# Patient Record
Sex: FEMALE | Race: WHITE | NOT HISPANIC OR LATINO | URBAN - METROPOLITAN AREA
[De-identification: names, ages, dates, MRNs, and addresses within clinical notes are randomized per-mention and may not be internally consistent; named-entity substitution may affect disease eponyms.]

---

## 2017-01-09 ENCOUNTER — COMMUNICATION - HEALTHEAST (OUTPATIENT)
Dept: SCHEDULING | Facility: CLINIC | Age: 37
End: 2017-01-09

## 2017-01-19 ENCOUNTER — TRANSFERRED RECORDS (OUTPATIENT)
Dept: HEALTH INFORMATION MANAGEMENT | Facility: CLINIC | Age: 37
End: 2017-01-19

## 2017-05-31 ENCOUNTER — TRANSFERRED RECORDS (OUTPATIENT)
Dept: HEALTH INFORMATION MANAGEMENT | Facility: CLINIC | Age: 37
End: 2017-05-31

## 2017-07-13 ENCOUNTER — TRANSFERRED RECORDS (OUTPATIENT)
Dept: HEALTH INFORMATION MANAGEMENT | Facility: CLINIC | Age: 37
End: 2017-07-13

## 2017-11-10 ENCOUNTER — COMMUNICATION - HEALTHEAST (OUTPATIENT)
Dept: FAMILY MEDICINE | Facility: CLINIC | Age: 37
End: 2017-11-10

## 2017-11-12 ENCOUNTER — TRANSFERRED RECORDS (OUTPATIENT)
Dept: HEALTH INFORMATION MANAGEMENT | Facility: CLINIC | Age: 37
End: 2017-11-12

## 2017-11-13 ENCOUNTER — TRANSFERRED RECORDS (OUTPATIENT)
Dept: HEALTH INFORMATION MANAGEMENT | Facility: CLINIC | Age: 37
End: 2017-11-13

## 2017-12-15 ENCOUNTER — SURGERY - HEALTHEAST (OUTPATIENT)
Dept: SURGERY | Facility: CLINIC | Age: 37
End: 2017-12-15

## 2017-12-24 ENCOUNTER — HOSPITAL ENCOUNTER (INPATIENT)
Facility: CLINIC | Age: 37
LOS: 1 days | Discharge: HOME | DRG: 372 | End: 2017-12-25
Attending: EMERGENCY MEDICINE | Admitting: INTERNAL MEDICINE
Payer: OTHER GOVERNMENT

## 2017-12-24 DIAGNOSIS — A09 DIARRHEA OF INFECTIOUS ORIGIN: ICD-10-CM

## 2017-12-24 DIAGNOSIS — A04.72 C. DIFFICILE ENTERITIS: ICD-10-CM

## 2017-12-24 DIAGNOSIS — A04.72 C. DIFFICILE COLITIS: ICD-10-CM

## 2017-12-24 DIAGNOSIS — K92.2 GASTROINTESTINAL HEMORRHAGE, UNSPECIFIED GASTROINTESTINAL HEMORRHAGE TYPE: ICD-10-CM

## 2017-12-24 LAB
ABO + RH BLD: NORMAL
ABO + RH BLD: NORMAL
ALBUMIN SERPL-MCNC: 3.6 G/DL (ref 3.4–5)
ALBUMIN UR-MCNC: 10 MG/DL
ALP SERPL-CCNC: 70 U/L (ref 40–150)
ALT SERPL W P-5'-P-CCNC: 19 U/L (ref 0–50)
ALT SERPL W P-5'-P-CCNC: ABNORMAL U/L (ref 0–50)
ANION GAP SERPL CALCULATED.3IONS-SCNC: 5 MMOL/L (ref 3–14)
APPEARANCE UR: ABNORMAL
AST SERPL W P-5'-P-CCNC: 9 U/L (ref 0–45)
AST SERPL W P-5'-P-CCNC: ABNORMAL U/L (ref 0–45)
BACTERIA #/AREA URNS HPF: ABNORMAL /HPF
BASOPHILS # BLD AUTO: 0.1 10E9/L (ref 0–0.2)
BASOPHILS NFR BLD AUTO: 0.5 %
BILIRUB SERPL-MCNC: 0.3 MG/DL (ref 0.2–1.3)
BILIRUB UR QL STRIP: NEGATIVE
BLD GP AB SCN SERPL QL: NORMAL
BLOOD BANK CMNT PATIENT-IMP: NORMAL
BUN SERPL-MCNC: 12 MG/DL (ref 7–30)
C DIFF TOX B STL QL: POSITIVE
CALCIUM SERPL-MCNC: 8.7 MG/DL (ref 8.5–10.1)
CHLORIDE SERPL-SCNC: 108 MMOL/L (ref 94–109)
CO2 SERPL-SCNC: 27 MMOL/L (ref 20–32)
COLOR UR AUTO: YELLOW
CREAT SERPL-MCNC: 0.47 MG/DL (ref 0.52–1.04)
CRP SERPL-MCNC: 3 MG/L (ref 0–8)
DIFFERENTIAL METHOD BLD: ABNORMAL
EOSINOPHIL # BLD AUTO: 0.5 10E9/L (ref 0–0.7)
EOSINOPHIL NFR BLD AUTO: 4.1 %
ERYTHROCYTE [DISTWIDTH] IN BLOOD BY AUTOMATED COUNT: 13 % (ref 10–15)
ERYTHROCYTE [DISTWIDTH] IN BLOOD BY AUTOMATED COUNT: 13.4 % (ref 10–15)
ERYTHROCYTE [SEDIMENTATION RATE] IN BLOOD BY WESTERGREN METHOD: 4 MM/H (ref 0–20)
GFR SERPL CREATININE-BSD FRML MDRD: >90 ML/MIN/1.7M2
GLUCOSE SERPL-MCNC: 82 MG/DL (ref 70–99)
GLUCOSE UR STRIP-MCNC: NEGATIVE MG/DL
HCT VFR BLD AUTO: 38.1 % (ref 35–47)
HCT VFR BLD AUTO: 40 % (ref 35–47)
HGB BLD-MCNC: 13.2 G/DL (ref 11.7–15.7)
HGB BLD-MCNC: 13.5 G/DL (ref 11.7–15.7)
HGB UR QL STRIP: ABNORMAL
IMM GRANULOCYTES # BLD: 0 10E9/L (ref 0–0.4)
IMM GRANULOCYTES NFR BLD: 0.2 %
INR PPP: 0.9 (ref 0.86–1.14)
KETONES UR STRIP-MCNC: NEGATIVE MG/DL
LEUKOCYTE ESTERASE UR QL STRIP: NEGATIVE
LYMPHOCYTES # BLD AUTO: 3.7 10E9/L (ref 0.8–5.3)
LYMPHOCYTES NFR BLD AUTO: 28.8 %
MCH RBC QN AUTO: 30.1 PG (ref 26.5–33)
MCH RBC QN AUTO: 30.8 PG (ref 26.5–33)
MCHC RBC AUTO-ENTMCNC: 33.8 G/DL (ref 31.5–36.5)
MCHC RBC AUTO-ENTMCNC: 34.6 G/DL (ref 31.5–36.5)
MCV RBC AUTO: 89 FL (ref 78–100)
MCV RBC AUTO: 89 FL (ref 78–100)
MONOCYTES # BLD AUTO: 1.2 10E9/L (ref 0–1.3)
MONOCYTES NFR BLD AUTO: 9.6 %
NEUTROPHILS # BLD AUTO: 7.3 10E9/L (ref 1.6–8.3)
NEUTROPHILS NFR BLD AUTO: 56.8 %
NITRATE UR QL: NEGATIVE
NRBC # BLD AUTO: 0 10*3/UL
NRBC BLD AUTO-RTO: 0 /100
PH UR STRIP: 5.5 PH (ref 5–7)
PLATELET # BLD AUTO: 253 10E9/L (ref 150–450)
PLATELET # BLD AUTO: 260 10E9/L (ref 150–450)
POTASSIUM SERPL-SCNC: 3.9 MMOL/L (ref 3.4–5.3)
POTASSIUM SERPL-SCNC: ABNORMAL MMOL/L (ref 3.4–5.3)
PROT SERPL-MCNC: 7.5 G/DL (ref 6.8–8.8)
RBC # BLD AUTO: 4.28 10E12/L (ref 3.8–5.2)
RBC # BLD AUTO: 4.48 10E12/L (ref 3.8–5.2)
RBC #/AREA URNS AUTO: 1 /HPF (ref 0–2)
SODIUM SERPL-SCNC: 140 MMOL/L (ref 133–144)
SOURCE: ABNORMAL
SP GR UR STRIP: 1.01 (ref 1–1.03)
SPECIMEN EXP DATE BLD: NORMAL
SPECIMEN SOURCE: ABNORMAL
SQUAMOUS #/AREA URNS AUTO: 4 /HPF (ref 0–1)
TRANS CELLS #/AREA URNS HPF: <1 /HPF (ref 0–1)
UROBILINOGEN UR STRIP-MCNC: NORMAL MG/DL (ref 0–2)
WBC # BLD AUTO: 10.4 10E9/L (ref 4–11)
WBC # BLD AUTO: 12.8 10E9/L (ref 4–11)
WBC #/AREA URNS AUTO: 1 /HPF (ref 0–2)

## 2017-12-24 PROCEDURE — 86850 RBC ANTIBODY SCREEN: CPT | Performed by: EMERGENCY MEDICINE

## 2017-12-24 PROCEDURE — 85025 COMPLETE CBC W/AUTO DIFF WBC: CPT | Performed by: EMERGENCY MEDICINE

## 2017-12-24 PROCEDURE — 84460 ALANINE AMINO (ALT) (SGPT): CPT | Performed by: EMERGENCY MEDICINE

## 2017-12-24 PROCEDURE — 25000128 H RX IP 250 OP 636: Performed by: EMERGENCY MEDICINE

## 2017-12-24 PROCEDURE — 25000132 ZZH RX MED GY IP 250 OP 250 PS 637: Performed by: INTERNAL MEDICINE

## 2017-12-24 PROCEDURE — 96361 HYDRATE IV INFUSION ADD-ON: CPT | Performed by: EMERGENCY MEDICINE

## 2017-12-24 PROCEDURE — 96374 THER/PROPH/DIAG INJ IV PUSH: CPT | Performed by: EMERGENCY MEDICINE

## 2017-12-24 PROCEDURE — 25000128 H RX IP 250 OP 636: Performed by: INTERNAL MEDICINE

## 2017-12-24 PROCEDURE — 99285 EMERGENCY DEPT VISIT HI MDM: CPT | Mod: Z6 | Performed by: EMERGENCY MEDICINE

## 2017-12-24 PROCEDURE — 86140 C-REACTIVE PROTEIN: CPT | Performed by: EMERGENCY MEDICINE

## 2017-12-24 PROCEDURE — 85652 RBC SED RATE AUTOMATED: CPT | Performed by: EMERGENCY MEDICINE

## 2017-12-24 PROCEDURE — 84450 TRANSFERASE (AST) (SGOT): CPT | Performed by: EMERGENCY MEDICINE

## 2017-12-24 PROCEDURE — 96376 TX/PRO/DX INJ SAME DRUG ADON: CPT | Performed by: EMERGENCY MEDICINE

## 2017-12-24 PROCEDURE — 84132 ASSAY OF SERUM POTASSIUM: CPT | Performed by: EMERGENCY MEDICINE

## 2017-12-24 PROCEDURE — 12000008 ZZH R&B INTERMEDIATE UMMC

## 2017-12-24 PROCEDURE — 25000132 ZZH RX MED GY IP 250 OP 250 PS 637: Performed by: STUDENT IN AN ORGANIZED HEALTH CARE EDUCATION/TRAINING PROGRAM

## 2017-12-24 PROCEDURE — 99285 EMERGENCY DEPT VISIT HI MDM: CPT | Mod: 25 | Performed by: EMERGENCY MEDICINE

## 2017-12-24 PROCEDURE — 25000125 ZZHC RX 250: Performed by: STUDENT IN AN ORGANIZED HEALTH CARE EDUCATION/TRAINING PROGRAM

## 2017-12-24 PROCEDURE — 85610 PROTHROMBIN TIME: CPT | Performed by: EMERGENCY MEDICINE

## 2017-12-24 PROCEDURE — 80053 COMPREHEN METABOLIC PANEL: CPT | Performed by: EMERGENCY MEDICINE

## 2017-12-24 PROCEDURE — 36415 COLL VENOUS BLD VENIPUNCTURE: CPT | Performed by: NURSE PRACTITIONER

## 2017-12-24 PROCEDURE — 81001 URINALYSIS AUTO W/SCOPE: CPT | Performed by: STUDENT IN AN ORGANIZED HEALTH CARE EDUCATION/TRAINING PROGRAM

## 2017-12-24 PROCEDURE — 86901 BLOOD TYPING SEROLOGIC RH(D): CPT | Performed by: EMERGENCY MEDICINE

## 2017-12-24 PROCEDURE — 40000556 ZZH STATISTIC PERIPHERAL IV START W US GUIDANCE

## 2017-12-24 PROCEDURE — 96375 TX/PRO/DX INJ NEW DRUG ADDON: CPT | Performed by: EMERGENCY MEDICINE

## 2017-12-24 PROCEDURE — 85027 COMPLETE CBC AUTOMATED: CPT | Performed by: NURSE PRACTITIONER

## 2017-12-24 PROCEDURE — 87493 C DIFF AMPLIFIED PROBE: CPT | Performed by: EMERGENCY MEDICINE

## 2017-12-24 PROCEDURE — 86900 BLOOD TYPING SEROLOGIC ABO: CPT | Performed by: EMERGENCY MEDICINE

## 2017-12-24 PROCEDURE — 25000125 ZZHC RX 250: Performed by: EMERGENCY MEDICINE

## 2017-12-24 PROCEDURE — 25000125 ZZHC RX 250: Performed by: NURSE PRACTITIONER

## 2017-12-24 PROCEDURE — 99223 1ST HOSP IP/OBS HIGH 75: CPT | Mod: AI | Performed by: INTERNAL MEDICINE

## 2017-12-24 RX ORDER — ONDANSETRON 2 MG/ML
4 INJECTION INTRAMUSCULAR; INTRAVENOUS EVERY 30 MIN PRN
Status: DISCONTINUED | OUTPATIENT
Start: 2017-12-24 | End: 2017-12-24

## 2017-12-24 RX ORDER — HYDROMORPHONE HYDROCHLORIDE 1 MG/ML
0.5 INJECTION, SOLUTION INTRAMUSCULAR; INTRAVENOUS; SUBCUTANEOUS
Status: COMPLETED | OUTPATIENT
Start: 2017-12-24 | End: 2017-12-24

## 2017-12-24 RX ORDER — NALOXONE HYDROCHLORIDE 0.4 MG/ML
.1-.4 INJECTION, SOLUTION INTRAMUSCULAR; INTRAVENOUS; SUBCUTANEOUS
Status: DISCONTINUED | OUTPATIENT
Start: 2017-12-24 | End: 2017-12-25 | Stop reason: HOSPADM

## 2017-12-24 RX ORDER — ACETAMINOPHEN 325 MG/1
650 TABLET ORAL EVERY 4 HOURS PRN
Status: DISCONTINUED | OUTPATIENT
Start: 2017-12-24 | End: 2017-12-25 | Stop reason: HOSPADM

## 2017-12-24 RX ORDER — LIDOCAINE 4 G/G
1 PATCH TOPICAL
Status: DISCONTINUED | OUTPATIENT
Start: 2017-12-24 | End: 2017-12-25 | Stop reason: HOSPADM

## 2017-12-24 RX ORDER — OXYCODONE HYDROCHLORIDE 5 MG/1
5 TABLET ORAL
Status: COMPLETED | OUTPATIENT
Start: 2017-12-24 | End: 2017-12-24

## 2017-12-24 RX ORDER — HYDROMORPHONE HYDROCHLORIDE 1 MG/ML
0.4 INJECTION, SOLUTION INTRAMUSCULAR; INTRAVENOUS; SUBCUTANEOUS ONCE
Status: COMPLETED | OUTPATIENT
Start: 2017-12-24 | End: 2017-12-24

## 2017-12-24 RX ORDER — SODIUM CHLORIDE 9 MG/ML
1000 INJECTION, SOLUTION INTRAVENOUS CONTINUOUS
Status: DISCONTINUED | OUTPATIENT
Start: 2017-12-24 | End: 2017-12-25 | Stop reason: HOSPADM

## 2017-12-24 RX ORDER — ONDANSETRON 2 MG/ML
4-8 INJECTION INTRAMUSCULAR; INTRAVENOUS EVERY 6 HOURS PRN
Status: DISCONTINUED | OUTPATIENT
Start: 2017-12-24 | End: 2017-12-25 | Stop reason: HOSPADM

## 2017-12-24 RX ORDER — DULOXETIN HYDROCHLORIDE 20 MG/1
20 CAPSULE, DELAYED RELEASE ORAL EVERY EVENING
Status: DISCONTINUED | OUTPATIENT
Start: 2017-12-24 | End: 2017-12-24

## 2017-12-24 RX ORDER — DIAZEPAM 5 MG
5 TABLET ORAL EVERY 8 HOURS PRN
Status: DISCONTINUED | OUTPATIENT
Start: 2017-12-24 | End: 2017-12-25 | Stop reason: HOSPADM

## 2017-12-24 RX ORDER — DIAZEPAM 10 MG/2ML
5 INJECTION, SOLUTION INTRAMUSCULAR; INTRAVENOUS ONCE
Status: COMPLETED | OUTPATIENT
Start: 2017-12-24 | End: 2017-12-24

## 2017-12-24 RX ORDER — ESTRADIOL 0.1 MG/D
1 FILM, EXTENDED RELEASE TRANSDERMAL
COMMUNITY

## 2017-12-24 RX ORDER — ESZOPICLONE 1 MG/1
2 TABLET, FILM COATED ORAL AT BEDTIME
Status: DISCONTINUED | OUTPATIENT
Start: 2017-12-24 | End: 2017-12-25 | Stop reason: HOSPADM

## 2017-12-24 RX ORDER — OXYCODONE HYDROCHLORIDE 5 MG/1
5 TABLET ORAL EVERY 6 HOURS PRN
Status: DISCONTINUED | OUTPATIENT
Start: 2017-12-24 | End: 2017-12-25 | Stop reason: HOSPADM

## 2017-12-24 RX ORDER — ONDANSETRON 4 MG/1
4 TABLET, ORALLY DISINTEGRATING ORAL EVERY 6 HOURS PRN
Status: DISCONTINUED | OUTPATIENT
Start: 2017-12-24 | End: 2017-12-25 | Stop reason: HOSPADM

## 2017-12-24 RX ORDER — DULOXETIN HYDROCHLORIDE 30 MG/1
60 CAPSULE, DELAYED RELEASE ORAL EVERY EVENING
Status: DISCONTINUED | OUTPATIENT
Start: 2017-12-24 | End: 2017-12-25 | Stop reason: HOSPADM

## 2017-12-24 RX ADMIN — Medication 0.5 MG: at 04:14

## 2017-12-24 RX ADMIN — Medication 125 MG: at 16:34

## 2017-12-24 RX ADMIN — PANTOPRAZOLE SODIUM 80 MG: 40 INJECTION, POWDER, FOR SOLUTION INTRAVENOUS at 12:23

## 2017-12-24 RX ADMIN — METRONIDAZOLE 500 MG: 500 INJECTION, SOLUTION INTRAVENOUS at 18:02

## 2017-12-24 RX ADMIN — Medication 125 MG: at 23:16

## 2017-12-24 RX ADMIN — Medication 0.5 MG: at 03:13

## 2017-12-24 RX ADMIN — SODIUM CHLORIDE 1000 ML: 9 INJECTION, SOLUTION INTRAVENOUS at 03:13

## 2017-12-24 RX ADMIN — ESZOPICLONE 2 MG: 1 TABLET, FILM COATED ORAL at 23:17

## 2017-12-24 RX ADMIN — METRONIDAZOLE 500 MG: 500 INJECTION, SOLUTION INTRAVENOUS at 12:17

## 2017-12-24 RX ADMIN — ONDANSETRON 4 MG: 2 INJECTION INTRAMUSCULAR; INTRAVENOUS at 22:01

## 2017-12-24 RX ADMIN — Medication 0.4 MG: at 10:40

## 2017-12-24 RX ADMIN — ONDANSETRON 4 MG: 2 INJECTION INTRAMUSCULAR; INTRAVENOUS at 03:13

## 2017-12-24 RX ADMIN — DIAZEPAM 5 MG: 5 INJECTION, SOLUTION INTRAMUSCULAR; INTRAVENOUS at 23:09

## 2017-12-24 RX ADMIN — ONDANSETRON 4 MG: 4 TABLET, ORALLY DISINTEGRATING ORAL at 20:58

## 2017-12-24 RX ADMIN — PANTOPRAZOLE SODIUM 80 MG: 40 INJECTION, POWDER, FOR SOLUTION INTRAVENOUS at 04:15

## 2017-12-24 RX ADMIN — Medication 0.5 MG: at 05:38

## 2017-12-24 RX ADMIN — OXYCODONE HYDROCHLORIDE 5 MG: 5 TABLET ORAL at 12:29

## 2017-12-24 RX ADMIN — Medication 125 MG: at 13:29

## 2017-12-24 RX ADMIN — DULOXETINE HYDROCHLORIDE 60 MG: 30 CAPSULE, DELAYED RELEASE ORAL at 23:17

## 2017-12-24 RX ADMIN — DIAZEPAM 5 MG: 5 TABLET ORAL at 15:54

## 2017-12-24 RX ADMIN — OXYCODONE HYDROCHLORIDE 5 MG: 5 TABLET ORAL at 23:16

## 2017-12-24 RX ADMIN — ONDANSETRON 4 MG: 2 INJECTION INTRAMUSCULAR; INTRAVENOUS at 06:57

## 2017-12-24 RX ADMIN — SODIUM CHLORIDE 1000 ML: 9 INJECTION, SOLUTION INTRAVENOUS at 04:29

## 2017-12-24 RX ADMIN — SODIUM CHLORIDE 1000 ML: 9 INJECTION, SOLUTION INTRAVENOUS at 15:55

## 2017-12-24 RX ADMIN — OXYCODONE HYDROCHLORIDE 5 MG: 5 TABLET ORAL at 23:39

## 2017-12-24 ASSESSMENT — ENCOUNTER SYMPTOMS
VOMITING: 1
BLOOD IN STOOL: 1
DIZZINESS: 1
CONFUSION: 1
FATIGUE: 1
DIARRHEA: 1
FEVER: 1
NAUSEA: 1
ABDOMINAL PAIN: 1

## 2017-12-24 NOTE — IP AVS SNAPSHOT
MRN:[id number]                      After Visit Summary   12/24/2017    Shanda Hinds    MRN: [id number]           Thank you!     Thank you for choosing [facility] for your care. Our goal is always to provide you with excellent care. Hearing back from our patients is one way we can continue to improve our services. Please take a few minutes to complete the written survey that you may receive in the mail after you visit with us. Thank you!        Patient Information     Date Of Birth          1980        Designated Caregiver       Most Recent Value    Caregiver    Will someone help with your care after discharge? no      About your hospital stay     You were admitted on:  December 24, 2017 You last received care in the:  [address] [facility]    You were discharged on:  December 25, 2017        Reason for your hospital stay       Dear Ms. Hinds - You were hospitalized due to abdominal pain and diarrhea. The diarrhea may be due to CDiff, but your labs did not show evidence of inflammation or dehydration. Your vital signs remained stable throughout the admission. You may have irritable bowel symptoms as a result of your recent infection. We have placed a referral for you to be seen in our GI clinic. You will be contacted with an appointment time. Please also see your primary care doctor at the next available time for post-hospital followup.                  Who to Call     For medical emergencies, please call 911.  For non-urgent questions about your medical care, please call your primary care provider or clinic, [phone]          Attending Provider     Provider Specialty    Kori Jara MD Emergency Medicine    Yecenia Hart MD Internal Medicine       Primary Care Provider Office Phone # Fax #    [facility] [phone]      After Care Instructions     Activity       Your activity upon discharge: activity as tolerated            Diet       Follow this  "diet upon discharge: Orders Placed This Encounter      Regular Diet Adult                  Follow-up Appointments     Adult [facility] Follow-up and recommended labs and tests       GI clinic.     Appointments on [place] and/or [facility] (with [facility] or [facility] provider or service). Call [phone] if you haven't heard regarding these appointments within 7 days of discharge.                  Additional Services     GASTROENTEROLOGY ADULT REF CONSULT ONLY       Preferred Location: [facility] [phone]      Please be aware that coverage of these services is subject to the terms and limitations of your health insurance plan.  Call member services at your health plan with any benefit or coverage questions.  Any procedures must be performed at a [facility] facility OR coordinated by your clinic's referral office.    Please bring the following with you to your appointment:    (1) Any X-Rays, CTs or MRIs which have been performed.  Contact the facility where they were done to arrange for  prior to your scheduled appointment.    (2) List of current medications   (3) This referral request   (4) Any documents/labs given to you for this referral                  Pending Results     Date and Time Order Name Status Description    2017 1232 Drug Abuse Scr  Ur w Qnt Reflx In process     2017 0822 XR Abdomen 2 Views In process     2017 1928 Calprotectin Feces In process     2017 191 Enteric Bacteria and Virus Panel by WYATT Stool In process             Admission Information     Date & Time Provider Department Dept. Phone    2017 Yecenia Hart MD [address] [facility] [place] [phone]      Your Vitals Were     Blood Pressure Pulse Temperature Respirations Height Weight    145/85 (BP Location: Right arm) 94 97.5  F (36.4  C) (Oral) 16 1.676 m (5' 6\") 81.6 kg (180 lb)    Pulse Oximetry BMI (Body Mass Index)                100% 29.05 kg/m2          MyChart Information     MyChart " "lets you send messages to your doctor, view your test results, renew your prescriptions, schedule appointments and more. To sign up, go to [email or web address] . Click on \"Log in\" on the left side of the screen, which will take you to the Welcome page. Then click on \"Sign up Now\" on the right side of the page.     You will be asked to enter the access code listed below, as well as some personal information. Please follow the directions to create your username and password.     Your access code is: [email or web address]  Expires: 3/25/2018  3:29 PM     Your access code will  in 90 days. If you need help or a new code, please call your [facility] clinic or [phone].        Care EveryWhere ID     This is your Care EveryWhere ID. This could be used by other organizations to access your [facility] medical records  [id number]        Equal Access to Services     [facility] : Yfn Estrada, davida william, aron lopez, isaac leach . So [facility] [phone].    ATENCIÓN: Si habla español, tiene a johnson disposición servicios gratuitos de asistencia lingüística. Llame al [phone].    We comply with applicable federal civil rights laws and [place] laws. We do not discriminate on the basis of race, color, national origin, age, disability, sex, sexual orientation, or gender identity.               Review of your medicines      START taking        Dose / Directions    vancomycin 125 MG capsule   Commonly known as:  VANCOCIN   Used for:  C. difficile colitis        Dose:  125 mg   Take 1 capsule (125 mg) by mouth 4 times daily 4 times daily until . 3 times daily  - . Twice daily until  - . Daily -.   Quantity:  1 capsule   Refills:  3         CONTINUE these medicines which have NOT CHANGED        Dose / Directions    CYMBALTA PO        Dose:  60 mg   Take 60 mg by mouth every evening   Refills:  0       estradiol 0.1 MG/24HR BIW patch   Commonly " known as:  VIVELLE-DOT        Dose:  1 patch   Place 1 patch onto the skin twice a week On Wednesdays and Sundays.   Refills:  0       HAIR SKIN & NAILS GUMMIES PO        Take 2 gummies by mouth per day.   Refills:  0       IMITREX PO        Dose:  25 mg   Take 25 mg by mouth daily as needed   Refills:  0       LUNESTA PO        Dose:  2 mg   Take 2 mg by mouth At Bedtime   Refills:  0       oxyCODONE-acetaminophen 5-325 MG per tablet   Commonly known as:  PERCOCET        Dose:  1-2 tablet   Take 1-2 tablets by mouth every 4 hours as needed for pain   Quantity:  15 tablet   Refills:  0       VALIUM PO        Dose:  5 mg   Take 5 mg by mouth every 8 hours as needed for anxiety   Refills:  0         STOP taking     IBUPROFEN PO                Where to get your medicines      Some of these will need a paper prescription and others can be bought over the counter. Ask your nurse if you have questions.     Bring a paper prescription for each of these medications     vancomycin 125 MG capsule               ANTIBIOTIC INSTRUCTION     You've Been Prescribed an Antibiotic - Now What?  Your healthcare team thinks that you or your loved one might have an infection. Some infections can be treated with antibiotics, which are powerful, life-saving drugs. Like all medications, antibiotics have side effects and should only be used when necessary. There are some important things you should know about your antibiotic treatment.      Your healthcare team may run tests before you start taking an antibiotic.    Your team may take samples (e.g., from your blood, urine or other areas) to run tests to look for bacteria. These test can be important to determine if you need an antibiotic at all and, if you do, which antibiotic will work best.      Within a few days, your healthcare team might change or even stop your antibiotic.    Your team may start you on an antibiotic while they are working to find out what is making you sick.    Your  team might change your antibiotic because test results show that a different antibiotic would be better to treat your infection.    In some cases, once your team has more information, they learn that you do not need an antibiotic at all. They may find out that you don't have an infection, or that the antibiotic you're taking won't work against your infection. For example, an infection caused by a virus can't be treated with antibiotics. Staying on an antibiotic when you don't need it is more likely to be harmful than helpful.      You may experience side effects from your antibiotic.    Like all medications, antibiotics have side effects. Some of these can be serious.    Let you healthcare team know if you have any known allergies when you are admitted to the hospital.    One significant side effect of nearly all antibiotics is the risk of severe and sometimes deadly diarrhea caused by Clostridium difficile (C. Difficile). This occurs when a person takes antibiotics because some good germs are destroyed. Antibiotic use allows C. diificile to take over, putting patients at high risk for this serious infection.    As a patient or caregiver, it is important to understand your or your loved one's antibiotic treatment. It is especially important for caregivers to speak up when patients can't speak for themselves. Here are some important questions to ask your healthcare team.    What infection is this antibiotic treating and how do you know I have that infection?    What side effects might occur from this antibiotic?    How long will I need to take this antibiotic?    Is it safe to take this antibiotic with other medications or supplements (e.g., vitamins) that I am taking?     Are there any special directions I need to know about taking this antibiotic? For example, should I take it with food?    How will I be monitored to know whether my infection is responding to the antibiotic?    What tests may help to make sure the  right antibiotic is prescribed for me?      Information provided by:  www.cdc.gov/getsmart  U.S. Department of Health and Human Services  Centers for disease Control and Prevention  National Center for Emerging and Zoonotic Infectious Diseases  Division of Healthcare Quality Promotion         Protect others around you: Learn how to safely use, store and throw away your medicines at www.disposemymeds.org.             Medication List: This is a list of all your medications and when to take them. Check marks below indicate your daily home schedule. Keep this list as a reference.      Medications           Morning Afternoon Evening Bedtime As Needed    CYMBALTA PO   Take 60 mg by mouth every evening   Last time this was given:  60 mg on 12/24/2017 11:17 PM                                estradiol 0.1 MG/24HR BIW patch   Commonly known as:  VIVELLE-DOT   Place 1 patch onto the skin twice a week On Wednesdays and Sundays.                                HAIR SKIN & NAILS GUMMIES PO   Take 2 gummies by mouth per day.                                IMITREX PO   Take 25 mg by mouth daily as needed                                LUNESTA PO   Take 2 mg by mouth At Bedtime   Last time this was given:  2 mg on 12/24/2017 11:17 PM                                oxyCODONE-acetaminophen 5-325 MG per tablet   Commonly known as:  PERCOCET   Take 1-2 tablets by mouth every 4 hours as needed for pain                                VALIUM PO   Take 5 mg by mouth every 8 hours as needed for anxiety   Last time this was given:  5 mg on 12/25/2017  2:18 PM                                vancomycin 125 MG capsule   Commonly known as:  VANCOCIN   Take 1 capsule (125 mg) by mouth 4 times daily 4 times daily until 1/8. 3 times daily 1/8 - 1/22. Twice daily until 1/22 - 2/5. Daily 2/5-19.

## 2017-12-24 NOTE — ED PROVIDER NOTES
History     Chief Complaint   Patient presents with     Rectal Bleeding     Abdominal Pain     HPI  Shanda Hinds is a 37 year old female who presents with rectal bleeding and hematemesis.  Patient has a history of an injury to her lower abdomen resulting in bilateral oophorectomy, hysterectomy, and prior  uretero-vaginal fistula repair.  Per chart review, the patient was diagnosed with C. difficile colitis in early November with concurrent concern for development of rectovaginal fistula.  She was originally treated with oral vancomycin which she reports she has been taking for the past 2.5 weeks.  She has also been seen in the emergency department across multiple health systems six times since her original diagnosis, most recently two days ago at [facility].     In the ED, she states she has had 3 days of worsening bloody stools that started with dark streaking and now almost completely bloody.  She states she is having 15-20 episodes per day.  She is also had worsening right upper quadrant abdominal pain for the past 3 days along with 2 days of fever, up to 103F tonight, and nausea.  She states she has felt dizzy, fatigued and confused.  She had one episode of hematemesis in the ED tonight. She states she has been tolerating p.o. liquids and soft foods.    Past Medical History:   Diagnosis Date     Cystic fibrosis (H)      Depressive disorder      Insomnia      PTSD (post-traumatic stress disorder)        Past Surgical History:   Procedure Laterality Date     ABDOMEN SURGERY       CHOLECYSTECTOMY       GYN SURGERY      fibroids, endometriosis, torsion, adhesion removal     HYSTERECTOMY       SLING BLADDER SUSPENSION WITH FASCIA EILEEN         No family history on file.    Social History   Substance Use Topics     Smoking status: Current Every Day Smoker     Smokeless tobacco: Never Used     Alcohol use Yes      Comment: socially        Current Facility-Administered Medications   Medication     0.9% sodium  "chloride BOLUS    Followed by     0.9% sodium chloride infusion     ondansetron (ZOFRAN) injection 4 mg     HYDROmorphone (PF) (DILAUDID) injection 0.5 mg     Current Outpatient Prescriptions   Medication     DULoxetine HCl (CYMBALTA PO)     SUMAtriptan Succinate (IMITREX PO)     Zolpidem Tartrate (AMBIEN PO)     ciprofloxacin (CIPRO) 500 MG tablet     oxyCODONE-acetaminophen (PERCOCET) 5-325 MG per tablet        Allergies   Allergen Reactions     Codeine      Hives, vomiting      Morphine      Hives, vomiting          I have reviewed the Medications, Allergies, Past Medical and Surgical History, and Social History in the Epic system.    Review of Systems   Constitutional: Positive for fatigue and fever.   Gastrointestinal: Positive for abdominal pain (RUQ), blood in stool, diarrhea, nausea and vomiting (hematemesis).   Neurological: Positive for dizziness.   Psychiatric/Behavioral: Positive for confusion.       Physical Exam   BP: (!) 176/116  Pulse: 94  Temp: 98.9  F (37.2  C)  Resp: 20  Height: 167.6 cm (5' 6\")  Weight: 81.6 kg (180 lb)  SpO2: 99 %      Physical Exam   Constitutional: She is oriented to person, place, and time. She appears well-developed and well-nourished.   HENT:   Head: Normocephalic and atraumatic.   Neck: Normal range of motion. Neck supple.   Cardiovascular: Normal rate, regular rhythm and normal heart sounds.    Pulmonary/Chest: Effort normal and breath sounds normal. No respiratory distress. She has no wheezes. She has no rales.   Abdominal: Soft. She exhibits no distension. There is tenderness (mild right lower abd tenderness). There is no rebound.   Genitourinary:   Genitourinary Comments: Red blood per rectum; liquid stool   Musculoskeletal: She exhibits no tenderness.   Neurological: She is alert and oriented to person, place, and time.   Skin: Skin is warm and dry.   Psychiatric: She has a normal mood and affect. Her behavior is normal. Thought content normal.       ED Course     ED " Course     Procedures             Critical Care time:  none             Labs Ordered and Resulted from Time of ED Arrival Up to the Time of Departure from the ED - No data to display         Assessments & Plan (with Medical Decision Making)   Pt is a 36 yo female who known C.diff colitis who presents to the ER for increased diarrhea and now persistent bloody stools despite taking oral vancomycin at home. Pt says that she feels her c.diff is worsening and now her stools are bloody. Per care everywhere, pt had a CT abd/pelvis 2 days prior that showed right sided colitis. This is likely what's causing her continued pain and bloody diarrhea.  Pt with stable Hgb.  She had 1 episode of unwitnessed bloody vomit so pt was given 1 dose of IV protonix.  Feel that bleeding is likely form colitis verse active upper  GI bleed.  Pt will be admitted to IM for worsening C.diff colitis and worsening GI bleed.  Pt accepted by triage hospitalist. Pt agrees with plan of care.     I have reviewed the nursing notes.    I have reviewed the findings, diagnosis, plan and need for follow up with the patient.    New Prescriptions    No medications on file       Final diagnoses:   Diarrhea of infectious origin   Gastrointestinal hemorrhage, unspecified gastrointestinal hemorrhage type   C. difficile colitis   IRodrick, am serving as a trained medical scribe to document services personally performed by Kori Jara MD, based on the provider's statements to me.      Kori ARROYO MD, was physically present and have reviewed and verified the accuracy of this note documented by Rodrick Terrazas.       12/24/2017   [facility], [place], EMERGENCY DEPARTMENT     Kori Jara MD  12/24/17 0529

## 2017-12-24 NOTE — IP AVS SNAPSHOT
[address] [address]    Phone:  [phone]                                       After Visit Summary   12/24/2017    Shanda Hinds    MRN: [id number]           After Visit Summary Signature Page     I have received my discharge instructions, and my questions have been answered. I have discussed any challenges I see with this plan with the nurse or doctor.    ..........................................................................................................................................  Patient/Patient Representative Signature      ..........................................................................................................................................  Patient Representative Print Name and Relationship to Patient    ..................................................               ................................................  Date                                            Time    ..........................................................................................................................................  Reviewed by Signature/Title    ...................................................              ..............................................  Date                                                            Time

## 2017-12-24 NOTE — ED NOTES
Pt arrives with complaints of RLQ abdominal pain, bloody stools, and a fever of 103.1 at home. Pt took tylenol and ibuprofen at 2330. She states she was recently admitted for cdfiff and has been taking antibiotics as prescribed at home. Pt states all of her BMs have contained dark red blood for the last four days.

## 2017-12-24 NOTE — H&P
[facility], [place]    Internal Medicine History and Physical - [facility] Service       Date of Admission:  12/24/2017    Chief Complaint   Abdominal Pain, increasing diarrhea     History is obtained from the patient    History of Present Illness   Shanda Hinds is a 37 year old female with pmh of multiple abdominal surgeries, with complications including constipation, concern for colovaginal fistula, hx of c.diff treated with oral vancomycin with recurrence, presenting to ED for evaluation of hematochezia and lower abdominal pain.     Of note patient was seen at OSHs for the past month with initial diagnosis of c.diff colitis in early November and treated with oral vancomycin taper. She was then hospitalized on 11/25 to 11/26 at OSH for recurrence of c diff. On 12/10 she again presented to OSH ED for fever, diarrhea and hematochezia. CT on 12/10 demonstrated no evidence of acute abdominal pathology or rectovaginal fistula - a previous concern given hx of multiple abdominal surgeries. She again was seen on the ED 12/20 where she reported hematochezia and abdominal pain. She was discharged and scheduled for a colonoscopy on 12/21 which did not occur. She returned to ED on 12/22 with ongoing abdominal pain and hematochezia, where they obtained a repeat abdominal CT demonstrating right colonic wall thickening with possibility of colitis, new from previous imaging that month. She was discharged with oral metronidazole for treatment of cdiff and colitis.     Since then she reports ongoing abdominal pain in her lower right quadrant. Poor PO intake, endorses one episode of maroon blood tingled emesis. She reports taking about 600 BID of IBU recently for the abdominal pain. No previous history Upper gi bleed or hematemesis.   She reports previous colonoscopy in July 2017 for evaluation of possible colo-vesicular fistula secondary to multiple abdominal surgeries. She endorses hematochezia for  past 5 days. Worsening diarrhea up to every hour. She endorses fevers as high as 103. She is dizzy and confused, feels light headed like she will faint.  She denies dysuria or changes in vaginal bleeding or discharge.     In the ED, she was noted to be hemodynamically stable with Hgb s at baseline. She was started in IV PPI.     Review of Systems   The 10 point Review of Systems is negative other than noted in the HPI or here.     Past Medical History    I have reviewed this patient's medical history and updated it with pertinent information if needed.   Past Medical History:   Diagnosis Date     Cystic fibrosis (H)      Depressive disorder      Insomnia      PTSD (post-traumatic stress disorder)        Past Surgical History   I have reviewed this patient's surgical history and updated it with pertinent information if needed.  Past Surgical History:   Procedure Laterality Date     ABDOMEN SURGERY       CHOLECYSTECTOMY       GYN SURGERY      fibroids, endometriosis, torsion, adhesion removal     HYSTERECTOMY       SLING BLADDER SUSPENSION WITH FASCIA EILEEN          Social History   Lives with  and two children. Does not work. Smokes 1/2 ppd. Social alcohol drinking 1-2 times a week. No other drug use.      Family History   No family hx of chrons, UC    Prior to Admission Medications   Prior to Admission Medications   Prescriptions Last Dose Informant Patient Reported? Taking?   DULoxetine HCl (CYMBALTA PO) Unknown at Unknown time  Yes No   SUMAtriptan Succinate (IMITREX PO) Unknown at Unknown time  Yes No   Zolpidem Tartrate (AMBIEN PO) Unknown at Unknown time  Yes No   Sig: Take 10 mg by mouth nightly as needed for sleep   ciprofloxacin (CIPRO) 500 MG tablet Unknown at Unknown time  No No   Sig: Take 1 tablet (500 mg) by mouth 2 times daily   oxyCODONE-acetaminophen (PERCOCET) 5-325 MG per tablet Unknown at Unknown time  No No   Sig: Take 1-2 tablets by mouth every 4 hours as needed for pain       Facility-Administered Medications: None     Allergies   Allergies   Allergen Reactions     Codeine      Hives, vomiting      Morphine      Hives, vomiting        Physical Exam   Vital Signs: Temp: 98.9  F (37.2  C) Temp src: Oral BP: 129/84 Pulse: 94 Heart Rate: 76 Resp: 9 SpO2: 97 % O2 Device: None (Room air)    Weight: 180 lbs 0 oz    General Appearance: nontoxic, curled up in bed  Eyes: no scleral icterus, EOMI, PERRLA  HEENT: NCAT,nares aptent, MMM, no oral lesions  Respiratory: CTAB, no crackles, wheezes  Cardiovascular: RRR, no murmur   GI: soft, no distention, multiple abdominal scars c/d/i, rebound tenderness RLQ. No peritoneal signs   Lymph/Hematologic: no bruises, generalized LAD  Skin: multiple tattoos   Musculoskeletal: no jnt pain, no peripheral edema  Neurologic: CN 2-10 intact, no focal deficits   Psychiatric: appropriate     OSH IMAGING    Abd CT 12/22  1.  Right colonic wall thickening not excluded. Correlate for colitis.   Underdistention of colon reduces evaluation for early changes of colitis.   No bowel obstruction or abscess. Appendix is normal.    Assessment & Plan   Shanda Hinds is a 37 year old female w/ pmh of multiple abdominal surgeries, recurrent cdiff infection, presenting for abdominal pain and hematochezia in setting of OSH imaging demonstrating possible new colitis. She remains hemodynamically stable without evidence of Hgb drop from baseline.     # Diverticulitis   #Hx of C diff colitis complicated by recurrence  #Hematochezia   Diff Dx includes recurrence of cdiff complicated by ongoing colitis demonstrating on OSH Abdominal CT with new right colonic wall thickening. Other possibilities include complication secondary to hx of recto-vaginal fistula previously noted in OSH documents and following for with colorectal surgery. Recent imaging w/o documentation of such complications and XR Gastrografin Enema on 11/17 w/o evidence of recto-vaginal fistula. Mild leukocytosis here  consistent with colitis. She remains hemodynamically stable, without evidence of hematochezia resulting to orthostatics, hgb stable, at baseline. Concern for gastritis in setting of IBU use however low concern for Upper GI bleed.   - repeat labs: CMP, Lipase, CBC w/ diff, UA  - C diff PCR positive - given volume of diarrhea will consider true positive and treat, although high likelihood of PCR being positive simply in setting of recent infection.    - restart vancomycin 125mg QID, consider long taper   - Add IV metronidazole 500mg q6hr for colitis.   - NPO   - GI Luminal Consult for evaluation of colonoscopy, given OSH recommended patient obtain one on 12/21. However with imaging of possible colitis on OSH CT would consider repeating abdominal imaging here.   - Cont IV PPI, consider switching to omeprazole upon further evaluation     - Avoid IBU    #Hx of C diff colitis complicated by recurrence  - Restart 125mg vancomycin QID  - Start IV metronidazole. 500mg q6hr   - enteric precautions   - Will need long vancomycin taper    #Chronic Abdominal Pain   - Cont PTA Cymbalta   - Consider pain consult  - Topical lidocaine patches   - Heat packs PRN     #Anxiety  #Hx of PTSD  - Takes xanax 1 mg BID - not occurred will pain control   - Hx of being on prazosin 1 mg QHS     Diet:  NPO  Fluids:  cc/hr   DVT Prophylaxis: Mechanical in setting of eval for GI bleed   Code Status: Full Code    Disposition Plan   Expected discharge: 2 - 3 days; recommended to prior living arrangement once adequate pain management/ tolerating PO medications.     Entered: Justine Hernandez 12/24/2017, 5:18 AM   Information in the above section will display in the discharge planner report.    Will be staffed in the morning.     Justine Hernandez  [facility]   Pager: [phone]   Please see sticky note for cross cover information      Data   Data     Recent Labs  Lab 12/24/17  0540 12/24/17  0306   WBC  --   "12.8*   HGB  --  13.5   MCV  --  89   PLT  --  260   INR  --  0.90   NA  --  140   POTASSIUM 3.9 Unsatisfactory specimen - grossly hemolyzed   CHLORIDE  --  108   CO2  --  27   BUN  --  12   CR  --  0.47*   ANIONGAP  --  5   CASSIE  --  8.7   GLC  --  82   ALBUMIN  --  3.6   PROTTOTAL  --  7.5   BILITOTAL  --  0.3   ALKPHOS  --  70   ALT 19 Unsatisfactory specimen - hemolyzed   AST 9 Unsatisfactory specimen - grossly hemolyzed           ATTENDING ATTESTATION  Patient seen, examined and discussed with resident(s) or advanced practice provider. I agree with the key elements of findings and plan described above, with pertinent additions, amendments and/or updates as included in text below.     Ms. Hinds presents with about 1 month of R sided abdominal pain that began about the time she was diagnosed with CDiff colitis last month. The pain is worse when she has bowel movements, better with heat packs and pain medications. She takes oxycodone, ibuprofen and tylenol at home with only modest relief. Over the past day, she reports having increasing stool, sometimes with red blood in it. She also reported having an episode of vomiting in the ED with \"thick, dark\" blood. She says she's having diarrhea up to every hour. Also reports lots of stress at home, with  who has \"early dementia\". Says she had issues with chronic abdominal pain that led to hysterectomy. Did not follow up for her colonoscopy on 12/21. She completed a tapering dose of oral vancomycin about a week ago, she estimates.    Exam notable for soft abdomen with normoactive sounds and multiple surgical scars that are all well healed. No peritoneal signs. There is RLQ tenderness that is worse with manual palpation than with stethoscope.     sCr 0.47  CRP 3.0  ESR 4  LFTs all normal  WBC 12.8  CDiff toxin assay positive    A/P: Ms. Hinds presents with persistent diarrhea and long-standing R sided abdominal pain.     - Abd pain: I'm not convinced that her " abdominal pain is due to her CDiff colitis; I suspect it isn't. We discussed the fact that the chronicity and vagueness of her abdominal pain is more consistent with chronic functional abdominal pain than with an acute process. She's had extensive abdominal imaging in the last month without concerning findings. GI to see today, but I think endoscopy won't be warranted this admission. Will continue oxycodone 5 mg PO PRN. She is on oxy at home. Will need longer term PCP follow up to manage and investigate chronic abdominal pain.   - CDiff colitis: Treating with vancomycin and metronidazole today. Will monitor stool frequency and electrolytes overnight. Will need long vancomycin taper as outpatient.   - Emesis: Substance described by patient does not sound like blood in vomit. Hct normal and hemodynamics stable. Will recheck CBC with next blood draw. Continue PPI. Antiemetics PRN.    Abraham Harris MD  [facility]

## 2017-12-24 NOTE — PLAN OF CARE
Problem: Patient Care Overview  Goal: Plan of Care/Patient Progress Review  Outcome: Improving  Arrived from ED at ~08:10. AVSS ex B/P elevated 153/96, recheck 124/77. Reports cramping abdominal pain, IV dilaudid given x1, oxycodone x1. Regular diet, tolerating w/o nausea or vomiting. C.diff +, oral vanco started. No reports of BM since leaving ED. Voided 400mL, UA sent. MIVF @125mL/hr, IV flagyl via PIV. Ambulating in halls independently. Possible d/c tomorrow if able to tolerate diet and oral medications. Pt's home medications - lunesta and valium sent to security. Continue POC.

## 2017-12-24 NOTE — ED NOTES
1 episode of bloody emesis. Pt c/o abdominal pain. Requesting iv narcotics. Pt awaiting transfer to pcu, will attempt to contact admitting team regarding pain meds prior to transfer to pcu.

## 2017-12-25 ENCOUNTER — APPOINTMENT (OUTPATIENT)
Dept: GENERAL RADIOLOGY | Facility: CLINIC | Age: 37
DRG: 372 | End: 2017-12-25
Payer: OTHER GOVERNMENT

## 2017-12-25 VITALS
OXYGEN SATURATION: 100 % | BODY MASS INDEX: 28.93 KG/M2 | SYSTOLIC BLOOD PRESSURE: 145 MMHG | HEIGHT: 66 IN | HEART RATE: 94 BPM | DIASTOLIC BLOOD PRESSURE: 85 MMHG | RESPIRATION RATE: 16 BRPM | TEMPERATURE: 97.5 F | WEIGHT: 180 LBS

## 2017-12-25 LAB
ALBUMIN SERPL-MCNC: 3 G/DL (ref 3.4–5)
ALP SERPL-CCNC: 67 U/L (ref 40–150)
ALT SERPL W P-5'-P-CCNC: 18 U/L (ref 0–50)
ANION GAP SERPL CALCULATED.3IONS-SCNC: 9 MMOL/L (ref 3–14)
AST SERPL W P-5'-P-CCNC: 12 U/L (ref 0–45)
BILIRUB SERPL-MCNC: 0.4 MG/DL (ref 0.2–1.3)
BUN SERPL-MCNC: 10 MG/DL (ref 7–30)
C COLI+JEJUNI+LARI FUSA STL QL NAA+PROBE: NOT DETECTED
CALCIUM SERPL-MCNC: 8.4 MG/DL (ref 8.5–10.1)
CHLORIDE SERPL-SCNC: 110 MMOL/L (ref 94–109)
CO2 SERPL-SCNC: 22 MMOL/L (ref 20–32)
CREAT SERPL-MCNC: 0.56 MG/DL (ref 0.52–1.04)
CRP SERPL-MCNC: 3.7 MG/L (ref 0–8)
EC STX1 GENE STL QL NAA+PROBE: NOT DETECTED
EC STX2 GENE STL QL NAA+PROBE: NOT DETECTED
ENTERIC PATHOGEN COMMENT: NORMAL
ERYTHROCYTE [DISTWIDTH] IN BLOOD BY AUTOMATED COUNT: 12.9 % (ref 10–15)
ERYTHROCYTE [SEDIMENTATION RATE] IN BLOOD BY WESTERGREN METHOD: 5 MM/H (ref 0–20)
GFR SERPL CREATININE-BSD FRML MDRD: >90 ML/MIN/1.7M2
GLUCOSE SERPL-MCNC: 100 MG/DL (ref 70–99)
HCT VFR BLD AUTO: 37.9 % (ref 35–47)
HGB BLD-MCNC: 13.1 G/DL (ref 11.7–15.7)
INR PPP: 0.97 (ref 0.86–1.14)
LIPASE SERPL-CCNC: 344 U/L (ref 73–393)
MCH RBC QN AUTO: 30.6 PG (ref 26.5–33)
MCHC RBC AUTO-ENTMCNC: 34.6 G/DL (ref 31.5–36.5)
MCV RBC AUTO: 89 FL (ref 78–100)
NOROV GI+II ORF1-ORF2 JNC STL QL NAA+PR: NOT DETECTED
PLATELET # BLD AUTO: 235 10E9/L (ref 150–450)
POTASSIUM SERPL-SCNC: 3.9 MMOL/L (ref 3.4–5.3)
PROT SERPL-MCNC: 6.1 G/DL (ref 6.8–8.8)
RBC # BLD AUTO: 4.28 10E12/L (ref 3.8–5.2)
RVA NSP5 STL QL NAA+PROBE: NOT DETECTED
SALMONELLA SP RPOD STL QL NAA+PROBE: NOT DETECTED
SHIGELLA SP+EIEC IPAH STL QL NAA+PROBE: NOT DETECTED
SODIUM SERPL-SCNC: 141 MMOL/L (ref 133–144)
V CHOL+PARA RFBL+TRKH+TNAA STL QL NAA+PR: NOT DETECTED
WBC # BLD AUTO: 9.7 10E9/L (ref 4–11)
Y ENTERO RECN STL QL NAA+PROBE: NOT DETECTED

## 2017-12-25 PROCEDURE — 85610 PROTHROMBIN TIME: CPT | Performed by: STUDENT IN AN ORGANIZED HEALTH CARE EDUCATION/TRAINING PROGRAM

## 2017-12-25 PROCEDURE — 87506 IADNA-DNA/RNA PROBE TQ 6-11: CPT | Performed by: NURSE PRACTITIONER

## 2017-12-25 PROCEDURE — 85027 COMPLETE CBC AUTOMATED: CPT | Performed by: STUDENT IN AN ORGANIZED HEALTH CARE EDUCATION/TRAINING PROGRAM

## 2017-12-25 PROCEDURE — 80053 COMPREHEN METABOLIC PANEL: CPT | Performed by: STUDENT IN AN ORGANIZED HEALTH CARE EDUCATION/TRAINING PROGRAM

## 2017-12-25 PROCEDURE — 80307 DRUG TEST PRSMV CHEM ANLYZR: CPT | Performed by: INTERNAL MEDICINE

## 2017-12-25 PROCEDURE — 99239 HOSP IP/OBS DSCHRG MGMT >30: CPT | Performed by: INTERNAL MEDICINE

## 2017-12-25 PROCEDURE — 25000132 ZZH RX MED GY IP 250 OP 250 PS 637: Performed by: INTERNAL MEDICINE

## 2017-12-25 PROCEDURE — 25000128 H RX IP 250 OP 636: Performed by: INTERNAL MEDICINE

## 2017-12-25 PROCEDURE — 85652 RBC SED RATE AUTOMATED: CPT | Performed by: STUDENT IN AN ORGANIZED HEALTH CARE EDUCATION/TRAINING PROGRAM

## 2017-12-25 PROCEDURE — 83993 ASSAY FOR CALPROTECTIN FECAL: CPT | Performed by: NURSE PRACTITIONER

## 2017-12-25 PROCEDURE — 25000125 ZZHC RX 250: Performed by: STUDENT IN AN ORGANIZED HEALTH CARE EDUCATION/TRAINING PROGRAM

## 2017-12-25 PROCEDURE — 36415 COLL VENOUS BLD VENIPUNCTURE: CPT | Performed by: STUDENT IN AN ORGANIZED HEALTH CARE EDUCATION/TRAINING PROGRAM

## 2017-12-25 PROCEDURE — 80048 BASIC METABOLIC PNL TOTAL CA: CPT | Performed by: STUDENT IN AN ORGANIZED HEALTH CARE EDUCATION/TRAINING PROGRAM

## 2017-12-25 PROCEDURE — 83690 ASSAY OF LIPASE: CPT | Performed by: STUDENT IN AN ORGANIZED HEALTH CARE EDUCATION/TRAINING PROGRAM

## 2017-12-25 PROCEDURE — 86140 C-REACTIVE PROTEIN: CPT | Performed by: STUDENT IN AN ORGANIZED HEALTH CARE EDUCATION/TRAINING PROGRAM

## 2017-12-25 PROCEDURE — 25000132 ZZH RX MED GY IP 250 OP 250 PS 637: Performed by: STUDENT IN AN ORGANIZED HEALTH CARE EDUCATION/TRAINING PROGRAM

## 2017-12-25 PROCEDURE — 25000128 H RX IP 250 OP 636: Performed by: EMERGENCY MEDICINE

## 2017-12-25 PROCEDURE — 74020 XR ABDOMEN 2 VW: CPT

## 2017-12-25 RX ORDER — LORAZEPAM 1 MG/1
1 TABLET ORAL ONCE
Status: DISCONTINUED | OUTPATIENT
Start: 2017-12-25 | End: 2017-12-25

## 2017-12-25 RX ORDER — PROMETHAZINE HYDROCHLORIDE 25 MG/ML
25 INJECTION, SOLUTION INTRAMUSCULAR; INTRAVENOUS EVERY 6 HOURS PRN
Status: DISCONTINUED | OUTPATIENT
Start: 2017-12-25 | End: 2017-12-25 | Stop reason: HOSPADM

## 2017-12-25 RX ORDER — VANCOMYCIN HYDROCHLORIDE 125 MG/1
125 CAPSULE ORAL 4 TIMES DAILY
Qty: 1 CAPSULE | Refills: 3 | Status: SHIPPED | OUTPATIENT
Start: 2017-12-25 | End: 2018-04-17

## 2017-12-25 RX ORDER — VANCOMYCIN HYDROCHLORIDE 125 MG/1
125 CAPSULE ORAL 4 TIMES DAILY
Qty: 1 CAPSULE | Refills: 3 | Status: SHIPPED | OUTPATIENT
Start: 2017-12-25 | End: 2017-12-25

## 2017-12-25 RX ADMIN — METRONIDAZOLE 500 MG: 500 INJECTION, SOLUTION INTRAVENOUS at 11:18

## 2017-12-25 RX ADMIN — OXYCODONE HYDROCHLORIDE 5 MG: 5 TABLET ORAL at 14:18

## 2017-12-25 RX ADMIN — Medication 125 MG: at 16:15

## 2017-12-25 RX ADMIN — METRONIDAZOLE 500 MG: 500 INJECTION, SOLUTION INTRAVENOUS at 00:00

## 2017-12-25 RX ADMIN — OXYCODONE HYDROCHLORIDE 5 MG: 5 TABLET ORAL at 06:03

## 2017-12-25 RX ADMIN — ONDANSETRON 8 MG: 2 INJECTION INTRAMUSCULAR; INTRAVENOUS at 11:50

## 2017-12-25 RX ADMIN — METRONIDAZOLE 500 MG: 500 INJECTION, SOLUTION INTRAVENOUS at 06:03

## 2017-12-25 RX ADMIN — SODIUM CHLORIDE 1000 ML: 9 INJECTION, SOLUTION INTRAVENOUS at 14:18

## 2017-12-25 RX ADMIN — DIAZEPAM 5 MG: 5 TABLET ORAL at 00:04

## 2017-12-25 RX ADMIN — Medication 125 MG: at 06:05

## 2017-12-25 RX ADMIN — DIAZEPAM 5 MG: 5 TABLET ORAL at 14:18

## 2017-12-25 ASSESSMENT — PAIN DESCRIPTION - DESCRIPTORS: DESCRIPTORS: ACHING

## 2017-12-25 NOTE — PROGRESS NOTES
"Brief Medicine Note, 12/24/17:    Cross cover contacted due to patient complaints of dissatisfaction of care and desire to transfer to a different hospital. Shanda reports that she has been \"puking up blood, looks like coffee grounds\".  No emesis visible or noted on my exam.  She tells me that she isn't happy with her care here and \"doesn't understand why I'm getting the same treatment [Vanc and Flagyl] for my C diff\".  Believes that she needs a fecal transplant.  She tells me that she has spoken with [facility] and she believes that [facility] can offer this treatment for her.  She also feels that her abdominal pain is not being adequately treated and she doesn't feel as though she can tolerate oral medications.  She says that she \"doesn't want to stay on the IV pain medications and knows that the oral ones work better\", but is concerned about how to manage her pain if she continues to vomit.  She has repeatedly mentioned wanting to try ketamine for pain.         I discussed w/ patient that transfer to a different facility overnight is typically only done in emergent situations where a higher level of care would be indicated, and that situations regarding patient preference would be considered by primary/ day team.  Also advised her that, given her reports of nausea, hematemesis, and abdominal pain, we do not favor a decision to leave AMA.  Regarding plan for pain management, I encouraged patient to try Zofran to address nausea and emesis, then attempt next PRN dose of oxycodone.  She also takes Valium 5mg PO Q8H PRN, next dose due at midnight.  Suspect anxiety could be significant factor influencing her current complaints, could consider dosing Valium prior to next dose of oxycodone.  Pt in agreement w/ current plan and willing to stay through am 12/25.      Added Zofran ODT 4mg Q4H PRN and STAT CBC.  Hgb wnl 13.2.  Hemodynamically stable.           Roberta Dean CNP  Hospitalist Service   Pager: [phone]  "

## 2017-12-25 NOTE — DISCHARGE SUMMARY
"[facility]    Internal Medicine Discharge Summary- [facility]    Date of Admission:  12/24/2017  Date of Discharge:  12/25/2017  Discharging Provider: Abraham Harris  Discharge Team: [facility]    Discharge Diagnoses     ACUTE  Abdominal Pain  CDiff colitis    CHRONIC  Abdominal pain  H/o multiple abdominal surgeries  Anxiety    Follow-ups Needed After Discharge     - GI clinic follow up for following problems: CDiff colitis, Chronic abdominal pain    Hospital Course   Shanda Hinds was admitted on 12/24/2017 for diarrhea, nausea and about 1 month of R sided abdominal pain that began about the time she was diagnosed with CDiff colitis last month. The pain is worse when she has bowel movements, better with heat packs and pain medications. She takes oxycodone, ibuprofen and tylenol at home with only modest relief. Over the past day, she reports having increasing stool, sometimes with red blood in it. She also reported having an episode of vomiting in the ED with \"thick, dark\" blood. She says she's having diarrhea up to every hour. Also reports lots of stress at home, with  who has \"early dementia\". Says she had issues with chronic abdominal pain that led to hysterectomy. Did not follow up for her colonoscopy on 12/21. She completed a tapering dose of oral vancomycin about a week ago, she estimates.       sCr 0.47  CRP 3.0  ESR 4  LFTs all normal  WBC 12.8  CDiff toxin assay positive   Lipase normal    The following problems were addressed during her hospitalization:    # Abdominal pain, acute on chronic  - Continued on reported home oxycodone dose of 5 mg q6h PRN for pain  - GI consult team recommended continued treatment of CDiff colitis and obtaining CT images from recent CT at [facility]. Also recommended f/u colonoscopy once colitis has resolved.   - Likely differential diagnoses for both acute and chronic abdominal pain syndromes include post-infectious IBS " and opioid-induced bowel disorder.  - Patient opted to leave hospital rather than wait for GI consult team to review images from [facility]    # CDiff colitis  - Continued on metronidazole and PO vancomycin while hospitalized  - Will continue on 8 week tapering dose of vancomycin at time of discharge    Consultations This Hospital Stay   VASCULAR ACCESS CARE ADULT IP CONSULT  GI LUMINAL ADULT IP CONSULT  MEDICATION HISTORY IP PHARMACY CONSULT     Code Status   Full Code    Time Spent on this Encounter   I, Abraham Harris, personally saw the patient today and spent greater than 30 minutes discharging this patient.       Abraham Harris  Internal Medicine Staff Hospitalist Service  [facility]    ______________________________________________________________________    Physical Exam   Vital Signs: Temp: 97.5  F (36.4  C) Temp src: Oral BP: 145/85   Heart Rate: 92 Resp: 16 SpO2: 100 % O2 Device: None (Room air)    Weight: 180 lbs 0 oz    Sitting up in bed in NAD.  Also walking around hospital unit earlier today. Gait normal.   Cor regular and without murmurs.   Normal resp effort. Lungs CTA.   Abdomen soft, no apparent tenderness with stethoscope palpation. Moderate TTP in RLQ with manual palpation. No masses. Multiple abdominal surgical scars.   LEs warm and without edema    Significant Results and Procedures       Primary Care Physician   [facility]    Discharge Disposition   Discharged to home  Condition at discharge: Stable    Discharge Orders     GASTROENTEROLOGY ADULT REF CONSULT ONLY     Reason for your hospital stay   Dear Ms. Hinds - You were hospitalized due to abdominal pain and diarrhea. The diarrhea may be due to CDiff, but your labs did not show evidence of inflammation or dehydration. Your vital signs remained stable throughout the admission. You may have irritable bowel symptoms as a result of your recent infection. We have placed a referral for you to be seen  in our GI clinic. You will be contacted with an appointment time. Please also see your primary care doctor at the next available time for post-hospital followup.     Adult [facility] Follow-up and recommended labs and tests   GI clinic.     Appointments on [place] and/or [facility] (with [facility] or [facility] provider or service). Call [phone] if you haven't heard regarding these appointments within 7 days of discharge.     Activity   Your activity upon discharge: activity as tolerated     Full Code     Diet   Follow this diet upon discharge: Orders Placed This Encounter     Regular Diet Adult       Discharge Medications   Current Discharge Medication List      START taking these medications    Details   vancomycin (VANCOCIN) 125 MG capsule Take 1 capsule (125 mg) by mouth 4 times daily 4 times daily until 1/8. 3 times daily 1/8 - 1/22. Twice daily until 1/22 - 2/5. Daily 2/5-19.  Qty: 1 capsule, Refills: 3    Associated Diagnoses: C. difficile colitis         CONTINUE these medications which have NOT CHANGED    Details   DiazePAM (VALIUM PO) Take 5 mg by mouth every 8 hours as needed for anxiety      Eszopiclone (LUNESTA PO) Take 2 mg by mouth At Bedtime      estradiol (VIVELLE-DOT) 0.1 MG/24HR BIW patch Place 1 patch onto the skin twice a week On Wednesdays and Sundays.      Biotin w/ Vitamins C & E (HAIR SKIN & NAILS GUMMIES PO) Take 2 gummies by mouth per day.      DULoxetine HCl (CYMBALTA PO) Take 60 mg by mouth every evening       oxyCODONE-acetaminophen (PERCOCET) 5-325 MG per tablet Take 1-2 tablets by mouth every 4 hours as needed for pain  Qty: 15 tablet, Refills: 0      SUMAtriptan Succinate (IMITREX PO) Take 25 mg by mouth daily as needed          STOP taking these medications       IBUPROFEN PO Comments:   Reason for Stopping:             Allergies   Allergies   Allergen Reactions     Codeine      Hives, vomiting      Compazine [Prochlorperazine]      Fentanyl      Morphine      Hives, vomiting       Reglan [Metoclopramide]

## 2017-12-25 NOTE — PLAN OF CARE
"Problem: Patient Care Overview  Goal: Plan of Care/Patient Progress Review  Outcome: No Change  AVSS. RA. Reports cramping abdominal pain, oxycodone given x1. Pt requesting IV valium and pain medication, primary team notified and no new orders received. Ordered one tray, unable to eat d/t pt reports of vomiting. 1 episode of emesis noted after pt went downstairs, came back to floor with an emesis bag with small amount noted in bag. Pt requesting another dose of valium d/t vomiting first dose. IV zofran given x1. Voiding in large amounts, UA sent for drug tox. MIVF @125mL/hr, IV flagyl via PIV. Ambulating in halls independently. Frequently down to lobby to smoke. Pt called nursing supervisor, stating \"no one has checked in here in 2 1/2 hours, and I haven't been receiving adequate care since arriving here.\" Multiple staff members have been in pt room in this time frame as documented in the MAR, and flowsheets. Pt requesting to be discharged so family can take her to [place]. Primary team notified. When pt was given back her home medications - lunesta and valium from security, pt stating she is missing pills. Writer and charge nurse went into room - writer told pt that she stated yesterday \"If the doc asks, the reason the count is off is because I only brought enough pills for a few days.\" Pt then states \"oh yeah, I forgot I said that.\" Witnessed by fany Nicole.       "

## 2017-12-25 NOTE — PROVIDER NOTIFICATION
"Patient called staff d/t nausea and vomiting, and to collect stool sample. Patient noted to have 50cc of frothy dark brown vomit, and less than 10cc red, bloody stool. Requesting RN to contact the physician about nausea and inability to take PO medications, will need IV pain medications soon. Patient denied Lidocaine patches \"I don't know what Lidocaine will do for my colon.\" MD paged.  "

## 2017-12-25 NOTE — PLAN OF CARE
Problem: Patient Care Overview  Goal: Plan of Care/Patient Progress Review  Outcome: No Change  [id number]  BP continues to be elevated, afebrile and on room air. Patient continues to have abdominal cramping. Has not taken any medications d/t nausea; requesting to have IV ketamine or dilaudid. MD notified multiple times, currently visiting with patient. New orders for Zofran entered, but patient continues to vomit. Stool samples not collected, less than 5cc of stool. PIV with NS at 125 cc/hr. Voiding adequately. Ambulating independently in hallways. Continue with POC.     MD allowed one time dose of Oxy 5mg in addition to PRN 5mg, and ordered one time IVP Valium for combined nausea and pain management. Patient agrees to this treatment plan. Also willing to take other scheduled PO medications.

## 2017-12-25 NOTE — PLAN OF CARE
"  BP slightly hypertensive. Otherwise VSS. C/o abominal cramping and has PRN Oxycodone available. Pt said that they want to stay away from it and \"stetch it out\". Tolerating regular diet. Good appetite. Denies nausea. Valium given x1. Up independently. IVMF at 125 mg/hr. Voiding spontaneously. Had one liquid BMx1 with some red streaking. GI in to see pt this shift, wants staff  to document stool characteristics and frequency. Pt is aware and goes in hat. IV abx given per orders. Continue with poc.     Treasure paged at 1221 in regards to pt requesting ketamine for pain control. States it was given at OSH and was effective. MD called back and said no new orders for tonight and primary team will address tomorrow.  "

## 2017-12-25 NOTE — PLAN OF CARE
"Problem: Patient Care Overview  Goal: Plan of Care/Patient Progress Review  Outcome: No Change  BP (!) 143/95 (BP Location: Right arm)  Pulse 94  Temp 97.5  F (36.4  C) (Oral)  Resp 16  Ht 1.676 m (5' 6\")  Wt 81.6 kg (180 lb)  SpO2 100%  BMI 29.05 kg/m2. No watery or loose stool noted. Diazepam for anxiety. No nausea. Flagyl per schedule. Voiding adequately. No new acute event at this time. Patient is resting well. Continue with plan of care.       "

## 2017-12-25 NOTE — PLAN OF CARE
Pt is discharging home. Received PO vancomycin dose prior to discharge. Pt verbalized discharge instructions and paperwork signed. MD paged to change prescription for PO vancomycin to pt pharmacy of choice [address] at [facility] pharmacy. Pt left unit at 16.25 pm

## 2017-12-25 NOTE — CONSULTS
GASTROENTEROLOGY CONSULTATION      Date of Admission:  12/24/2017          ASSESSMENT AND RECOMMENDATIONS:     Assessment:  37 year old female with a history of multiple abdominal surgeries, anxiety, depression, PTSD presenting to the hospital as a transfer from outside hospital for persistent diarrhea and abdominal pain and recent diagnosis of C. difficile infection.  First diagnosed with C. difficile infection in early November 2017 at outside hospital, the only first documented positive test available is from November 25. Most likely etiology is either persistent Cdiff infection or IBS post C. difficile infection, positive C. difficile test may still be from previous infection.  Patient does not have fulminant or severe infection as have moderate leukocytosis ( WBC<20K) with normal albumin and creatinine and no signs of ileus.        Recommendations    Recommend getting outside CT images and read by our radiologist.    Get stool enteric panel, stool calprotectin.  Check CRP and ESR.    Accurate assessment of stool frequency and quantity.    Get abdominal x-ray for baseline.    Start patient on fiber supplements like Citrucel.    Avoid narcotic pain medications.    Continue with oral vancomycin 125 mg 4 times daily. Continue IV metronidazole at this point until we evaluate outside images and above labs.    Do not recommend colonoscopy during this episode of C. difficile infection, however she does need outpatient colonoscopy as has history of polyps in the past with poor prep at last [facility] according to [facility].     Thank you for involving us in this patient's care. Please do not hesitate to contact the GI service with any questions or concerns.   Pt care plan discussed with Dr. Sifuentes, GI staff physician.    Melissa Kim  GI fellow  -------------------------------------------------------------------------------------------------------------------          Chief Complaint:   We were asked by  Justine Hernandez of Internal medicine  to evaluate this patient with diarrhea and abdominal pain.     History is obtained from the patient and the medical record.          History of Present Illness:     Shanda Hinds is a 37 year old female with a history of multiple abdominal surgeries, anxiety, depression, PTSD presenting to the hospital as a transfer from outside hospital for persistent diarrhea and abdominal pain and recent diagnosis of C. difficile infection.    According to patient all of her symptoms started in November 2017 with diarrhea, abdominal pain, nausea and vomiting for which she went to local hospital where she was diagnosed with C. difficile infection and was started on oral vancomycin.  She was discharged after 3 days and then came back within 2 days with fevers and abdominal pain and then was admitted in the hospital for another 5-6 days and then was discharged on oral vancomycin taper.  Patient continued vancomycin taper and completed the taper around 5 days ago.  At time when she had C. difficile infection she described having watery stools every 1-1.5 hours with streaks of blood intermittently.  Her diarrhea improved around time of last 1 week of the vancomycin taper regimen.  And after 2 days of stopping the vancomycin her diarrhea came back.  She always had persistent mild abdominal pain, which got worse for the past 1 week.  During all of these episodes she had mild intermittent hematochezia described as streaks of blood mixed with stools for which she presented couple of times to the ED departments in the [facility].  Her last CT abdomen was then December 22 which showed questionable right colonic wall thickening.  She denied any recent antibiotic use before this C. difficile episode, no sick contacts or recent travel.    Before her C. difficile episode issues she describes history of severe constipation with even having bowel movement like once in whole week and used  enemas in the past.  She has history of colon polyps and her last colonoscopy at [facility] around mid 2017 and was found to have multiple polyps which were removed but she had poor prep and was told to have a repeat colonoscopy which was scheduled around few days ago but was canceled because of recent infection.    Currently since morning she only had 3-4 episodes of loose bowel movements, streaks of blood in most of BM.             Past Medical History:   Reviewed and edited as appropriate  Past Medical History:   Diagnosis Date     Cystic fibrosis (H)      Depressive disorder      Insomnia      PTSD (post-traumatic stress disorder)             Past Surgical History:   Reviewed and edited as appropriate   Past Surgical History:   Procedure Laterality Date     ABDOMEN SURGERY       CHOLECYSTECTOMY       GYN SURGERY      fibroids, endometriosis, torsion, adhesion removal     HYSTERECTOMY       SLING BLADDER SUSPENSION WITH FASCIA EILEEN              Previous Endoscopy:   See HPI          Social History:   Reviewed and edited as appropriate  Social History     Social History     Marital status:      Spouse name: N/A     Number of children: N/A     Years of education: N/A     Occupational History     Not on file.     Social History Main Topics     Smoking status: Current Every Day Smoker     Smokeless tobacco: Never Used     Alcohol use Yes      Comment: socially      Drug use: No     Sexual activity: Not on file     Other Topics Concern     Not on file     Social History Narrative            Family History:     No known history of colorectal cancer, liver disease, or inflammatory bowel disease.       Allergies:   Reviewed and edited as appropriate     Allergies   Allergen Reactions     Codeine      Hives, vomiting      Compazine [Prochlorperazine]      Fentanyl      Morphine      Hives, vomiting      Reglan [Metoclopramide]             Medications:     Current Facility-Administered Medications   Medication      "0.9% sodium chloride infusion     ondansetron (ZOFRAN) injection 4 mg     naloxone (NARCAN) injection 0.1-0.4 mg     acetaminophen (TYLENOL) tablet 650 mg     metroNIDAZOLE (FLAGYL) infusion 500 mg     vancomycin (VANOCIN) solution 125 mg     Lidocaine (LIDOCARE) 4 % Patch 1 patch     lidocaine patch REMOVAL     lidocaine patch in PLACE     oxyCODONE IR (ROXICODONE) tablet 5 mg     DULoxetine (CYMBALTA) EC capsule 60 mg     diazepam (VALIUM) tablet 5 mg     eszopiclone (LUNESTA) tablet 2 mg     ondansetron (ZOFRAN-ODT) ODT tab 4 mg             Review of Systems:   A complete review of systems was performed and is negative except as noted in the HPI           Physical Exam:   BP (!) 141/96 (BP Location: Right arm)  Pulse 94  Temp 96.3  F (35.7  C) (Oral)  Resp 16  Ht 1.676 m (5' 6\")  Wt 81.6 kg (180 lb)  SpO2 99%  BMI 29.05 kg/m2  Wt:   Wt Readings from Last 2 Encounters:   12/24/17 81.6 kg (180 lb)   12/29/16 77.1 kg (170 lb)      Constitutional: cooperative, pleasant, not dyspneic/diaphoretic, no acute distress  Eyes: Sclera anicteric/no pallor.  Ears/nose/mouth/throat: Normal oropharynx without ulcers or exudate, mucus membranes moist, hearing intact  CV: No edema  Respiratory: Unlabored breathing  Abd: Nondistended, +bs, no hepatosplenomegaly, generalized mild tenderness on over, no peritoneal signs  Skin: warm, perfused, no jaundice  Neuro: AAO x 3, No asterixis  Psych: Normal affect  MSK: No gross deformities         Data:   Labs and imaging below were independently reviewed and interpreted    BMP  Recent Labs  Lab 12/24/17  0540 12/24/17  0306   NA  --  140   POTASSIUM 3.9 Unsatisfactory specimen - grossly hemolyzed   CHLORIDE  --  108   CASSIE  --  8.7   CO2  --  27   BUN  --  12   CR  --  0.47*   GLC  --  82     CBC  Recent Labs  Lab 12/24/17 0306   WBC 12.8*   RBC 4.48   HGB 13.5   HCT 40.0   MCV 89   MCH 30.1   MCHC 33.8   RDW 13.4        INR  Recent Labs  Lab 12/24/17  0306   INR 0.90 "     LFTs  Recent Labs  Lab 12/24/17  0540 12/24/17  0306   ALKPHOS  --  70   AST 9 Unsatisfactory specimen - grossly hemolyzed   ALT 19 Unsatisfactory specimen - hemolyzed   BILITOTAL  --  0.3   PROTTOTAL  --  7.5   ALBUMIN  --  3.6      PANCNo lab results found in last 7 days.    Imaging: not available.

## 2017-12-27 LAB
AMPHETAMINES UR QL SCN>1000 NG/ML: NEGATIVE
BARBITURATES UR QL SCN: NEGATIVE
BENZODIAZ UR QL SCN: NORMAL
BZE UR QL SCN>300 NG/ML: NEGATIVE
CARBOXYTHC UR QL SCN: NEGATIVE
CREAT UR-MCNC: 55 MG/DL
METHADONE UR QL SCN: NEGATIVE
NORDIAZEPAM UR CFM-MCNC: 290 NG/ML
OPIATES UR QL SCN: NEGATIVE
OXAZEPAM UR CFM-MCNC: 215 NG/ML
OXYCODONE UR CFM-MCNC: 2090 NG/ML
OXYCODONE UR QL SCN: NORMAL
OXYMORPHONE UR CFM-MCNC: 610 NG/ML
PCP CTO UR SCN-MCNC: NEGATIVE NG/ML
TEMAZEPAM UR CFM-MCNC: 514 NG/ML

## 2017-12-28 LAB — CALPROTECTIN STL-MCNT: <27 MG/KG (ref 0–49.9)

## 2018-03-29 ENCOUNTER — MEDICAL CORRESPONDENCE (OUTPATIENT)
Dept: HEALTH INFORMATION MANAGEMENT | Facility: CLINIC | Age: 38
End: 2018-03-29

## 2018-04-02 ENCOUNTER — TRANSFERRED RECORDS (OUTPATIENT)
Dept: HEALTH INFORMATION MANAGEMENT | Facility: CLINIC | Age: 38
End: 2018-04-02

## 2018-04-03 ENCOUNTER — TELEPHONE (OUTPATIENT)
Dept: GASTROENTEROLOGY | Facility: CLINIC | Age: 38
End: 2018-04-03

## 2018-04-06 ENCOUNTER — TRANSFERRED RECORDS (OUTPATIENT)
Dept: HEALTH INFORMATION MANAGEMENT | Facility: CLINIC | Age: 38
End: 2018-04-06

## 2018-04-17 ENCOUNTER — HOSPITAL ENCOUNTER (INPATIENT)
Facility: CLINIC | Age: 38
LOS: 1 days | Discharge: HOME | DRG: 885 | End: 2018-04-19
Attending: EMERGENCY MEDICINE | Admitting: PSYCHIATRY & NEUROLOGY
Payer: OTHER GOVERNMENT

## 2018-04-17 DIAGNOSIS — T65.92XA SUICIDE ATTEMPT BY SUBSTANCE OVERDOSE, INITIAL ENCOUNTER (H): ICD-10-CM

## 2018-04-17 DIAGNOSIS — T40.5X2A: ICD-10-CM

## 2018-04-17 DIAGNOSIS — T42.6X2A INTENTIONAL CHLORMETHIAZOLE OVERDOSE, INITIAL ENCOUNTER (H): ICD-10-CM

## 2018-04-17 LAB
ALBUMIN SERPL-MCNC: 3.6 G/DL (ref 3.4–5)
ALP SERPL-CCNC: 71 U/L (ref 40–150)
ALT SERPL W P-5'-P-CCNC: 36 U/L (ref 0–50)
AMPHETAMINES UR QL SCN: NEGATIVE
ANION GAP SERPL CALCULATED.3IONS-SCNC: 8 MMOL/L (ref 3–14)
APAP SERPL-MCNC: <2 MG/L (ref 10–20)
AST SERPL W P-5'-P-CCNC: 17 U/L (ref 0–45)
BASOPHILS # BLD AUTO: 0.1 10E9/L (ref 0–0.2)
BASOPHILS NFR BLD AUTO: 0.8 %
BENZODIAZ UR QL: POSITIVE
BILIRUB SERPL-MCNC: 0.2 MG/DL (ref 0.2–1.3)
BUN SERPL-MCNC: 8 MG/DL (ref 7–30)
CALCIUM SERPL-MCNC: 8.6 MG/DL (ref 8.5–10.1)
CANNABINOIDS UR QL SCN: NEGATIVE
CHLORIDE SERPL-SCNC: 109 MMOL/L (ref 94–109)
CO2 SERPL-SCNC: 27 MMOL/L (ref 20–32)
COCAINE UR QL: POSITIVE
CREAT SERPL-MCNC: 0.53 MG/DL (ref 0.52–1.04)
DIFFERENTIAL METHOD BLD: NORMAL
EOSINOPHIL # BLD AUTO: 0.4 10E9/L (ref 0–0.7)
EOSINOPHIL NFR BLD AUTO: 4 %
ERYTHROCYTE [DISTWIDTH] IN BLOOD BY AUTOMATED COUNT: 13.3 % (ref 10–15)
ETHANOL SERPL-MCNC: <0.01 G/DL
GFR SERPL CREATININE-BSD FRML MDRD: >90 ML/MIN/1.7M2
GLUCOSE SERPL-MCNC: 100 MG/DL (ref 70–99)
HCT VFR BLD AUTO: 41.8 % (ref 35–47)
HGB BLD-MCNC: 14.1 G/DL (ref 11.7–15.7)
IMM GRANULOCYTES # BLD: 0 10E9/L (ref 0–0.4)
IMM GRANULOCYTES NFR BLD: 0.2 %
INR PPP: 0.95 (ref 0.86–1.14)
LYMPHOCYTES # BLD AUTO: 1.9 10E9/L (ref 0.8–5.3)
LYMPHOCYTES NFR BLD AUTO: 20.9 %
MCH RBC QN AUTO: 30.7 PG (ref 26.5–33)
MCHC RBC AUTO-ENTMCNC: 33.7 G/DL (ref 31.5–36.5)
MCV RBC AUTO: 91 FL (ref 78–100)
MONOCYTES # BLD AUTO: 0.6 10E9/L (ref 0–1.3)
MONOCYTES NFR BLD AUTO: 6.5 %
NEUTROPHILS # BLD AUTO: 6.2 10E9/L (ref 1.6–8.3)
NEUTROPHILS NFR BLD AUTO: 67.6 %
NRBC # BLD AUTO: 0 10*3/UL
NRBC BLD AUTO-RTO: 0 /100
OPIATES UR QL SCN: NEGATIVE
PLATELET # BLD AUTO: 339 10E9/L (ref 150–450)
POTASSIUM SERPL-SCNC: 3.9 MMOL/L (ref 3.4–5.3)
PROT SERPL-MCNC: 7.2 G/DL (ref 6.8–8.8)
RBC # BLD AUTO: 4.59 10E12/L (ref 3.8–5.2)
SALICYLATES SERPL-MCNC: 4 MG/DL
SODIUM SERPL-SCNC: 144 MMOL/L (ref 133–144)
WBC # BLD AUTO: 9.2 10E9/L (ref 4–11)

## 2018-04-17 PROCEDURE — 80329 ANALGESICS NON-OPIOID 1 OR 2: CPT | Performed by: EMERGENCY MEDICINE

## 2018-04-17 PROCEDURE — 85025 COMPLETE CBC W/AUTO DIFF WBC: CPT | Performed by: EMERGENCY MEDICINE

## 2018-04-17 PROCEDURE — 85610 PROTHROMBIN TIME: CPT | Performed by: EMERGENCY MEDICINE

## 2018-04-17 PROCEDURE — 80053 COMPREHEN METABOLIC PANEL: CPT | Performed by: EMERGENCY MEDICINE

## 2018-04-17 PROCEDURE — 96360 HYDRATION IV INFUSION INIT: CPT

## 2018-04-17 PROCEDURE — 80320 DRUG SCREEN QUANTALCOHOLS: CPT | Performed by: EMERGENCY MEDICINE

## 2018-04-17 PROCEDURE — 99285 EMERGENCY DEPT VISIT HI MDM: CPT | Mod: Z6 | Performed by: EMERGENCY MEDICINE

## 2018-04-17 PROCEDURE — 80307 DRUG TEST PRSMV CHEM ANLYZR: CPT | Performed by: EMERGENCY MEDICINE

## 2018-04-17 PROCEDURE — 25000132 ZZH RX MED GY IP 250 OP 250 PS 637: Performed by: EMERGENCY MEDICINE

## 2018-04-17 PROCEDURE — 96361 HYDRATE IV INFUSION ADD-ON: CPT

## 2018-04-17 PROCEDURE — 25000128 H RX IP 250 OP 636: Performed by: EMERGENCY MEDICINE

## 2018-04-17 PROCEDURE — 99285 EMERGENCY DEPT VISIT HI MDM: CPT | Mod: 25

## 2018-04-17 RX ORDER — LORAZEPAM 1 MG/1
1 TABLET ORAL ONCE
Status: COMPLETED | OUTPATIENT
Start: 2018-04-17 | End: 2018-04-17

## 2018-04-17 RX ORDER — SODIUM CHLORIDE 9 MG/ML
1000 INJECTION, SOLUTION INTRAVENOUS CONTINUOUS
Status: DISCONTINUED | OUTPATIENT
Start: 2018-04-17 | End: 2018-04-18

## 2018-04-17 RX ORDER — OLANZAPINE 10 MG/1
10 TABLET, ORALLY DISINTEGRATING ORAL ONCE
Status: COMPLETED | OUTPATIENT
Start: 2018-04-17 | End: 2018-04-17

## 2018-04-17 RX ORDER — ZOLPIDEM TARTRATE 10 MG/1
10 TABLET ORAL
Status: ON HOLD | COMMUNITY
End: 2018-04-19

## 2018-04-17 RX ORDER — DULOXETIN HYDROCHLORIDE 60 MG/1
60 CAPSULE, DELAYED RELEASE ORAL AT BEDTIME
Status: ON HOLD | COMMUNITY
End: 2018-04-19

## 2018-04-17 RX ADMIN — LORAZEPAM 1 MG: 1 TABLET ORAL at 15:23

## 2018-04-17 RX ADMIN — OLANZAPINE 10 MG: 10 TABLET, ORALLY DISINTEGRATING ORAL at 17:08

## 2018-04-17 RX ADMIN — SODIUM CHLORIDE 1000 ML: 9 INJECTION, SOLUTION INTRAVENOUS at 17:02

## 2018-04-17 RX ADMIN — SODIUM CHLORIDE 1000 ML: 9 INJECTION, SOLUTION INTRAVENOUS at 15:28

## 2018-04-17 ASSESSMENT — ENCOUNTER SYMPTOMS
FATIGUE: 1
HALLUCINATIONS: 0

## 2018-04-17 NOTE — IP AVS SNAPSHOT
"    UR 32NR: [phone]                                              INTERAGENCY TRANSFER FORM - LAB / IMAGING / EKG / EMG RESULTS   2018                    Hospital Admission Date: 2018  BIANCA TALAMANTES   : 1980  Sex: Female        Attending Provider: Jenaro Cortes MD     Allergies:  Haldol [Haloperidol], Codeine, Compazine [Prochlorperazine], Fentanyl, Morphine, Neurontin [Gabapentin], Reglan [Metoclopramide], Trazodone    Infection:  None   Service:  EMERGENCY ME    Ht:  1.676 m (5' 6\")   Wt:  85.4 kg (188 lb 3.2 oz)   Admission Wt:  79.4 kg (175 lb)    BMI:  30.38 kg/m 2   BSA:  1.99 m 2            Patient PCP Information     Provider PCP Type    Karie Vasquez MD General         Lab Results - 3 Days      Vitamin D Deficiency [[id number]] (Abnormal)  Resulted: 18 1420, Result status: Final result    Ordering provider: Isela Powers APRN CNP  18 0836 Resulting lab: [facility]    Specimen Information    Type Source Collected On     18 0836          Components       Value Reference Range Flag Lab   Vitamin D Deficiency screening 12 20 - 75 ug/L L 51   Comment:         Season, race, dietary intake, and treatment affect the concentration of   25-hydroxy-Vitamin D. Values may decrease during winter months and increase   during summer months. Values 20-29 ug/L may indicate Vitamin D insufficiency   and values <20 ug/L may indicate Vitamin D deficiency.  Vitamin D determination is routinely performed by an immunoassay specific for   25 hydroxyvitamin D3.  If an individual is on vitamin D2 (ergocalciferol)   supplementation, please specify 25 OH vitamin D2 and D3 level determination by   LCMSMS test [id number].              T4 free [[id number]] (Abnormal)  Resulted: 18 0957, Result status: Final result    Ordering provider: Isela Powers APRN CNP  1836 Resulting lab: [facility] "    Specimen Information    Type Source Collected On     04/19/18 0836          Components       Value Reference Range Flag Lab   T4 Free 0.69 0.76 - 1.46 ng/dL L 13            Lipid panel reflex to direct LDL [[id number]] (Abnormal)  Resulted: 04/19/18 0913, Result status: Final result    Ordering provider: Isela Powers APRN CNP  04/19/18 0725 Resulting lab: [facility]    Specimen Information    Type Source Collected On   Blood  04/19/18 0836          Components       Value Reference Range Flag Lab   Cholesterol 250 <200 mg/dL H 13   Comment:  Desirable:       <200 mg/dl   Triglycerides 282 <150 mg/dL H 13   Comment:         Borderline high:  150-199 mg/dl  High:             200-499 mg/dl  Very high:       >499 mg/dl     HDL Cholesterol 35 >49 mg/dL L 13   LDL Cholesterol Calculated 159 <100 mg/dL H 13   Comment:         Above desirable:  100-129 mg/dl  Borderline High:  130-159 mg/dL  High:             160-189 mg/dL  Very high:       >189 mg/dl     Non HDL Cholesterol 215 <130 mg/dL H 13   Comment:         Above Desirable:  130-159 mg/dl  Borderline high:  160-189 mg/dl  High:             190-219 mg/dl  Very high:       >219 mg/dl              TSH with free T4 reflex [[id number]] (Abnormal)  Resulted: 04/19/18 0913, Result status: Final result    Ordering provider: Isela Powers APRN CNP  04/19/18 0836 Resulting lab: [facility]    Specimen Information    Type Source Collected On     04/19/18 0836          Components       Value Reference Range Flag Lab   TSH 4.04 0.40 - 4.00 mU/L H 13            Comprehensive metabolic panel [[id number]] (Abnormal)  Resulted: 04/17/18 1625, Result status: Final result    Ordering provider: Tremaine Barnett MD  04/17/18 1348 Resulting lab: [facility]    Specimen Information    Type Source Collected On   Blood  04/17/18 1510          Components       Value Reference  Range Flag Lab   Sodium 144 133 - 144 mmol/L  13   Potassium 3.9 3.4 - 5.3 mmol/L  13   Chloride 109 94 - 109 mmol/L  13   Carbon Dioxide 27 20 - 32 mmol/L  13   Anion Gap 8 3 - 14 mmol/L  13   Glucose 100 70 - 99 mg/dL H 13   Urea Nitrogen 8 7 - 30 mg/dL  13   Creatinine 0.53 0.52 - 1.04 mg/dL  13   GFR Estimate >90 >60 mL/min/1.7m2  13   Comment:  Non  GFR Calc   GFR Estimate If Black >90 >60 mL/min/1.7m2  13   Comment:  African American GFR Calc   Calcium 8.6 8.5 - 10.1 mg/dL  13   Bilirubin Total 0.2 0.2 - 1.3 mg/dL  13   Albumin 3.6 3.4 - 5.0 g/dL  13   Protein Total 7.2 6.8 - 8.8 g/dL  13   Alkaline Phosphatase 71 40 - 150 U/L  13   ALT 36 0 - 50 U/L  13   AST 17 0 - 45 U/L  13            Alcohol ethyl [[id number]]  Resulted: 04/17/18 1624, Result status: Final result    Ordering provider: Tremaine Barnett MD  04/17/18 1348 Resulting lab: [facility]    Specimen Information    Type Source Collected On   Blood  04/17/18 1510          Components       Value Reference Range Flag Lab   Ethanol g/dL <0.01 <0.01 g/dL  13            Salicylate level [[id number]]  Resulted: 04/17/18 1622, Result status: Final result    Ordering provider: Tremaine Barnett MD  04/17/18 1348 Resulting lab: [facility]    Specimen Information    Type Source Collected On   Blood  04/17/18 1510          Components       Value Reference Range Flag Lab   Salicylate Level 4 mg/dL  13   Comment:  Therapeutic:        <20  Anti inflammatory:  15-30              Acetaminophen level [[id number]]  Resulted: 04/17/18 1622, Result status: Final result    Ordering provider: Tremaine Barnett MD  04/17/18 1348 Resulting lab: [facility]    Specimen Information    Type Source Collected On   Blood  04/17/18 1510          Components       Value Reference Range Flag Lab   Acetaminophen Level <2 mg/L  13   Comment:  Therapeutic  range: 10-20 mg/L            INR [[id number]]  Resulted: 04/17/18 1615, Result status: Final result    Ordering provider: Tremaine Barnett MD  04/17/18 1346 Resulting lab: [facility]    Specimen Information    Type Source Collected On   Blood  04/17/18 1510          Components       Value Reference Range Flag Lab   INR 0.95 0.86 - 1.14  13            CBC with platelets differential [[id number]]  Resulted: 04/17/18 1609, Result status: Final result    Ordering provider: Tremaine Barnett MD  04/17/18 1348 Resulting lab: [facility]    Specimen Information    Type Source Collected On   Blood  04/17/18 1510          Components       Value Reference Range Flag Lab   WBC 9.2 4.0 - 11.0 10e9/L  13   RBC Count 4.59 3.8 - 5.2 10e12/L  13   Hemoglobin 14.1 11.7 - 15.7 g/dL  13   Hematocrit 41.8 35.0 - 47.0 %  13   MCV 91 78 - 100 fl  13   MCH 30.7 26.5 - 33.0 pg  13   MCHC 33.7 31.5 - 36.5 g/dL  13   RDW 13.3 10.0 - 15.0 %  13   Platelet Count 339 150 - 450 10e9/L  13   Diff Method Automated Method   13   % Neutrophils 67.6 %  13   % Lymphocytes 20.9 %  13   % Monocytes 6.5 %  13   % Eosinophils 4.0 %  13   % Basophils 0.8 %  13   % Immature Granulocytes 0.2 %  13   Nucleated RBCs 0 0 /100  13   Absolute Neutrophil 6.2 1.6 - 8.3 10e9/L  13   Absolute Lymphocytes 1.9 0.8 - 5.3 10e9/L  13   Absolute Monocytes 0.6 0.0 - 1.3 10e9/L  13   Absolute Eosinophils 0.4 0.0 - 0.7 10e9/L  13   Absolute Basophils 0.1 0.0 - 0.2 10e9/L  13   Abs Immature Granulocytes 0.0 0 - 0.4 10e9/L  13   Absolute Nucleated RBC 0.0   13            Opiates qualitative urine [[id number]]  Resulted: 04/17/18 1438, Result status: Final result    Ordering provider: Tremaine Barnett MD  04/17/18 1348 Resulting lab: [facility]    Specimen Information    Type Source Collected On   Urine Random Urine 04/17/18 1351          Components       Value  Reference Range Flag Lab   Opiates Qualitative Urine Negative NEG^Negative  13   Comment:  Cutoff for a negative opiate is 300 ng/mL or less.            Amphetamine qualitative urine [[id number]]  Resulted: 04/17/18 1438, Result status: Final result    Ordering provider: Tremaine Barnett MD  04/17/18 1348 Resulting lab: [facility]    Specimen Information    Type Source Collected On   Urine Random Urine 04/17/18 1355          Components       Value Reference Range Flag Lab   Amphetamine Qual Urine Negative NEG^Negative  13   Comment:  Cutoff for a negative amphetamine is 500 ng/mL or less.            Cannabinoids qualitative urine [[id number]]  Resulted: 04/17/18 1438, Result status: Final result    Ordering provider: Tremaine Barnett MD  04/17/18 1348 Resulting lab: [facility]    Specimen Information    Type Source Collected On   Urine Random Urine 04/17/18 1355          Components       Value Reference Range Flag Lab   Cannabinoids Qual Urine Negative NEG^Negative  13   Comment:  Cutoff for a negative cannabinoid is 50 ng/mL or less.            Cocaine qualitative urine [[id number]] (Abnormal)  Resulted: 04/17/18 1438, Result status: Final result    Ordering provider: Tremaine Barnett MD  04/17/18 1348 Resulting lab: [facility]    Specimen Information    Type Source Collected On   Urine Random Urine 04/17/18 1355          Components       Value Reference Range Flag Lab   Cocaine Qual Urine Positive NEG^Negative A 13   Comment:         Cutoff for a positive cocaine is greater than 300 ng/mL. This is an   unconfirmed screening result to be used for medical purposes only.              Benzodiazepine qualitative urine [[id number]] (Abnormal)  Resulted: 04/17/18 1438, Result status: Final result    Ordering provider: Tremaine Barnett MD  04/17/18 1348 Resulting lab: [facility]  [facility]    Specimen Information    Type Source Collected On   Urine Random Urine 04/17/18 1355          Components       Value Reference Range Flag Lab   Benzodiazepine Qual Urine Positive NEG^Negative A 13   Comment:         Cutoff for a positive benzodiazepine is greater than 200 ng/mL. This is an   unconfirmed screening result to be used for medical purposes only.              Testing Performed By     Lab - Abbreviation Name Director Address Valid Date Range    13 - Unknown [facility] Unknown [address] [facility] [address] 01/15/15 0916 - Present    51 - Unknown [facility] Unknown [address] [facility] [address] 12/31/14 1010 - Present            Unresulted Labs     None      Encounter-Level Documents:     There are no encounter-level documents.      Order-Level Documents:     There are no order-level documents.

## 2018-04-17 NOTE — IP AVS SNAPSHOT
[address]    [address]    [place] [address]    Phone:  [phone]                                       After Visit Summary   4/17/2018    Shanda Hinds    MRN: [id number]           After Visit Summary Signature Page     I have received my discharge instructions, and my questions have been answered. I have discussed any challenges I see with this plan with the nurse or doctor.    ..........................................................................................................................................  Patient/Patient Representative Signature      ..........................................................................................................................................  Patient Representative Print Name and Relationship to Patient    ..................................................               ................................................  Date                                            Time    ..........................................................................................................................................  Reviewed by Signature/Title    ...................................................              ..............................................  Date                                                            Time

## 2018-04-17 NOTE — IP AVS SNAPSHOT
MRN:[id number]                      After Visit Summary   4/17/2018    Shanda Hinds    MRN: [id number]           Thank you!     Thank you for choosing [facility] for your care. Our goal is always to provide you with excellent care.        Patient Information     Date Of Birth          1980        About your hospital stay     You were admitted on:  April 18, 2018 You last received care in the:  [facility]    You were discharged on:  April 19, 2018       Who to Call     For medical emergencies, please call 911.  For non-urgent questions about your medical care, please call your primary care provider or clinic, [phone]          Attending Provider     Provider Specialty    Tremaine Branett MD Emergency Medicine    [place], Jenaro Hearn MD Psychiatry       Primary Care Provider Office Phone # Fax #    Karie Vasquez [phone]      Further instructions from your care team        Behavioral Discharge Planning and Instructions      Summary:  You were admitted on 4/17/2018  due to Suicidal Ideations, substance abuse.  You were treated by Sylvia Powers NP and discharged on 4/20/2018 from [facility]      Principal Diagnosis:     1.  Major depressive disorder, severe, recurrent without psychosis.     2.  Generalized anxiety disorder.     3.  Posttraumatic stress disorder.     4.  Borderline personality disorder.   5.  Opiate use disorder, moderate.   6.  Stimulant (cocaine) use disorder, moderate.   7.  Sedative, anxiolytic and hypnotic use disorder, moderate.    8.  Nicotine use disorder.     9.  Chronic abdominal pain.     10.  History of traumatic brain injury.       Health Care Follow-up Appointments:     You [facility]  To access all mental health services contact Mental Health Intake at   [phone], Monday through Friday, from 8:00 am to 4:30 pm    Addiction Recovery Services Clinic  [facility] offers one of the most comprehensive outpatient substance abuse treatment programs in the entire [facility] facility network. It provides care at the Intensive Outpatient Program (IOP) level and has an eight-week aftercare group for alumni.  You have previously been referred to the IOP program.  Addiction Recovery Services  Appointment scheduled at 1pm 4/20/18  Room [address]P - [address]  [facility]   [place] [address]   [phone]    [facility]  Address: [address] [place] [address]  Hours: Open 24 hours   Phone: [phone]    Attend all scheduled appointments with your outpatient providers. Call at least 24 hours in advance if you need to reschedule an appointment to ensure continued access to your outpatient providers.   Major Treatments, Procedures and Findings:  You were provided with: a psychiatric assessment, assessed for medical stability, medication evaluation and/or management and milieu management    Symptoms to Report: mood getting worse, thoughts of suicide or substance abuse    Early warning signs can include: increased depression or anxiety sleep disturbances increased thoughts or behaviors of suicide or self-harm  increased unusual thinking, such as paranoia or hearing voices    Safety and Wellness:  Take all medicines as directed.  Make no changes unless your doctor suggests them.      Follow treatment recommendations.  Refrain from alcohol and non-prescribed drugs.  If there is a concern for safety, call [phone].    Resources: [facility] CRISIS LINE: 437.549.8124 #1  Crisis Intervention: 415.418.5189 or 504-096-3823 (TTY: 412.210.9973).  Call anytime for help.  National [place] on Mental Illness (www.mn.ruthann.org): 812.625.4476 or 411-136-6441.  Alcoholics Anonymous (www.alcoholics-anonymous.org): Check your phone book for your local chapter.  Suicide Awareness Voices of Education (SAVE) (www.save.org): 920-069-NTRN (6889)  National  "Suicide Prevention Line (www.mentalhealthmn.org): 682-542-QNJZ (0858)  Self- Management and Recovery Training., SMART-- Toll free: 146.969.2047  www.[employer]  [facility] Crisis (COPE) Response - Adult 389 745-3235  Text 4 Life: txt \"LIFE\" to 38647 for immediate support and crisis intervention  Crisis text line: Text \"[place]\" to 911746. Free, confidential, .  Crisis Intervention: 892.634.5055 or 874-316-5620. Call anytime for help.     The treatment team has appreciated the opportunity to work with you.     If you have any questions or concerns our unit number is [phone]  You may be receiving a follow-up phone call within the next three days from a representative from behavioral health.    You have identified the best phone number to reach you as [phone]        Pending Results     No orders found from 4/15/2018 to 2018.            Admission Information     Date & Time Provider Department Dept. Phone    2018 Jenaro Cortes MD [facility] [phone]      Your Vitals Were     Blood Pressure Pulse Temperature Respirations Height Weight    164/102 96 98  F (36.7  C) (Oral) 16 1.676 m (5' 6\") 85.4 kg (188 lb 3.2 oz)    Pulse Oximetry BMI (Body Mass Index)                97% 30.38 kg/m2          MyChart Information     [employer] lets you send messages to your doctor, view your test results, renew your prescriptions, schedule appointments and more. To sign up, go to [email or web address] . Click on \"Log in\" on the left side of the screen, which will take you to the Welcome page. Then click on \"Sign up Now\" on the right side of the page.     You will be asked to enter the access code listed below, as well as some personal information. Please follow the directions to create your username and password.     Your access code is: [email or web address]  Expires: 2018  5:19 PM     Your access code will  in 90 days. If you need help or a new code, please call your [facility] clinic or " [phone].        Care EveryWhere ID     This is your Care EveryWhere ID. This could be used by other organizations to access your [place] medical records  [id number]        Equal Access to Services     ESTHELA KIRKPATRICK : Hadii aad ku hadprimoena Sean, wamadelaineda luqadaha, qaybta kaalmada john, isaac cartwright fammanuela gibson haylee cruz. So [facility] [phone].    ATENCIÓN: Si habla español, tiene a johnson disposición servicios gratuitos de asistencia lingüística. Llame al [phone].    We comply with applicable federal civil rights laws and [place] laws. We do not discriminate on the basis of race, color, national origin, age, disability, sex, sexual orientation, or gender identity.               Review of your medicines      START taking        Dose / Directions    acetaminophen 325 MG tablet   Commonly known as:  TYLENOL        Dose:  975 mg   Take 3 tablets (975 mg) by mouth every 6 hours as needed for mild pain   Refills:  0       cloNIDine 0.1 MG tablet   Commonly known as:  CATAPRES        Dose:  0.1 mg   Take 1 tablet (0.1 mg) by mouth At Bedtime   Refills:  0       ibuprofen 600 MG tablet   Commonly known as:  ADVIL/MOTRIN        Dose:  600 mg   Take 1 tablet (600 mg) by mouth every 6 hours as needed for moderate pain   Refills:  0       lamoTRIgine 25 MG tablet   Commonly known as:  LaMICtal        Dose:  25 mg   Start taking on:  4/20/2018   Take 1 tablet (25 mg) by mouth daily   Refills:  0         CONTINUE these medicines which have NOT CHANGED        Dose / Directions    estradiol 0.1 MG/24HR BIW patch   Commonly known as:  VIVELLE-DOT        Dose:  1 patch   Place 1 patch onto the skin twice a week On Wednesdays and Sundays.   Refills:  0         STOP taking     DULoxetine 60 MG EC capsule   Commonly known as:  CYMBALTA           zolpidem 10 MG tablet   Commonly known as:  AMBIEN                    Protect others around you: Learn how to safely use, store and throw away your medicines at  www.disposemymeds.org.             Medication List: This is a list of all your medications and when to take them. Check marks below indicate your daily home schedule. Keep this list as a reference.      Medications           Morning Afternoon Evening Bedtime As Needed    acetaminophen 325 MG tablet   Commonly known as:  TYLENOL   Take 3 tablets (975 mg) by mouth every 6 hours as needed for mild pain                                cloNIDine 0.1 MG tablet   Commonly known as:  CATAPRES   Take 1 tablet (0.1 mg) by mouth At Bedtime   Last time this was given:  0.1 mg on 4/18/2018  8:32 PM                                estradiol 0.1 MG/24HR BIW patch   Commonly known as:  VIVELLE-DOT   Place 1 patch onto the skin twice a week On Wednesdays and Sundays.   Last time this was given:  1 patch on 4/18/2018  8:31 PM                                ibuprofen 600 MG tablet   Commonly known as:  ADVIL/MOTRIN   Take 1 tablet (600 mg) by mouth every 6 hours as needed for moderate pain   Last time this was given:  600 mg on 4/18/2018  8:32 PM                                lamoTRIgine 25 MG tablet   Commonly known as:  LaMICtal   Take 1 tablet (25 mg) by mouth daily   Start taking on:  4/20/2018   Last time this was given:  25 mg on 4/19/2018  8:39 AM

## 2018-04-17 NOTE — ED PROVIDER NOTES
History     Chief Complaint   Patient presents with     Suicide Attempt     Pt attempted to OD last night around 2200, Pt took an known amount of 10 mg Ambien and at least 1 g of Cocaine     HPI  Shanda Hinds is a 37 year old female with past history of PTSD, depression, polysubstance abuse, cystic fibrosis, who presented to the emergency department due to suicide attempt.  Patient reports that last night she took at least 10, 10 mg Ambien tablets, and 1 g of cocaine that she snorted and swallowed.  She does endorse continued suicidal ideations.  She reports that she just feels tired right now, and has no other specific complaints.  She denies auditory or visual hallucinations, or homicidal ideation.  No other acute complaints.  She does report that she will abuse cocaine, narcotic pain medication, and benzodiazepines if she has access to these medications.      This part of the document was transcribed by Laurie Richards, Medical Scribe.   I have reviewed the Medications, Allergies, Past Medical and Surgical History, and Social History in the [employer] system.  Past Medical History:   Diagnosis Date     Cystic fibrosis (H)      Depressive disorder      Insomnia      PTSD (post-traumatic stress disorder)        Past Surgical History:   Procedure Laterality Date     ABDOMEN SURGERY       CHOLECYSTECTOMY       GYN SURGERY      fibroids, endometriosis, torsion, adhesion removal     HYSTERECTOMY       SLING BLADDER SUSPENSION WITH FASCIA EILEEN         No family history on file.    Social History   Substance Use Topics     Smoking status: Current Every Day Smoker     Packs/day: 0.50     Smokeless tobacco: Never Used     Alcohol use Yes      Comment: socially        No current facility-administered medications for this encounter.      Current Outpatient Prescriptions   Medication     acetaminophen (TYLENOL) 325 MG tablet     cholecalciferol (VITAMIN D3) 1000 UNIT tablet     cloNIDine (CATAPRES) 0.1 MG tablet     estradiol  "(VIVELLE-DOT) 0.1 MG/24HR BIW patch     ibuprofen (ADVIL/MOTRIN) 600 MG tablet     lamoTRIgine (LAMICTAL) 25 MG tablet        Allergies   Allergen Reactions     Haldol [Haloperidol] Anaphylaxis     Codeine      Hives, vomiting      Compazine [Prochlorperazine]      Fentanyl      Morphine      Hives, vomiting      Neurontin [Gabapentin] Other (See Comments)     Facial twitching     Reglan [Metoclopramide]      Trazodone Nausea and Hives       Review of Systems   Constitutional: Positive for fatigue.   Psychiatric/Behavioral: Positive for self-injury (suicide attempt) and suicidal ideas. Negative for hallucinations.   All other systems reviewed and are negative.      Physical Exam   BP: (!) 178/116  Pulse: 86  Heart Rate: 84  Temp: 98.1  F (36.7  C)  Resp: 16  Height: 167.6 cm (5' 6\")  Weight: 79.4 kg (175 lb)  SpO2: 99 %  Sitting Orthostatic BP: 164/102  Sitting Orthostatic Pulse: 96 bpm  Standing Orthostatic BP: 133/95  Standing Orthostatic Pulse: 82 bpm      Physical Exam   Constitutional: She is oriented to person, place, and time. Vital signs are normal. She appears well-developed and well-nourished.  Non-toxic appearance. She does not appear ill. No distress.   HENT:   Head: Normocephalic and atraumatic.   Mouth/Throat: Oropharynx is clear and moist. No oropharyngeal exudate.   Eyes: Conjunctivae and EOM are normal. Pupils are equal, round, and reactive to light. No scleral icterus.   Neck: Normal range of motion. Neck supple. No JVD present. No tracheal deviation present. No thyromegaly present.   Cardiovascular: Normal rate, regular rhythm, normal heart sounds and intact distal pulses.  Exam reveals no gallop and no friction rub.    No murmur heard.  Pulmonary/Chest: Effort normal and breath sounds normal. No respiratory distress.   Abdominal: Soft. Bowel sounds are normal. She exhibits no distension and no mass. There is no tenderness.   Musculoskeletal: Normal range of motion. She exhibits no edema or " tenderness.   Lymphadenopathy:     She has no cervical adenopathy.   Neurological: She is alert and oriented to person, place, and time. She has normal strength. No cranial nerve deficit or sensory deficit.   Skin: Skin is warm and dry. No rash noted. No erythema. No pallor.   Psychiatric: Her speech is normal. Her affect is blunt. She is withdrawn. She is not actively hallucinating. Thought content is not paranoid and not delusional. She exhibits a depressed mood. She expresses suicidal ideation. She expresses no homicidal ideation. She expresses suicidal plans.   Nursing note and vitals reviewed.      ED Course     ED Course     Procedures               Labs Ordered and Resulted from Time of ED Arrival Up to the Time of Departure from the ED   COMPREHENSIVE METABOLIC PANEL - Abnormal; Notable for the following:        Result Value    Glucose 100 (*)     All other components within normal limits   COCAINE QUALITATIVE URINE - Abnormal; Notable for the following:     Cocaine Qual Urine Positive (*)     All other components within normal limits   BENZODIAZEPINE QUALITATIVE URINE - Abnormal; Notable for the following:     Benzodiazepine Qual Urine Positive (*)     All other components within normal limits   CBC WITH PLATELETS DIFFERENTIAL   INR   SALICYLATE LEVEL   ACETAMINOPHEN LEVEL   ALCOHOL ETHYL   OPIATES QUALITATIVE URINE   AMPHETAMINE QUALITATIVE URINE   CANNABINOIDS QUALITATIVE URINE   PULSE OXIMETRY NURSING   CARDIAC CONTINUOUS MONITORING   PERIPHERAL IV CATHETER            Assessments & Plan (with Medical Decision Making)   This patient presented to the emergency room after an attempt to kill herself by overdosing on Ambien and cocaine last night. She is hemodynamically stable here in the Emergency Department and work up demonstrates that there is no significant medical abnormalities. No evidence of hepatic or renal injury. No elevation of salicylates or acetaminophen levels. I do feel that patients  presentation wanting patient to have mental health treatment and feel that she is medically cleared for admission to the mental health unit. I have contacted mental health intake and patient is on a waiting list for bed placements. Patient is voluntary at this point but given her attempt, if she does become involuntary I would support 72 hour hold.          This part of the medical record was transcribed by Enriqueta Carolina, Medical Scribe, from a dictation done by Dr. Barnett.    I have reviewed the nursing notes.    I have reviewed the findings, diagnosis, plan and need for follow up with the patient.      Final diagnoses:   Suicide attempt by substance overdose, initial encounter (H)       4/17/2018   [facility], [place], EMERGENCY DEPARTMENT     Tremaine Barnett MD  07/18/18 2396

## 2018-04-18 PROBLEM — F33.2 MAJOR DEPRESSIVE DISORDER, RECURRENT SEVERE WITHOUT PSYCHOTIC FEATURES (H): Status: ACTIVE | Noted: 2018-04-18

## 2018-04-18 LAB — INTERPRETATION ECG - MUSE: NORMAL

## 2018-04-18 PROCEDURE — 99222 1ST HOSP IP/OBS MODERATE 55: CPT | Mod: AI | Performed by: NURSE PRACTITIONER

## 2018-04-18 PROCEDURE — 25000132 ZZH RX MED GY IP 250 OP 250 PS 637: Performed by: NURSE PRACTITIONER

## 2018-04-18 PROCEDURE — 99207 ZZC CDG-MDM COMPONENT: MEETS LOW - DOWN CODED: CPT | Performed by: NURSE PRACTITIONER

## 2018-04-18 PROCEDURE — 12400007 ZZH R&B MH INTERMEDIATE UMMC

## 2018-04-18 RX ORDER — CLONIDINE HYDROCHLORIDE 0.1 MG/1
0.1 TABLET ORAL 2 TIMES DAILY
Status: DISCONTINUED | OUTPATIENT
Start: 2018-04-18 | End: 2018-04-19

## 2018-04-18 RX ORDER — ALUMINA, MAGNESIA, AND SIMETHICONE 2400; 2400; 240 MG/30ML; MG/30ML; MG/30ML
30 SUSPENSION ORAL EVERY 4 HOURS PRN
Status: DISCONTINUED | OUTPATIENT
Start: 2018-04-18 | End: 2018-04-19 | Stop reason: HOSPADM

## 2018-04-18 RX ORDER — POLYETHYLENE GLYCOL 3350 17 G
4-8 POWDER IN PACKET (EA) ORAL
Status: DISCONTINUED | OUTPATIENT
Start: 2018-04-18 | End: 2018-04-19 | Stop reason: HOSPADM

## 2018-04-18 RX ORDER — LAMOTRIGINE 25 MG/1
25 TABLET ORAL DAILY
Status: DISCONTINUED | OUTPATIENT
Start: 2018-04-18 | End: 2018-04-19 | Stop reason: HOSPADM

## 2018-04-18 RX ORDER — HYDROXYZINE HYDROCHLORIDE 25 MG/1
25-50 TABLET, FILM COATED ORAL EVERY 4 HOURS PRN
Status: DISCONTINUED | OUTPATIENT
Start: 2018-04-18 | End: 2018-04-19 | Stop reason: HOSPADM

## 2018-04-18 RX ORDER — OLANZAPINE 10 MG/2ML
10 INJECTION, POWDER, FOR SOLUTION INTRAMUSCULAR
Status: DISCONTINUED | OUTPATIENT
Start: 2018-04-18 | End: 2018-04-19 | Stop reason: HOSPADM

## 2018-04-18 RX ORDER — ESTRADIOL 0.1 MG/D
1 FILM, EXTENDED RELEASE TRANSDERMAL
Status: DISCONTINUED | OUTPATIENT
Start: 2018-04-18 | End: 2018-04-19 | Stop reason: HOSPADM

## 2018-04-18 RX ORDER — BUPRENORPHINE AND NALOXONE 8; 2 MG/1; MG/1
1 FILM, SOLUBLE BUCCAL; SUBLINGUAL AT BEDTIME
Status: DISCONTINUED | OUTPATIENT
Start: 2018-04-18 | End: 2018-04-19 | Stop reason: HOSPADM

## 2018-04-18 RX ORDER — OLANZAPINE 10 MG/1
10 TABLET ORAL
Status: DISCONTINUED | OUTPATIENT
Start: 2018-04-18 | End: 2018-04-19 | Stop reason: HOSPADM

## 2018-04-18 RX ORDER — ACETAMINOPHEN 325 MG/1
975 TABLET ORAL EVERY 6 HOURS PRN
Status: DISCONTINUED | OUTPATIENT
Start: 2018-04-18 | End: 2018-04-19 | Stop reason: HOSPADM

## 2018-04-18 RX ORDER — IBUPROFEN 600 MG/1
600 TABLET, FILM COATED ORAL EVERY 6 HOURS PRN
Status: DISCONTINUED | OUTPATIENT
Start: 2018-04-18 | End: 2018-04-19 | Stop reason: HOSPADM

## 2018-04-18 RX ADMIN — HYDROXYZINE HYDROCHLORIDE 50 MG: 25 TABLET ORAL at 12:54

## 2018-04-18 RX ADMIN — IBUPROFEN 600 MG: 600 TABLET ORAL at 20:32

## 2018-04-18 RX ADMIN — CLONIDINE HYDROCHLORIDE 0.1 MG: 0.1 TABLET ORAL at 20:32

## 2018-04-18 RX ADMIN — NICOTINE POLACRILEX 8 MG: 2 LOZENGE ORAL at 21:08

## 2018-04-18 RX ADMIN — CLONIDINE HYDROCHLORIDE 0.1 MG: 0.1 TABLET ORAL at 12:54

## 2018-04-18 RX ADMIN — ESTRADIOL 1 PATCH: 0.1 PATCH TRANSDERMAL at 20:31

## 2018-04-18 RX ADMIN — BUPRENORPHINE HYDROCHLORIDE, NALOXONE HYDROCHLORIDE 1 FILM: 8; 2 FILM, SOLUBLE BUCCAL; SUBLINGUAL at 20:32

## 2018-04-18 RX ADMIN — HYDROXYZINE HYDROCHLORIDE 50 MG: 25 TABLET ORAL at 20:32

## 2018-04-18 RX ADMIN — LAMOTRIGINE 25 MG: 25 TABLET ORAL at 12:54

## 2018-04-18 ASSESSMENT — ACTIVITIES OF DAILY LIVING (ADL)
GROOMING: INDEPENDENT
ORAL_HYGIENE: INDEPENDENT
LAUNDRY: WITH SUPERVISION
DRESS: INDEPENDENT
DRESS: INDEPENDENT
ORAL_HYGIENE: INDEPENDENT
GROOMING: INDEPENDENT

## 2018-04-18 NOTE — PHARMACY-ADMISSION MEDICATION HISTORY
Admission medication history interview status for the 4/17/2018 admission is complete. See Epic admission navigator for allergy information, pharmacy, prior to admission medications and immunization status.     Medication history interview sources:  Patient    Changes made to PTA medication list (reason)  Added: None  Deleted: Patient reports not having taken the following medications for about a month:  Biotin w/ Vitamins C & E gummy; Take 2 gummies by mouth per day.  Diazepam 5mg tablet; Take 1 tablet by mouth every 8 hours as needed for anxiety.  Sumatriptan Succinate 25mg tablet; Take 1 tablet by mouth once daily as needed.    Eszopiclone 2mg tablet; Take 1 tablet by mouth at bedtime (no longer taking)  Oxycodone-acetaminophen 5-325mg tablet; Take 1-2 tablets by mouth every 4 hours as needed for pain (no longer taking; short course).  Vancomycin 125mg capsule (Course for C. Dif infection ended).  Changed: None    Additional medication history information (including reliability of information, actions taken by pharmacist):    Patient was a moderate historian of her medication history.  Patient reports not taking any of her medications for about a month other than zolpidem 10mg every night and estradiol patch twice weekly.  Unable to contact [facility] pharmacy to determine doses of medications she was on. [facility] if [facility] doses are needed.      Prior to Admission medications    Medication Sig Last Dose Taking? Auth Provider   zolpidem (AMBIEN) 10 MG tablet Take 10 mg by mouth nightly as needed for sleep 4/16/2018 at PM Yes Unknown, Entered By History   DULoxetine (CYMBALTA) 60 MG EC capsule Take 60 mg by mouth At Bedtime  Yes Unknown, Entered By History   estradiol (VIVELLE-DOT) 0.1 MG/24HR BIW patch Place 1 patch onto the skin twice a week On Wednesdays and Sundays. 4/15/2018 at AM  Unknown, Entered By History         Medication history completed by: Shon Knox, Pharmacy Intern

## 2018-04-18 NOTE — ED NOTES
ED to Behavioral Floor Handoff    SITUATION  Shanda Hinds is a 37 year old female who speaks English and lives in a home with a spouse The patient arrived in the ED by ambulance from home with a complaint of Suicide Attempt (Pt attempted to OD last night around 2200, Pt took an known amount of 10 mg Ambien and at least 1 g of Cocaine)  .The patient's current symptoms started/worsened 1 week(s) ago and during this time the symptoms have increased.   In the ED, pt was diagnosed with   Final diagnoses:   Suicide attempt by substance overdose, initial encounter (H)        Initial vitals were: BP: (!) 178/116  Pulse: 86  Heart Rate: 84  Temp: 98.1  F (36.7  C)  Resp: 16  Weight: 79.4 kg (175 lb)  SpO2: 99 %   --------  Is the patient diabetic? No   If yes, last blood glucose? --     If yes, was this treated in the ED? --  --------  Is the patient inebriated (ETOH) No or Impaired on other substances? No  MSSA done? N/A  Last MSSA score: --    Were withdrawal symptoms treated? N/A  Does the patient have a seizure history? No. If yes, date of most recent seizure--  --------  Is the patient patient experiencing suicidal ideation? has a history of suicidal attempts, most recent 04/16/18    Homicidal ideation? denies current or recent homicidal ideation or behaviors.    Self-injurious behavior/urges? reports current or recent self injurious behavior or ideation including attempt via overdose 4/16/18 at 2200. unknown number of 10 mg  ambien and snorted 1g  cocaine.  ------  Was pt aggressive in the ED No  Was a code called No  Is the pt now cooperative? Yes  -------  Meds given in ED:   Medications   0.9% sodium chloride BOLUS (0 mLs Intravenous Stopped 4/17/18 1656)     Followed by   sodium chloride 0.9% infusion (0 mLs Intravenous Stopped 4/17/18 1729)   LORazepam (ATIVAN) tablet 1 mg (1 mg Oral Given 4/17/18 1523)   OLANZapine zydis (zyPREXA) ODT tab 10 mg (10 mg Oral Given 4/17/18 1708)      Family present during ED  course? Yes  Family currently present? No    BACKGROUND  Does the patient have a cognitive impairment or developmental disability? No  Allergies:   Allergies   Allergen Reactions     Haldol [Haloperidol] Anaphylaxis     Codeine      Hives, vomiting      Compazine [Prochlorperazine]      Fentanyl      Morphine      Hives, vomiting      Reglan [Metoclopramide]    .   Social demographics are   Social History     Social History     Marital status:      Spouse name: N/A     Number of children: N/A     Years of education: N/A     Social History Main Topics     Smoking status: Current Every Day Smoker     Packs/day: 0.50     Smokeless tobacco: Never Used     Alcohol use Yes      Comment: socially      Drug use: Yes     Special: Cocaine, Marijuana     Sexual activity: Not on file     Other Topics Concern     Not on file     Social History Narrative        ASSESSMENT  Labs results   Labs Ordered and Resulted from Time of ED Arrival Up to the Time of Departure from the ED   COMPREHENSIVE METABOLIC PANEL - Abnormal; Notable for the following:        Result Value    Glucose 100 (*)     All other components within normal limits   COCAINE QUALITATIVE URINE - Abnormal; Notable for the following:     Cocaine Qual Urine Positive (*)     All other components within normal limits   BENZODIAZEPINE QUALITATIVE URINE - Abnormal; Notable for the following:     Benzodiazepine Qual Urine Positive (*)     All other components within normal limits   CBC WITH PLATELETS DIFFERENTIAL   INR   SALICYLATE LEVEL   ACETAMINOPHEN LEVEL   ALCOHOL ETHYL   OPIATES QUALITATIVE URINE   AMPHETAMINE QUALITATIVE URINE   CANNABINOIDS QUALITATIVE URINE   PULSE OXIMETRY NURSING   CARDIAC CONTINUOUS MONITORING   PERIPHERAL IV CATHETER      Imaging Studies: No results found for this or any previous visit (from the past 24 hour(s)).   Most recent vital signs /75  Pulse 80  Temp 98.8  F (37.1  C) (Oral)  Resp 18  Wt 79.4 kg (175 lb)  SpO2 93%  BMI  28.25 kg/m2   Abnormal labs/tests/findings requiring intervention:---   Pain control: pt had none  Nausea control: pt had none    RECOMMENDATION  Are any infection precautions needed (MRSA, VRE, etc.)? No If yes, what infection? --  ---  Does the patient have mobility issues? independently. If yes, what device does the pt use? ---  ---  Is patient on 72 hour hold or commitment? No If on 72 hour hold, have hold and rights been given to patient? N/A  Are admitting orders written if after 10 p.m. ?N/A  Tasks needing to be completed:---     Likes to be called Kami.        Joy Garrido   [facility]-- [phone]   [phone] ED

## 2018-04-18 NOTE — ED NOTES
Pt spoke earlier about wanting to leave ED when spouse was present, but stated that she is currently voluntary for IP MH.

## 2018-04-18 NOTE — PLAN OF CARE
Problem: Mood Impairment (Depressive Signs/Symptoms) (Adult)  Goal: Improved Mood Symptoms (Depressive Signs/Symptoms)  Outcome: No Change  Pt was admitted to [facility] from ED after being medically cleared after a suicide attempt by OD with ambien and cocaine. Pt has been calm and cooperative on the unit. Affect is blunted, flat, sad. Mood is anxious. Pt denies SI/SIB. Will continue to monitor.

## 2018-04-18 NOTE — ED PROVIDER NOTES
The pt was signed out at shift change pending inpt bed availability. She rested comfortably throughout the night without difficulty. The pt will be signed out again at shift change, still awaiting and inpt bed.       Patricia Metcalf MD  04/18/18 0693
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
## 2018-04-18 NOTE — PROGRESS NOTES
04/18/18 1158   Patient Belongings   Did you bring any home meds/supplements to the hospital?  No   Patient Belongings clothing   Disposition of Belongings Locker   Belongings Search Yes   Clothing Search Yes   Second Staff Kinjal GUZMAN   In locker:  Boots, Jeans, Shirt, Jacket, sunglasses w/ black case,  ADDENDUM 4/18/18:  brought clothing, shower bag, hygiene products, coloring materials, slippers, and grooming products  No items sent to security  A               Admission:  I am responsible for any personal items that are not sent to the safe or pharmacy.  [facility] is not responsible for loss, theft or damage of any property in my possession.    Signature:  _________________________________ Date: _______  Time: _____                                              Staff Signature:  ____________________________ Date: ________  Time: _____      2nd Staff person, if patient is unable/unwilling to sign:    Signature: ________________________________ Date: ________  Time: _____     Discharge:  [facility] has returned all of my personal belongings:    Signature: _________________________________ Date: ________  Time: _____                                          Staff Signature:  ____________________________ Date: ________  Time: _____

## 2018-04-19 VITALS
BODY MASS INDEX: 30.25 KG/M2 | RESPIRATION RATE: 16 BRPM | SYSTOLIC BLOOD PRESSURE: 164 MMHG | HEART RATE: 96 BPM | WEIGHT: 188.2 LBS | HEIGHT: 66 IN | DIASTOLIC BLOOD PRESSURE: 102 MMHG | TEMPERATURE: 98 F | OXYGEN SATURATION: 97 %

## 2018-04-19 LAB
CHOLEST SERPL-MCNC: 250 MG/DL
DEPRECATED CALCIDIOL+CALCIFEROL SERPL-MC: 12 UG/L (ref 20–75)
HDLC SERPL-MCNC: 35 MG/DL
LDLC SERPL CALC-MCNC: 159 MG/DL
NONHDLC SERPL-MCNC: 215 MG/DL
T4 FREE SERPL-MCNC: 0.69 NG/DL (ref 0.76–1.46)
TRIGL SERPL-MCNC: 282 MG/DL
TSH SERPL DL<=0.005 MIU/L-ACNC: 4.04 MU/L (ref 0.4–4)

## 2018-04-19 PROCEDURE — 84443 ASSAY THYROID STIM HORMONE: CPT | Performed by: NURSE PRACTITIONER

## 2018-04-19 PROCEDURE — 80061 LIPID PANEL: CPT | Performed by: NURSE PRACTITIONER

## 2018-04-19 PROCEDURE — 82306 VITAMIN D 25 HYDROXY: CPT | Performed by: NURSE PRACTITIONER

## 2018-04-19 PROCEDURE — 36415 COLL VENOUS BLD VENIPUNCTURE: CPT | Performed by: NURSE PRACTITIONER

## 2018-04-19 PROCEDURE — 25000132 ZZH RX MED GY IP 250 OP 250 PS 637: Performed by: NURSE PRACTITIONER

## 2018-04-19 PROCEDURE — 84439 ASSAY OF FREE THYROXINE: CPT | Performed by: NURSE PRACTITIONER

## 2018-04-19 PROCEDURE — 90853 GROUP PSYCHOTHERAPY: CPT

## 2018-04-19 PROCEDURE — 99232 SBSQ HOSP IP/OBS MODERATE 35: CPT | Performed by: NURSE PRACTITIONER

## 2018-04-19 RX ORDER — IBUPROFEN 600 MG/1
600 TABLET, FILM COATED ORAL EVERY 6 HOURS PRN
Status: ON HOLD | COMMUNITY
Start: 2018-04-19 | End: 2023-04-21

## 2018-04-19 RX ORDER — CLONIDINE HYDROCHLORIDE 0.1 MG/1
0.1 TABLET ORAL AT BEDTIME
Status: DISCONTINUED | OUTPATIENT
Start: 2018-04-19 | End: 2018-04-19 | Stop reason: HOSPADM

## 2018-04-19 RX ORDER — LAMOTRIGINE 25 MG/1
25 TABLET ORAL DAILY
Status: ON HOLD | COMMUNITY
Start: 2018-04-20 | End: 2023-05-01

## 2018-04-19 RX ORDER — CLONIDINE HYDROCHLORIDE 0.1 MG/1
0.1 TABLET ORAL AT BEDTIME
Status: ON HOLD | COMMUNITY
Start: 2018-04-19 | End: 2023-04-21

## 2018-04-19 RX ORDER — ACETAMINOPHEN 325 MG/1
975 TABLET ORAL EVERY 6 HOURS PRN
Status: ON HOLD | COMMUNITY
Start: 2018-04-19 | End: 2023-04-21

## 2018-04-19 RX ADMIN — LAMOTRIGINE 25 MG: 25 TABLET ORAL at 08:39

## 2018-04-19 ASSESSMENT — ACTIVITIES OF DAILY LIVING (ADL)
DRESS: INDEPENDENT
GROOMING: INDEPENDENT
ORAL_HYGIENE: INDEPENDENT
LAUNDRY: WITH SUPERVISION

## 2018-04-19 NOTE — PLAN OF CARE
Problem: General Plan of Care (Inpatient Behavioral)  Goal: Team Discussion  Team Plan:    BEHAVIORAL TEAM DISCUSSION    Participants: Sylvia Powers CNP; Niya Bowen RN  Progress: New admission following a suicide attempt by overdose   Continued Stay Criteria/Rationale: needs further assessment  Medical/Physical: see chart  Precautions:   Behavioral Orders   Procedures     Code 1 - Restrict to Unit     Routine Programming     As clinically indicated     Status 15     Every 15 minutes.     Suicide precautions     Patients on Suicide Precautions should have a Combination Diet ordered that includes a Diet selection(s) AND a Behavioral Tray selection for Safe Tray - with utensils, or Safe Tray - NO utensils       Plan: stabilize, assess, monitor.  CTC to arrange discharge plan  Rationale for change in precautions or plan: new admission.      Problem: Patient Care Overview  Goal: Team Discussion  Team Plan:    BEHAVIORAL TEAM DISCUSSION    Participants: Sylvia Powers CNP; Niya Bowen RN  Progress: New admission following a suicide attempt by overdose   Continued Stay Criteria/Rationale: needs further assessment  Medical/Physical: see chart  Precautions:   Behavioral Orders   Procedures     Code 1 - Restrict to Unit     Routine Programming     As clinically indicated     Status 15     Every 15 minutes.     Suicide precautions     Patients on Suicide Precautions should have a Combination Diet ordered that includes a Diet selection(s) AND a Behavioral Tray selection for Safe Tray - with utensils, or Safe Tray - NO utensils       Plan: stabilize, assess, monitor.  CTC to arrange discharge plan  Rationale for change in precautions or plan: new admission.
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
## 2018-04-19 NOTE — PLAN OF CARE
Problem: Patient Care Overview  Goal: Individualization & Mutuality  The patient completed the reasons for admit and goals for discharge in the personal plan of care.   Reasons for admit:  1)  SI      Goals for discharge:  1)  Coordinate with my doctor / meds  2)  [facility]

## 2018-04-19 NOTE — PROGRESS NOTES
DISCHARGE: This pt approached this RN and requested a DC. Pt stated she didn't like some of the pt's here which made her very uncomfortable. This RN discussed this with Sylvia Powers at 1600. This RN reviewed all meds (she has these at home or will [facility] the [facility] on Friday 4/20/18 @ 1300 at her appointment). Pt denied any SI or depression or psychosis stating she feels god to DC and her  agreed. All belongings returned. Pt bright and smiling upon DC.

## 2018-04-19 NOTE — PROGRESS NOTES
Pt isolative and withdrawn ,sleeping/ napping for most this shift  Blunted,flat  affect. Pt expressed feeling confused about her plan of care. Stated wanting to go home, Pt also expressed wanting to go to treatment. Rated her anxiety at a 9, denies SI/SIB thoughts.  No behavioral concerns.        04/18/18 2120   Behavioral Health   Hallucinations denies / not responding to hallucinations   Thinking poor concentration   Orientation time: oriented;date: oriented;place: oriented;person: oriented   Memory baseline memory   Insight poor   Judgement impaired   Eye Contact at examiner   Affect blunted, flat;sad;tense   Mood anxious;depressed   Physical Appearance/Attire attire appropriate to age and situation   Hygiene well groomed   Suicidality other (see comments)  (Pt denies)   1. Wish to be Dead No   2. Non-Specific Active Suicidal Thoughts  No   Self Injury other (see comment)  (Pt denies)   Activity isolative;withdrawn   Speech pressured   Medication Sensitivity no stated side effects;no observed side effects   Psychomotor / Gait balanced   Psycho Education   Type of Intervention 1:1 intervention   Response participates, initiates socially appropriate   Hours 0.5   Treatment Detail (check in )   Activities of Daily Living   Hygiene/Grooming independent   Oral Hygiene independent   Dress independent   Laundry with supervision   Room Organization independent   Activity   Activity Assistance Provided independent
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
## 2018-04-19 NOTE — DISCHARGE INSTRUCTIONS
Behavioral Discharge Planning and Instructions      Summary:  You were admitted on 4/17/2018  due to Suicidal Ideations, substance abuse.  You were treated by Sylvia Powers NP and discharged on 4/20/2018 [facility]      Principal Diagnosis:     1.  Major depressive disorder, severe, recurrent without psychosis.     2.  Generalized anxiety disorder.     3.  Posttraumatic stress disorder.     4.  Borderline personality disorder.   5.  Opiate use disorder, moderate.   6.  Stimulant (cocaine) use disorder, moderate.   7.  Sedative, anxiolytic and hypnotic use disorder, moderate.    8.  Nicotine use disorder.     9.  Chronic abdominal pain.     10.  History of traumatic brain injury.       Health Care Follow-up Appointments:     You [facility]  To access all mental health services contact Mental Health Intake at   [phone], Monday through Friday, from 8:00 am to 4:30 pm    [facility]  [facility] ([facility]) offers one of the most comprehensive outpatient substance abuse treatment programs in the entire [facility] facility [facility]. It provides care at the Intensive Outpatient Program (IOP) level and has an eight-week aftercare group for alumni.  You have previously been referred to the IOP program.  [facility]  Appointment scheduled at 1pm 4/20/18  Room [address]  [address] [place] [address]   [phone]    [facility]  Address: [address] [place] [address]  Hours: Open 24 hours   Phone: [phone]    Attend all scheduled appointments with your outpatient providers. Call at least 24 hours in advance if you need to reschedule an appointment to ensure continued access to your outpatient providers.   Major Treatments, Procedures and Findings:  You were provided with: a psychiatric assessment, assessed for medical stability,  "medication evaluation and/or management and milieu management    Symptoms to Report: mood getting worse, thoughts of suicide or substance abuse    Early warning signs can include: increased depression or anxiety sleep disturbances increased thoughts or behaviors of suicide or self-harm  increased unusual thinking, such as paranoia or hearing voices    Safety and Wellness:  Take all medicines as directed.  Make no changes unless your doctor suggests them.      Follow treatment recommendations.  Refrain from alcohol and non-prescribed drugs.  If there is a concern for safety, call 911.    Resources: [facility] CRISIS LINE: 563.407.8669 #1  Crisis Intervention: 123.103.1069 or 410-368-7226 (TTY: 572.458.2143).  Call anytime for help.  National [place] on Mental Illness (www.[place].ruthann.org): 354.591.9819 or 793-010-5573.  Alcoholics Anonymous (www.alcoholics-anonymous.org): Check your phone book for your local chapter.  Suicide Awareness Voices of Education (SAVE) (www.save.org): 678-837-HWBB (0275)  National Suicide Prevention Line (www.mentalhealthmn.org): 630-651-HSAR (6184)  Self- Management and Recovery Training., SMART-- Toll free: 876.718.8080  www.VidAngel.SeeMedia  [facility] Crisis (COPE) Response - Adult 222 207-3349  Text 4 Life: txt \"LIFE\" to 04355 for immediate support and crisis intervention  Crisis text line: Text \"[place]\" to 584004. Free, confidential, 24/7.  Crisis Intervention: 855.617.4428 or 698-445-0259. Call anytime for help.     The treatment team has appreciated the opportunity to work with you.     If you have any questions or concerns our unit number is [phone]  You may be receiving a follow-up phone call within the next three days from a representative from behavioral health.    You have identified the best phone number to reach you as [phone]      "
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
## 2018-04-20 NOTE — PROGRESS NOTES
"Attended 1 OT creative expressions group. Irritable mood with superficial interaction. Pt was given and completed a written self assessment. OT purpose was explained with a value of having involvement in tx plan, and provided options to meet self identified goals. Will assess further in the areas of organization, problem solving, and concentration. Indicated she had unidentified stress x 3-6 months. Selected sx's she had been experiencing recently as: anxiety, anger, rage,guilt, being overwhelmed, having racing thoughts, memory problems, decreased concentration, blanking out, nightmares, self harm thoughts and obsessive thoughts. Identified coping skills as: relaxation, hobbies and exercise. Selected goals as: find resources/supports and manage anger and anxiety.. Supports include:  and kids. Coffee and water was accidentally spilt and it got on pt. and project. Noted\"I am fine\" when asked if she was \"ok\". Left to clean herself up and returned to select a new task. Good attention to details and planning. Asked for supplies and was able to gather independently. Minimal interaction with others.  Will continue to encourage group attendance and participation.    "
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
## 2018-04-23 NOTE — TELEPHONE ENCOUNTER
Referral forwarded to [facility] from [facility] no records included.  Call placed to referring providers office to request records to be sent to me.  (Dr. Karie Vasquez at [facility]- [phone]).  Left message to call me back.     Carol Westholter

## 2018-04-26 NOTE — TELEPHONE ENCOUNTER
No call back from clinic and no records received.  Per note on referral, patient has been treated at [facility] for this previously.  Left message for patient to call me back to discuss.  She will need to contact [facility] and have them send records to us.     Carol Westholter

## 2018-04-30 NOTE — TELEPHONE ENCOUNTER
Call received back from [facility] clinic.  They have an ED report and positive C Diff result from December 2017, they will fax to me.      Second message left for patient to call me back to discuss [facility] records.     Carol Westholter

## 2021-08-08 ENCOUNTER — HEALTH MAINTENANCE LETTER (OUTPATIENT)
Age: 41
End: 2021-08-08

## 2021-10-03 ENCOUNTER — HEALTH MAINTENANCE LETTER (OUTPATIENT)
Age: 41
End: 2021-10-03

## 2022-09-11 ENCOUNTER — HEALTH MAINTENANCE LETTER (OUTPATIENT)
Age: 42
End: 2022-09-11

## 2023-04-14 ENCOUNTER — HOSPITAL ENCOUNTER (EMERGENCY)
Facility: CLINIC | Age: 43
Discharge: HOME | End: 2023-04-14
Attending: EMERGENCY MEDICINE | Admitting: EMERGENCY MEDICINE
Payer: COMMERCIAL

## 2023-04-14 VITALS
RESPIRATION RATE: 20 BRPM | OXYGEN SATURATION: 99 % | SYSTOLIC BLOOD PRESSURE: 108 MMHG | TEMPERATURE: 98.6 F | HEART RATE: 66 BPM | DIASTOLIC BLOOD PRESSURE: 56 MMHG

## 2023-04-14 DIAGNOSIS — R56.9 SEIZURE-LIKE ACTIVITY (H): ICD-10-CM

## 2023-04-14 LAB
ALBUMIN SERPL BCG-MCNC: 4.1 G/DL (ref 3.5–5.2)
ALP SERPL-CCNC: 62 U/L (ref 35–104)
ALT SERPL W P-5'-P-CCNC: 22 U/L (ref 10–35)
ANION GAP SERPL CALCULATED.3IONS-SCNC: 12 MMOL/L (ref 7–15)
AST SERPL W P-5'-P-CCNC: 22 U/L (ref 10–35)
BILIRUB SERPL-MCNC: 0.2 MG/DL
BUN SERPL-MCNC: 12.4 MG/DL (ref 6–20)
CALCIUM SERPL-MCNC: 8.4 MG/DL (ref 8.6–10)
CHLORIDE SERPL-SCNC: 105 MMOL/L (ref 98–107)
CREAT SERPL-MCNC: 0.74 MG/DL (ref 0.51–0.95)
DEPRECATED HCO3 PLAS-SCNC: 22 MMOL/L (ref 22–29)
ERYTHROCYTE [DISTWIDTH] IN BLOOD BY AUTOMATED COUNT: 14.5 % (ref 10–15)
GFR SERPL CREATININE-BSD FRML MDRD: >90 ML/MIN/1.73M2
GLUCOSE SERPL-MCNC: 118 MG/DL (ref 70–99)
HCT VFR BLD AUTO: 38.6 % (ref 35–47)
HGB BLD-MCNC: 12.3 G/DL (ref 11.7–15.7)
HOLD SPECIMEN: NORMAL
HOLD SPECIMEN: NORMAL
MCH RBC QN AUTO: 29.6 PG (ref 26.5–33)
MCHC RBC AUTO-ENTMCNC: 31.9 G/DL (ref 31.5–36.5)
MCV RBC AUTO: 93 FL (ref 78–100)
PLATELET # BLD AUTO: 330 10E3/UL (ref 150–450)
POTASSIUM SERPL-SCNC: 3.3 MMOL/L (ref 3.4–5.3)
PROT SERPL-MCNC: 6.5 G/DL (ref 6.4–8.3)
RBC # BLD AUTO: 4.15 10E6/UL (ref 3.8–5.2)
SODIUM SERPL-SCNC: 139 MMOL/L (ref 136–145)
WBC # BLD AUTO: 13.2 10E3/UL (ref 4–11)

## 2023-04-14 PROCEDURE — 82040 ASSAY OF SERUM ALBUMIN: CPT | Performed by: EMERGENCY MEDICINE

## 2023-04-14 PROCEDURE — 96376 TX/PRO/DX INJ SAME DRUG ADON: CPT

## 2023-04-14 PROCEDURE — 250N000011 HC RX IP 250 OP 636

## 2023-04-14 PROCEDURE — 250N000009 HC RX 250: Performed by: EMERGENCY MEDICINE

## 2023-04-14 PROCEDURE — 99284 EMERGENCY DEPT VISIT MOD MDM: CPT | Performed by: EMERGENCY MEDICINE

## 2023-04-14 PROCEDURE — 36415 COLL VENOUS BLD VENIPUNCTURE: CPT | Performed by: EMERGENCY MEDICINE

## 2023-04-14 PROCEDURE — 96374 THER/PROPH/DIAG INJ IV PUSH: CPT

## 2023-04-14 PROCEDURE — 85027 COMPLETE CBC AUTOMATED: CPT | Performed by: EMERGENCY MEDICINE

## 2023-04-14 PROCEDURE — 99285 EMERGENCY DEPT VISIT HI MDM: CPT | Mod: 25

## 2023-04-14 RX ORDER — LORAZEPAM 2 MG/ML
INJECTION INTRAMUSCULAR
Status: COMPLETED
Start: 2023-04-14 | End: 2023-04-14

## 2023-04-14 RX ADMIN — LORAZEPAM: 2 INJECTION INTRAMUSCULAR; INTRAVENOUS at 08:14

## 2023-04-14 RX ADMIN — Medication 10 MG: at 08:33

## 2023-04-14 RX ADMIN — Medication 5 MG: at 09:50

## 2023-04-14 ASSESSMENT — ACTIVITIES OF DAILY LIVING (ADL)
ADLS_ACUITY_SCORE: 35

## 2023-04-14 NOTE — ED NOTES
Pt had witnessed seizure like activity which included posturing and eye deviation up and to the right. Lasting about ~1minute. Desat noted to 90% w/ good wave form on pulse ox. MD notified. See MAR

## 2023-04-14 NOTE — ED TRIAGE NOTES
Triage Assessment     Row Name 04/14/23 0740       Triage Assessment (Adult)    Airway WDL WDL       Respiratory WDL    Respiratory WDL WDL       Skin Circulation/Temperature WDL    Skin Circulation/Temperature WDL WDL       Cardiac WDL    Cardiac WDL WDL       Peripheral/Neurovascular WDL    Peripheral Neurovascular WDL WDL       Cognitive/Neuro/Behavioral WDL    Cognitive/Neuro/Behavioral WDL X

## 2023-04-14 NOTE — ED NOTES
Bed: ED15  Expected date:   Expected time:   Means of arrival:   Comments:  Yarely 411 41 yo f pseudoseizure

## 2023-04-14 NOTE — DISCHARGE INSTRUCTIONS
Please make an appointment to follow up with Pain Clinic (phone: [phone]) as soon as possible.

## 2023-04-14 NOTE — ED PROVIDER NOTES
ED Provider Note  [facility]      History     Chief Complaint   Patient presents with     Seizures     HPI  Shanda Hinds is a .Brought in from home with complaints of ongoing seizures.  Per EMS who provides report, pt has hx of pseudo seizures- in total had about 6 witnessed seizure like activity events from home to hospital. Pt had loss of bladder function. 10 IM versed given en route, 112 BG. States migraine yesterday as aura to seizures. Pain in abd exacerbated by seizures.      Past Medical History  Past Medical History:   Diagnosis Date     Cystic fibrosis (H)      Depressive disorder      Insomnia      PTSD (post-traumatic stress disorder)      Past Surgical History:   Procedure Laterality Date     ABDOMEN SURGERY       ABDOMEN SURGERY      removal of shrapnel     ABDOMEN SURGERY      removal of adhesions     BLADDER SURGERY       CHOLECYSTECTOMY       CHOLECYSTECTOMY       CHOLECYSTECTOMY Bilateral 2011     COLONOSCOPY N/A 4/22/2023    Procedure: Colonoscopy;  Surgeon: Omid Pierce MD;  Location: [facility] OR     GYN SURGERY      fibroids, endometriosis, torsion, adhesion removal     HYSTERECTOMY       HYSTERECTOMY       LEFT OOPHORECTOMY       SLING BLADDER SUSPENSION WITH FASCIA EILEEN       amitriptyline (ELAVIL) 50 MG tablet  atorvastatin (LIPITOR) 40 MG tablet  benzonatate (TESSALON) 100 MG capsule  cloNIDine (CATAPRES) 0.2 MG tablet  estradiol (VIVELLE-DOT) 0.1 MG/24HR BIW patch  fluticasone (FLONASE) 50 MCG/ACT nasal spray  guaiFENesin-dextromethorphan (ROBITUSSIN DM) 100-10 MG/5ML syrup  lamoTRIgine (LAMICTAL) 25 MG tablet  montelukast (SINGULAIR) 10 MG tablet  OLANZapine (ZYPREXA) 7.5 MG tablet  omeprazole (PRILOSEC) 40 MG DR capsule  rimegepant (NURTEC) 75 MG ODT tablet  sulfamethoxazole-trimethoprim (BACTRIM DS) 800-160 MG tablet  thiamine (B-1) 100 MG tablet  vancomycin (VANCOCIN) 125 MG capsule  zolpidem (AMBIEN) 10 MG tablet      Allergies   Allergen  Reactions     Haldol [Haloperidol] Anaphylaxis     Codeine      Hives, vomiting      Compazine [Prochlorperazine]      Dexmedetomidine Hcl In Nacl Other (See Comments)     Tachy arrhythmias on 2 separate infusion trials      Fentanyl      Morphine      Hives, vomiting      Neurontin [Gabapentin] Other (See Comments)     Facial twitching     Reglan [Metoclopramide]      Trazodone Nausea and Hives     Family History  Family History   Problem Relation Age of Onset     No Known Problems Mother      Kidney failure Father      Hypertension Father      Pancreatic Cancer Sister      Social History   Social History     Tobacco Use     Smoking status: Every Day     Packs/day: 0.25     Years: 15.00     Pack years: 3.75     Types: Cigarettes     Smokeless tobacco: Never     Tobacco comments:     3-4 per day   Substance Use Topics     Alcohol use: Yes     Comment: Alcoholic Drinks/day: Holidays     Drug use: Yes     Types: Cocaine, Marijuana     Comment: Drug use: occassionally      Past medical history, past surgical history, medications, allergies, family history, and social history were reviewed with the patient. No additional pertinent items.      A medically appropriate review of systems was performed with pertinent positives and negatives noted in the HPI, and all other systems negative.    Physical Exam   BP: (!) 140/93  Pulse: 70  Temp: 98.5  F (36.9  C)  Resp: 16  SpO2: 97 %  Physical Exam  Vitals and nursing note reviewed.   Constitutional:       General: She is not in acute distress.     Appearance: Normal appearance. She is well-developed.   HENT:      Head: Normocephalic and atraumatic.   Eyes:      General: No scleral icterus.     Conjunctiva/sclera: Conjunctivae normal.   Cardiovascular:      Rate and Rhythm: Normal rate.   Pulmonary:      Effort: Pulmonary effort is normal. No respiratory distress.   Abdominal:      General: Abdomen is flat.   Musculoskeletal:      Cervical back: Normal range of motion and neck  supple.   Skin:     General: Skin is warm and dry.      Findings: No rash.   Neurological:      Mental Status: She is alert and oriented to person, place, and time.             ED Course, Procedures, & Data      Procedures                 Results for orders placed or performed during the hospital encounter of 04/14/23   CBC with platelets     Status: Abnormal   Result Value Ref Range    WBC Count 13.2 (H) 4.0 - 11.0 10e3/uL    RBC Count 4.15 3.80 - 5.20 10e6/uL    Hemoglobin 12.3 11.7 - 15.7 g/dL    Hematocrit 38.6 35.0 - 47.0 %    MCV 93 78 - 100 fL    MCH 29.6 26.5 - 33.0 pg    MCHC 31.9 31.5 - 36.5 g/dL    RDW 14.5 10.0 - 15.0 %    Platelet Count 330 150 - 450 10e3/uL   Comprehensive metabolic panel     Status: Abnormal   Result Value Ref Range    Sodium 139 136 - 145 mmol/L    Potassium 3.3 (L) 3.4 - 5.3 mmol/L    Chloride 105 98 - 107 mmol/L    Carbon Dioxide (CO2) 22 22 - 29 mmol/L    Anion Gap 12 7 - 15 mmol/L    Urea Nitrogen 12.4 6.0 - 20.0 mg/dL    Creatinine 0.74 0.51 - 0.95 mg/dL    Calcium 8.4 (L) 8.6 - 10.0 mg/dL    Glucose 118 (H) 70 - 99 mg/dL    Alkaline Phosphatase 62 35 - 104 U/L    AST 22 10 - 35 U/L    ALT 22 10 - 35 U/L    Protein Total 6.5 6.4 - 8.3 g/dL    Albumin 4.1 3.5 - 5.2 g/dL    Bilirubin Total 0.2 <=1.2 mg/dL    GFR Estimate >90 >60 mL/min/1.73m2   [facility] Draw     Status: None (In process)    Narrative    The following orders were created for panel order [facility] Draw.  Procedure                               Abnormality         Status                     ---------                               -----------         ------                     Extra Blue Top Tube[[id number]]                              In process                 Extra Red Top Tube[[id number]]                               In process                 Extra Green Top (Lithium...[[id number]]                                                   Please view results for these tests on the individual orders.     Medications    LORazepam (ATIVAN) 2 MG/ML injection (  $Given 4/14/23 8887)   ketamine (KETALAR) injection 10 mg (10 mg Intravenous $Given 4/14/23 5359)     Labs Ordered and Resulted from Time of ED Arrival to Time of ED Departure   CBC WITH PLATELETS - Abnormal       Result Value    WBC Count 13.2 (*)     RBC Count 4.15      Hemoglobin 12.3      Hematocrit 38.6      MCV 93      MCH 29.6      MCHC 31.9      RDW 14.5      Platelet Count 330     COMPREHENSIVE METABOLIC PANEL - Abnormal    Sodium 139      Potassium 3.3 (*)     Chloride 105      Carbon Dioxide (CO2) 22      Anion Gap 12      Urea Nitrogen 12.4      Creatinine 0.74      Calcium 8.4 (*)     Glucose 118 (*)     Alkaline Phosphatase 62      AST 22      ALT 22      Protein Total 6.5      Albumin 4.1      Bilirubin Total 0.2      GFR Estimate >90       No orders to display          Critical care was not performed.     Medical Decision Making  The patient's presentation was of moderate complexity (a chronic illness mild to moderate exacerbation, progression, or side effect of treatment).    The patient's evaluation involved:  review of external note(s) from 1 sources (see separate area of note for details)  ordering and/or review of 3+ test(s) in this encounter (see separate area of note for details)    The patient's management necessitated high risk (a parenteral controlled substance).      Assessment & Plan      Shanda Hinds is a .Brought in from home with complaints of ongoing seizures.  Vital signs in triage notable for mildly elevated blood pressure 140/93 but otherwise stable and afebrile including normal pulse ox at 97% on room air.  IV established, labs drawn sent reviewed document epic remarkable for mild leukocytosis with a WBC of 13.2 and mildly decreased potassium at 3.3 but otherwise normal CBC electrolytes.  Patient was given Ativan 2 mg IV here in the emergency department after witnessed seizure and observed for 6 hours thereafter with no return of  seizure activity.  She developed abdominal pain here in the emergency department and was given ketamine 10 mg IV followed by another 5 mg IV with adequate pain relief.  I did offer a neurology consultation, however patient declined stating she would follow-up with her own neurologist.  She is agreeable to follow-up with pain clinic for further evaluation and care.    I have reviewed the nursing notes. I have reviewed the findings, diagnosis, plan and need for follow up with the patient.    New Prescriptions    No medications on file       Final diagnoses:   Seizure-like activity (H)       Jud Thompson MD  [facility] EMERGENCY DEPARTMENT  4/14/2023     Jud Thompson MD  05/07/23 5788

## 2023-04-14 NOTE — ED TRIAGE NOTES
BIBA.Brought in from home with complaints of ongoing seizures.  Per EMS who provides report, pt has hx of pseudo seizures- in total had about 6 witnessed seizure like activity events from home to hospital. Pt had loss of bladder function. 10 IM versed given en route, 112 BG. States migraine yesterday as aura to seizures. Pain in abd exacerbated by seizures.

## 2023-04-20 ENCOUNTER — HOSPITAL ENCOUNTER (INPATIENT)
Facility: CLINIC | Age: 43
LOS: 11 days | Discharge: HOME | DRG: 917 | End: 2023-05-01
Attending: EMERGENCY MEDICINE | Admitting: INTERNAL MEDICINE
Payer: COMMERCIAL

## 2023-04-20 ENCOUNTER — APPOINTMENT (OUTPATIENT)
Dept: CT IMAGING | Facility: CLINIC | Age: 43
DRG: 917 | End: 2023-04-20
Attending: EMERGENCY MEDICINE
Payer: COMMERCIAL

## 2023-04-20 ENCOUNTER — APPOINTMENT (OUTPATIENT)
Dept: GENERAL RADIOLOGY | Facility: CLINIC | Age: 43
DRG: 917 | End: 2023-04-20
Attending: EMERGENCY MEDICINE
Payer: COMMERCIAL

## 2023-04-20 DIAGNOSIS — F13.10 BENZODIAZEPINE ABUSE (H): ICD-10-CM

## 2023-04-20 DIAGNOSIS — Z11.52 ENCOUNTER FOR SCREENING LABORATORY TESTING FOR SEVERE ACUTE RESPIRATORY SYNDROME CORONAVIRUS 2 (SARS-COV-2): ICD-10-CM

## 2023-04-20 DIAGNOSIS — T18.4XXA FOREIGN BODY IN COLON, INITIAL ENCOUNTER: ICD-10-CM

## 2023-04-20 DIAGNOSIS — F14.10 COCAINE ABUSE (H): ICD-10-CM

## 2023-04-20 DIAGNOSIS — G93.40 ENCEPHALOPATHY: ICD-10-CM

## 2023-04-20 DIAGNOSIS — J18.9 PNEUMONIA DUE TO INFECTIOUS ORGANISM, UNSPECIFIED LATERALITY, UNSPECIFIED PART OF LUNG: ICD-10-CM

## 2023-04-20 DIAGNOSIS — F33.2 MAJOR DEPRESSIVE DISORDER, RECURRENT SEVERE WITHOUT PSYCHOTIC FEATURES (H): Primary | ICD-10-CM

## 2023-04-20 LAB
ABO/RH(D): NORMAL
ALBUMIN SERPL BCG-MCNC: 4.5 G/DL (ref 3.5–5.2)
ALBUMIN UR-MCNC: NEGATIVE MG/DL
ALP SERPL-CCNC: 66 U/L (ref 35–104)
ALT SERPL W P-5'-P-CCNC: 20 U/L (ref 10–35)
AMMONIA PLAS-SCNC: 15 UMOL/L (ref 11–51)
AMPHETAMINES UR QL SCN: ABNORMAL
ANION GAP SERPL CALCULATED.3IONS-SCNC: 12 MMOL/L (ref 7–15)
ANTIBODY SCREEN: NEGATIVE
APAP SERPL-MCNC: <5 UG/ML (ref 10–30)
APPEARANCE UR: CLEAR
AST SERPL W P-5'-P-CCNC: 28 U/L (ref 10–35)
ATRIAL RATE - MUSE: 104 BPM
BARBITURATES UR QL SCN: ABNORMAL
BASOPHILS # BLD AUTO: 0.1 10E3/UL (ref 0–0.2)
BASOPHILS NFR BLD AUTO: 1 %
BENZODIAZ UR QL SCN: ABNORMAL
BILIRUB SERPL-MCNC: 0.2 MG/DL
BILIRUB UR QL STRIP: NEGATIVE
BUN SERPL-MCNC: 10.1 MG/DL (ref 6–20)
BZE UR QL SCN: ABNORMAL
CA-I BLD-MCNC: 4.8 MG/DL (ref 4.4–5.2)
CALCIUM SERPL-MCNC: 8.9 MG/DL (ref 8.6–10)
CANNABINOIDS UR QL SCN: ABNORMAL
CHLORIDE SERPL-SCNC: 99 MMOL/L (ref 98–107)
COLOR UR AUTO: NORMAL
CPB POCT: NO
CREAT BLD-MCNC: 0.7 MG/DL (ref 0.5–1)
CREAT SERPL-MCNC: 0.68 MG/DL (ref 0.51–0.95)
DEPRECATED HCO3 PLAS-SCNC: 19 MMOL/L (ref 22–29)
DIASTOLIC BLOOD PRESSURE - MUSE: NORMAL MMHG
EOSINOPHIL # BLD AUTO: 1.1 10E3/UL (ref 0–0.7)
EOSINOPHIL NFR BLD AUTO: 8 %
ERYTHROCYTE [DISTWIDTH] IN BLOOD BY AUTOMATED COUNT: 14.1 % (ref 10–15)
ETHANOL SERPL-MCNC: <0.01 G/DL
GFR SERPL CREATININE-BSD FRML MDRD: >60 ML/MIN/1.73M2
GFR SERPL CREATININE-BSD FRML MDRD: >90 ML/MIN/1.73M2
GLUCOSE BLD-MCNC: 133 MG/DL (ref 70–99)
GLUCOSE BLDC GLUCOMTR-MCNC: 135 MG/DL (ref 70–99)
GLUCOSE SERPL-MCNC: 134 MG/DL (ref 70–99)
GLUCOSE UR STRIP-MCNC: NEGATIVE MG/DL
HCO3 BLDV-SCNC: 24 MMOL/L (ref 21–28)
HCT VFR BLD AUTO: 37.7 % (ref 35–47)
HCT VFR BLD CALC: 38 % (ref 35–47)
HGB BLD-MCNC: 12.3 G/DL (ref 11.7–15.7)
HGB BLD-MCNC: 12.9 G/DL (ref 11.7–15.7)
HGB UR QL STRIP: NEGATIVE
HOLD SPECIMEN: NORMAL
IMM GRANULOCYTES # BLD: 0.1 10E3/UL
IMM GRANULOCYTES NFR BLD: 0 %
INR BLD: 1 (ref 2–3)
INR PPP: 0.97 (ref 0.85–1.15)
INTERPRETATION ECG - MUSE: NORMAL
KETONES UR STRIP-MCNC: NEGATIVE MG/DL
LACTATE SERPL-SCNC: 1.4 MMOL/L (ref 0.7–2)
LACTATE SERPL-SCNC: 2.9 MMOL/L (ref 0.7–2)
LEUKOCYTE ESTERASE UR QL STRIP: NEGATIVE
LIPASE SERPL-CCNC: 27 U/L (ref 13–60)
LYMPHOCYTES # BLD AUTO: 4.1 10E3/UL (ref 0.8–5.3)
LYMPHOCYTES NFR BLD AUTO: 30 %
MAGNESIUM SERPL-MCNC: 1.9 MG/DL (ref 1.7–2.3)
MCH RBC QN AUTO: 30.1 PG (ref 26.5–33)
MCHC RBC AUTO-ENTMCNC: 32.6 G/DL (ref 31.5–36.5)
MCV RBC AUTO: 92 FL (ref 78–100)
MONOCYTES # BLD AUTO: 1.2 10E3/UL (ref 0–1.3)
MONOCYTES NFR BLD AUTO: 9 %
NEUTROPHILS # BLD AUTO: 7 10E3/UL (ref 1.6–8.3)
NEUTROPHILS NFR BLD AUTO: 52 %
NITRATE UR QL: NEGATIVE
NRBC # BLD AUTO: 0 10E3/UL
NRBC BLD AUTO-RTO: 0 /100
OPIATES UR QL SCN: ABNORMAL
P AXIS - MUSE: 38 DEGREES
PCO2 BLDV: 40 MM HG (ref 40–50)
PH BLDV: 7.39 [PH] (ref 7.32–7.43)
PH UR STRIP: 5.5 [PH] (ref 5–7)
PLATELET # BLD AUTO: 326 10E3/UL (ref 150–450)
PO2 BLDV: 54 MM HG (ref 25–47)
POTASSIUM BLD-SCNC: 3.6 MMOL/L (ref 3.4–5.3)
POTASSIUM SERPL-SCNC: 3.4 MMOL/L (ref 3.4–5.3)
PR INTERVAL - MUSE: 164 MS
PROT SERPL-MCNC: 7.2 G/DL (ref 6.4–8.3)
QRS DURATION - MUSE: 110 MS
QT - MUSE: 384 MS
QTC - MUSE: 504 MS
R AXIS - MUSE: 9 DEGREES
RBC # BLD AUTO: 4.08 10E6/UL (ref 3.8–5.2)
RBC URINE: <1 /HPF
SALICYLATES SERPL-MCNC: <0.5 MG/DL
SAO2 % BLDV: 87 % (ref 94–100)
SODIUM BLD-SCNC: 139 MMOL/L (ref 133–144)
SODIUM SERPL-SCNC: 130 MMOL/L (ref 136–145)
SP GR UR STRIP: 1 (ref 1–1.03)
SPECIMEN EXPIRATION DATE: NORMAL
SYSTOLIC BLOOD PRESSURE - MUSE: NORMAL MMHG
T AXIS - MUSE: -6 DEGREES
TROPONIN T BLD-MCNC: 0 UG/L
UROBILINOGEN UR STRIP-MCNC: NORMAL MG/DL
VENTRICULAR RATE- MUSE: 104 BPM
WBC # BLD AUTO: 13.7 10E3/UL (ref 4–11)
WBC URINE: 0 /HPF

## 2023-04-20 PROCEDURE — 74177 CT ABD & PELVIS W/CONTRAST: CPT | Mod: 26 | Performed by: RADIOLOGY

## 2023-04-20 PROCEDURE — 70450 CT HEAD/BRAIN W/O DYE: CPT

## 2023-04-20 PROCEDURE — 70450 CT HEAD/BRAIN W/O DYE: CPT | Mod: 26 | Performed by: STUDENT IN AN ORGANIZED HEALTH CARE EDUCATION/TRAINING PROGRAM

## 2023-04-20 PROCEDURE — 31500 INSERT EMERGENCY AIRWAY: CPT

## 2023-04-20 PROCEDURE — 5A1945Z RESPIRATORY VENTILATION, 24-96 CONSECUTIVE HOURS: ICD-10-PCS | Performed by: INTERNAL MEDICINE

## 2023-04-20 PROCEDURE — 36415 COLL VENOUS BLD VENIPUNCTURE: CPT | Performed by: EMERGENCY MEDICINE

## 2023-04-20 PROCEDURE — 93005 ELECTROCARDIOGRAM TRACING: CPT

## 2023-04-20 PROCEDURE — 82077 ASSAY SPEC XCP UR&BREATH IA: CPT | Performed by: EMERGENCY MEDICINE

## 2023-04-20 PROCEDURE — 74177 CT ABD & PELVIS W/CONTRAST: CPT

## 2023-04-20 PROCEDURE — 82947 ASSAY GLUCOSE BLOOD QUANT: CPT

## 2023-04-20 PROCEDURE — 250N000009 HC RX 250: Performed by: EMERGENCY MEDICINE

## 2023-04-20 PROCEDURE — 82330 ASSAY OF CALCIUM: CPT | Performed by: NURSE PRACTITIONER

## 2023-04-20 PROCEDURE — 94002 VENT MGMT INPAT INIT DAY: CPT

## 2023-04-20 PROCEDURE — 82803 BLOOD GASES ANY COMBINATION: CPT

## 2023-04-20 PROCEDURE — 999N000065 XR CHEST PORT 1 VIEW

## 2023-04-20 PROCEDURE — 83605 ASSAY OF LACTIC ACID: CPT | Performed by: EMERGENCY MEDICINE

## 2023-04-20 PROCEDURE — 250N000011 HC RX IP 250 OP 636: Performed by: EMERGENCY MEDICINE

## 2023-04-20 PROCEDURE — 84484 ASSAY OF TROPONIN QUANT: CPT

## 2023-04-20 PROCEDURE — 85610 PROTHROMBIN TIME: CPT

## 2023-04-20 PROCEDURE — 86850 RBC ANTIBODY SCREEN: CPT | Performed by: EMERGENCY MEDICINE

## 2023-04-20 PROCEDURE — 250N000011 HC RX IP 250 OP 636

## 2023-04-20 PROCEDURE — 999N000185 HC STATISTIC TRANSPORT TIME EA 15 MIN

## 2023-04-20 PROCEDURE — 80307 DRUG TEST PRSMV CHEM ANLYZR: CPT | Performed by: EMERGENCY MEDICINE

## 2023-04-20 PROCEDURE — 82550 ASSAY OF CK (CPK): CPT | Performed by: NURSE PRACTITIONER

## 2023-04-20 PROCEDURE — 83690 ASSAY OF LIPASE: CPT | Performed by: EMERGENCY MEDICINE

## 2023-04-20 PROCEDURE — 250N000011 HC RX IP 250 OP 636: Performed by: STUDENT IN AN ORGANIZED HEALTH CARE EDUCATION/TRAINING PROGRAM

## 2023-04-20 PROCEDURE — 80179 DRUG ASSAY SALICYLATE: CPT | Performed by: EMERGENCY MEDICINE

## 2023-04-20 PROCEDURE — 80143 DRUG ASSAY ACETAMINOPHEN: CPT | Performed by: EMERGENCY MEDICINE

## 2023-04-20 PROCEDURE — 74018 RADEX ABDOMEN 1 VIEW: CPT | Mod: 26 | Performed by: RADIOLOGY

## 2023-04-20 PROCEDURE — 99291 CRITICAL CARE FIRST HOUR: CPT | Mod: 25 | Performed by: EMERGENCY MEDICINE

## 2023-04-20 PROCEDURE — 200N000002 HC R&B ICU UMMC

## 2023-04-20 PROCEDURE — 82962 GLUCOSE BLOOD TEST: CPT

## 2023-04-20 PROCEDURE — 83735 ASSAY OF MAGNESIUM: CPT | Performed by: EMERGENCY MEDICINE

## 2023-04-20 PROCEDURE — 99291 CRITICAL CARE FIRST HOUR: CPT | Performed by: NURSE PRACTITIONER

## 2023-04-20 PROCEDURE — 99291 CRITICAL CARE FIRST HOUR: CPT | Mod: 25

## 2023-04-20 PROCEDURE — 82947 ASSAY GLUCOSE BLOOD QUANT: CPT | Performed by: EMERGENCY MEDICINE

## 2023-04-20 PROCEDURE — 999N000157 HC STATISTIC RCP TIME EA 10 MIN

## 2023-04-20 PROCEDURE — 85610 PROTHROMBIN TIME: CPT | Performed by: EMERGENCY MEDICINE

## 2023-04-20 PROCEDURE — 71045 X-RAY EXAM CHEST 1 VIEW: CPT | Mod: 26 | Performed by: RADIOLOGY

## 2023-04-20 PROCEDURE — 85025 COMPLETE CBC W/AUTO DIFF WBC: CPT | Performed by: EMERGENCY MEDICINE

## 2023-04-20 PROCEDURE — C9803 HOPD COVID-19 SPEC COLLECT: HCPCS

## 2023-04-20 PROCEDURE — 87040 BLOOD CULTURE FOR BACTERIA: CPT | Performed by: EMERGENCY MEDICINE

## 2023-04-20 PROCEDURE — 84100 ASSAY OF PHOSPHORUS: CPT | Performed by: NURSE PRACTITIONER

## 2023-04-20 PROCEDURE — 82140 ASSAY OF AMMONIA: CPT | Performed by: EMERGENCY MEDICINE

## 2023-04-20 PROCEDURE — 93010 ELECTROCARDIOGRAM REPORT: CPT | Performed by: EMERGENCY MEDICINE

## 2023-04-20 PROCEDURE — 84443 ASSAY THYROID STIM HORMONE: CPT | Performed by: NURSE PRACTITIONER

## 2023-04-20 PROCEDURE — 999N000065 XR ABDOMEN PORT 1 VIEW

## 2023-04-20 PROCEDURE — 81001 URINALYSIS AUTO W/SCOPE: CPT | Performed by: EMERGENCY MEDICINE

## 2023-04-20 PROCEDURE — 82565 ASSAY OF CREATININE: CPT

## 2023-04-20 RX ORDER — DEXTROSE MONOHYDRATE 25 G/50ML
25-50 INJECTION, SOLUTION INTRAVENOUS
Status: DISCONTINUED | OUTPATIENT
Start: 2023-04-20 | End: 2023-05-01 | Stop reason: HOSPADM

## 2023-04-20 RX ORDER — VECURONIUM BROMIDE 1 MG/ML
10 INJECTION, POWDER, LYOPHILIZED, FOR SOLUTION INTRAVENOUS ONCE
Status: COMPLETED | OUTPATIENT
Start: 2023-04-20 | End: 2023-04-20

## 2023-04-20 RX ORDER — PROPOFOL 10 MG/ML
60 INJECTION, EMULSION INTRAVENOUS ONCE
Status: COMPLETED | OUTPATIENT
Start: 2023-04-20 | End: 2023-04-20

## 2023-04-20 RX ORDER — SODIUM CHLORIDE, SODIUM LACTATE, POTASSIUM CHLORIDE, CALCIUM CHLORIDE 600; 310; 30; 20 MG/100ML; MG/100ML; MG/100ML; MG/100ML
INJECTION, SOLUTION INTRAVENOUS CONTINUOUS
Status: DISCONTINUED | OUTPATIENT
Start: 2023-04-21 | End: 2023-04-21

## 2023-04-20 RX ORDER — LORAZEPAM 2 MG/ML
2 INJECTION INTRAMUSCULAR EVERY 4 HOURS PRN
Status: DISCONTINUED | OUTPATIENT
Start: 2023-04-20 | End: 2023-04-22

## 2023-04-20 RX ORDER — ONDANSETRON 2 MG/ML
4 INJECTION INTRAMUSCULAR; INTRAVENOUS EVERY 6 HOURS PRN
Status: DISCONTINUED | OUTPATIENT
Start: 2023-04-20 | End: 2023-05-01 | Stop reason: HOSPADM

## 2023-04-20 RX ORDER — ALBUTEROL SULFATE 0.83 MG/ML
2.5 SOLUTION RESPIRATORY (INHALATION) EVERY 4 HOURS PRN
Status: DISCONTINUED | OUTPATIENT
Start: 2023-04-20 | End: 2023-04-20

## 2023-04-20 RX ORDER — AMOXICILLIN 250 MG
2 CAPSULE ORAL 2 TIMES DAILY
Status: DISCONTINUED | OUTPATIENT
Start: 2023-04-21 | End: 2023-04-23

## 2023-04-20 RX ORDER — PROPOFOL 10 MG/ML
5-75 INJECTION, EMULSION INTRAVENOUS CONTINUOUS
Status: DISCONTINUED | OUTPATIENT
Start: 2023-04-20 | End: 2023-04-27

## 2023-04-20 RX ORDER — ACETAMINOPHEN 325 MG/1
650 TABLET ORAL EVERY 4 HOURS PRN
Status: DISCONTINUED | OUTPATIENT
Start: 2023-04-20 | End: 2023-04-21

## 2023-04-20 RX ORDER — AMOXICILLIN 250 MG
1 CAPSULE ORAL 2 TIMES DAILY
Status: DISCONTINUED | OUTPATIENT
Start: 2023-04-21 | End: 2023-04-23

## 2023-04-20 RX ORDER — OLANZAPINE 10 MG/2ML
INJECTION, POWDER, FOR SOLUTION INTRAMUSCULAR
Status: COMPLETED
Start: 2023-04-20 | End: 2023-04-20

## 2023-04-20 RX ORDER — NALOXONE HYDROCHLORIDE 0.4 MG/ML
0.2 INJECTION, SOLUTION INTRAMUSCULAR; INTRAVENOUS; SUBCUTANEOUS
Status: DISCONTINUED | OUTPATIENT
Start: 2023-04-20 | End: 2023-05-01 | Stop reason: HOSPADM

## 2023-04-20 RX ORDER — IOPAMIDOL 755 MG/ML
135 INJECTION, SOLUTION INTRAVASCULAR ONCE
Status: COMPLETED | OUTPATIENT
Start: 2023-04-20 | End: 2023-04-20

## 2023-04-20 RX ORDER — ONDANSETRON 4 MG/1
4 TABLET, ORALLY DISINTEGRATING ORAL EVERY 6 HOURS PRN
Status: DISCONTINUED | OUTPATIENT
Start: 2023-04-20 | End: 2023-05-01 | Stop reason: HOSPADM

## 2023-04-20 RX ORDER — NICOTINE POLACRILEX 4 MG
15-30 LOZENGE BUCCAL
Status: DISCONTINUED | OUTPATIENT
Start: 2023-04-20 | End: 2023-05-01 | Stop reason: HOSPADM

## 2023-04-20 RX ORDER — NALOXONE HYDROCHLORIDE 0.4 MG/ML
0.4 INJECTION, SOLUTION INTRAMUSCULAR; INTRAVENOUS; SUBCUTANEOUS
Status: DISCONTINUED | OUTPATIENT
Start: 2023-04-20 | End: 2023-05-01 | Stop reason: HOSPADM

## 2023-04-20 RX ORDER — PROPOFOL 10 MG/ML
5-75 INJECTION, EMULSION INTRAVENOUS CONTINUOUS
Status: DISCONTINUED | OUTPATIENT
Start: 2023-04-20 | End: 2023-04-21

## 2023-04-20 RX ORDER — ACETAMINOPHEN 325 MG/10.15ML
650 LIQUID ORAL EVERY 4 HOURS PRN
Status: DISCONTINUED | OUTPATIENT
Start: 2023-04-20 | End: 2023-04-21

## 2023-04-20 RX ORDER — BISACODYL 10 MG
10 SUPPOSITORY, RECTAL RECTAL DAILY PRN
Status: DISCONTINUED | OUTPATIENT
Start: 2023-04-20 | End: 2023-05-01 | Stop reason: HOSPADM

## 2023-04-20 RX ORDER — POLYETHYLENE GLYCOL 3350 17 G/17G
17 POWDER, FOR SOLUTION ORAL 2 TIMES DAILY
Status: DISCONTINUED | OUTPATIENT
Start: 2023-04-21 | End: 2023-04-21

## 2023-04-20 RX ORDER — PROPOFOL 10 MG/ML
40 INJECTION, EMULSION INTRAVENOUS ONCE
Status: COMPLETED | OUTPATIENT
Start: 2023-04-20 | End: 2023-04-20

## 2023-04-20 RX ORDER — ACETAMINOPHEN 325 MG/10.15ML
650 LIQUID ORAL EVERY 4 HOURS PRN
Status: DISCONTINUED | OUTPATIENT
Start: 2023-04-20 | End: 2023-05-01 | Stop reason: HOSPADM

## 2023-04-20 RX ORDER — LEVALBUTEROL INHALATION SOLUTION 1.25 MG/3ML
1.25 SOLUTION RESPIRATORY (INHALATION) EVERY 4 HOURS PRN
Status: DISCONTINUED | OUTPATIENT
Start: 2023-04-20 | End: 2023-05-01 | Stop reason: HOSPADM

## 2023-04-20 RX ORDER — HYDRALAZINE HYDROCHLORIDE 20 MG/ML
10-20 INJECTION INTRAMUSCULAR; INTRAVENOUS EVERY 4 HOURS PRN
Status: DISCONTINUED | OUTPATIENT
Start: 2023-04-20 | End: 2023-04-24

## 2023-04-20 RX ORDER — ENOXAPARIN SODIUM 100 MG/ML
40 INJECTION SUBCUTANEOUS EVERY 24 HOURS
Status: DISCONTINUED | OUTPATIENT
Start: 2023-04-21 | End: 2023-05-01 | Stop reason: HOSPADM

## 2023-04-20 RX ORDER — ETOMIDATE 2 MG/ML
20 INJECTION INTRAVENOUS ONCE
Status: COMPLETED | OUTPATIENT
Start: 2023-04-20 | End: 2023-04-20

## 2023-04-20 RX ORDER — ACETAMINOPHEN 325 MG/1
650 TABLET ORAL EVERY 4 HOURS PRN
Status: DISCONTINUED | OUTPATIENT
Start: 2023-04-20 | End: 2023-05-01 | Stop reason: HOSPADM

## 2023-04-20 RX ORDER — HYDRALAZINE HYDROCHLORIDE 20 MG/ML
20 INJECTION INTRAMUSCULAR; INTRAVENOUS ONCE
Status: COMPLETED | OUTPATIENT
Start: 2023-04-20 | End: 2023-04-20

## 2023-04-20 RX ADMIN — Medication 0.1 MG/HR: at 19:31

## 2023-04-20 RX ADMIN — PROPOFOL 40 MG: 10 INJECTION, EMULSION INTRAVENOUS at 20:00

## 2023-04-20 RX ADMIN — HYDRALAZINE HYDROCHLORIDE 20 MG: 20 INJECTION INTRAMUSCULAR; INTRAVENOUS at 21:11

## 2023-04-20 RX ADMIN — SUCCINYLCHOLINE CHLORIDE 150 MG: 20 INJECTION, SOLUTION INTRAMUSCULAR; INTRAVENOUS; PARENTERAL at 18:37

## 2023-04-20 RX ADMIN — PROPOFOL 30 MCG/KG/MIN: 10 INJECTION, EMULSION INTRAVENOUS at 19:04

## 2023-04-20 RX ADMIN — PROPOFOL 60 MG: 10 INJECTION, EMULSION INTRAVENOUS at 18:40

## 2023-04-20 RX ADMIN — OLANZAPINE: 10 INJECTION, POWDER, FOR SOLUTION INTRAMUSCULAR at 18:20

## 2023-04-20 RX ADMIN — VECURONIUM BROMIDE 10 MG: 1 INJECTION, POWDER, LYOPHILIZED, FOR SOLUTION INTRAVENOUS at 18:30

## 2023-04-20 RX ADMIN — IOPAMIDOL 135 ML: 755 INJECTION, SOLUTION INTRAVENOUS at 19:26

## 2023-04-20 RX ADMIN — PROPOFOL 50 MCG/KG/MIN: 10 INJECTION, EMULSION INTRAVENOUS at 21:15

## 2023-04-20 RX ADMIN — ETOMIDATE 20 MG: 2 INJECTION, SOLUTION INTRAVENOUS at 18:37

## 2023-04-20 ASSESSMENT — ACTIVITIES OF DAILY LIVING (ADL)
ADLS_ACUITY_SCORE: 35

## 2023-04-20 NOTE — ED TRIAGE NOTES
biba after swallowing 4 magnets on Sunday with boyfriend  No other acute issues reported  In triage pt altered, anxious, restless  Airway intact       Triage Assessment     Row Name 04/20/23 1810       Triage Assessment (Adult)    Airway WDL WDL       Respiratory WDL    Respiratory WDL WDL       Skin Circulation/Temperature WDL    Skin Circulation/Temperature WDL WDL       Cardiac WDL    Cardiac WDL WDL       Cognitive/Neuro/Behavioral WDL    Cognitive/Neuro/Behavioral WDL X;level of consciousness;arousability    Level of Consciousness confused    Orientation disoriented to;place;time;situation

## 2023-04-20 NOTE — PROGRESS NOTES
[facility], [place]  Procedure Note           Intubation:       Shanda Hinds  MRN# [id number]   April 20, 2023 Indication: Change in level of conciousness           Patient intubated at: April 20, 2023   Patient informed of: Why intubation was required   Informed consent: Not obtainable   Cervical spine: Was not stabilized during the procedure   Sedative medication: Was administered during the procedure   Technique used: Fiberoptic visualization with GlideScope   Endotracheal tube size: 7.0 cm with cuff   Number of attempts: 1   Placement confirmed by: Auscultation of bilateral breath sounds  Visualization of bilateral chest wall rise  End-tidal CO2 monitor   ET tube repositioning: Was not performed   Tube secured at: 23 @ teeth cm      This procedure was performed without difficulty and he tolerated the procedure well with no complications.      Recorded by Jeff Hadley, RT

## 2023-04-20 NOTE — ED PROVIDER NOTES
[facility] ([facility])    4/20/23       ED PROVIDER NOTE      History     Chief Complaint   Patient presents with     Altered Mental Status     The history is provided by the patient and medical records.     Shanda Hinds is a 42 year old female with a past medical history significant for cystic fibrosis, major depressive disorder, and PTSD presenting to the ED via EMS after swallowing four magnets on Sunday (4/16).  Patient presents with altered mental status and in an anxious/restless state.  Patient's airway is intact.  No other symptoms noted.  I was called to the triage room of her agitation and level of alertness.  She is not interacting she seems very agitated.  At times she had sonorous respirations.  She was brought to the resuscitation room.  I was able to speak to her boyfriend who reports that she did ingest magnets and was seen in the outside emergency department and told to return to have a follow-up x-ray.  He says that when she left the apartment to get in the ambulance she was fine.  She has an extensive drug and psychiatric history a question of pseudoseizures.  There was no report of seizure-like activity either per paramedics or in the ED waiting area.  On examination she had a disconjugate gaze with roving eye movements and would likely require intubation.    Past Medical History  Past Medical History:   Diagnosis Date     Cystic fibrosis (H)      Depressive disorder      Insomnia      PTSD (post-traumatic stress disorder)      Past Surgical History:   Procedure Laterality Date     ABDOMEN SURGERY       ABDOMEN SURGERY      removal of shrapnel     ABDOMEN SURGERY      removal of adhesions     BLADDER SURGERY       CHOLECYSTECTOMY       CHOLECYSTECTOMY       CHOLECYSTECTOMY Bilateral 2011     COLONOSCOPY N/A 4/22/2023    Procedure: Colonoscopy;  Surgeon: Omid Pierce MD;  Location: [facility] OR     GYN SURGERY      fibroids, endometriosis, torsion,  "adhesion removal     HYSTERECTOMY       HYSTERECTOMY       LEFT OOPHORECTOMY       SLING BLADDER SUSPENSION WITH FASCIA EILEEN       No current outpatient medications on file.    Allergies   Allergen Reactions     Haldol [Haloperidol] Anaphylaxis     Codeine      Hives, vomiting      Compazine [Prochlorperazine]      Dexmedetomidine Hcl In Nacl Other (See Comments)     Tachy arrhythmias on 2 separate infusion trials      Fentanyl      Morphine      Hives, vomiting      Neurontin [Gabapentin] Other (See Comments)     Facial twitching     Reglan [Metoclopramide]      Trazodone Nausea and Hives     Family History  Family History   Problem Relation Age of Onset     No Known Problems Mother      Kidney failure Father      Hypertension Father      Pancreatic Cancer Sister      Social History   Social History     Tobacco Use     Smoking status: Every Day     Packs/day: 0.25     Years: 15.00     Pack years: 3.75     Types: Cigarettes     Smokeless tobacco: Never     Tobacco comments:     3-4 per day   Substance Use Topics     Alcohol use: Yes     Comment: Alcoholic Drinks/day: Holidays     Drug use: Yes     Types: Cocaine, Marijuana     Comment: Drug use: occassionally      Past medical history, past surgical history, medications, allergies, family history, and social history were reviewed with the patient. No additional pertinent items.      Unable to obtain secondary to altered mental status    Physical Exam   BP:  (WILLIAM)  Pulse: 85  Temp: 97.5  F (36.4  C)  Resp: 19  Height: 167.6 cm (5' 6\")  Weight: 101.9 kg (224 lb 10.4 oz)  SpO2: 100 %     Physical Exam    Patient was brought right to the resuscitation room because of concerns for altered mental status and inability to protect airway.  She had no obvious trauma to the head.  Her pupils were equal, she had a disconjugate gaze and some roving eye movements.    Cardiac: Regular rate and rhythm  Respiratory: She has sonorous respirations likely some degree of upper airway " obstruction related to AMS.  Lungs are grossly clear    Neurologic: She is not following commands she is awake but not alert.  She is aggressive and fighting not following commands.              ED Course, Procedures, & Data      Procedures        Shortly after arriving in the resuscitation room she was given 2 intranasal doses of Narcan without effect                   Results for orders placed or performed during the hospital encounter of 04/20/23   COLONOSCOPY     Status: None   Result Value Ref Range    COLONOSCOPY       [facility]  [address] [place] [address] [phone]     Endoscopy Department  _______________________________________________________________________________  Patient Name: Shanda Hinds          Procedure Date: 4/22/2023 11:19 AM  MRN: [id number]                       Account Number: [id number]  YOB: 1980               Admit Type: Inpatient  Age: 42                               Room:  OR 17  Gender: Female                        Note Status: Finalized  Attending MD: ORI WRAY MD,   Total Sedation Time:   _______________________________________________________________________________     Procedure:             Colonoscopy  Indications:           Foreign body removal from the colon  Providers:             ORI WRAY MD  Patient Profile:       39F h/o polysubstance use, PTSD, BPD who presented                          after ingesting 3-4 magnets. Magnet is seen in the RLQ.  Referring MD:             Medicines:             Propofol per Anesthesia  Complications:         No immediate complications.  _______________________________________________________________________________  Procedure:             Pre-Anesthesia Assessment:                         - Prior to the procedure, a History and Physical was                          performed, and patient medications and allergies were                           reviewed. The risks and benefits of the procedure and                          the sedation options and risks were discussed with the                          patient. All questions were answered and informed                          consent was obtained. Patient identification and                          proposed procedure were verified by the physician and                          the nurse in the procedure room. Mental Status                          Examination: sedated. Prophylactic Antibiotics: The                          patient does not require prophylactic antibioti cs.                          Prior Anticoagulants: The patient has taken no                          anticoagulant or antiplatelet agents. ASA Grade                          Assessment: II - A patient with mild systemic disease.                          After reviewing the risks and benefits, the patient                          was deemed in satisfactory condition to undergo the                          procedure. The anesthesia plan was to use general                          anesthesia. Immediately prior to administration of                          medications, the patient was re-assessed for adequacy                          to receive sedatives. The heart rate, respiratory                          rate, oxygen saturations, blood pressure, adequacy of                          pulmonary ventilation, and response to care were                          monitored throughout the procedure. The physical                          status of the patient was re-assessed after the                           procedure.                         After obtaining informed consent, the colonoscope was                          passed under direct vision. Throughout the procedure,                          the patient's blood pressure, pulse, and oxygen                          saturations were monitored continuously. The                          Colonoscope was  introduced through the anus and                          advanced to the terminal ileum.                                                                                   Findings:       The prep was poor. Semi liquid stool and incompletely digested food was        seen throughout the colon. Extensive lavage was performed and we were        able to visualize > 90% of the mucosa in the right colon. The right        colon was examined multiple times in forward and retroflex view. However        we were unable to locate the magnet. Intraprocedural abdominal XR was        performed and showed the magnet was still in similar positio n. At the        end of the procedure, there were still patches of mucosa covered with        residual stool and undigested food, predominantly in the left colon..                                                                                   Impression:            The prep was poor. Extensive lavage was performed and                          we were able to visualize 90% of the mucosa in the                          right colon. The right colon was examined multiple                          times in forward and retroflex view. However we were                          unable to locate a magnet or other foreign body. It is                          possible that the foreign body has passed or is                          obscured by residual stool burden.  Recommendation:        - Return the patient to [facility] ICU                         - Defer timing of extubation to ICU team.                         - 2L of Golytely today and 2L of Golytely tomorrow.                         - Repe at abdominal XR tomorrow after Golytely.                         - Return patient to hospital flor for observation.                                                                                       _____________________________  ORI WRAY MD  4/22/2023 8:49:34 PM  I was physically present  for the entire viewing portion of the exam.  __________________________  Signature of teaching physician  Brenda/Marco WRAY MD  Number of Addenda: 0    Note Initiated On: 4/22/2023 11:19 AM  Scope In:  Scope Out:     COLONOSCOPY     Status: None   Result Value Ref Range    COLONOSCOPY       [facility]  [address] [place] [address] [phone]     Endoscopy Department  _______________________________________________________________________________  Patient Name: Shanda Hinds          Procedure Date: 4/24/2023 10:05 AM  MRN: [id number]                       Account Number: [id number]  YOB: 1980               Admit Type: Inpatient  Age: 42                               Room: [place] [address]  Gender: Female                        Note Status: Finalized  Attending MD: PAVEL MURRAY DO,   Total Sedation Time:   _______________________________________________________________________________     Procedure:             Colonoscopy  Indications:           Therapeutic procedure for removal of foreign body                          (magnets)  Providers:             PAVEL MURRAY DO, Manju Nobles RN, GUEVARA MURILLO MD  Patient Profile:       42 year old female with past medical history                           significant for polysubstance abuse (Cocaine,                          benzodiazepines, opioids, THC), multiple psychiatric                          conditions (bipolar disorder,Â BPD, RANDI, PTSD) fatty                          liver, constipation, multiple abdominal surgeries due                          to shrapnel (she is a ) who was admitted for                          intoxicationÂ now s/p intubationÂ in settings of                          swallowing magnets (occurred on 4/16).  Referring MD:            Medicines:             per ICU  Complications:         No immediate complications. Estimated blood loss:                           Minimal.  _______________________________________________________________________________  Procedure:             Pre-Anesthesia Assessment:                         - Prior to the procedure, a History and Physical was                          performed, and patient medications and allergies were                          reviewed. The  patient is competent. The risks and                          benefits of the procedure and the sedation options and                          risks were discussed with the patient. All questions                          were answered and informed consent was obtained.                          Patient identification and proposed procedure were                          verified by the physician and the nurse in the                          pre-procedure area in the endoscopy suite. Mental                          Status Examination: sedated. Airway Examination:                          orotracheal intubation. Respiratory Examination: clear                          to auscultation. CV Examination: regular rate and                          rhythm. Prophylactic Antibiotics: The patient does not                          require prophylactic antibiotics. Prior                          Anticoagulants: The patient has taken no anticoagulant                          or antiplatelet agents. ASA G rade Assessment: III - A                          patient with severe systemic disease. After reviewing                          the risks and benefits, the patient was deemed in                          satisfactory condition to undergo the procedure. The                          anesthesia plan was to use on propofol drip per ICU.                          Immediately prior to administration of medications,                          the patient was re-assessed for adequacy to receive                          sedatives. The heart rate, respiratory rate, oxygen                           saturations, blood pressure, adequacy of pulmonary                          ventilation, and response to care were monitored                          throughout the procedure. The physical status of the                          patient was re-assessed after the procedure.                         After obtaining informed consent, the colonoscope was                          passed under direct vision. Throu ghout the procedure,                          the patient's blood pressure, pulse, and oxygen                          saturations were monitored continuously. The                          Colonoscope was introduced through the anus and                          advanced to the cecum, identified by appendiceal                          orifice and ileocecal valve. The colonoscopy was                          performed without difficulty. The patient tolerated                          the procedure well. The quality of the bowel                          preparation was good.                                                                                   Findings:       The perianal and digital rectal examinations were normal.       Two magnets found together in the ascending colon. These were not        adhered to the mucosa. Removal of a magnet was accomplished with a Thacker        net. Given report that patient initially swallowed 4 magnets, an x-ray        was done at bedside after sco pe was removed that showed no remaining        foreign bodies.                                                                                   Impression:            - Foreign body (2 magnets) in the ascending colon.                          Removal was successful.                         - No detailed exam of mucosa made on withdrawal - not                          an adequate exam for screening/surveillance  Recommendation:        - Further cares per ICU                         - Per chart review, is likely due to repeat                           surveillance colonoscopy - can be done as an                          outpatient when acute issues resolve.                         - GI will sign off at this time, please call with any                          questions or concerns.                                                                                     Electronically Signed by Dr. Murray  ______________________  PAVEL MURRAY DO  4/24/2023 1:26:25 PM  I was physically p resent for the entire viewing portion of the exam.  __________________________  Signature of teaching physician  Brenda/[id number] MURRAY , DO    _______________  GUEVARA MURILLO MD  Number of Addenda: 0    Note Initiated On: 4/24/2023 10:05 AM     XR Chest Port 1 View     Status: None    Narrative    EXAM: XR CHEST PORT 1 VIEW  4/20/2023 6:57 PM     HISTORY:  ett verify       COMPARISON:  Chest x-ray 11/15/2019    FINDINGS:   AP view of the chest. The endotracheal tube tip projects approximately  3 cm above the pastora. Enteric tube courses below the diaphragm with  the tip out of the field of view. Heart size is normal. Mild  retrocardiac and perihilar opacities. No significant pleural effusion.  No pneumothorax. The osseous structures are within normal limits.  Cholecystectomy clips in right upper quadrant.      Impression    IMPRESSION:   1. Endotracheal tube tip projects 3 cm above the pastora.  2. Mild retrocardiac and perihilar opacities, likely representing a  combination of atelectasis and edema, and less likely infection.    I have personally reviewed the examination and initial interpretation  and I agree with the findings.    GAYATRI JONES MD         SYSTEM ID:  [id number]   CT Head w/o Contrast     Status: None    Narrative    EXAM: CT HEAD W/O CONTRAST  4/20/2023 7:31 PM     HISTORY:  Unexplained altered mental status       COMPARISON:  CT 12/4/2019    TECHNIQUE: Using multidetector thin collimation helical acquisition  technique, axial, coronal and  sagittal CT images from the skull base  to the vertex were obtained without intravenous contrast.   (topogram) image(s) also obtained and reviewed.    FINDINGS:  No acute intracranial hemorrhage, mass effect, or midline shift. No  acute loss of gray-white matter differentiation in the cerebral  hemispheres. Ventricles are proportionate to the cerebral sulci. Clear  basal cisterns.    The bony calvaria and the bones of the skull base are normal. Mucosal  thickening of the left maxillary sinus with frothy debris. Otherwise  the paranasal sinuses are relatively clear. The mastoid air cells are  clear.       Impression    IMPRESSION:   1. No acute intracranial pathology.   2. Mucosal thickening of the left maxillary sinus with frothy debris,  as can be seen with acute sinusitis in the appropriate clinical  context.    I have personally reviewed the examination and initial interpretation  and I agree with the findings.    GASTON WAGONER MD         SYSTEM ID:  [id number]   CT Abdomen Pelvis w Contrast     Status: None    Narrative    Examination: CT ABDOMEN PELVIS W CONTRAST, 4/20/2023 7:32 PM    Technique:  Helical CT images from the lung bases through the  symphysis pubis were obtained with contrast.  Coronal reformatted  images were generated at a workstation for further assessment.    Contrast:  135 mL Isovue-370    Comparison: Radiograph same day, 12/25/2017, CT abdomen and pelvis  7/15/2019    History: By report possible magnet ingestion a few days ago    Findings:    Abdomen and pelvis:   Liver: Diffuse hypoattenuation of the hepatic parenchyma. No  suspicious liver lesions.   Gallbladder: Surgically absent.  Spleen: Normal size. Small splenules in the left upper quadrant.  Pancreas: No suspicious pancreatic lesions. The pancreatic duct is not  dilated.  Adrenal glands: No adrenal nodules.  Urinary system: No kidney masses. No hydronephrosis, hydroureter, or  obstructing renal stones. Scruggs catheter in place. The  bladder is  decompressed.   Reproductive organs: Hysterectomy.  Bowel: Enteric tube tip terminates in the distal stomach. Metallic  density in the proximal ascending colon likely represent ingested  magnets. No signs of obstruction in the bowel. No abnormal bowel wall  enhancement. Mild bowel wall thickening along the descending colon  likely due to underdistention. The appendix is unremarkable.  Lymph nodes: No retroperitoneal, mesenteric, or pelvic  lymphadenopathy.  Fluid: No free fluid within the abdomen.  Vessels: No infrarenal aortic aneurysm. The portal, superior  mesenteric, and splenic veins are patent.    Lung bases: Trace bilateral pleural effusions with adjacent  compressive atelectasis.     Bones and soft tissues: No suspicious osseous lesions. Mild  degenerative changes throughout the visualized spine.      Impression    Impression:   1.  Metallic density in the proximal ascending colon likely represents  ingested magnets. No signs of bowel obstruction.  2.  Diffuse hepatic steatosis.  3.  Trace bilateral pleural effusions with adjacent compressive  atelectasis.    Findings discussed with Dr. Viveros by Dr. Dang at 7:50 PM on  4/20/2023.    I have personally reviewed the examination and initial interpretation  and I agree with the findings.    GAYATRI JONES MD         SYSTEM ID:  [id number]   XR Abdomen Port 1 View     Status: None    Narrative    EXAM: XR ABDOMEN PORT 1 VIEW  4/20/2023 6:58 PM     HISTORY:  og placement       COMPARISON:  Abdominal x-ray 12/25/2017    FINDINGS:   AP view of the abdomen. Enteric tube tip and sidehole overlie the  stomach. Nonobstructive bowel gas pattern. Cholecystectomy clips. Mild  perihilar opacities in the visualized portion of the lungs. The  osseous structures are within normal limits.      Impression    IMPRESSION:  Enteric tube tip and sidehole project over the stomach..     I have personally reviewed the examination and initial interpretation  and I  agree with the findings.    GAYATRI JONES MD         SYSTEM ID:  [id number]   XR Chest Port 1 View     Status: None    Narrative    XR CHEST PORT 1 VIEW  4/21/2023 1:09 AM      HISTORY: verify ETT placement    COMPARISON: Chest x-ray 4/20/2023, 11/15/2019.    FINDINGS:   Portable AP supine chest x-ray. Endotracheal tube terminates over the  mid thoracic trachea. Enteric tube courses into the stomach and below  the field-of-view.    Trachea is midline. Cardiac silhouette is within normal limits.  Pulmonary vasculature is distinct. Stable mild perihilar interstitial  opacities and retrocardiac opacities. No pneumothorax.    Bony structures appear intact.      Impression    IMPRESSION:   1.  Endotracheal tube terminates over the mid thoracic trachea.  2.  Stable retrocardiac and perihilar interstitial  opacities/atelectasis.    I have personally reviewed the examination and initial interpretation  and I agree with the findings.    SONIA TAYLOR DO         SYSTEM ID:  [id number]   XR Abdomen Port 1 View     Status: None    Narrative    XR ABDOMEN PORT 1 VIEW  4/21/2023 11:41 AM      HISTORY: progression of ingested magnets    COMPARISON: 4/20/2023    FINDINGS:   Supine views of the abdomen and pelvis. Enteric tube tip and side-port  remain over the stomach. Cholecystectomy clips. Presumed ingested  magnets remain over the right lateral abdomen in similar position over  the expected ascending colon. Nonobstructive bowel gas pattern.  Moderate stool burden. No pneumatosis or portal venous gas.      Impression    IMPRESSION:   Presumed ingested magnets remain over the right lateral abdomen in  similar position over the expected location of the ascending colon.    I have personally reviewed the examination and initial interpretation  and I agree with the findings.    SONIA TAYLOR DO         SYSTEM ID:  [id number]   XR Abdomen Port 1 View     Status: None    Narrative    EXAMINATION:  XR ABDOMEN PORT 1 VIEW 4/21/2023  6:42 PM.    COMPARISON: Radiograph same day, 4/20/2023, CT abdomen and pelvis  4/20/2023    HISTORY:  progression of foreign body ingestion-magnets in ascending  colon    FINDINGS:   Portable AP view of the abdomen. Enteric tube tip and sidehole  projected over the stomach. No significant change in position of the  metallic foreign body projecting over the right lateral abdomen in the  expected location of the ascending colon. Nonobstructive bowel gas  pattern. No pneumatosis or portal venous gas. Cholecystectomy clips.  No acute osseous abnormality. Scruggs catheter in place. Pelvic  phleboliths.      Impression    IMPRESSION:   No significant change in position of the ingested magnets, projecting  over the right lateral abdomen in the expected location of the  ascending colon.    I have personally reviewed the examination and initial interpretation  and I agree with the findings.    SONIA TAYLOR DO         SYSTEM ID:  [id number]   XR Abdomen Port 1 View     Status: None    Narrative    Exam: XR ABDOMEN PORT 1 VIEW, 4/22/2023 8:03 AM    Indication: Ingested magnets 4/16, abd CT 4/20 showed them in the  ascending colon. Evaluate current position.    Comparison: Abdominal x-ray 4/22/2023 (4:10 AM)    Findings:   Portable supine AP radiographs of the abdomen. 1.3 cm round  hyperdensity projects over the ascending colon, not significantly  changed compared to radiograph from same day at 4:10 AM. The tip and  sidehole of the enteric tube project over the stomach. Cholecystectomy  clips. Scruggs catheter. The bowel gas pattern is nonobstructive. No  visualized pneumatosis or portal venous gas. Pelvic phleboliths.      Impression    Impression:   1. The ingested magnet projects over the ascending colon, not  significantly changed compared to abdominal radiograph from earlier in  the day.  2. Nonobstructive bowel gas pattern.    I have personally reviewed the examination and initial interpretation  and I agree with the  findings.    MUKUL REGALADO MD         SYSTEM ID:  [id number]   XR Abdomen Port 1 View     Status: None    Narrative    EXAMINATION:  XR ABDOMEN PORT 1 VIEW 4/22/2023 4:17 AM     COMPARISON: Abdominal x-ray 4/21/2023, 4/20/2023.    HISTORY: Assess position of ingested magnets    TECHNIQUE: Frontal view of the abdomen.    FINDINGS: Gastric tube tip and sidehole terminates over the stomach.  Cylindrical density projects over the right lower quadrant, unchanged.  Surgical clips project over the right upper quadrant. The small  intestine and colon are not distended. No abnormal soft tissue  densities.  No pneumatosis or portal venous gas. The lung bases are  unremarkable.      Impression    IMPRESSION:   1.  Ingested magnet projects over the right lower quadrant without  significant migration compared to prior exam.  2.  Non-obstructed bowel gas pattern.    I have personally reviewed the examination and initial interpretation  and I agree with the findings.    MUKUL REGALADO MD         SYSTEM ID:  [id number]   XR Chest Port 1 View     Status: None    Narrative    Exam: XR CHEST PORT 1 VIEW, 4/22/2023 11:02 AM    Indication: intubated, increased secretions, RUL Coarse lung sounds.    Comparison: 4/21/2023    Findings:   Endotracheal tube in the mid thoracic trachea, enteric tube seen  coursing through the mediastinum. Tip and sidehole project off the  film but at least to the fundus of the stomach.    Borderline cardiomegaly. Pulmonary vasculature within normal limits.  Patchy opacities at both lung bases suggest atelectasis. Low lung  volume.      Impression    Impression:   1. Stable support devices  2. Stable patchy opacities at both lung bases and low lung volume  suggests atelectasis.    MUKUL REGALADO MD         SYSTEM ID:  [id number]   XR Abdomen Port 1 View     Status: None    Narrative    Exam: XR ABDOMEN PORT 1 VIEW, 4/22/2023 2:52 PM    Indication: Colonoscopy, post-op check    Comparison: None    Findings:   Left  lateral decubitus radiographs of the abdomen. Redemonstration of  metallic foreign body in the large bowel. Difficult to localize given  technique. Multiple air-filled dilated loops of bowel, unremarkable on  the recent postprocedural setting. No free air identified.      Impression    Impression:   1. Redemonstration of metallic foreign body overlying the large bowel.  Typical to localize given technique.  2. Multiple air-filled loops of bowel. No free air identified.    I have personally reviewed the examination and initial interpretation  and I agree with the findings.    CROW KAPLAN MD         SYSTEM ID:  [id number]   XR Abdomen Port 1 View     Status: None    Narrative    Exam: XR ABDOMEN PORT 1 VIEW, 4/22/2023 5:38 PM    Indication: new NG placed    Comparison: X-ray 4/22/2023    Findings:   Frontal radiograph of the abdomen. Gastric tube tip and sidehole  project over the stomach. Clips in the right upper quadrant.  Nonobstructive bowel gas pattern. No pneumatosis or portal venous gas.  Previously seen metallic foreign body is not visualized on this exam,  and may be out of the field of view.      Impression    Impression:   1. Gastric tube tip and sidehole projected over the stomach.    I have personally reviewed the examination and initial interpretation  and I agree with the findings.    CROW KAPLAN MD         SYSTEM ID:  [id number]   XR Abdomen Port 1 View     Status: None    Narrative    Exam: XR ABDOMEN PORT 1 VIEW, 4/23/2023 8:51 AM    Indication: Ingested magnets 4/16, abd CT 4/20 showed them in the  ascending colon. Evaluate current position.    Comparison: Same-day CT    Findings:   Supine frontal views of the abdomen. Enteric tube tip and sidehole  project over the stomach. Right upper quadrant cholecystectomy clips.  Metallic foreign body continues to project over the right lower  quadrant. Paucity of small bowel gas. Air within the nondilated colon.  No pneumatosis or portal venous  gas.      Impression    Impression: Metallic foreign body continues to project over the right  lower quadrant in the region of the ileocecal valve, as seen on  same-day CT.    SONIA TAYLOR DO         SYSTEM ID:  [id number]   CT Chest/Abdomen/Pelvis w Contrast     Status: None    Narrative    CT of the Chest, Abdomen and Pelvis with contrast, 4/23/2023 2:18 AM.    Comparison: Abdominal x-ray 4/22/2023. Chest x-ray 4/22/2023. CT  abdomen and pelvis 4/20/2023.    History: ingested magnets, developed high fever, assess for bowel  perf.     Technique: Axial images of the chest, abdomen and pelvis were obtained  with contrast. Coronal reconstructions were provided. Images were  reviewed in bone, lung, and soft tissue windows.     Findings:  Endotracheal tube terminates in the low trachea just above the pastora.  Gastric tube tip and sidehole terminates in the stomach.    Chest:  Thyroid gland is unremarkable. Heart size is normal. No pericardial  effusion. No significant coronary artery calcifications. Thoracic  aorta and pulmonary arteries are normal in caliber with conventional  three-vessel branching pattern of the aortic arch. Esophagus is  unremarkable.    Scattered prominent, nonenlarged mediastinal/hilar lymph nodes. No  axillary adenopathy.    Trachea and central airways are clear. Right upper and bilateral lower  lobe consolidative opacities with scattered peripheral groundglass and  consolidative opacities in the right upper and middle lobes.    Abdomen and Pelvis:   Liver is unremarkable. No focal enhancing lesion. No intra or  extrahepatic biliary duct dilation. Postsurgical cholecystectomy  changes. Spleen size within normal limits. Small accessory splenule.  Homogenous enhancement of the pancreas. The main pancreatic and common  bile ducts are not dilated.    Adrenal glands are unremarkable. Symmetric renal enhancement. No  hydronephrosis, calcified stone, or enhancing mass. Bladder is  decompressed with  Scruggs catheter balloon in place and small amount of  intraluminal postprocedural gas.    Metallic densities remain, located at the ileocecal valve, unchanged  position since CT 4/20/2023. No small or large bowel wall thickening  or dilation. No free intraperitoneal air. No intra-abdominal pelvic or  free fluid. No pneumatosis or portal venous gas. The appendix is  normal.    Abdominal aorta and major branches are normal in caliber and patent.  Portal and hepatic veins are patent.    No mesenteric or retroperitoneal, or pelvic lymphadenopathy.    Bones and Soft Tissues:   No suspicious osseous lesion. Nodular subcutaneous densities along the  lower abdomen likely secondary to medication administration.       Impression    Impression:   1.  A few scattered peripheral groundglass opacities in the right  upper and middle lobes suggestive for infection versus aspiration.  2.  Right upper and bilateral lower lobe consolidative opacities  suggestive for dependent atelectasis versus, less likely, infection.  3.  No acute findings in the abdomen or pelvis.  4.  Metallic densities remain at the ileocecal valve, in unchanged  position since CT 4/20/2023 without evidence for bowel obstruction or  perforation.     I have personally reviewed the examination and initial interpretation  and I agree with the findings.    SONIA TAYLOR DO         SYSTEM ID:  [id number]   XR Abdomen Port 1 View     Status: None    Narrative    Exam: XR ABDOMEN PORT 1 VIEW, 4/24/2023 4:24 AM    Indication: pt ingested magnets 4/16, previous AXR showed magnet in  the RLQ near ileocecal valve. Evaluate current position    Comparison: Abdominal radiograph 4/23/2023, chest and pelvis CT  4/23/2023, abdomen/pelvis CT 4/20/2023    Findings:   Supine AP view of the abdomen. Persistent round metallic density  projecting over the proximal ascending colon, most likely represents  ingested magnet. Nonobstructive bowel gas pattern. No pneumatosis.      Impression     Impression:   Metallic density representing presumed magnet projects over the  proximal ascending colon, unchanged.    I have personally reviewed the examination and initial interpretation  and I agree with the findings.    MUKUL REGALADO MD         SYSTEM ID:  [id number]   XR Chest Port 1 View     Status: None    Narrative    Exam: XR CHEST PORT 1 VIEW, 4/24/2023 4:20 AM    Indication: fever, increased secretions    Comparison: Chest abdomen pelvis CT 4/23/2023, chest radiograph  4/22/2023, 4/21/2023    Findings:   Semiupright AP view of the chest. Endotracheal tube terminates over  the mid thoracic trachea. Gastric tube terminates over the stomach.  Esophageal temperature probe terminates over the gastroesophageal  junction. Low lung volumes. Similar appearance of bibasilar opacities  and increasing consolidative opacity right upper lung zone. Cardiac  silhouette is stable in size.      Impression    Impression:   1. Increasing consolidative opacity in the right upper lobe compared  to comparison CT. Given the patient's history of secretions pneumonia  would have to be the prime consideration.  2. Stable low lung volumes with bibasilar atelectasis.    I have personally reviewed the examination and initial interpretation  and I agree with the findings.    MUKUL REGALADO MD         SYSTEM ID:  [id number]   XR Abdomen Port 1 View     Status: None    Narrative    Exam: XR ABDOMEN PORT 1 VIEW, 4/24/2023 9:43 AM    Indication: progression of ingested magnets    Comparison: Abdominal radiograph 4/24/2023 4:18 AM    Findings:   Portable AP supine abdominal radiographs. Nasogastric tube tip and  sidehole overlying the proximal stomach. Esophageal temperature probe  at the GE junction. Cholecystectomy clips.    Rounded radiodense structures projected over the right mid abdomen  consistent with ingested magnets which appear apposed on imaging,  likely in the ascending colonic region, minimally progressed distally  from prior  study. No small bowel obstruction. Limited assessment for  free air on supine imaging.    No acute osseous abnormalities. Bibasilar opacities. Please see same  day chest radiograph.      Impression    Impression: Ingested radiopaque magnets are opposed overlying the  right lower quadrant, most likely in the ascending colonic region,  minimally progressed distally from prior study.    JONN CHOI MD         SYSTEM ID:  [id number]   XR Abdomen Port 1 View     Status: None    Narrative    Exam: XR ABDOMEN PORT 1 VIEW, 4/24/2023 11:56 AM    Indication: magnets retrieved from colon    Comparison: 4/24/2023    Findings:   No residual ingested radiopaque foreign body is identified.  Cholecystectomy clips. No obstructive bowel gas pattern or  pneumatosis. Scruggs catheter and pelvic phleboliths.      Impression    Impression: No remaining ingested radiopaque foreign bodies.  Nonobstructive bowel gas pattern.    I have personally reviewed the examination and initial interpretation  and I agree with the findings.    CROW KAPLAN MD         SYSTEM ID:  [id number]   CT Soft Tissue Neck w/o Contrast     Status: None    Narrative    EXAM: CT SOFT TISSUE NECK W/O CONTRAST  4/25/2023 2:55 PM     HISTORY: Patient intubated with no air leak , assess for airway edema          COMPARISON: CT chest, abdomen and pelvis 4/23/2023    TECHNIQUE: Thin section helical CT images were obtained from the skull  base down to the level of the aortic arch.  Axial, coronal and  sagittal reformations were performed with 2-3 mm slice thickness  reconstruction. Images were reviewed in soft tissue, lung and bone  windows.    CONTRAST: none    FINDINGS:   Endotracheal tube is present in the thoracic trachea. No substantial  paratracheal soft tissue edema. Subglottic airway is patent. Feeding  tube and esophageal temperature probe are within the esophagus.  Prominent, nonenlarged level 1b and 2 lymph nodes.    Salivary glands are within normal  limits.    Normal thyroid gland.    No suspicious osseus lesion. No high grade spinal canal stenosis.    Bilateral maxillary sinus mucosal thickening. Bilateral mastoid  effusions. The imaged skull base, intracranial and orbital structures  are within normal limits.    Bilateral dependent pulmonary opacities with air bronchograms. Similar  smaller opacities in apices.      Impression    IMPRESSION:   1. Endotracheal tube is within the mid thoracic trachea, which is  patent. No substantial paratracheal edema.  2. Bilateral pulmonary dependent opacities with air bronchograms.    I have personally reviewed the examination and initial interpretation  and I agree with the findings.    MIMI SOLIS MD         SYSTEM ID:  [id number]   [facility] Draw     Status: None    Narrative    The following orders were created for panel order [facility].  Procedure                               Abnormality         Status                     ---------                               -----------         ------                     Extra Blood Culture Bottle[[id number]]                       Final result               Extra Blue Top Tube[[id number]]                              Final result               Extra Red Top Tube[[id number]]                               Final result               Extra Green Top (Lithium...[[id number]]                      Final result               Extra Purple Top Tube[[id number]]                            Final result                 Please view results for these tests on the individual orders.   Extra Blood Culture Bottle     Status: None   Result Value Ref Range    Hold Specimen [facility]    Extra Blue Top Tube     Status: None   Result Value Ref Range    Hold Specimen [facility]    Extra Red Top Tube     Status: None   Result Value Ref Range    Hold Specimen [facility]    Extra Green Top (Lithium Heparin) Tube     Status: None   Result Value Ref Range    Hold Specimen [facility]    Extra Purple Top Tube     Status: None   Result Value Ref  Range    Hold Specimen [facility]    Lactic acid whole blood     Status: Abnormal   Result Value Ref Range    Lactic Acid 2.9 (H) 0.7 - 2.0 mmol/L   Glucose by meter     Status: Abnormal   Result Value Ref Range    GLUCOSE BY METER POCT 135 (H) 70 - 99 mg/dL   iStat INR, POCT     Status: Abnormal   Result Value Ref Range    INR POCT 1.0 (L) 2.0 - 3.0   Creatinine POCT     Status: Normal   Result Value Ref Range    Creatinine POCT 0.7 0.5 - 1.0 mg/dL    GFR, ESTIMATED POCT >60 >60 mL/min/1.73m2   INR     Status: Normal   Result Value Ref Range    INR 0.97 0.85 - 1.15   Comprehensive metabolic panel     Status: Abnormal   Result Value Ref Range    Sodium 130 (L) 136 - 145 mmol/L    Potassium 3.4 3.4 - 5.3 mmol/L    Chloride 99 98 - 107 mmol/L    Carbon Dioxide (CO2) 19 (L) 22 - 29 mmol/L    Anion Gap 12 7 - 15 mmol/L    Urea Nitrogen 10.1 6.0 - 20.0 mg/dL    Creatinine 0.68 0.51 - 0.95 mg/dL    Calcium 8.9 8.6 - 10.0 mg/dL    Glucose 134 (H) 70 - 99 mg/dL    Alkaline Phosphatase 66 35 - 104 U/L    AST 28 10 - 35 U/L    ALT 20 10 - 35 U/L    Protein Total 7.2 6.4 - 8.3 g/dL    Albumin 4.5 3.5 - 5.2 g/dL    Bilirubin Total 0.2 <=1.2 mg/dL    GFR Estimate >90 >60 mL/min/1.73m2   Lipase     Status: Normal   Result Value Ref Range    Lipase 27 13 - 60 U/L   Ammonia     Status: Normal   Result Value Ref Range    Ammonia 15 11 - 51 umol/L   Magnesium     Status: Normal   Result Value Ref Range    Magnesium 1.9 1.7 - 2.3 mg/dL   UA with Microscopic reflex to Culture     Status: Normal    Specimen: Urine, Catheter   Result Value Ref Range    Color Urine Straw Colorless, Straw, Light Yellow, Yellow    Appearance Urine Clear Clear    Glucose Urine Negative Negative mg/dL    Bilirubin Urine Negative Negative    Ketones Urine Negative Negative mg/dL    Specific Gravity Urine 1.005 1.003 - 1.035    Blood Urine Negative Negative    pH Urine 5.5 5.0 - 7.0    Protein Albumin Urine Negative Negative mg/dL    Urobilinogen Urine Normal Normal,  2.0 mg/dL    Nitrite Urine Negative Negative    Leukocyte Esterase Urine Negative Negative    RBC Urine <1 <=2 /HPF    WBC Urine 0 <=5 /HPF    Narrative    Urine Culture not indicated   iStat Troponin, POCT     Status: Normal   Result Value Ref Range    TROPPC POCT 0.00 <=0.12 ug/L   iStat Gases Electrolytes ICA Glucose Venous, POCT     Status: Abnormal   Result Value Ref Range    CPB Applied No     Hematocrit POCT 38 35 - 47 %    Calcium, Ionized Whole Blood POCT 4.8 4.4 - 5.2 mg/dL    Glucose Whole Blood POCT 133 (H) 70 - 99 mg/dL    Bicarbonate Venous POCT 24 21 - 28 mmol/L    Hemoglobin POCT 12.9 11.7 - 15.7 g/dL    Potassium POCT 3.6 3.4 - 5.3 mmol/L    Sodium POCT 139 133 - 144 mmol/L    pCO2 Venous POCT 40 40 - 50 mm Hg    pO2 Venous POCT 54 (H) 25 - 47 mm Hg    pH Venous POCT 7.39 7.32 - 7.43    O2 Sat, Venous POCT 87 (L) 94 - 100 %   CBC with platelets and differential     Status: Abnormal   Result Value Ref Range    WBC Count 13.7 (H) 4.0 - 11.0 10e3/uL    RBC Count 4.08 3.80 - 5.20 10e6/uL    Hemoglobin 12.3 11.7 - 15.7 g/dL    Hematocrit 37.7 35.0 - 47.0 %    MCV 92 78 - 100 fL    MCH 30.1 26.5 - 33.0 pg    MCHC 32.6 31.5 - 36.5 g/dL    RDW 14.1 10.0 - 15.0 %    Platelet Count 326 150 - 450 10e3/uL    % Neutrophils 52 %    % Lymphocytes 30 %    % Monocytes 9 %    % Eosinophils 8 %    % Basophils 1 %    % Immature Granulocytes 0 %    NRBCs per 100 WBC 0 <1 /100    Absolute Neutrophils 7.0 1.6 - 8.3 10e3/uL    Absolute Lymphocytes 4.1 0.8 - 5.3 10e3/uL    Absolute Monocytes 1.2 0.0 - 1.3 10e3/uL    Absolute Eosinophils 1.1 (H) 0.0 - 0.7 10e3/uL    Absolute Basophils 0.1 0.0 - 0.2 10e3/uL    Absolute Immature Granulocytes 0.1 <=0.4 10e3/uL    Absolute NRBCs 0.0 10e3/uL   Drug abuse screen 1 urine (ED)     Status: Abnormal   Result Value Ref Range    Amphetamines Urine Screen Negative Screen Negative    Barbituates Urine Screen Negative Screen Negative    Benzodiazepine Urine Screen Positive (A) Screen  Negative    Cannabinoids Urine Screen Negative Screen Negative    Cocaine Urine Screen Positive (A) Screen Negative    Opiates Urine Screen Negative Screen Negative   Acetaminophen level     Status: Abnormal   Result Value Ref Range    Acetaminophen <5.0 (L) 10.0 - 30.0 ug/mL   Salicylate level     Status: Normal   Result Value Ref Range    Salicylate <0.5   mg/dL   Ethyl Alcohol Level     Status: Normal   Result Value Ref Range    Alcohol ethyl <0.01 <=0.01 g/dL   Lactic acid whole blood     Status: Normal   Result Value Ref Range    Lactic Acid 1.4 0.7 - 2.0 mmol/L   Osmolality     Status: Abnormal   Result Value Ref Range    Osmolality Blood 296 (H) 275 - 295 mmol/kg    Narrative    Greater than 385 mmol/kg relates to stupor in hyperglycemia   Greater than 400 mmol/kg can relate to seizures   Greater than 420 mmol/kg can be lethal    Serum Osmalar Gap:   Normal <10   Larger suggest unmeasured substances present in serum (ethanol, methanol, isopropanol, mannitol, ethylene glycol).   Asymptomatic Influenza A/B, RSV, & SARS-CoV2 PCR (COVID-19) Nose     Status: Normal    Specimen: Nose; Swab   Result Value Ref Range    Influenza A PCR Negative Negative    Influenza B PCR Negative Negative    RSV PCR Negative Negative    SARS CoV2 PCR Negative Negative    Narrative    Testing was performed using the Xpert Xpress CoV2/Flu/RSV Assay on the [employer] GeneXpert Instrument. This test should be ordered for the detection of SARS-CoV-2, influenza, and RSV viruses in individuals who meet clinical and/or epidemiological criteria. Test performance is unknown in asymptomatic patients. This test is for in vitro diagnostic use under the FDA EUA for laboratories certified under CLIA to perform high or moderate complexity testing. This test has not been FDA cleared or approved. A negative result does not rule out the presence of PCR inhibitors in the specimen or target RNA in concentration below the limit of detection for the assay. If  only one viral target is positive but coinfection with multiple targets is suspected, the sample should be re-tested with another FDA cleared, approved, or authorized test, if coinfection would change clinical management. This test was validated by the [facility]. These laboratories are certified under the Clinical Laboratory Improvement Amendments of 1988 (CLIA-88) as qualified to perform high complexity laboratory testing.   Comprehensive metabolic panel     Status: Abnormal   Result Value Ref Range    Sodium 141 136 - 145 mmol/L    Potassium 4.0 3.4 - 5.3 mmol/L    Chloride 105 98 - 107 mmol/L    Carbon Dioxide (CO2) 25 22 - 29 mmol/L    Anion Gap 11 7 - 15 mmol/L    Urea Nitrogen 9.4 6.0 - 20.0 mg/dL    Creatinine 0.69 0.51 - 0.95 mg/dL    Calcium 8.8 8.6 - 10.0 mg/dL    Glucose 110 (H) 70 - 99 mg/dL    Alkaline Phosphatase 60 35 - 104 U/L    AST 23 10 - 35 U/L    ALT 21 10 - 35 U/L    Protein Total 6.4 6.4 - 8.3 g/dL    Albumin 4.0 3.5 - 5.2 g/dL    Bilirubin Total <0.2 <=1.2 mg/dL    GFR Estimate >90 >60 mL/min/1.73m2   Phosphorus     Status: Abnormal   Result Value Ref Range    Phosphorus 2.1 (L) 2.5 - 4.5 mg/dL   Ionized Calcium     Status: Normal   Result Value Ref Range    Calcium Ionized 4.4 4.4 - 5.2 mg/dL   Lactic acid whole blood     Status: Normal   Result Value Ref Range    Lactic Acid 1.9 0.7 - 2.0 mmol/L   INR     Status: Normal   Result Value Ref Range    INR 1.04 0.85 - 1.15   CK total     Status: Normal   Result Value Ref Range     26 - 192 U/L   Blood gas arterial and oxyhgb     Status: None   Result Value Ref Range    pH Arterial 7.40 7.35 - 7.45    pCO2 Arterial 42 35 - 45 mm Hg    pO2 Arterial 81 80 - 105 mm Hg    Bicarbonate Arterial 26 21 - 28 mmol/L    Oxyhemoglobin Arterial 95 92 - 100 %    Base Excess/Deficit (+/-) 1.0 -9.0 - 1.8 mmol/L    FIO2 40     Glenroy's Test Yes    Lamotrigine Level     Status: Abnormal   Result Value Ref Range    Lamotrigine <0.9 (L) 3.0  - 15.0 ug/mL   TSH with free T4 reflex     Status: Normal   Result Value Ref Range    TSH 1.30 0.30 - 4.20 uIU/mL   Glucose by meter     Status: Abnormal   Result Value Ref Range    GLUCOSE BY METER POCT 107 (H) 70 - 99 mg/dL   Extra Tube ([facility] Draw) *Canceled*     Status: None ()    Narrative    The following orders were created for panel order Extra Tube ([facility] Draw).  Procedure                               Abnormality         Status                     ---------                               -----------         ------                     Extra Purple Top Tube[[id number]]                                                         Please view results for these tests on the individual orders.   Extra Tube     Status: None    Narrative    The following orders were created for panel order Extra Tube.  Procedure                               Abnormality         Status                     ---------                               -----------         ------                     Extra Purple Top Tube[[id number]]                            Final result                 Please view results for these tests on the individual orders.   Extra Purple Top Tube     Status: None   Result Value Ref Range    Hold Specimen [facility]    CBC with platelets     Status: Abnormal   Result Value Ref Range    WBC Count 11.6 (H) 4.0 - 11.0 10e3/uL    RBC Count 3.81 3.80 - 5.20 10e6/uL    Hemoglobin 11.3 (L) 11.7 - 15.7 g/dL    Hematocrit 36.4 35.0 - 47.0 %    MCV 96 78 - 100 fL    MCH 29.7 26.5 - 33.0 pg    MCHC 31.0 (L) 31.5 - 36.5 g/dL    RDW 14.4 10.0 - 15.0 %    Platelet Count 255 150 - 450 10e3/uL   Basic metabolic panel     Status: Abnormal   Result Value Ref Range    Sodium 142 136 - 145 mmol/L    Potassium 3.9 3.4 - 5.3 mmol/L    Chloride 106 98 - 107 mmol/L    Carbon Dioxide (CO2) 24 22 - 29 mmol/L    Anion Gap 12 7 - 15 mmol/L    Urea Nitrogen 9.5 6.0 - 20.0 mg/dL    Creatinine 0.57 0.51 - 0.95 mg/dL    Calcium 8.7 8.6 - 10.0 mg/dL     Glucose 101 (H) 70 - 99 mg/dL    GFR Estimate >90 >60 mL/min/1.73m2   Magnesium     Status: Normal   Result Value Ref Range    Magnesium 1.9 1.7 - 2.3 mg/dL   Phosphorus     Status: Normal   Result Value Ref Range    Phosphorus 2.8 2.5 - 4.5 mg/dL   Blood gas venous     Status: Abnormal   Result Value Ref Range    pH Venous 7.34 7.32 - 7.43    pCO2 Venous 53 (H) 40 - 50 mm Hg    pO2 Venous 35 25 - 47 mm Hg    Bicarbonate Venous 29 (H) 21 - 28 mmol/L    Base Excess/Deficit (+/-) 2.1 (H) -7.7 - 1.9 mmol/L    FIO2 35    CBC with platelets and differential     Status: Abnormal   Result Value Ref Range    WBC Count 13.0 (H) 4.0 - 11.0 10e3/uL    RBC Count 3.94 3.80 - 5.20 10e6/uL    Hemoglobin 11.8 11.7 - 15.7 g/dL    Hematocrit 36.2 35.0 - 47.0 %    MCV 92 78 - 100 fL    MCH 29.9 26.5 - 33.0 pg    MCHC 32.6 31.5 - 36.5 g/dL    RDW 14.1 10.0 - 15.0 %    Platelet Count 289 150 - 450 10e3/uL    % Neutrophils 71 %    % Lymphocytes 18 %    % Monocytes 7 %    % Eosinophils 3 %    % Basophils 1 %    % Immature Granulocytes 0 %    NRBCs per 100 WBC 0 <1 /100    Absolute Neutrophils 9.2 (H) 1.6 - 8.3 10e3/uL    Absolute Lymphocytes 2.3 0.8 - 5.3 10e3/uL    Absolute Monocytes 0.9 0.0 - 1.3 10e3/uL    Absolute Eosinophils 0.4 0.0 - 0.7 10e3/uL    Absolute Basophils 0.1 0.0 - 0.2 10e3/uL    Absolute Immature Granulocytes 0.0 <=0.4 10e3/uL    Absolute NRBCs 0.0 10e3/uL   CK total     Status: Normal   Result Value Ref Range     26 - 192 U/L   Triglycerides     Status: Abnormal   Result Value Ref Range    Triglycerides 580 (H) <150 mg/dL   Glucose by meter     Status: Normal   Result Value Ref Range    GLUCOSE BY METER POCT 97 70 - 99 mg/dL   Glucose by meter     Status: Normal   Result Value Ref Range    GLUCOSE BY METER POCT 92 70 - 99 mg/dL   Glucose by meter     Status: Abnormal   Result Value Ref Range    GLUCOSE BY METER POCT 121 (H) 70 - 99 mg/dL   Basic metabolic panel     Status: Abnormal   Result Value Ref Range     Sodium 137 136 - 145 mmol/L    Potassium 3.9 3.4 - 5.3 mmol/L    Chloride 103 98 - 107 mmol/L    Carbon Dioxide (CO2) 24 22 - 29 mmol/L    Anion Gap 10 7 - 15 mmol/L    Urea Nitrogen 10.3 6.0 - 20.0 mg/dL    Creatinine 0.57 0.51 - 0.95 mg/dL    Calcium 8.7 8.6 - 10.0 mg/dL    Glucose 118 (H) 70 - 99 mg/dL    GFR Estimate >90 >60 mL/min/1.73m2   Magnesium     Status: Normal   Result Value Ref Range    Magnesium 1.9 1.7 - 2.3 mg/dL   Phosphorus     Status: Abnormal   Result Value Ref Range    Phosphorus 2.4 (L) 2.5 - 4.5 mg/dL   Procalcitonin     Status: Normal   Result Value Ref Range    Procalcitonin <0.02 <0.05 ng/mL   CK total     Status: Normal   Result Value Ref Range    CK 72 26 - 192 U/L   Extra Tube     Status: None    Narrative    The following orders were created for panel order Extra Tube.  Procedure                               Abnormality         Status                     ---------                               -----------         ------                     Extra Purple Top Tube[[id number]]                            Final result                 Please view results for these tests on the individual orders.   Extra Purple Top Tube     Status: None   Result Value Ref Range    Hold Specimen [facility]    Glucose by meter     Status: Abnormal   Result Value Ref Range    GLUCOSE BY METER POCT 108 (H) 70 - 99 mg/dL   Triglycerides     Status: Abnormal   Result Value Ref Range    Triglycerides 378 (H) <150 mg/dL   Basic metabolic panel     Status: Abnormal   Result Value Ref Range    Sodium 138 136 - 145 mmol/L    Potassium 3.0 (L) 3.4 - 5.3 mmol/L    Chloride 103 98 - 107 mmol/L    Carbon Dioxide (CO2) 23 22 - 29 mmol/L    Anion Gap 12 7 - 15 mmol/L    Urea Nitrogen 6.5 6.0 - 20.0 mg/dL    Creatinine 0.55 0.51 - 0.95 mg/dL    Calcium 8.0 (L) 8.6 - 10.0 mg/dL    Glucose 108 (H) 70 - 99 mg/dL    GFR Estimate >90 >60 mL/min/1.73m2   CBC with platelets     Status: Abnormal   Result Value Ref Range    WBC Count 13.9 (H)  4.0 - 11.0 10e3/uL    RBC Count 3.71 (L) 3.80 - 5.20 10e6/uL    Hemoglobin 11.1 (L) 11.7 - 15.7 g/dL    Hematocrit 33.6 (L) 35.0 - 47.0 %    MCV 91 78 - 100 fL    MCH 29.9 26.5 - 33.0 pg    MCHC 33.0 31.5 - 36.5 g/dL    RDW 14.6 10.0 - 15.0 %    Platelet Count 273 150 - 450 10e3/uL   Magnesium     Status: Normal   Result Value Ref Range    Magnesium 2.0 1.7 - 2.3 mg/dL   Phosphorus     Status: Normal   Result Value Ref Range    Phosphorus 2.6 2.5 - 4.5 mg/dL   Glucose by meter     Status: Abnormal   Result Value Ref Range    GLUCOSE BY METER POCT 135 (H) 70 - 99 mg/dL   Phosphorus     Status: Abnormal   Result Value Ref Range    Phosphorus 2.2 (L) 2.5 - 4.5 mg/dL   Glucose by meter     Status: Abnormal   Result Value Ref Range    GLUCOSE BY METER POCT 102 (H) 70 - 99 mg/dL   Potassium     Status: Normal   Result Value Ref Range    Potassium 4.3 3.4 - 5.3 mmol/L   Glucose by meter     Status: Normal   Result Value Ref Range    GLUCOSE BY METER POCT 71 70 - 99 mg/dL   Glucose by meter     Status: Abnormal   Result Value Ref Range    GLUCOSE BY METER POCT 149 (H) 70 - 99 mg/dL   Glucose by meter     Status: Normal   Result Value Ref Range    GLUCOSE BY METER POCT 93 70 - 99 mg/dL   Basic metabolic panel     Status: Abnormal   Result Value Ref Range    Sodium 141 136 - 145 mmol/L    Potassium 3.6 3.4 - 5.3 mmol/L    Chloride 108 (H) 98 - 107 mmol/L    Carbon Dioxide (CO2) 22 22 - 29 mmol/L    Anion Gap 11 7 - 15 mmol/L    Urea Nitrogen 3.3 (L) 6.0 - 20.0 mg/dL    Creatinine 0.66 0.51 - 0.95 mg/dL    Calcium 7.5 (L) 8.6 - 10.0 mg/dL    Glucose 111 (H) 70 - 99 mg/dL    GFR Estimate >90 >60 mL/min/1.73m2   CBC with platelets     Status: Abnormal   Result Value Ref Range    WBC Count 16.0 (H) 4.0 - 11.0 10e3/uL    RBC Count 3.29 (L) 3.80 - 5.20 10e6/uL    Hemoglobin 9.9 (L) 11.7 - 15.7 g/dL    Hematocrit 30.4 (L) 35.0 - 47.0 %    MCV 92 78 - 100 fL    MCH 30.1 26.5 - 33.0 pg    MCHC 32.6 31.5 - 36.5 g/dL    RDW 14.7 10.0 -  15.0 %    Platelet Count 229 150 - 450 10e3/uL   Magnesium     Status: Normal   Result Value Ref Range    Magnesium 2.1 1.7 - 2.3 mg/dL   Blood gas venous     Status: None   Result Value Ref Range    pH Venous 7.35 7.32 - 7.43    pCO2 Venous 45 40 - 50 mm Hg    pO2 Venous 41 25 - 47 mm Hg    Bicarbonate Venous 25 21 - 28 mmol/L    Base Excess/Deficit (+/-) -1.1 -7.7 - 1.9 mmol/L    FIO2 60    Lactic acid whole blood     Status: Normal   Result Value Ref Range    Lactic Acid 1.1 0.7 - 2.0 mmol/L   Glucose by meter     Status: Normal   Result Value Ref Range    GLUCOSE BY METER POCT 99 70 - 99 mg/dL   Phosphorus     Status: Normal   Result Value Ref Range    Phosphorus 2.8 2.5 - 4.5 mg/dL   Glucose by meter     Status: Abnormal   Result Value Ref Range    GLUCOSE BY METER POCT 114 (H) 70 - 99 mg/dL   Triglycerides     Status: Abnormal   Result Value Ref Range    Triglycerides 205 (H) <150 mg/dL   Glucose by meter     Status: Normal   Result Value Ref Range    GLUCOSE BY METER POCT 99 70 - 99 mg/dL   CRP inflammation     Status: Abnormal   Result Value Ref Range    CRP Inflammation 168.33 (H) <5.00 mg/L   Procalcitonin     Status: Abnormal   Result Value Ref Range    Procalcitonin 0.08 (H) <0.05 ng/mL   Glucose by meter     Status: Normal   Result Value Ref Range    GLUCOSE BY METER POCT 92 70 - 99 mg/dL   Comprehensive metabolic panel     Status: Abnormal   Result Value Ref Range    Sodium 138 136 - 145 mmol/L    Potassium 3.7 3.4 - 5.3 mmol/L    Chloride 104 98 - 107 mmol/L    Carbon Dioxide (CO2) 19 (L) 22 - 29 mmol/L    Anion Gap 15 7 - 15 mmol/L    Urea Nitrogen 3.1 (L) 6.0 - 20.0 mg/dL    Creatinine 0.64 0.51 - 0.95 mg/dL    Calcium 7.9 (L) 8.6 - 10.0 mg/dL    Glucose 98 70 - 99 mg/dL    Alkaline Phosphatase 101 35 - 104 U/L    AST 28 10 - 35 U/L    ALT 20 10 - 35 U/L    Protein Total 6.4 6.4 - 8.3 g/dL    Albumin 3.5 3.5 - 5.2 g/dL    Bilirubin Total 0.8 <=1.2 mg/dL    GFR Estimate >90 >60 mL/min/1.73m2    Magnesium     Status: Normal   Result Value Ref Range    Magnesium 2.1 1.7 - 2.3 mg/dL   Phosphorus     Status: Abnormal   Result Value Ref Range    Phosphorus 2.4 (L) 2.5 - 4.5 mg/dL   Lactic acid whole blood     Status: Normal   Result Value Ref Range    Lactic Acid 1.2 0.7 - 2.0 mmol/L   Lipase     Status: Normal   Result Value Ref Range    Lipase 36 13 - 60 U/L   Ammonia     Status: Normal   Result Value Ref Range    Ammonia 17 11 - 51 umol/L   INR     Status: Abnormal   Result Value Ref Range    INR 1.27 (H) 0.85 - 1.15   CK total     Status: Normal   Result Value Ref Range    CK 60 26 - 192 U/L   Glucose by meter     Status: Abnormal   Result Value Ref Range    GLUCOSE BY METER POCT 113 (H) 70 - 99 mg/dL   CBC with platelets and differential     Status: Abnormal   Result Value Ref Range    WBC Count 20.8 (H) 4.0 - 11.0 10e3/uL    RBC Count 3.59 (L) 3.80 - 5.20 10e6/uL    Hemoglobin 10.7 (L) 11.7 - 15.7 g/dL    Hematocrit 33.6 (L) 35.0 - 47.0 %    MCV 94 78 - 100 fL    MCH 29.8 26.5 - 33.0 pg    MCHC 31.8 31.5 - 36.5 g/dL    RDW 14.9 10.0 - 15.0 %    Platelet Count 258 150 - 450 10e3/uL    % Neutrophils 79 %    % Lymphocytes 7 %    % Monocytes 9 %    % Eosinophils 4 %    % Basophils 0 %    % Immature Granulocytes 1 %    NRBCs per 100 WBC 0 <1 /100    Absolute Neutrophils 16.3 (H) 1.6 - 8.3 10e3/uL    Absolute Lymphocytes 1.5 0.8 - 5.3 10e3/uL    Absolute Monocytes 1.8 (H) 0.0 - 1.3 10e3/uL    Absolute Eosinophils 0.9 (H) 0.0 - 0.7 10e3/uL    Absolute Basophils 0.1 0.0 - 0.2 10e3/uL    Absolute Immature Granulocytes 0.2 <=0.4 10e3/uL    Absolute NRBCs 0.0 10e3/uL   Extra Tube ([facility] Draw)     Status: None    Narrative    The following orders were created for panel order Extra Tube ([facility] Draw).  Procedure                               Abnormality         Status                     ---------                               -----------         ------                     Extra Purple Top Tube[[id number]]                             Final result                 Please view results for these tests on the individual orders.   Extra Purple Top Tube     Status: None   Result Value Ref Range    Hold Specimen JI    Magnesium     Status: Normal   Result Value Ref Range    Magnesium 2.0 1.7 - 2.3 mg/dL   Vancomycin level     Status: Normal   Result Value Ref Range    Vancomycin 18.2   ug/mL   Basic metabolic panel     Status: Abnormal   Result Value Ref Range    Sodium 142 136 - 145 mmol/L    Potassium 3.8 3.4 - 5.3 mmol/L    Chloride 109 (H) 98 - 107 mmol/L    Carbon Dioxide (CO2) 20 (L) 22 - 29 mmol/L    Anion Gap 13 7 - 15 mmol/L    Urea Nitrogen 4.0 (L) 6.0 - 20.0 mg/dL    Creatinine 0.64 0.51 - 0.95 mg/dL    Calcium 7.8 (L) 8.6 - 10.0 mg/dL    Glucose 116 (H) 70 - 99 mg/dL    GFR Estimate >90 >60 mL/min/1.73m2   CBC with platelets     Status: Abnormal   Result Value Ref Range    WBC Count 20.2 (H) 4.0 - 11.0 10e3/uL    RBC Count 3.45 (L) 3.80 - 5.20 10e6/uL    Hemoglobin 10.3 (L) 11.7 - 15.7 g/dL    Hematocrit 33.0 (L) 35.0 - 47.0 %    MCV 96 78 - 100 fL    MCH 29.9 26.5 - 33.0 pg    MCHC 31.2 (L) 31.5 - 36.5 g/dL    RDW 15.0 10.0 - 15.0 %    Platelet Count 263 150 - 450 10e3/uL   Phosphorus     Status: Normal   Result Value Ref Range    Phosphorus 2.9 2.5 - 4.5 mg/dL   Blood gas venous     Status: Abnormal   Result Value Ref Range    pH Venous 7.35 7.32 - 7.43    pCO2 Venous 43 40 - 50 mm Hg    pO2 Venous 63 (H) 25 - 47 mm Hg    Bicarbonate Venous 24 21 - 28 mmol/L    Base Excess/Deficit (+/-) -1.8 -7.7 - 1.9 mmol/L    FIO2 3    Lactic acid whole blood     Status: Normal   Result Value Ref Range    Lactic Acid 1.3 0.7 - 2.0 mmol/L   Extra Tube     Status: None    Narrative    The following orders were created for panel order Extra Tube.  Procedure                               Abnormality         Status                     ---------                               -----------         ------                     Extra Purple Top  Tube[[id number]]                            Final result                 Please view results for these tests on the individual orders.   Extra Purple Top Tube     Status: None   Result Value Ref Range    Hold Specimen [facility]    Treponema Abs w Reflex to RPR and Titer     Status: Normal   Result Value Ref Range    Treponema Antibody Total Nonreactive Nonreactive   Vancomycin level     Status: Normal   Result Value Ref Range    Vancomycin 9.6   ug/mL   Blood gas venous     Status: Abnormal   Result Value Ref Range    pH Venous 7.30 (L) 7.32 - 7.43    pCO2 Venous 56 (H) 40 - 50 mm Hg    pO2 Venous 23 (L) 25 - 47 mm Hg    Bicarbonate Venous 28 21 - 28 mmol/L    Base Excess/Deficit (+/-) 0.0 -7.7 - 1.9 mmol/L    FIO2 21    Blood gas venous     Status: None   Result Value Ref Range    pH Venous 7.37 7.32 - 7.43    pCO2 Venous 48 40 - 50 mm Hg    pO2 Venous 29 25 - 47 mm Hg    Bicarbonate Venous 28 21 - 28 mmol/L    Base Excess/Deficit (+/-) 1.6 -7.7 - 1.9 mmol/L    FIO2 97    Extra Tube     Status: None    Narrative    The following orders were created for panel order Extra Tube.  Procedure                               Abnormality         Status                     ---------                               -----------         ------                     Extra Green Top (Lithium...[[id number]]                      Final result                 Please view results for these tests on the individual orders.   Extra Green Top (Lithium Heparin) Tube     Status: None   Result Value Ref Range    Hold Specimen [facility]    Blood gas venous     Status: Abnormal   Result Value Ref Range    pH Venous 7.37 7.32 - 7.43    pCO2 Venous 45 40 - 50 mm Hg    pO2 Venous 50 (H) 25 - 47 mm Hg    Bicarbonate Venous 26 21 - 28 mmol/L    Base Excess/Deficit (+/-) 0.4 -7.7 - 1.9 mmol/L    FIO2 50    Lactic acid whole blood     Status: Normal   Result Value Ref Range    Lactic Acid 0.9 0.7 - 2.0 mmol/L   Basic metabolic panel     Status: Abnormal   Result Value  Ref Range    Sodium 142 136 - 145 mmol/L    Potassium 3.9 3.4 - 5.3 mmol/L    Chloride 110 (H) 98 - 107 mmol/L    Carbon Dioxide (CO2) 23 22 - 29 mmol/L    Anion Gap 9 7 - 15 mmol/L    Urea Nitrogen 7.9 6.0 - 20.0 mg/dL    Creatinine 0.57 0.51 - 0.95 mg/dL    Calcium 7.9 (L) 8.6 - 10.0 mg/dL    Glucose 100 (H) 70 - 99 mg/dL    GFR Estimate >90 >60 mL/min/1.73m2   CBC with platelets     Status: Abnormal   Result Value Ref Range    WBC Count 17.3 (H) 4.0 - 11.0 10e3/uL    RBC Count 3.41 (L) 3.80 - 5.20 10e6/uL    Hemoglobin 10.1 (L) 11.7 - 15.7 g/dL    Hematocrit 32.3 (L) 35.0 - 47.0 %    MCV 95 78 - 100 fL    MCH 29.6 26.5 - 33.0 pg    MCHC 31.3 (L) 31.5 - 36.5 g/dL    RDW 14.9 10.0 - 15.0 %    Platelet Count 267 150 - 450 10e3/uL   Magnesium     Status: Normal   Result Value Ref Range    Magnesium 2.0 1.7 - 2.3 mg/dL   EKG 12-lead, tracing only     Status: None   Result Value Ref Range    Systolic Blood Pressure  mmHg    Diastolic Blood Pressure  mmHg    Ventricular Rate 104 BPM    Atrial Rate 104 BPM    RI Interval 164 ms    QRS Duration 110 ms     ms    QTc 504 ms    P Axis 38 degrees    R AXIS 9 degrees    T Axis -6 degrees    Interpretation ECG       Sinus tachycardia  Minimal voltage criteria for LVH, may be normal variant  Inferior infarct , age undetermined  Abnormal ECG  Unconfirmed report - interpretation of this ECG is computer generated - see medical record for final interpretation  Confirmed by - EMERGENCY ROOM, PHYSICIAN ([id number]),  DANISHA YBARRA ([id number]) on 4/20/2023 9:31:09 PM     EKG 12-lead, complete     Status: None   Result Value Ref Range    Systolic Blood Pressure  mmHg    Diastolic Blood Pressure  mmHg    Ventricular Rate 86 BPM    Atrial Rate 86 BPM    RI Interval 160 ms    QRS Duration 96 ms     ms    QTc 490 ms    P Axis 73 degrees    R AXIS 74 degrees    T Axis 51 degrees    Interpretation ECG       Sinus rhythm  Nonspecific T wave abnormality  Prolonged QT  Abnormal  ECG  When compared with ECG of 2023 19:43,  Questionable change in QRS axis  Nonspecific T wave abnormality now evident in Anterior leads  Confirmed by fellow MD LUBA, DWAINE ([id number]) on 2023 12:03:39 PM  Confirmed by MD WENDI, GHISLAINE ([id number]) on 2023 2:54:48 PM     EKG 12-lead, complete     Status: None   Result Value Ref Range    Systolic Blood Pressure  mmHg    Diastolic Blood Pressure  mmHg    Ventricular Rate 113 BPM    Atrial Rate 113 BPM    CA Interval 156 ms    QRS Duration 96 ms     ms    QTc 449 ms    P Axis 33 degrees    R AXIS 53 degrees    T Axis 74 degrees    Interpretation ECG       Sinus tachycardia  Possible Inferior infarct , age undetermined  Abnormal ECG  When compared with ECG of 2023 00:13,  Borderline criteria for Inferior infarct are now Present     Echo Complete     Status: None   Result Value Ref Range    LVEF  70%     Narrative    [id number]  [id number]  [id number]  [id number]^GEETHA^LEANNA     [facility],[place]  Echocardiography Laboratory  [address] [place] [id number]     Name: BIANCA TALAMANTES  MRN: [id number]  : 1980  Study Date: 2023 10:46 AM  Age: 42 yrs  Gender: Female  Patient Location: [facility]  Reason For Study: Arrhythmia  Ordering Physician: LEANNA INIGUEZ  Performed By: Natanael Talavera RDCS     BSA: 2.1 m2  Height: 66 in  Weight: 235 lb  ______________________________________________________________________________  Procedure  Complete Portable Echo Adult.  ______________________________________________________________________________  Interpretation Summary  Global and regional left ventricular function is hyperkinetic with an EF >70%.  Right ventricular function, chamber size, wall motion, and thickness are  normal.  The inferior vena cava is normal.  No pericardial effusion is present.  There is no prior study for direct  comparison.  ______________________________________________________________________________  Left Ventricle  Global and regional left ventricular function is hyperkinetic with an EF >70%.  Left ventricular wall thickness is normal. Left ventricular size is normal.  Left ventricular diastolic function is normal. No regional wall motion  abnormalities are seen.     Right Ventricle  Right ventricular function, chamber size, wall motion, and thickness are  normal.     Atria  Both atria appear normal. The atrial septum is intact as assessed by color  Doppler .     Mitral Valve  The mitral valve is normal.     Aortic Valve  Aortic valve is normal in structure and function.     Tricuspid Valve  The tricuspid valve is normal. Pulmonary artery systolic pressure cannot be  assessed.     Pulmonic Valve  The pulmonic valve is normal.     Vessels  The thoracic aorta is normal. The pulmonary artery and bifurcation cannot be  assessed. The inferior vena cava is normal.     Pericardium  No pericardial effusion is present. Prominent epicardial fat is noted.     Compared to Previous Study  There is no prior study for direct comparison.  ______________________________________________________________________________  MMode/2D Measurements & Calculations  IVSd: 1.1 cm  LVIDd: 4.5 cm  LVIDs: 2.7 cm  LVPWd: 0.92 cm  FS: 38.8 %  LV mass(C)d: 150.3 grams  LV mass(C)dI: 70.2 grams/m2  Ao root diam: 3.6 cm  asc Aorta Diam: 3.6 cm  LVOT diam: 2.4 cm  LVOT area: 4.5 cm2  LA Volume (BP): 47.3 ml     LA Volume Index (BP): 22.1 ml/m2  RWT: 0.41  TAPSE: 2.3 cm     Doppler Measurements & Calculations  MV E max francine: 69.8 cm/sec  MV A max francine: 80.1 cm/sec  MV E/A: 0.87  MV dec slope: 420.0 cm/sec2  MV dec time: 0.17 sec  PA acc time: 0.12 sec  E/E' av.6  Lateral E/e': 6.1  Medial E/e': 7.0     ______________________________________________________________________________  Report approved by: Luis Carlos Oneill 2023 12:33 PM         Adult Type  and Screen     Status: None   Result Value Ref Range    ABO/RH(D) A POS     Antibody Screen Negative Negative    SPECIMEN EXPIRATION DATE [id number]    Blood Culture Peripheral Blood     Status: Normal (Preliminary result)    Specimen: Peripheral Blood   Result Value Ref Range    Culture No growth after 4 days    Blood Culture Peripheral Blood     Status: Normal (Preliminary result)    Specimen: Peripheral Blood   Result Value Ref Range    Culture No growth after 4 days    Respiratory Aerobic Bacterial Culture with Gram Stain     Status: Abnormal (Preliminary result)    Specimen: Endotracheal; Sputum   Result Value Ref Range    Culture 3+ Staphylococcus aureus (A)     Culture 1+ Normal yudy     Gram Stain Result >25 PMNs/low power field (A)     Gram Stain Result 4+ Gram positive cocci (A)    MRSA MSSA PCR, Nasal Swab     Status: Abnormal    Specimen: Nares, Bilateral; Swab   Result Value Ref Range    MRSA Target DNA Positive (A) Negative    SA Target DNA Positive     Narrative    The [employer]  Xpert SA Nasal Complete assay performed in the [employer] System is a qualitative in vitro diagnostic test designed for rapid detection of Staphylococcus aureus (SA) and methicillin-resistant Staphylococcus aureus (MRSA) from nasal swabs in patients at risk for nasal colonization. The test utilizes automated real-time polymerase chain reaction (PCR) to detect MRSA/SA DNA. The Xpert SA Nasal Complete assay is intended to aid in the prevention and control of MRSA/SA infections in healthcare settings. The assay is not intended to diagnose, guide or monitor treatment for MRSA/SA infections, or provide results of susceptibility to methicillin. A negative result does not preclude MRSA/SA nasal colonization.    Blood Culture Peripheral Blood     Status: Normal (Preliminary result)    Specimen: Peripheral Blood   Result Value Ref Range    Culture No growth after 2 days    Blood Culture Peripheral Blood     Status: Normal  (Preliminary result)    Specimen: Peripheral Blood   Result Value Ref Range    Culture No growth after 2 days     Narrative    Only an Aerobic Blood Culture Bottle was collected, interpret results with caution.       MRSA Culture Reflex     Status: None (Preliminary result)    Specimen: Nares, Bilateral; Swab   Result Value Ref Range    Culture Culture in progress    Blood Culture Hand, Left     Status: Normal (Preliminary result)    Specimen: Hand, Left; Blood   Result Value Ref Range    Culture No growth after 1 day    Blood Culture Hand, Right     Status: Normal (Preliminary result)    Specimen: Hand, Right; Blood   Result Value Ref Range    Culture No growth after 1 day    ABO/Rh type and screen *Canceled*     Status: None ()    Narrative    The following orders were created for panel order ABO/Rh type and screen.  Procedure                               Abnormality         Status                     ---------                               -----------         ------                       Please view results for these tests on the individual orders.   CBC with platelets differential     Status: Abnormal    Narrative    The following orders were created for panel order CBC with platelets differential.  Procedure                               Abnormality         Status                     ---------                               -----------         ------                     CBC with platelets and d...[[id number]]  Abnormal            Final result                 Please view results for these tests on the individual orders.   ABO/Rh type and screen *Canceled*     Status: None ()    Narrative    The following orders were created for panel order ABO/Rh type and screen.  Procedure                               Abnormality         Status                     ---------                               -----------         ------                     Adult Type and Screen[[id number]]                                                          Please view results for these tests on the individual orders.   Urine Drugs of Abuse Screen     Status: Abnormal    Narrative    The following orders were created for panel order Urine Drugs of Abuse Screen.  Procedure                               Abnormality         Status                     ---------                               -----------         ------                     Drug abuse screen 1 urin...[[id number]]  Abnormal            Final result                 Please view results for these tests on the individual orders.   ABO/Rh type and screen     Status: None    Narrative    The following orders were created for panel order ABO/Rh type and screen.  Procedure                               Abnormality         Status                     ---------                               -----------         ------                     Adult Type and Screen[[id number]]                            Final result                 Please view results for these tests on the individual orders.   CBC with Platelets & Differential     Status: Abnormal    Narrative    The following orders were created for panel order CBC with Platelets & Differential.  Procedure                               Abnormality         Status                     ---------                               -----------         ------                     CBC with platelets and d...[[id number]]  Abnormal            Final result                 Please view results for these tests on the individual orders.   CBC with Platelets & Differential     Status: Abnormal    Narrative    The following orders were created for panel order CBC with Platelets & Differential.  Procedure                               Abnormality         Status                     ---------                               -----------         ------                     CBC with platelets and d...[[id number]]  Abnormal            Final result                 Please view results for these tests on the  individual orders.   EEG Video 2-12 HRS Ummonitored     Status: None    Narrative    EEG Video 2-12 HRS Ummonitored Result    VIDEO EEG DATE: 2023  VIDEO EEG [id number]  VIDEO EEG DAY#: 1  VIDEO EEG SOURCE FILE DURATION: 10 hours and 39 minutes    PATIENT HISTORY: 42-year old presented with altered mental status   (agitated delirium, amnesia, unresponsiveness).  Previous history of   stroke, possible seizures, possible nonepileptic events.  Currently   intubated and encephalopathic in  U.  As best as can be determined from   electronic medical record, patient treated with propofol 35 to 75   mcg/kg/min and hydromorphone 0.2 to 0.4 mg/h throughout the study.    TECHNICAL SUMMARY: This is a video-EEG monitoring study. EEG was recorded   from 23 scalp electrodes placed according to the 10-20 international   system. Additional electrodes were utilized for referencing, artifact   detection, and recording from other cerebral regions. Qualified   technicians attached EEG electrodes, set up the study, monitored and   reviewed EEG recordings, and disconnected EEG electrodes when appropriate.   Video was continuously recorded. Video was reviewed for clinical   correlation and to assist with EEG interpretations.     BACKGROUND ACTIVITY: During some portions of the recording, continuous   moderate amplitude diffuse polymorphic delta activity.  This pattern was   the most persistent.  Superimposed diffuse alpha.  During other portions   of the recording, diffuse alpha and theta predominate with somewhat less   delta.  Desynchronization's occurred during these portions of the   recording lasting up to 4 seconds but 5 to 10  V theta is present even   during the desynchronized periods.    ACTIVATION PROCEDURE: Formal stimulation not performed.  EEG is reactive   to stimulations associated with routine cares.  Normal rhythms however are   not seen.    OTHER INTERICTAL ABNORMALITIES: No epileptiform discharges.    ICTAL  ABNORMALITIES: No electrographic seizures.    IMPRESSION: Abnormal.  1) findings variously consistent with moderate or moderate to to severe   diffuse encephalopathy.  EEG is reactive.  Findings are within the range   typically seen in patients treated with sedative drips at the doses   employed.  Variable extent EEG measures of encephalopathy probably related   to varying doses of sedative drips.  Presence of encephalopathy beyond   that related to anesthetic drips cannot be excluded; however, malignant   patterns are not seen.  2) epileptiform discharges or seizures not seen.  It should be noted that   propofol has potent anticonvulsant effects at the doses reported and   seizures could potentially emerge as this treatment is reduced.    Richard Jo MD  EPILEPSY STAFF      EEG Video 2-12 HRS Ummonitored     Status: None    Narrative    EEG Video 2-12 HRS Ummonitored Result    VIDEO EEG DATE: 2023  VIDEO EEG [id number]  VIDEO EEG DAY#: 2  VIDEO EEG SOURCE FILE DURATION: 06 hours and 31 minutes    PATIENT HISTORY: 42-year old presented with altered mental status   (agitated delirium, amnesia, unresponsiveness).  Previous history of   stroke, possible seizures, possible nonepileptic events.  Currently   intubated and encephalopathic in  U.  As best as can be determined from   electronic medical record, patient treated with propofol 35 to 75   mcg/kg/min and hydromorphone 0.2 to 0.4 mg/h throughout the study.    TECHNICAL SUMMARY: This is a video-EEG monitoring study. EEG was recorded   from 23 scalp electrodes placed according to the 10-20 international   system. Additional electrodes were utilized for referencing, artifact   detection, and recording from other cerebral regions. Qualified   technicians attached EEG electrodes, set up the study, monitored and   reviewed EEG recordings, and disconnected EEG electrodes when appropriate.   Video was continuously recorded. Video was reviewed for  clinical   correlation and to assist with EEG interpretations.     BACKGROUND ACTIVITY: During some portions of the recording diffuse   polymorphic delta with superimposed alpha.  During other portions of the   recording moderate amplitude diffuse alpha.  Infrequently, brief   desynchronization's lasting up to 2 seconds are seen.    ACTIVATION PROCEDURE: Stimulation performed utilizing ICU protocol around   9:16 AM on 4/25.  This increases persistence of faster frequencies   indicating some degree of reactivity.  Normal rhythms however are not   induced.    OTHER INTERICTAL ABNORMALITIES: No epileptiform discharges.    ICTAL ABNORMALITIES: No electrographic seizures.    IMPRESSION: Abnormal.  Findings reveal varying degrees of diffuse   encephalopathy.  These may be related to the varying anesthetic drips   employed.  Extent of encephalopathy separate from anesthetic effects   cannot be determined from this study.  However, no malignant patterns were   seen and overall, findings were reasonably typical of those recorded in   people treated with sedative drips at the doses employed.  Seizures were   not recorded.    Richard Jo MD  EPILEPSY STAFF      Medications   glucose gel 15-30 g ( Oral Unhold 4/22/23 1638)     Or   dextrose 50 % injection 25-50 mL ( Intravenous Unhold 4/22/23 1638)     Or   glucagon injection 1 mg ( Subcutaneous Unhold 4/22/23 1638)   pantoprazole (PROTONIX) 2 mg/mL suspension 40 mg ( Per Feeding Tube See Alternative 4/25/23 0757)     Or   pantoprazole (PROTONIX) IV push injection 40 mg (40 mg Intravenous $Given 4/25/23 0757)   enoxaparin ANTICOAGULANT (LOVENOX) injection 40 mg (40 mg Subcutaneous $Given 4/25/23 0757)   acetaminophen (TYLENOL) tablet 650 mg ( Oral See Alternative 4/25/23 1336)     Or   acetaminophen (TYLENOL) solution 650 mg (650 mg Per NG tube $Given 4/25/23 1336)   ondansetron (ZOFRAN ODT) ODT tab 4 mg ( Oral Unhold 4/22/23 1638)     Or   ondansetron (ZOFRAN)  injection 4 mg ( Intravenous Unhold 4/22/23 1638)   bisacodyl (DULCOLAX) suppository 10 mg (10 mg Rectal $Given 4/23/23 0031)   propofol (DIPRIVAN) infusion (50 mcg/kg/min × 101.9 kg Intravenous $New Bag 4/25/23 1459)     And   propofol (DIPRIVAN) bolus from infusion pump 10 mg (10 mg Intravenous $Given 4/25/23 0506)     And   Medication Instruction ( Does not apply Unhold 4/22/23 1638)   levalbuterol (XOPENEX) neb solution 1.25 mg ( Nebulization Unhold 4/22/23 1638)   naloxone (NARCAN) injection 0.2 mg ( Intravenous Unhold 4/22/23 1638)     Or   naloxone (NARCAN) injection 0.4 mg ( Intravenous Unhold 4/22/23 1638)     Or   naloxone (NARCAN) injection 0.2 mg ( Intramuscular Unhold 4/22/23 1638)     Or   naloxone (NARCAN) injection 0.4 mg ( Intramuscular Unhold 4/22/23 1638)   fluticasone (FLONASE) 50 MCG/ACT spray 1 spray (1 spray Both Nostrils $Given 4/25/23 0759)   HYDROmorphone (DILAUDID) 0.2 mg/mL infusion ADULT/PEDS GREATER than or EQUAL to 20 kg (0.3 mg/hr Intravenous Restarted 4/25/23 1330)   potassium chloride 10 mEq in 100 mL sterile water infusion ( Intravenous Unhold 4/22/23 1638)   chlorhexidine (PERIDEX) 0.12 % solution 15 mL (15 mLs Mouth/Throat $Given 4/25/23 0757)   hydromorphone (DILAUDID) 0.2 mg/mL bolus dose from infusion pump 0.5-1 mg (1 mg Intravenous $Given 4/25/23 0505)   piperacillin-tazobactam (ZOSYN) 4.5 g vial to attach to  mL bag (4.5 g Intravenous $New Bag 4/25/23 1336)   LORazepam (ATIVAN) injection 2 mg (2 mg Intravenous $Given 4/23/23 0940)   midazolam (VERSED) injection 2 mg (2 mg Intravenous $Given 4/24/23 1048)   dextrose 10% infusion (has no administration in time range)   atorvastatin (LIPITOR) tablet 40 mg (40 mg Oral or Feeding Tube $Given 4/24/23 2040)   montelukast (SINGULAIR) tablet 10 mg (10 mg Oral or Feeding Tube $Given 4/24/23 2202)   QUEtiapine (SEROquel) tablet 50 mg (50 mg Oral or Feeding Tube $Given 4/25/23 1211)   senna-docusate (SENOKOT-S/PERICOLACE) 8.6-50  MG per tablet 1 tablet ( Oral or Feeding Tube See Alternative 4/25/23 0758)     Or   senna-docusate (SENOKOT-S/PERICOLACE) 8.6-50 MG per tablet 2 tablet (2 tablets Oral or Feeding Tube Not Given 4/25/23 0758)   Patient is already receiving anticoagulation with heparin, enoxaparin (LOVENOX), warfarin (COUMADIN)  or other anticoagulant medication (has no administration in time range)   labetalol (NORMODYNE/TRANDATE) injection 10 mg (10 mg Intravenous $Given 4/25/23 0846)   folic acid (FOLVITE) tablet 1 mg (1 mg Oral or Feeding Tube $Given 4/25/23 0757)   thiamine (B-1) tablet 100 mg (100 mg Oral or Feeding Tube $Given 4/25/23 0757)   lamoTRIgine (LaMICtal) tablet 25 mg (25 mg Oral or Feeding Tube $Given 4/25/23 0757)   vancomycin (VANCOCIN) 1,750 mg in sodium chloride 0.9 % 500 mL intermittent infusion (1,750 mg Intravenous $New Bag 4/25/23 1510)   polyethylene glycol (MIRALAX) Packet 17 g (17 g Oral Not Given 4/25/23 1647)   dexmedetomidine (PRECEDEX) 4 mcg/mL in NS infusion (0 mcg/kg/hr × 101.9 kg (Dosing Weight) Intravenous Stopped 4/25/23 1330)   dexamethasone (DECADRON) injection 4 mg (4 mg Intravenous $Given 4/25/23 1510)   dextrose 10% infusion (has no administration in time range)   multivitamins w/minerals liquid 15 mL (15 mLs Per Feeding Tube $Given 4/25/23 1510)   OLANZapine (zyPREXA) 10 MG injection (  $Given 4/20/23 1820)   etomidate (AMIDATE) injection 20 mg (20 mg Intravenous $Given 4/20/23 1837)   succinylcholine (ANECTINE) injection 150 mg (150 mg Intravenous $Given 4/20/23 1837)   sodium chloride (PF) 0.9% PF flush 84 mL (84 mLs Intravenous $Given 4/20/23 1926)   iopamidol (ISOVUE-370) solution 135 mL (135 mLs Intravenous $Given 4/20/23 1926)   vecuronium (NORCURON) injection 10 mg (10 mg Intravenous $Given 4/20/23 1830)   propofol (DIPRIVAN) injection 10 mg/mL vial (60 mg Intravenous $Given 4/20/23 1840)   propofol (DIPRIVAN) injection 10 mg/mL vial (40 mg Intravenous $Given 4/20/23 2000)    hydrALAZINE (APRESOLINE) injection 20 mg (20 mg Intravenous $Given 4/20/23 2111)   magnesium oxide (MAG-OX) tablet 400 mg (400 mg Oral Not Given 4/21/23 1029)   lactated ringers BOLUS 500 mL (500 mLs Intravenous $New Bag 4/21/23 0832)   bisacodyl (DULCOLAX) suppository 10 mg (10 mg Rectal $Given 4/21/23 0803)   magnesium oxide (MAG-OX) tablet 400 mg (400 mg Oral Not Given 4/21/23 1400)   polyethylene glycol (GoLYTELY) suspension 4,000 mL (4,000 mLs Oral $Given 4/21/23 1100)   lactated ringers BOLUS 500 mL (500 mLs Intravenous $New Bag 4/21/23 1246)   polyethylene glycol (GoLYTELY) suspension 4,000 mL (4,000 mLs Oral $Given 4/21/23 1258)   polyethylene glycol (GoLYTELY) suspension 2,000 mL (2,000 mLs Oral $Given 4/21/23 1706)   sodium phosphate 15 mmol in D5W 250 mL intermittent infusion (15 mmol Intravenous $Given 4/21/23 1717)   magnesium sulfate 2 g in 50 mL sterile water intermittent infusion (2 g Intravenous $New Bag 4/21/23 1733)   labetalol (NORMODYNE/TRANDATE) injection 10 mg (10 mg Intravenous Not Given 4/21/23 1830)   magnesium oxide (MAG-OX) tablet 400 mg (400 mg Oral or Feeding Tube Not Given 4/22/23 1128)   potassium & sodium phosphates (NEUTRA-PHOS) Packet 1 packet (1 packet Oral or Feeding Tube Not Given 4/22/23 1127)   magnesium sulfate 2 g in 50 mL sterile water intermittent infusion (2 g Intravenous $New Bag 4/22/23 1121)   polyethylene glycol (GoLYTELY) suspension 2,000 mL (2,000 mLs Oral or Feeding Tube $Given 4/23/23 0500)   LORazepam (ATIVAN) injection 2 mg (2 mg Intravenous $Given 4/22/23 2027)   LORazepam (ATIVAN) injection 2 mg (2 mg Intravenous $Given 4/22/23 2121)   sodium chloride 0.9 % bag 500mL for CT scan flush use (65 mLs As instructed $Given 4/23/23 0233)   iopamidol (ISOVUE-370) solution 100 mL (100 mLs Intravenous $Given 4/23/23 0232)   potassium chloride 10 mEq in 100 mL sterile water infusion (10 mEq Intravenous $New Bag 4/23/23 7393)   furosemide (LASIX) injection 20 mg (20 mg  Intravenous $Given 4/23/23 0943)   polyethylene glycol (GoLYTELY) suspension 2,000 mL (2,000 mLs Oral $Given 4/23/23 1957)   polyethylene glycol (GoLYTELY) suspension 2,000 mL (2,000 mLs Oral $Given 4/24/23 0554)   potassium & sodium phosphates (NEUTRA-PHOS) Packet 1 packet (1 packet Oral or Feeding Tube $Given 4/24/23 0723)   furosemide (LASIX) injection 40 mg (40 mg Intravenous $Given 4/24/23 1218)   potassium phosphate 15 mmol in D5W 250 mL intermittent infusion (15 mmol Intravenous $Given 4/25/23 0952)   magnesium oxide (MAG-OX) tablet 400 mg (400 mg Oral $Given 4/25/23 1336)   furosemide (LASIX) injection 40 mg (40 mg Intravenous $Given 4/25/23 1209)     Labs Ordered and Resulted from Time of ED Arrival to Time of ED Departure   LACTIC ACID WHOLE BLOOD - Abnormal       Result Value    Lactic Acid 2.9 (*)    GLUCOSE BY METER - Abnormal    GLUCOSE BY METER POCT 135 (*)    ISTAT INR POCT - Abnormal    INR POCT 1.0 (*)    COMPREHENSIVE METABOLIC PANEL - Abnormal    Sodium 130 (*)     Potassium 3.4      Chloride 99      Carbon Dioxide (CO2) 19 (*)     Anion Gap 12      Urea Nitrogen 10.1      Creatinine 0.68      Calcium 8.9      Glucose 134 (*)     Alkaline Phosphatase 66      AST 28      ALT 20      Protein Total 7.2      Albumin 4.5      Bilirubin Total 0.2      GFR Estimate >90     ISTAT GASES ELECTROLYTES ICA GLUCOSE VENOUS POCT - Abnormal    CPB Applied No      Hematocrit POCT 38      Calcium, Ionized Whole Blood POCT 4.8      Glucose Whole Blood POCT 133 (*)     Bicarbonate Venous POCT 24      Hemoglobin POCT 12.9      Potassium POCT 3.6      Sodium POCT 139      pCO2 Venous POCT 40      pO2 Venous POCT 54 (*)     pH Venous POCT 7.39      O2 Sat, Venous POCT 87 (*)    CBC WITH PLATELETS AND DIFFERENTIAL - Abnormal    WBC Count 13.7 (*)     RBC Count 4.08      Hemoglobin 12.3      Hematocrit 37.7      MCV 92      MCH 30.1      MCHC 32.6      RDW 14.1      Platelet Count 326      % Neutrophils 52      %  Lymphocytes 30      % Monocytes 9      % Eosinophils 8      % Basophils 1      % Immature Granulocytes 0      NRBCs per 100 WBC 0      Absolute Neutrophils 7.0      Absolute Lymphocytes 4.1      Absolute Monocytes 1.2      Absolute Eosinophils 1.1 (*)     Absolute Basophils 0.1      Absolute Immature Granulocytes 0.1      Absolute NRBCs 0.0     DRUG ABUSE SCREEN 1 URINE (ED) - Abnormal    Amphetamines Urine Screen Negative      Barbituates Urine Screen Negative      Benzodiazepine Urine Screen Positive (*)     Cannabinoids Urine Screen Negative      Cocaine Urine Screen Positive (*)     Opiates Urine Screen Negative     ACETAMINOPHEN LEVEL - Abnormal    Acetaminophen <5.0 (*)    ISTAT CREATININE POCT - Normal    Creatinine POCT 0.7      GFR, ESTIMATED POCT >60     INR - Normal    INR 0.97     LIPASE - Normal    Lipase 27     AMMONIA - Normal    Ammonia 15     MAGNESIUM - Normal    Magnesium 1.9     ROUTINE UA WITH MICROSCOPIC REFLEX TO CULTURE - Normal    Color Urine Straw      Appearance Urine Clear      Glucose Urine Negative      Bilirubin Urine Negative      Ketones Urine Negative      Specific Gravity Urine 1.005      Blood Urine Negative      pH Urine 5.5      Protein Albumin Urine Negative      Urobilinogen Urine Normal      Nitrite Urine Negative      Leukocyte Esterase Urine Negative      RBC Urine <1      WBC Urine 0     ISTAT TROPONIN POCT - Normal    TROPPC POCT 0.00     SALICYLATE LEVEL - Normal    Salicylate <0.5     ETHYL ALCOHOL LEVEL - Normal    Alcohol ethyl <0.01     LACTIC ACID WHOLE BLOOD - Normal    Lactic Acid 1.4     TYPE AND SCREEN, ADULT    ABO/RH(D) A POS      Antibody Screen Negative      SPECIMEN EXPIRATION DATE [id number]     ABO/RH TYPE AND SCREEN     CT Soft Tissue Neck w/o Contrast   Final Result   IMPRESSION:    1. Endotracheal tube is within the mid thoracic trachea, which is   patent. No substantial paratracheal edema.   2. Bilateral pulmonary dependent opacities with air  bronchograms.      I have personally reviewed the examination and initial interpretation   and I agree with the findings.      MIMI SOLIS MD            SYSTEM ID:  [id number]      XR Abdomen Port 1 View   Final Result   Impression: No remaining ingested radiopaque foreign bodies.   Nonobstructive bowel gas pattern.      I have personally reviewed the examination and initial interpretation   and I agree with the findings.      CROW KAPLAN MD            SYSTEM ID:  [id number]      XR Abdomen Port 1 View   Final Result   Impression: Ingested radiopaque magnets are opposed overlying the   right lower quadrant, most likely in the ascending colonic region,   minimally progressed distally from prior study.      JONN CHOI MD            SYSTEM ID:  [id number]      XR Abdomen Port 1 View   Final Result   Impression:    Metallic density representing presumed magnet projects over the   proximal ascending colon, unchanged.      I have personally reviewed the examination and initial interpretation   and I agree with the findings.      MUKUL REGALADO MD            SYSTEM ID:  [id number]      XR Chest Port 1 View   Final Result   Impression:    1. Increasing consolidative opacity in the right upper lobe compared   to comparison CT. Given the patient's history of secretions pneumonia   would have to be the prime consideration.   2. Stable low lung volumes with bibasilar atelectasis.      I have personally reviewed the examination and initial interpretation   and I agree with the findings.      MUKUL REGALADO MD            SYSTEM ID:  [id number]      Echo Complete   Final Result      XR Abdomen Port 1 View   Final Result   Impression: Metallic foreign body continues to project over the right   lower quadrant in the region of the ileocecal valve, as seen on   same-day CT.      SONIA TAYLOR DO            SYSTEM ID:  [id number]      CT Chest/Abdomen/Pelvis w Contrast   Final Result   Impression:    1.  A few scattered  peripheral groundglass opacities in the right   upper and middle lobes suggestive for infection versus aspiration.   2.  Right upper and bilateral lower lobe consolidative opacities   suggestive for dependent atelectasis versus, less likely, infection.   3.  No acute findings in the abdomen or pelvis.   4.  Metallic densities remain at the ileocecal valve, in unchanged   position since CT 4/20/2023 without evidence for bowel obstruction or   perforation.       I have personally reviewed the examination and initial interpretation   and I agree with the findings.      SONIA TAYLOR DO            SYSTEM ID:  [id number]      XR Abdomen Port 1 View   Final Result   Impression:    1. Gastric tube tip and sidehole projected over the stomach.      I have personally reviewed the examination and initial interpretation   and I agree with the findings.      CROW KAPLAN MD            SYSTEM ID:  [id number]      XR Abdomen Port 1 View   Final Result   Impression:    1. Redemonstration of metallic foreign body overlying the large bowel.   Typical to localize given technique.   2. Multiple air-filled loops of bowel. No free air identified.      I have personally reviewed the examination and initial interpretation   and I agree with the findings.      CROW KAPLAN MD            SYSTEM ID:  [id number]      XR Chest Port 1 View   Final Result   Impression:    1. Stable support devices   2. Stable patchy opacities at both lung bases and low lung volume   suggests atelectasis.      MUKUL REGALADO MD            SYSTEM ID:  [id number]      XR Abdomen Port 1 View   Final Result   Impression:    1. The ingested magnet projects over the ascending colon, not   significantly changed compared to abdominal radiograph from earlier in   the day.   2. Nonobstructive bowel gas pattern.      I have personally reviewed the examination and initial interpretation   and I agree with the findings.      MUKUL REGALADO MD            SYSTEM ID:   [id number]      XR Abdomen Port 1 View   Final Result   IMPRESSION:    1.  Ingested magnet projects over the right lower quadrant without   significant migration compared to prior exam.   2.  Non-obstructed bowel gas pattern.      I have personally reviewed the examination and initial interpretation   and I agree with the findings.      MUKUL REGALADO MD            SYSTEM ID:  [id number]      XR Abdomen Port 1 View   Final Result   IMPRESSION:    No significant change in position of the ingested magnets, projecting   over the right lateral abdomen in the expected location of the   ascending colon.      I have personally reviewed the examination and initial interpretation   and I agree with the findings.      SONIA TAYLOR DO            SYSTEM ID:  [id number]      XR Abdomen Port 1 View   Final Result   IMPRESSION:    Presumed ingested magnets remain over the right lateral abdomen in   similar position over the expected location of the ascending colon.      I have personally reviewed the examination and initial interpretation   and I agree with the findings.      SONIA TAYLOR DO            SYSTEM ID:  [id number]      XR Chest Port 1 View   Final Result   IMPRESSION:    1.  Endotracheal tube terminates over the mid thoracic trachea.   2.  Stable retrocardiac and perihilar interstitial   opacities/atelectasis.      I have personally reviewed the examination and initial interpretation   and I agree with the findings.      SONIA TAYLOR DO            SYSTEM ID:  [id number]      CT Abdomen Pelvis w Contrast   Final Result   Impression:    1.  Metallic density in the proximal ascending colon likely represents   ingested magnets. No signs of bowel obstruction.   2.  Diffuse hepatic steatosis.   3.  Trace bilateral pleural effusions with adjacent compressive   atelectasis.      Findings discussed with Dr. Viveros by Dr. Dang at 7:50 PM on   4/20/2023.      I have personally reviewed the examination and initial  "interpretation   and I agree with the findings.      GAYATRI JONES MD            SYSTEM ID:  [id number]      CT Head w/o Contrast   Final Result   IMPRESSION:    1. No acute intracranial pathology.    2. Mucosal thickening of the left maxillary sinus with frothy debris,   as can be seen with acute sinusitis in the appropriate clinical   context.      I have personally reviewed the examination and initial interpretation   and I agree with the findings.      GASTON WAGONER MD            SYSTEM ID:  [id number]      XR Abdomen Port 1 View   Final Result   IMPRESSION:   Enteric tube tip and sidehole project over the stomach..       I have personally reviewed the examination and initial interpretation   and I agree with the findings.      GAYATRI JONES MD            SYSTEM ID:  [id number]      XR Chest Port 1 View   Final Result   IMPRESSION:    1. Endotracheal tube tip projects 3 cm above the pastora.   2. Mild retrocardiac and perihilar opacities, likely representing a   combination of atelectasis and edema, and less likely infection.      I have personally reviewed the examination and initial interpretation   and I agree with the findings.      GAYATRI JONES MD            SYSTEM ID:  [id number]      [facility] Procedure Note          Intubation:     Date/Time: 8 PM  Performed by: Shiv Viveros MD    The procedure was performed in an emergent situation.  Risks and benefits: risks, benefits and alternatives were discussed.  Patient identity confirmed: verbally with patient and arm band  Time out: Immediately prior to procedure a \"time out\" was called to verify the correct patient, procedure, equipment, support staff and site/side marked as required.    Indication: Acute respiratory failure. and Airway protection.    Intubation method: Fiberoptic direct  Patient status: RSI  Preoxygenation: Mask  Pretreatment medications: None  Sedatives: Etomidate  Paralytic: succinylcholine and " vecuronium  Laryngoscope size: Mac 3  Tube size: 7.0 cuffed with cuff inflated after placement  Number of attempts: 1  Cricoid pressure: yes  Cords visualized: yes  Post-procedure assessment: Breath sounds equal bilaterally with chest rise and absent over the epigastrium, Chest x-ray interpreted by me demonstrating endotracheal tube in appropriate position and CO2 detector.  ETT to teeth: 23 cm  Tube secured with: ETT fernandez    Patient tolerated the procedure well with no immediate complications.  Complications:  None           Critical care was performed.   Critical Care Addendum  My initial assessment, based on my review of prehospital provider report, review of cardiac rhythm monitor, 12 lead ECG analysis and discussion with ICU staff, established a high suspicion that Shanda Hinds has severe agitation and altered mental status, which requires immediate intervention, and therefore she is critically ill.     After the initial assessment, the care team initiated multiple lab tests, initiated IV fluid administration, initiated medication therapy with Narcan, hydralazine, vecuronium, propofol, etomidate and performed advanced airway management to provide stabilization care. Due to the critical nature of this patient, I reassessed nursing observations, vital signs, physical exam, mental status and respiratory status multiple times prior to her disposition.     Time also spent performing documentation, discussion with family to obtain medical information for decision making, reviewing test results, discussion with consultants and coordination of care.     Critical care time (excluding teaching time and procedures): 90 minutes.     Assessment & Plan    42-year-old female arrives with little or no prehospital history. After discussion with the boyfriend, she was coming here to get a follow-up x-ray of her abdomen because of recent ingestion of batteries. On arrival here, she was agitated and sedated. She was brought  to resuscitation room. She was unable to contribute anything to the HPI. She appeared to be encephalopathic for some unknown reason.  After discussion with the boyfriend, she does use cocaine and benzodiazepines. Toxicology screen returned positive for both.  Because of her level of agitation, need for an expedited work-up, and her combativeness, she was uneventfully intubated.  She was sedated with propofol, vecuronium, and Dilaudid.  CT of the head was unremarkable. CT of the abdomen does show metallic foreign body in the ascending colon which is likely the battery.  Remainder of her work-up was negative. She was maintained with deep sedation and report was called to the [facility] ICU. She was significantly hypertensive, likely related to her cocaine ingestion and was therefore given hydralazine IV.  She remained stable and will be transferred to [facility] ICU. Exact etiology of her agitation and altered mental status is unknown at this time.    I have reviewed the nursing notes. I have reviewed the findings, diagnosis, plan and need for follow up with the patient.    Current Discharge Medication List          Final diagnoses:   Encephalopathy   Foreign body in colon, initial encounter   Cocaine abuse (H)   Benzodiazepine abuse (H)   ILaxmi, am serving as a trained medical scribe to document services personally performed by Shiv Viveros MD, based on the provider's statements to me.     IShiv MD, was physically present and have reviewed and verified the accuracy of this note documented by Laxmi Downs.      [facility] EMERGENCY DEPARTMENT  4/20/2023     Shiv Viveros MD  04/25/23 0292

## 2023-04-21 ENCOUNTER — APPOINTMENT (OUTPATIENT)
Dept: GENERAL RADIOLOGY | Facility: CLINIC | Age: 43
DRG: 917 | End: 2023-04-21
Attending: NURSE PRACTITIONER
Payer: COMMERCIAL

## 2023-04-21 LAB
ALBUMIN SERPL BCG-MCNC: 4 G/DL (ref 3.5–5.2)
ALLEN'S TEST: YES
ALP SERPL-CCNC: 60 U/L (ref 35–104)
ALT SERPL W P-5'-P-CCNC: 21 U/L (ref 10–35)
ANION GAP SERPL CALCULATED.3IONS-SCNC: 10 MMOL/L (ref 7–15)
ANION GAP SERPL CALCULATED.3IONS-SCNC: 11 MMOL/L (ref 7–15)
ANION GAP SERPL CALCULATED.3IONS-SCNC: 12 MMOL/L (ref 7–15)
AST SERPL W P-5'-P-CCNC: 23 U/L (ref 10–35)
ATRIAL RATE - MUSE: 86 BPM
BASE EXCESS BLDA CALC-SCNC: 1 MMOL/L (ref -9–1.8)
BASE EXCESS BLDV CALC-SCNC: 2.1 MMOL/L (ref -7.7–1.9)
BASOPHILS # BLD AUTO: 0.1 10E3/UL (ref 0–0.2)
BASOPHILS NFR BLD AUTO: 1 %
BILIRUB SERPL-MCNC: <0.2 MG/DL
BUN SERPL-MCNC: 10.3 MG/DL (ref 6–20)
BUN SERPL-MCNC: 9.4 MG/DL (ref 6–20)
BUN SERPL-MCNC: 9.5 MG/DL (ref 6–20)
CA-I BLD-MCNC: 4.4 MG/DL (ref 4.4–5.2)
CALCIUM SERPL-MCNC: 8.7 MG/DL (ref 8.6–10)
CALCIUM SERPL-MCNC: 8.7 MG/DL (ref 8.6–10)
CALCIUM SERPL-MCNC: 8.8 MG/DL (ref 8.6–10)
CHLORIDE SERPL-SCNC: 103 MMOL/L (ref 98–107)
CHLORIDE SERPL-SCNC: 105 MMOL/L (ref 98–107)
CHLORIDE SERPL-SCNC: 106 MMOL/L (ref 98–107)
CK SERPL-CCNC: 115 U/L (ref 26–192)
CK SERPL-CCNC: 143 U/L (ref 26–192)
CK SERPL-CCNC: 72 U/L (ref 26–192)
CREAT SERPL-MCNC: 0.57 MG/DL (ref 0.51–0.95)
CREAT SERPL-MCNC: 0.57 MG/DL (ref 0.51–0.95)
CREAT SERPL-MCNC: 0.69 MG/DL (ref 0.51–0.95)
DEPRECATED HCO3 PLAS-SCNC: 24 MMOL/L (ref 22–29)
DEPRECATED HCO3 PLAS-SCNC: 24 MMOL/L (ref 22–29)
DEPRECATED HCO3 PLAS-SCNC: 25 MMOL/L (ref 22–29)
DIASTOLIC BLOOD PRESSURE - MUSE: NORMAL MMHG
EOSINOPHIL # BLD AUTO: 0.4 10E3/UL (ref 0–0.7)
EOSINOPHIL NFR BLD AUTO: 3 %
ERYTHROCYTE [DISTWIDTH] IN BLOOD BY AUTOMATED COUNT: 14.1 % (ref 10–15)
ERYTHROCYTE [DISTWIDTH] IN BLOOD BY AUTOMATED COUNT: 14.4 % (ref 10–15)
FLUAV RNA SPEC QL NAA+PROBE: NEGATIVE
FLUBV RNA RESP QL NAA+PROBE: NEGATIVE
GFR SERPL CREATININE-BSD FRML MDRD: >90 ML/MIN/1.73M2
GLUCOSE BLDC GLUCOMTR-MCNC: 107 MG/DL (ref 70–99)
GLUCOSE BLDC GLUCOMTR-MCNC: 108 MG/DL (ref 70–99)
GLUCOSE BLDC GLUCOMTR-MCNC: 121 MG/DL (ref 70–99)
GLUCOSE BLDC GLUCOMTR-MCNC: 92 MG/DL (ref 70–99)
GLUCOSE BLDC GLUCOMTR-MCNC: 97 MG/DL (ref 70–99)
GLUCOSE SERPL-MCNC: 101 MG/DL (ref 70–99)
GLUCOSE SERPL-MCNC: 110 MG/DL (ref 70–99)
GLUCOSE SERPL-MCNC: 118 MG/DL (ref 70–99)
HCO3 BLD-SCNC: 26 MMOL/L (ref 21–28)
HCO3 BLDV-SCNC: 29 MMOL/L (ref 21–28)
HCT VFR BLD AUTO: 36.2 % (ref 35–47)
HCT VFR BLD AUTO: 36.4 % (ref 35–47)
HGB BLD-MCNC: 11.3 G/DL (ref 11.7–15.7)
HGB BLD-MCNC: 11.8 G/DL (ref 11.7–15.7)
HOLD SPECIMEN: NORMAL
IMM GRANULOCYTES # BLD: 0 10E3/UL
IMM GRANULOCYTES NFR BLD: 0 %
INR PPP: 1.04 (ref 0.85–1.15)
INTERPRETATION ECG - MUSE: NORMAL
LACTATE SERPL-SCNC: 1.9 MMOL/L (ref 0.7–2)
LYMPHOCYTES # BLD AUTO: 2.3 10E3/UL (ref 0.8–5.3)
LYMPHOCYTES NFR BLD AUTO: 18 %
MAGNESIUM SERPL-MCNC: 1.9 MG/DL (ref 1.7–2.3)
MAGNESIUM SERPL-MCNC: 1.9 MG/DL (ref 1.7–2.3)
MCH RBC QN AUTO: 29.7 PG (ref 26.5–33)
MCH RBC QN AUTO: 29.9 PG (ref 26.5–33)
MCHC RBC AUTO-ENTMCNC: 31 G/DL (ref 31.5–36.5)
MCHC RBC AUTO-ENTMCNC: 32.6 G/DL (ref 31.5–36.5)
MCV RBC AUTO: 92 FL (ref 78–100)
MCV RBC AUTO: 96 FL (ref 78–100)
MONOCYTES # BLD AUTO: 0.9 10E3/UL (ref 0–1.3)
MONOCYTES NFR BLD AUTO: 7 %
NEUTROPHILS # BLD AUTO: 9.2 10E3/UL (ref 1.6–8.3)
NEUTROPHILS NFR BLD AUTO: 71 %
NRBC # BLD AUTO: 0 10E3/UL
NRBC BLD AUTO-RTO: 0 /100
O2/TOTAL GAS SETTING VFR VENT: 35 %
O2/TOTAL GAS SETTING VFR VENT: 40 %
OSMOLALITY SERPL: 296 MMOL/KG (ref 275–295)
OXYHGB MFR BLD: 95 % (ref 92–100)
P AXIS - MUSE: 73 DEGREES
PCO2 BLD: 42 MM HG (ref 35–45)
PCO2 BLDV: 53 MM HG (ref 40–50)
PH BLD: 7.4 [PH] (ref 7.35–7.45)
PH BLDV: 7.34 [PH] (ref 7.32–7.43)
PHOSPHATE SERPL-MCNC: 2.1 MG/DL (ref 2.5–4.5)
PHOSPHATE SERPL-MCNC: 2.4 MG/DL (ref 2.5–4.5)
PHOSPHATE SERPL-MCNC: 2.8 MG/DL (ref 2.5–4.5)
PLATELET # BLD AUTO: 255 10E3/UL (ref 150–450)
PLATELET # BLD AUTO: 289 10E3/UL (ref 150–450)
PO2 BLD: 81 MM HG (ref 80–105)
PO2 BLDV: 35 MM HG (ref 25–47)
POTASSIUM SERPL-SCNC: 3.9 MMOL/L (ref 3.4–5.3)
POTASSIUM SERPL-SCNC: 3.9 MMOL/L (ref 3.4–5.3)
POTASSIUM SERPL-SCNC: 4 MMOL/L (ref 3.4–5.3)
PR INTERVAL - MUSE: 160 MS
PROCALCITONIN SERPL IA-MCNC: <0.02 NG/ML
PROT SERPL-MCNC: 6.4 G/DL (ref 6.4–8.3)
QRS DURATION - MUSE: 96 MS
QT - MUSE: 410 MS
QTC - MUSE: 490 MS
R AXIS - MUSE: 74 DEGREES
RBC # BLD AUTO: 3.81 10E6/UL (ref 3.8–5.2)
RBC # BLD AUTO: 3.94 10E6/UL (ref 3.8–5.2)
RSV RNA SPEC NAA+PROBE: NEGATIVE
SARS-COV-2 RNA RESP QL NAA+PROBE: NEGATIVE
SODIUM SERPL-SCNC: 137 MMOL/L (ref 136–145)
SODIUM SERPL-SCNC: 141 MMOL/L (ref 136–145)
SODIUM SERPL-SCNC: 142 MMOL/L (ref 136–145)
SYSTOLIC BLOOD PRESSURE - MUSE: NORMAL MMHG
T AXIS - MUSE: 51 DEGREES
TRIGL SERPL-MCNC: 580 MG/DL
TSH SERPL DL<=0.005 MIU/L-ACNC: 1.3 UIU/ML (ref 0.3–4.2)
VENTRICULAR RATE- MUSE: 86 BPM
WBC # BLD AUTO: 11.6 10E3/UL (ref 4–11)
WBC # BLD AUTO: 13 10E3/UL (ref 4–11)

## 2023-04-21 PROCEDURE — 250N000011 HC RX IP 250 OP 636: Performed by: NURSE PRACTITIONER

## 2023-04-21 PROCEDURE — 250N000013 HC RX MED GY IP 250 OP 250 PS 637: Performed by: NURSE PRACTITIONER

## 2023-04-21 PROCEDURE — 82550 ASSAY OF CK (CPK): CPT | Performed by: NURSE PRACTITIONER

## 2023-04-21 PROCEDURE — 85610 PROTHROMBIN TIME: CPT | Performed by: NURSE PRACTITIONER

## 2023-04-21 PROCEDURE — 80175 DRUG SCREEN QUAN LAMOTRIGINE: CPT | Performed by: NURSE PRACTITIONER

## 2023-04-21 PROCEDURE — 93005 ELECTROCARDIOGRAM TRACING: CPT

## 2023-04-21 PROCEDURE — 99222 1ST HOSP IP/OBS MODERATE 55: CPT | Mod: GC | Performed by: INTERNAL MEDICINE

## 2023-04-21 PROCEDURE — 84100 ASSAY OF PHOSPHORUS: CPT | Performed by: NURSE PRACTITIONER

## 2023-04-21 PROCEDURE — 36415 COLL VENOUS BLD VENIPUNCTURE: CPT | Performed by: NURSE PRACTITIONER

## 2023-04-21 PROCEDURE — 200N000002 HC R&B ICU UMMC

## 2023-04-21 PROCEDURE — 87637 SARSCOV2&INF A&B&RSV AMP PRB: CPT | Performed by: NURSE PRACTITIONER

## 2023-04-21 PROCEDURE — 80053 COMPREHEN METABOLIC PANEL: CPT | Performed by: NURSE PRACTITIONER

## 2023-04-21 PROCEDURE — 250N000009 HC RX 250: Performed by: NURSE PRACTITIONER

## 2023-04-21 PROCEDURE — 85027 COMPLETE CBC AUTOMATED: CPT | Performed by: NURSE PRACTITIONER

## 2023-04-21 PROCEDURE — 74018 RADEX ABDOMEN 1 VIEW: CPT | Mod: 26 | Performed by: RADIOLOGY

## 2023-04-21 PROCEDURE — 93010 ELECTROCARDIOGRAM REPORT: CPT | Performed by: INTERNAL MEDICINE

## 2023-04-21 PROCEDURE — 94003 VENT MGMT INPAT SUBQ DAY: CPT

## 2023-04-21 PROCEDURE — 82310 ASSAY OF CALCIUM: CPT | Performed by: NURSE PRACTITIONER

## 2023-04-21 PROCEDURE — 80048 BASIC METABOLIC PNL TOTAL CA: CPT | Performed by: NURSE PRACTITIONER

## 2023-04-21 PROCEDURE — 85025 COMPLETE CBC W/AUTO DIFF WBC: CPT | Performed by: NURSE PRACTITIONER

## 2023-04-21 PROCEDURE — 74018 RADEX ABDOMEN 1 VIEW: CPT

## 2023-04-21 PROCEDURE — 999N000065 XR CHEST PORT 1 VIEW

## 2023-04-21 PROCEDURE — 83735 ASSAY OF MAGNESIUM: CPT | Performed by: NURSE PRACTITIONER

## 2023-04-21 PROCEDURE — 82805 BLOOD GASES W/O2 SATURATION: CPT | Performed by: NURSE PRACTITIONER

## 2023-04-21 PROCEDURE — 250N000011 HC RX IP 250 OP 636: Performed by: EMERGENCY MEDICINE

## 2023-04-21 PROCEDURE — 999N000157 HC STATISTIC RCP TIME EA 10 MIN

## 2023-04-21 PROCEDURE — C9113 INJ PANTOPRAZOLE SODIUM, VIA: HCPCS | Performed by: NURSE PRACTITIONER

## 2023-04-21 PROCEDURE — 258N000003 HC RX IP 258 OP 636: Performed by: NURSE PRACTITIONER

## 2023-04-21 PROCEDURE — 83930 ASSAY OF BLOOD OSMOLALITY: CPT | Performed by: NURSE PRACTITIONER

## 2023-04-21 PROCEDURE — 82803 BLOOD GASES ANY COMBINATION: CPT | Performed by: NURSE PRACTITIONER

## 2023-04-21 PROCEDURE — 84478 ASSAY OF TRIGLYCERIDES: CPT | Performed by: NURSE PRACTITIONER

## 2023-04-21 PROCEDURE — 84145 PROCALCITONIN (PCT): CPT | Performed by: NURSE PRACTITIONER

## 2023-04-21 PROCEDURE — 250N000013 HC RX MED GY IP 250 OP 250 PS 637: Performed by: INTERNAL MEDICINE

## 2023-04-21 PROCEDURE — 83605 ASSAY OF LACTIC ACID: CPT | Performed by: NURSE PRACTITIONER

## 2023-04-21 PROCEDURE — 71045 X-RAY EXAM CHEST 1 VIEW: CPT | Mod: 26 | Performed by: RADIOLOGY

## 2023-04-21 PROCEDURE — 74018 RADEX ABDOMEN 1 VIEW: CPT | Mod: 76

## 2023-04-21 RX ORDER — MAGNESIUM OXIDE 400 MG/1
400 TABLET ORAL EVERY 4 HOURS
Status: COMPLETED | OUTPATIENT
Start: 2023-04-21 | End: 2023-04-21

## 2023-04-21 RX ORDER — OMEPRAZOLE 40 MG/1
40 CAPSULE, DELAYED RELEASE ORAL DAILY
COMMUNITY

## 2023-04-21 RX ORDER — MONTELUKAST SODIUM 10 MG/1
10 TABLET ORAL AT BEDTIME
Status: DISCONTINUED | OUTPATIENT
Start: 2023-04-22 | End: 2023-04-23

## 2023-04-21 RX ORDER — LOSARTAN POTASSIUM AND HYDROCHLOROTHIAZIDE 25; 100 MG/1; MG/1
1 TABLET ORAL DAILY
Status: ON HOLD | COMMUNITY
End: 2023-05-01

## 2023-04-21 RX ORDER — ATORVASTATIN CALCIUM 40 MG/1
40 TABLET, FILM COATED ORAL EVERY EVENING
Status: DISCONTINUED | OUTPATIENT
Start: 2023-04-22 | End: 2023-04-23

## 2023-04-21 RX ORDER — MONTELUKAST SODIUM 10 MG/1
10 TABLET ORAL AT BEDTIME
COMMUNITY

## 2023-04-21 RX ORDER — POLYETHYLENE GLYCOL 3350 17 G/17G
238 POWDER, FOR SOLUTION ORAL 2 TIMES DAILY
Status: DISCONTINUED | OUTPATIENT
Start: 2023-04-21 | End: 2023-04-21

## 2023-04-21 RX ORDER — BISACODYL 10 MG
10 SUPPOSITORY, RECTAL RECTAL ONCE
Status: COMPLETED | OUTPATIENT
Start: 2023-04-21 | End: 2023-04-21

## 2023-04-21 RX ORDER — HYDROMORPHONE HCL IN WATER/PF 6 MG/30 ML
.2-.4 PATIENT CONTROLLED ANALGESIA SYRINGE INTRAVENOUS
Status: DISCONTINUED | OUTPATIENT
Start: 2023-04-21 | End: 2023-04-21

## 2023-04-21 RX ORDER — ATORVASTATIN CALCIUM 40 MG/1
40 TABLET, FILM COATED ORAL AT BEDTIME
COMMUNITY

## 2023-04-21 RX ORDER — ZOLPIDEM TARTRATE 10 MG/1
10 TABLET ORAL
COMMUNITY

## 2023-04-21 RX ORDER — METOPROLOL SUCCINATE 25 MG/1
25 TABLET, EXTENDED RELEASE ORAL DAILY
Status: ON HOLD | COMMUNITY
End: 2023-05-01

## 2023-04-21 RX ORDER — FLUTICASONE PROPIONATE 50 MCG
1 SPRAY, SUSPENSION (ML) NASAL DAILY
Status: DISCONTINUED | OUTPATIENT
Start: 2023-04-21 | End: 2023-05-01 | Stop reason: HOSPADM

## 2023-04-21 RX ORDER — LABETALOL HYDROCHLORIDE 5 MG/ML
10 INJECTION, SOLUTION INTRAVENOUS ONCE
Status: COMPLETED | OUTPATIENT
Start: 2023-04-21 | End: 2023-04-21

## 2023-04-21 RX ORDER — AMITRIPTYLINE HYDROCHLORIDE 50 MG/1
100 TABLET ORAL
COMMUNITY

## 2023-04-21 RX ORDER — FLUTICASONE PROPIONATE 50 MCG
2 SPRAY, SUSPENSION (ML) NASAL DAILY
COMMUNITY

## 2023-04-21 RX ORDER — MAGNESIUM SULFATE HEPTAHYDRATE 40 MG/ML
2 INJECTION, SOLUTION INTRAVENOUS ONCE
Status: COMPLETED | OUTPATIENT
Start: 2023-04-21 | End: 2023-04-21

## 2023-04-21 RX ORDER — DIAZEPAM 5 MG
5 TABLET ORAL EVERY 12 HOURS PRN
Status: ON HOLD | COMMUNITY
End: 2023-05-01

## 2023-04-21 RX ADMIN — PANTOPRAZOLE SODIUM 40 MG: 40 INJECTION, POWDER, FOR SOLUTION INTRAVENOUS at 08:26

## 2023-04-21 RX ADMIN — MAGNESIUM OXIDE TAB 400 MG (240 MG ELEMENTAL MG) 400 MG: 400 (240 MG) TAB at 06:21

## 2023-04-21 RX ADMIN — HYDRALAZINE HYDROCHLORIDE 10 MG: 20 INJECTION INTRAMUSCULAR; INTRAVENOUS at 02:50

## 2023-04-21 RX ADMIN — SODIUM CHLORIDE, POTASSIUM CHLORIDE, SODIUM LACTATE AND CALCIUM CHLORIDE: 600; 310; 30; 20 INJECTION, SOLUTION INTRAVENOUS at 00:20

## 2023-04-21 RX ADMIN — SODIUM CHLORIDE, POTASSIUM CHLORIDE, SODIUM LACTATE AND CALCIUM CHLORIDE 500 ML: 600; 310; 30; 20 INJECTION, SOLUTION INTRAVENOUS at 12:46

## 2023-04-21 RX ADMIN — PROPOFOL 30 MCG/KG/MIN: 10 INJECTION, EMULSION INTRAVENOUS at 15:33

## 2023-04-21 RX ADMIN — POLYETHYLENE GLYCOL 3350 17 G: 17 POWDER, FOR SOLUTION ORAL at 00:24

## 2023-04-21 RX ADMIN — Medication 50 MG: at 01:03

## 2023-04-21 RX ADMIN — SENNOSIDES AND DOCUSATE SODIUM 2 TABLET: 8.6; 5 TABLET ORAL at 08:27

## 2023-04-21 RX ADMIN — HYDRALAZINE HYDROCHLORIDE 10 MG: 20 INJECTION INTRAMUSCULAR; INTRAVENOUS at 17:11

## 2023-04-21 RX ADMIN — HYDROMORPHONE HYDROCHLORIDE 0.4 MG: 0.2 INJECTION, SOLUTION INTRAMUSCULAR; INTRAVENOUS; SUBCUTANEOUS at 18:22

## 2023-04-21 RX ADMIN — SENNOSIDES AND DOCUSATE SODIUM 2 TABLET: 8.6; 5 TABLET ORAL at 19:35

## 2023-04-21 RX ADMIN — BISACODYL 10 MG: 10 SUPPOSITORY RECTAL at 08:03

## 2023-04-21 RX ADMIN — HYDROMORPHONE HYDROCHLORIDE 0.4 MG: 0.2 INJECTION, SOLUTION INTRAMUSCULAR; INTRAVENOUS; SUBCUTANEOUS at 13:53

## 2023-04-21 RX ADMIN — PROPOFOL 30 MCG/KG/MIN: 10 INJECTION, EMULSION INTRAVENOUS at 02:49

## 2023-04-21 RX ADMIN — POLYETHYLENE GLYCOL 3350, SODIUM SULFATE ANHYDROUS, SODIUM BICARBONATE, SODIUM CHLORIDE, POTASSIUM CHLORIDE 4000 ML: 236; 22.74; 6.74; 5.86; 2.97 POWDER, FOR SOLUTION ORAL at 11:00

## 2023-04-21 RX ADMIN — PROPOFOL 40 MCG/KG/MIN: 10 INJECTION, EMULSION INTRAVENOUS at 00:29

## 2023-04-21 RX ADMIN — FLUTICASONE PROPIONATE 1 SPRAY: 50 SPRAY, METERED NASAL at 08:33

## 2023-04-21 RX ADMIN — PROPOFOL 45 MCG/KG/MIN: 10 INJECTION, EMULSION INTRAVENOUS at 23:23

## 2023-04-21 RX ADMIN — DEXMEDETOMIDINE HYDROCHLORIDE 0.8 MCG/KG/HR: 100 INJECTION, SOLUTION INTRAVENOUS at 15:16

## 2023-04-21 RX ADMIN — LORAZEPAM 2 MG: 2 INJECTION INTRAMUSCULAR; INTRAVENOUS at 13:53

## 2023-04-21 RX ADMIN — HYDROMORPHONE HYDROCHLORIDE 0.4 MG: 0.2 INJECTION, SOLUTION INTRAMUSCULAR; INTRAVENOUS; SUBCUTANEOUS at 15:07

## 2023-04-21 RX ADMIN — Medication 50 MG: at 08:32

## 2023-04-21 RX ADMIN — FOLIC ACID 500 MCG: 1 TABLET ORAL at 01:03

## 2023-04-21 RX ADMIN — FOLIC ACID 500 MCG: 1 TABLET ORAL at 08:32

## 2023-04-21 RX ADMIN — POLYETHYLENE GLYCOL 3350, SODIUM SULFATE ANHYDROUS, SODIUM BICARBONATE, SODIUM CHLORIDE, POTASSIUM CHLORIDE 2000 ML: 236; 22.74; 6.74; 5.86; 2.97 POWDER, FOR SOLUTION ORAL at 17:06

## 2023-04-21 RX ADMIN — MAGNESIUM OXIDE TAB 400 MG (240 MG ELEMENTAL MG) 400 MG: 400 (240 MG) TAB at 10:28

## 2023-04-21 RX ADMIN — PROPOFOL 40 MCG/KG/MIN: 10 INJECTION, EMULSION INTRAVENOUS at 19:35

## 2023-04-21 RX ADMIN — PROPOFOL 10 MCG/KG/MIN: 10 INJECTION, EMULSION INTRAVENOUS at 10:27

## 2023-04-21 RX ADMIN — MAGNESIUM SULFATE HEPTAHYDRATE 2 G: 40 INJECTION, SOLUTION INTRAVENOUS at 17:33

## 2023-04-21 RX ADMIN — SENNOSIDES AND DOCUSATE SODIUM 2 TABLET: 8.6; 5 TABLET ORAL at 00:24

## 2023-04-21 RX ADMIN — PROPOFOL 30 MCG/KG/MIN: 10 INJECTION, EMULSION INTRAVENOUS at 05:03

## 2023-04-21 RX ADMIN — SODIUM PHOSPHATE, MONOBASIC, MONOHYDRATE AND SODIUM PHOSPHATE, DIBASIC, ANHYDROUS 15 MMOL: 276; 142 INJECTION, SOLUTION INTRAVENOUS at 17:17

## 2023-04-21 RX ADMIN — HYDRALAZINE HYDROCHLORIDE 10 MG: 20 INJECTION INTRAMUSCULAR; INTRAVENOUS at 17:37

## 2023-04-21 RX ADMIN — POLYETHYLENE GLYCOL 3350 17 G: 17 POWDER, FOR SOLUTION ORAL at 08:27

## 2023-04-21 RX ADMIN — SODIUM CHLORIDE, POTASSIUM CHLORIDE, SODIUM LACTATE AND CALCIUM CHLORIDE 500 ML: 600; 310; 30; 20 INJECTION, SOLUTION INTRAVENOUS at 08:32

## 2023-04-21 RX ADMIN — ENOXAPARIN SODIUM 40 MG: 40 INJECTION SUBCUTANEOUS at 08:29

## 2023-04-21 RX ADMIN — DEXMEDETOMIDINE HYDROCHLORIDE 0.5 MCG/KG/HR: 100 INJECTION, SOLUTION INTRAVENOUS at 09:53

## 2023-04-21 ASSESSMENT — ACTIVITIES OF DAILY LIVING (ADL)
FALL_HISTORY_WITHIN_LAST_SIX_MONTHS: NO
ADLS_ACUITY_SCORE: 49
EQUIPMENT_CURRENTLY_USED_AT_HOME: OTHER (SEE COMMENTS)
ADLS_ACUITY_SCORE: 47
ADLS_ACUITY_SCORE: 49
ADLS_ACUITY_SCORE: 43
ADLS_ACUITY_SCORE: 49
DIFFICULTY_EATING/SWALLOWING: OTHER (SEE COMMENTS)
ADLS_ACUITY_SCORE: 49
WALKING_OR_CLIMBING_STAIRS_DIFFICULTY: OTHER (SEE COMMENTS)
CHANGE_IN_FUNCTIONAL_STATUS_SINCE_ONSET_OF_CURRENT_ILLNESS/INJURY: NO
ADLS_ACUITY_SCORE: 43
ADLS_ACUITY_SCORE: 35
ADLS_ACUITY_SCORE: 49
ADLS_ACUITY_SCORE: 35
ADLS_ACUITY_SCORE: 47
DRESSING/BATHING_DIFFICULTY: OTHER (SEE COMMENTS)
CONCENTRATING,_REMEMBERING_OR_MAKING_DECISIONS_DIFFICULTY: OTHER (SEE COMMENTS)
ADLS_ACUITY_SCORE: 47
TOILETING_ISSUES: OTHER (SEE COMMENTS)
DOING_ERRANDS_INDEPENDENTLY_DIFFICULTY: OTHER (SEE COMMENTS)
PATIENT'S_PREFERRED_MEANS_OF_COMMUNICATION: OTHER
WEAR_GLASSES_OR_BLIND: OTHER (SEE COMMENTS)

## 2023-04-21 NOTE — H&P
[facility] ADULT ICU H&P  04/20/2023    Date of Hospital Admission: 4/20/23  Date of ICU Admission: 4/20/23  Reason for Critical Care Admission: Agitated delirium 2/2 cocaine intoxication requiring intubation and mechanical ventilation for airway protection   Date of Service (when I saw the patient): 04/20/2023    ASSESSMENT: Shanda Hinds is a 42 year old female admitted with AMS presenting as agitated delirium likely 2/2 cocaine & possible BZD intoxication requiring intubation and mechanical ventilation. Also found to have magnets in her colon she swallowed on 4/16/23. Hx significant for asthma, BRIAN (no CPAP), constipation, HTN, HLD, prior CVA (no residual), hepatic steatosis, questionable seizure disorder, polysubstance use disorder (cocaine, opioids, BZD, and THC), and multiple psychiatric conditions (bipolar disorder, borderline personality disorder, RANDI, PTSD, multiple suicide attempts, and possible pseudoseizures).        PLAN:    Neuro:  # Agitated delirium, likely 2/2 cocaine intoxication  # Cocaine intoxication  # Possible BZD intoxication  # Poly substance abuse (cocaine, BZD, opioid, THC)  This hospitalization presented with agitated delirium. UDS positive for BZDs and cocaine. Tyl & salicylate negative. Head CT negative for acute intracranial anomalies. Has had seizure like activity in the past, but no reports of convulsive activity since presentation. Work-up for additional metabolic or infectious etiologies contributing to AMS has been thusfar unrevealing. Unable to reach SO this evening to obtain collateral information leading up to her presentation to the hospital today.     # Epilepsy vs pseudoseizures   # Migraine headaches   # Hx of TBI  New onset tonic clonic seizures early January 2023 (also possible report of sz in 2021). Neuroimaging negative for acute intracranial pathology, EEGs without epileptiforms while hospitalized. Generally preceded by an aura including migraines and anxiety. Was  started on Depakote initially, but reportedly transitioned to lamotrigine. Subsequently [place]like activity. Concern for psychogenic seizure has been raised as pt will at times be interactive during a convulsive event and has variable post-ictal states.     # Bipolar disorder  # Borderline personality disorder  # RANDI  # PTDS  # Multiple past suicide attempts   Unclear current psychiatric medication list. Recently discharged from [facility] with [facility] med list including amitriptyline 100 mg at bedtime PRN OR zolpidem 10 mg at bedtime PRN, diazepam 5 mg BID PRN, and is currently up-titrating lamotrigine. Saw her psychiatrist on 3/30/23 who also noted she may be on effexor, but this is not in her discharge med list.       Pharmacy consult for med rec    Neuro exam q4h    Will offer chem dep and psychiatry consultation once extubated if pt is willing to see them     Random lamotrigine level    Seizure precautions     Lorazepam PRN for seizure activity (will draw stat LA and prolactin if witnessed seizure activity)    Propofol gtt + boluses     Dilaudid gtt + boluses    Unclear intent, may need 1:1 sitter once extubated     Supplemental Thiamine and folate (unclear if ETOH abuse is an issue for her)    Possible PTA psych medication regimen:    Amitriptyline 100 mg @ HS PRN - Hold    Zolpidem 10 mg @ HS PRN - Hold     Diazepam 5 mg BID - Hold    Lamotrigine 25 mg qd or qod - Give 25 in AM *pharmacy to verify for subsequent dosing*     Rimegepant 75 mg every day PRN - Hold     Effexor 150 mg every day - Hold       Pulmonary:  # Intubation for airway protection   # Asthma  # BRIAN, no CPAP    SIMV 16/450/12 over 5/60 --> anticipate PST in AM with possible extubation pending resolution of agitated delirium     CXR to confirm ETT placement    ABG on ICU arrival    Resume PTA fluticasone nasal spray BID & montelukast 10 mg every day      Cardiovascular:  # Lactic acidosis, likely 2/2 agitation, resolved   # HTN  # HLD    EKG on ICU arrival     Trop negative in ED    Recheck electrolytes and lactic acid on arrival    Avoid beta blockade given known cocaine intoxication    Hydralazine PRN for SBP > 160 mmHg, escalate to nicardipine gtt if ineffective     Continue PTA meds: atorvastatin 40 mg every day     Holding losartan/HCTZ 100-25 every day, will likely resume in AM    Holding PTA metoprolol succinate 50 mg every day given cocaine use     GI/Nutrition:  # Magnet ingestion 4/16, now located in ascending colon  # Hepatic steatosis   # Chronic 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 requiring multiple abdominal surgeries     CT abd pelvis notable for clump of magnets in the ascending colon that do not appear impacted or causing bowel obstruction    Hepatic panel WNL on hospital arrival    OG for medication admin, will consult RD if pt is to remain intubated tomorrow for TF recs     Aggressive scheduled bowel regimen until patient passes the magnets, RNs to search BMs until magnets are found    Daily KUB to verify position of magnets until they are passed     Renal/Fluids/Electrolytes:  # Hyponatremia, resolved     Repeat chemistries on ICU arrival, check CK given severe agitation    UA in ED bland    ICU electrolyte replacement protocol    Scruggs for strict I&O    Daily weights     Endocrine:  No acute concerns    Goal BG < 180, no current indication for insulin      ID:  # Leukocytosis, likely reactive     Monitor WBC trend and fever curve, no neutrophilia on differential    Check pcal    UA without infectious findings    Blood cultures 4/20 pending     CXR appears atelectatic, no definite acute infiltrates, no reports of aspiration, no secretions suctioned from ETT on arrival    No abx indicated at this time     Hematology:    No acute concerns     CBC every day      Musculoskeletal:  No acute concerns    Ambulatory PTA, defer PT/OT consult at this  time      Skin:  No acute concerns    Diligent skin cares to prevent breakdown    General Cares/Prophylaxis:    DVT Prophylaxis: Enoxaparin (Lovenox) SQ and Pneumatic Compression Devices  GI Prophylaxis: PPI  Restraints: Bilateral upper extremity soft wrist restraints to prevent self extubation  Family Communication: Attempted to contact SO for collateral info and to provide an update, no answer, VM box not set up  Code Status: Full    Lines/tubes/drains:    PIV x 2    OG    Scruggs    ETT    Disposition:    [facility] adult ICU        Aracelis Hopkins NP  Critical care time 60 minutes     -----------------------------------------------------------------------    HISTORY PRESENTING ILLNESS:     Shanda Hinds is a 42 year old female admitted with AMS presenting as agitated delirium likely 2/2 cocaine intoxication requiring intubation and mechanical ventilation. Also found to have magnets in her colon she swallowed on 4/16/23. Hx significant for asthma, BRIAN (no CPAP), constipation, HTN, HLD, prior CVA (no residual), hepatic steatosis, questionable seizure disorder, polysubstance use disorder (cocaine, opioids, BZD, and THC), and multiple psychiatric conditions (bipolar disorder, borderline personality disorder, RANDI, PTSD, multiple suicide attempts, and possible pseudoseizures).      Presented to the [facility] ED for follow-up ABD x-ray due to recent ingestion of magnets.  On arrival she was noted to have severely altered mental status with agitation alternating with somnolence.  She was intubated for airway protection.  Diagnostics following notable for Na+ 130, WBC 13.7, UDS positive for BZD and cocaine.  Other electrolytes, renal function, hepatic function, and blood counts within normal limits.  No lactic acidosis or troponin elevation.  Head CT negative for acute intracranial anomalies.  CT abdomen and pelvis notable for clump of magnets in the ascending colon without evidence of obstruction.  EKG negative for  ischemic findings with appropriate cardiac intervals.  Presentation felt likely secondary to cocaine and benzodiazepine intoxication.  She was transferred to [facility] adult ICU for ongoing care of agitated delirium in the setting of polysubstance ingestion.    REVIEW OF SYSTEMS: unable due to intubation and sedation    PAST MEDICAL HISTORY:   Past Medical History:   Diagnosis Date     Cystic fibrosis (H)      Depressive disorder      Insomnia      PTSD (post-traumatic stress disorder)      SURGICAL HISTORY:  Past Surgical History:   Procedure Laterality Date     ABDOMEN SURGERY       ABDOMEN SURGERY      removal of shrapnel     ABDOMEN SURGERY      removal of adhesions     BLADDER SURGERY       CHOLECYSTECTOMY       CHOLECYSTECTOMY       CHOLECYSTECTOMY Bilateral 2011     GYN SURGERY      fibroids, endometriosis, torsion, adhesion removal     HYSTERECTOMY       HYSTERECTOMY       LEFT OOPHORECTOMY       SLING BLADDER SUSPENSION WITH FASCIA EILEEN       SOCIAL HISTORY:  Social History     Socioeconomic History     Marital status:    Tobacco Use     Smoking status: Every Day     Packs/day: 0.25     Years: 15.00     Pack years: 3.75     Types: Cigarettes     Smokeless tobacco: Never     Tobacco comments:     3-4 per day   Substance and Sexual Activity     Alcohol use: Yes     Comment: Alcoholic Drinks/day: Holidays     Drug use: Yes     Types: Cocaine, Marijuana     Comment: Drug use: occassionally     Sexual activity: Yes     Partners: Male   Social History Narrative    Disability from [employer]     FAMILY HISTORY:   Family History   Problem Relation Age of Onset     No Known Problems Mother      Kidney failure Father      Hypertension Father      Pancreatic Cancer Sister      ALLERGIES:   Allergies   Allergen Reactions     Haldol [Haloperidol] Anaphylaxis     Codeine      Hives, vomiting      Compazine [Prochlorperazine]      Fentanyl      Morphine      Hives, vomiting      Neurontin [Gabapentin] Other (See Comments)      Facial twitching     Reglan [Metoclopramide]      Trazodone Nausea and Hives     MEDICATIONS:  No current facility-administered medications on file prior to encounter.  acetaminophen (TYLENOL) 325 MG tablet, Take 3 tablets (975 mg) by mouth every 6 hours as needed for mild pain  cholecalciferol (VITAMIN D3) 1000 UNIT tablet, Take 2 tablets (2,000 Units) by mouth daily  cloNIDine (CATAPRES) 0.1 MG tablet, Take 1 tablet (0.1 mg) by mouth At Bedtime  estradiol (VIVELLE-DOT) 0.1 MG/24HR BIW patch, Place 1 patch onto the skin twice a week On Wednesdays and Sundays.  ibuprofen (ADVIL/MOTRIN) 600 MG tablet, Take 1 tablet (600 mg) by mouth every 6 hours as needed for moderate pain  lamoTRIgine (LAMICTAL) 25 MG tablet, Take 1 tablet (25 mg) by mouth daily        PHYSICAL EXAMINATION:  Pulse:  [] 97  Resp:  [15-19] 15  BP: (130-163)/() 148/83  FiO2 (%):  [40 %-70 %] 70 %  SpO2:  [98 %-100 %] 99 %  General: Intubated and sedated, in NAD  HEENT: Normocephalic, dried blood noted on lips - possible tongue laceration (UTV) during agitated episode, neck supple  Neuro: Sedated, pupils 3mm PERRLA, moves all extremities to noxious stimuli  Pulm/Resp: Clear breath sounds bilaterally without rhonchi, crackles or wheeze, breathing non-labored, no secretions suctioned from ETT  CV: RRR, no RMG, 2+ peripheral pulses, no edema  Abdomen: Soft, non-distended, non-tender  :  bob catheter in place, urine yellow and clear  Incisions/Skin: No wounds, rashes, or lesions     LABS: Reviewed.   Arterial Blood Gases   No lab results found in last 7 days.  Complete Blood Count   Recent Labs   Lab 04/20/23 1833 04/20/23 1832 04/14/23  0747   WBC  --  13.7* 13.2*   HGB 12.9 12.3 12.3   PLT  --  326 330     Basic Metabolic Panel  Recent Labs   Lab 04/20/23  2347 04/20/23  1836 04/20/23  1833 04/20/23 1832 04/20/23 1830 04/14/23  0747   NA  --   --  139 130*  --  139   POTASSIUM  --   --  3.6 3.4  --  3.3*   CHLORIDE  --   --   --   99  --  105   CO2  --   --   --  19*  --  22   BUN  --   --   --  10.1  --  12.4   CR  --  0.7  --  0.68  --  0.74   *  --  133* 134* 135* 118*     Liver Function Tests  Recent Labs   Lab 04/20/23  1832 04/14/23  0747   AST 28 22   ALT 20 22   ALKPHOS 66 62   BILITOTAL 0.2 0.2   ALBUMIN 4.5 4.1   INR 0.97  1.0*  --      Coagulation Profile  Recent Labs   Lab 04/20/23  1832   INR 0.97  1.0*       IMAGING:  Recent Results (from the past 24 hour(s))   XR Chest Port 1 View    Narrative    EXAM: XR CHEST PORT 1 VIEW  4/20/2023 6:57 PM     HISTORY:  ett verify       COMPARISON:  Chest x-ray 11/15/2019    FINDINGS:   AP view of the chest. The endotracheal tube tip projects approximately  3 cm above the pastora. Enteric tube courses below the diaphragm with  the tip out of the field of view. Heart size is normal. Mild  retrocardiac and perihilar opacities. No significant pleural effusion.  No pneumothorax. The osseous structures are within normal limits.  Cholecystectomy clips in right upper quadrant.      Impression    IMPRESSION:   1. Endotracheal tube tip projects 3 cm above the pastora.  2. Mild retrocardiac and perihilar opacities, likely representing a  combination of atelectasis and edema, and less likely infection.    I have personally reviewed the examination and initial interpretation  and I agree with the findings.    GAYATRI JONES MD         SYSTEM ID:  [id number]   XR Abdomen Port 1 View    Narrative    EXAM: XR ABDOMEN PORT 1 VIEW  4/20/2023 6:58 PM     HISTORY:  og placement       COMPARISON:  Abdominal x-ray 12/25/2017    FINDINGS:   AP view of the abdomen. Enteric tube tip and sidehole overlie the  stomach. Nonobstructive bowel gas pattern. Cholecystectomy clips. Mild  perihilar opacities in the visualized portion of the lungs. The  osseous structures are within normal limits.      Impression    IMPRESSION:  Enteric tube tip and sidehole project over the stomach..     I have personally reviewed  the examination and initial interpretation  and I agree with the findings.    GAYATRI JONES MD         SYSTEM ID:  [id number]   CT Head w/o Contrast    Narrative    EXAM: CT HEAD W/O CONTRAST  4/20/2023 7:31 PM     HISTORY:  Unexplained altered mental status       COMPARISON:  CT 12/4/2019    TECHNIQUE: Using multidetector thin collimation helical acquisition  technique, axial, coronal and sagittal CT images from the skull base  to the vertex were obtained without intravenous contrast.   (topogram) image(s) also obtained and reviewed.    FINDINGS:  No acute intracranial hemorrhage, mass effect, or midline shift. No  acute loss of gray-white matter differentiation in the cerebral  hemispheres. Ventricles are proportionate to the cerebral sulci. Clear  basal cisterns.    The bony calvaria and the bones of the skull base are normal. Mucosal  thickening of the left maxillary sinus with frothy debris. Otherwise  the paranasal sinuses are relatively clear. The mastoid air cells are  clear.       Impression    IMPRESSION:   1. No acute intracranial pathology.   2. Mucosal thickening of the left maxillary sinus with frothy debris,  as can be seen with acute sinusitis in the appropriate clinical  context.    I have personally reviewed the examination and initial interpretation  and I agree with the findings.    GASTON WAGONER MD         SYSTEM ID:  [id number]   CT Abdomen Pelvis w Contrast    Narrative    Examination: CT ABDOMEN PELVIS W CONTRAST, 4/20/2023 7:32 PM    Technique:  Helical CT images from the lung bases through the  symphysis pubis were obtained with contrast.  Coronal reformatted  images were generated at a workstation for further assessment.    Contrast:  135 mL [id number]    Comparison: Radiograph same day, 12/25/2017, CT abdomen and pelvis  7/15/2019    History: By report possible magnet ingestion a few days ago    Findings:    Abdomen and pelvis:   Liver: Diffuse hypoattenuation of the hepatic  parenchyma. No  suspicious liver lesions.   Gallbladder: Surgically absent.  Spleen: Normal size. Small splenules in the left upper quadrant.  Pancreas: No suspicious pancreatic lesions. The pancreatic duct is not  dilated.  Adrenal glands: No adrenal nodules.  Urinary system: No kidney masses. No hydronephrosis, hydroureter, or  obstructing renal stones. Scruggs catheter in place. The bladder is  decompressed.   Reproductive organs: Hysterectomy.  Bowel: Enteric tube tip terminates in the distal stomach. Metallic  density in the proximal ascending colon likely represent ingested  magnets. No signs of obstruction in the bowel. No abnormal bowel wall  enhancement. Mild bowel wall thickening along the descending colon  likely due to underdistention. The appendix is unremarkable.  Lymph nodes: No retroperitoneal, mesenteric, or pelvic  lymphadenopathy.  Fluid: No free fluid within the abdomen.  Vessels: No infrarenal aortic aneurysm. The portal, superior  mesenteric, and splenic veins are patent.    Lung bases: Trace bilateral pleural effusions with adjacent  compressive atelectasis.     Bones and soft tissues: No suspicious osseous lesions. Mild  degenerative changes throughout the visualized spine.      Impression    Impression:   1.  Metallic density in the proximal ascending colon likely represents  ingested magnets. No signs of bowel obstruction.  2.  Diffuse hepatic steatosis.  3.  Trace bilateral pleural effusions with adjacent compressive  atelectasis.    Findings discussed with Dr. Viveros by Dr. Dang at 7:50 PM on  4/20/2023.    I have personally reviewed the examination and initial interpretation  and I agree with the findings.    GAYATRI JONES MD         SYSTEM ID:  [id number]

## 2023-04-21 NOTE — PROGRESS NOTES
Brief GI Luminal Curbside Note:    I was called by Francheska Cobb - [facility] on 4/21/2023 at 9:48 AM regarding their patient Shanda Hinds and was provided with the following information:     Shanda Hinds is a 42 year old female with past medical history significant for polysubstance abuse (Cocaine, benzodiazepines, opioids, THC), multiple psychiatric conditions (bipolar disorder, borderline personality disorder, RANDI, PTSD, multiple suicide attempts, possible pseudoseizures), mild cystic fibrosis, asthmas, BRIAN (no CPAP), constipation, hepatic steatosis, hypertension, hyperlipidemia, prior CVA (no residual).    Patient swallowed 4 small magnets on Sunday 4/16/2023 (per Triage RN note), and that the patient reported she was told by an unknown provider at an unknown location to return for abdominal imaging on 4/20 so she presented to [facility] ED on 4/20/2023 for the abdominal imaging, found to have AMS, presenting with agitated delirium likely 2/2 cocaine and benzodiazepine intoxication requiring intubation, sedation, and mechanical ventilation. She remains intubated and sedated in the ICU.     CT Abdomen 4/20/2023 shows metallic density in the proximal ascending colon likely represents ingested magnets.     Based on the limited information provided on the phone by Francheska Cobb and chart review, I recommended the following:   -- STAT AXR to determine current location.   -- STAT 4 L Golytely via NGT, 30 mL every 15 minutes until the solution is gone.   -- Strict monitoring of rectal output.  -- Serial abdominal exams.  -- Please place formal GI Luminal Consult order.   -- If any change in clinical status, signs of abdominal pain/discomfort, please obtain STAT surgery consult for concern for perforation from ingested magnets.     I did not personally see or examine the patient at this time as they are located at [facility]. [facility] GI Fellow will see and evaluation this patient and fulfill full consult  order.     My recommendations are not intended to take the place of Francheska Cobb's clinical judgement, which should always be utilized to provide the most appropriate care to meet the unique needs of the patient.     Thank you for allowing us to participate in the care of this patient. Please do not hesitate to page the GI service with any questions or concerns.     Plan discussed with Dr. Maksim Pierce, GI Luminal Staff Physician.    Overall time spent on the date of this encounter including chart review of available notes, clinical status events, imaging and labs; obtaining and/or reviewing separately obtained history from Francheska Pool - ICU [facility]; communicating with other health care professionals; and documenting the above clinical information in the electronic medical record was 25 minutes.    Leonela Adame PA-C  Inpatient Gastroenterology Service  [facility]  Pager: *[phone]  Text Page

## 2023-04-21 NOTE — PLAN OF CARE
Problem: Oral Intake Inadequate  Goal: Improved Oral Intake  Outcome: Not Progressing   Goal Outcome Evaluation:       If nutrition support becomes part of POC, TF recs in 4/21 RD note.

## 2023-04-21 NOTE — PROGRESS NOTES
10A ICU End of Shift Summary.     Changes this shift:   Admitted from [facility]  PRN hydralazine given x1  Mag replaced  Neuro: Afebrile, moves all extremities, Pupils 3 mm PERRLA, Rass goal 0.-1,   Cardiac: NSR, intermittent high BP, 10 mg hydralazinex1 with sig improvement    Respiratory: intubated, ETT placement confirmed upon arrival. Mechanical Ventilator Setting:CVM/AC, FiO2: 35%, Volume: 450 PEEP 4, RR16   GI/: Meds administer via OG after placement confirmation 70 cm at the lips  Diet/appetite: NPO  Skin: No concerns  LDA's: 2 PIV's, OG, ETT and Scruggs  Activities: 2 hourly reposition, ICU early mobility protocol

## 2023-04-21 NOTE — ED NOTES
Pt with tammy inability to protect airway. Drooling. Continuing to intermittently thrash and fight with staff. Decision made by ED MD to intubate.

## 2023-04-21 NOTE — PHARMACY-ADMISSION MEDICATION HISTORY
"Pharmacist Admission Medication History    Admission medication history is complete. The information provided in this note is only as accurate as the sources available at the time of the update.    Medication reconciliation/reorder completed by provider prior to medication history? No    [facility] Patient is intubated and not able to have conversation.    Pertinent Information:   Diazepam 5 mg tablets: last fill on 4/11/23 60 tablets for 30 days    Lamotrigine: no record on refill history. Per Dr. Alisson Mendez neurologist note on 3/30/23, \"Okay with trying to titrate lamotrigine faster. LTG 25 mg BID x 1 week, 50 mg BID x 1 week than 100 mg BID\". Patient's current lamotrigine dose is unknown.    Per 3/26/23 [facility] hospital discharge summary: Atorvastatin, Amitriptyline, Zolpidem, diazepam, Estradiol patch, fluticasone nasal spray, lamotrigine, Losartan/HCTZ, metoprolol XL, montelukast, Rimegepant, and Omeprazole     Changes made to PTA medication list:    Added: All listed below except estradiol patch and lamotrigine     Deleted: Acetaminophen, vitamin D3, Clonidine, and Ibuprofen    Changed: Lamotrigine- not sure patient's current dose    Medication Affordability: N/A       Allergies reviewed with patient and updates made in EHR: unable to access     Medication History Completed By: Chery Zaragoza [facility] 4/21/2023 2:15 PM    Prior to Admission medications    Medication Sig Last Dose Taking? Auth Provider Long Term End Date   amitriptyline (ELAVIL) 50 MG tablet Take 100 mg by mouth nightly as needed for sleep Per Dr. Alisson Michele urologist note on 3/30/23 \"If she is able to cut the 50 mg tablet - 75 mg at bedtime x 2 weeks than 50 mg at bedtime\" Unknown Yes Unknown, Entered By History Yes    atorvastatin (LIPITOR) 40 MG tablet Take 40 mg by mouth At Bedtime Unknown Yes Unknown, Entered By History     diazepam (VALIUM) 5 MG tablet Take 5 mg by mouth every 12 hours as needed " "for anxiety Unknown Yes Unknown, Entered By History     estradiol (VIVELLE-DOT) 0.1 MG/24HR BIW patch Place 1 patch onto the skin twice a week On Wednesdays and Sundays. Unknown Yes Unknown, Entered By History Yes    fluticasone (FLONASE) 50 MCG/ACT nasal spray Spray 2 sprays into both nostrils daily Unknown Yes Unknown, Entered By History     lamoTRIgine (LAMICTAL) 25 MG tablet Take 25 mg by mouth daily Per Dr. Alisson Mendez neurologist note on 3/30/23 \"Okay with trying to titrate lamotrigine faster. LTG 25 mg BID x 1 week, 50 mg BID x 1 week than 100 mg BID\" Unknown Yes Isela Powers, APRN CNP Yes    losartan-hydrochlorothiazide (HYZAAR) 100-25 MG tablet Take 1 tablet by mouth daily Unknown Yes Unknown, Entered By History Yes    metoprolol succinate ER (TOPROL XL) 25 MG 24 hr tablet Take 25 mg by mouth daily Unknown Yes Unknown, Entered By History Yes    montelukast (SINGULAIR) 10 MG tablet Take 10 mg by mouth At Bedtime Unknown Yes Unknown, Entered By History     omeprazole (PRILOSEC) 40 MG DR capsule Take 40 mg by mouth daily Unknown Yes Unknown, Entered By History     rimegepant (NURTEC) 75 MG ODT tablet Place 75 mg under the tongue as needed for migraine Unknown Yes Unknown, Entered By History     zolpidem (AMBIEN) 10 MG tablet Take 10 mg by mouth nightly as needed for sleep Unknown Yes Unknown, Entered By History No        "

## 2023-04-21 NOTE — CONSULTS
Gastroenterology Consultation  Shanda Hinds [id number] 42 year old 1980 4/21/2023        Date of Admission: 4/20/2023  Requesting physician: Jack Marin MD       Reason for Consultation:   Foreign body ingestion           Assessment and Plan:   Shanda Hinds is a 42 year old female with past medical history significant for polysubstance abuse (Cocaine, benzodiazepines, opioids, THC), multiple psychiatric conditions (bipolar disorder, BPD, RANDI, PTSD) fatty liver, constipation, multiple abdominal surgeries due to shrapnel (she is a ) who was admitted for intoxication in settings of swallowing magnets.     # Foreign body ingestion  Magnets seen at the proximal colon on CT with high stool burden. Repeat AXR showed magnets still in the same position. No peritoneal signs on exam. Once past the IC valve, there is a good chance the foreign body will pass without endoscopy. However the high stool burden is a barrier. Patient has finished almost all the first 4L of Golytely but just had the first BMs.           Recommendations:   - Ordered an additional 2L now to be given at the rate of 8oz every 15min via NGtube. Once the patient has consistent BMs, repeat AXR to reassess.    If the magnets are moving, then we will continue to give golytely.    If the magnets are NOT moving after 6L of Golytely then will consider colonoscopy.   - Agree with serial abdominal exam. If there is peritoneal signs, stat CT and consult general surgery.   - GI will continue to follow.     It has been a pleasure to participate in the care of this patient.  Patient discussed with GI staff, Dr. Pierce.  Please do not hesitate to contact the GI service with any questions or concerns.     Andi Duron MD  Gastroenterology Fellow  [facility]  Text page [phone]           HPI:   Shanda Hinds is a 42 year old female with past medical history significant for polysubstance abuse (Cocaine, benzodiazepines, opioids,  THC), multiple psychiatric conditions (bipolar disorder, BPD, RANDI, PTSD) fatty liver, constipation, multiple abdominal surgeries due to shrapnel (she is a ) who was admitted for intoxication in settings of swallowing magnets.     History is limited due to sedation and unable to reach significant other.  Reportedly the patient was using drugs with her significant other and during intoxication she swallowed 3-4 magnets on 4/16/2023.  Initially there seemed to be no symptoms but later on she was seen at her PCP and was having some abdominal pain.  She was recommended to go to the emergency room for imaging.      She presented on 4/20/23 to the ED for the recommended imaging however found to be acutely intoxicated with benzodiazepine and cocaine without a mental status.  She was then intubated for airway protection.  Lab work were relatively unremarkable.  CT showed magnets in the proximal ascending colon.  When seen by GI, the patient is intubated and sedated.  Abdomen is soft nontender to deep palpation.         Past Medical History:   Reviewed and edited as appropriate  Past Medical History:   Diagnosis Date     Cystic fibrosis (H)      Depressive disorder      Insomnia      PTSD (post-traumatic stress disorder)             Past Surgical History:   Reviewed and edited as appropriate   Past Surgical History:   Procedure Laterality Date     ABDOMEN SURGERY       ABDOMEN SURGERY      removal of shrapnel     ABDOMEN SURGERY      removal of adhesions     BLADDER SURGERY       CHOLECYSTECTOMY       CHOLECYSTECTOMY       CHOLECYSTECTOMY Bilateral 2011     GYN SURGERY      fibroids, endometriosis, torsion, adhesion removal     HYSTERECTOMY       HYSTERECTOMY       LEFT OOPHORECTOMY       SLING BLADDER SUSPENSION WITH FASCIA EILEEN              Medications:     Current Facility-Administered Medications   Medication     acetaminophen (TYLENOL) tablet 650 mg    Or     acetaminophen (TYLENOL) solution 650 mg     [START ON  4/22/2023] atorvastatin (LIPITOR) tablet 40 mg     bisacodyl (DULCOLAX) suppository 10 mg     dexmedetomidine (PRECEDEX) 4 mcg/mL in sodium chloride 0.9 % 100 mL infusion     glucose gel 15-30 g    Or     dextrose 50 % injection 25-50 mL    Or     glucagon injection 1 mg     enoxaparin ANTICOAGULANT (LOVENOX) injection 40 mg     fluticasone (FLONASE) 50 MCG/ACT spray 1 spray     folic acid (FOLATE) oral suspension 500 mcg     hydrALAZINE (APRESOLINE) injection 10-20 mg     HYDROmorphone (DILAUDID) injection 0.2-0.4 mg     [START ON 4/22/2023] lamoTRIGine (LaMICtal) 10 mg/mL suspension 25 mg     levalbuterol (XOPENEX) neb solution 1.25 mg     LORazepam (ATIVAN) injection 2 mg     magnesium oxide (MAG-OX) tablet 400 mg     propofol (DIPRIVAN) infusion    And     propofol (DIPRIVAN) bolus from infusion pump 10 mg    And     Medication Instruction     [START ON 4/22/2023] montelukast (SINGULAIR) tablet 10 mg     naloxone (NARCAN) injection 0.2 mg    Or     naloxone (NARCAN) injection 0.4 mg    Or     naloxone (NARCAN) injection 0.2 mg    Or     naloxone (NARCAN) injection 0.4 mg     ondansetron (ZOFRAN ODT) ODT tab 4 mg    Or     ondansetron (ZOFRAN) injection 4 mg     pantoprazole (PROTONIX) 2 mg/mL suspension 40 mg    Or     pantoprazole (PROTONIX) IV push injection 40 mg     senna-docusate (SENOKOT-S/PERICOLACE) 8.6-50 MG per tablet 1 tablet    Or     senna-docusate (SENOKOT-S/PERICOLACE) 8.6-50 MG per tablet 2 tablet     thiamine 100 MG/ML suspension 50 mg            Allergies      Allergies   Allergen Reactions     Haldol [Haloperidol] Anaphylaxis     Codeine      Hives, vomiting      Compazine [Prochlorperazine]      Fentanyl      Morphine      Hives, vomiting      Neurontin [Gabapentin] Other (See Comments)     Facial twitching     Reglan [Metoclopramide]      Trazodone Nausea and Hives            Social History:   Reviewed and edited as appropriate  Social History     Socioeconomic History     Marital status:  "     Spouse name: Not on file     Number of children: Not on file     Years of education: Not on file     Highest education level: Not on file   Occupational History     Not on file   Tobacco Use     Smoking status: Every Day     Packs/day: 0.25     Years: 15.00     Pack years: 3.75     Types: Cigarettes     Smokeless tobacco: Never     Tobacco comments:     3-4 per day   Vaping Use     Vaping status: Not on file   Substance and Sexual Activity     Alcohol use: Yes     Comment: Alcoholic Drinks/day: Holidays     Drug use: Yes     Types: Cocaine, Marijuana     Comment: Drug use: occassionally     Sexual activity: Yes     Partners: Male   Other Topics Concern     Not on file   Social History Narrative    [facility] from [facility]     Social Determinants of Health     Financial Resource Strain: Not on file   Food Insecurity: Not on file   Transportation Needs: Not on file   Physical Activity: Not on file   Stress: Not on file   Social Connections: Not on file   Intimate Partner Violence: Not on file   Housing Stability: Not on file           Family History:   Reviewed and edited as appropriate  Family History   Problem Relation Age of Onset     No Known Problems Mother      Kidney failure Father      Hypertension Father      Pancreatic Cancer Sister      No known history of colorectal cancer, liver disease, or inflammatory bowel disease.         Review of Systems:   A complete 10 point review of systems was obtained.  Please see the HPI for pertinent positives and negatives.  All other systems were reviewed and were found to be negative.          Physical Exam:   VS:  /74   Pulse 71   Temp 98  F (36.7  C)   Resp 18   Ht 1.676 m (5' 6\")   Wt 101 kg (222 lb 10.6 oz)   SpO2 97%   BMI 35.94 kg/m      Wt:   Wt Readings from Last 2 Encounters:   04/20/23 101 kg (222 lb 10.6 oz)   04/19/18 85.4 kg (188 lb 3.2 oz)      Constitutional: Intubated, sedated.   Eyes: Conjunctiva anicteric  HEENT: Normal oropharynx " without ulcers or exudate, mucus membranes moist  CV: No Blayne edema  Respiratory: Mechanical breath sounds.   Abd:  Nondistended, +bs, no hepatosplenomegaly, nontender, no rebound or guarding   Skin: warm, perfused, no jaundice  Neuro: Unable to assess.          Laboratory:   BMP  Recent Labs   Lab 04/21/23  1213 04/21/23  0827 04/21/23  0436 04/21/23  0434 04/21/23 0021 04/20/23  2347 04/20/23 1836 04/20/23 1833 04/20/23 1832   NA  --   --  142  --  141  --   --  139 130*   POTASSIUM  --   --  3.9  --  4.0  --   --  3.6 3.4   CHLORIDE  --   --  106  --  105  --   --   --  99   CASSIE  --   --  8.7  --  8.8  --   --   --  8.9   CO2  --   --  24  --  25  --   --   --  19*   BUN  --   --  9.5  --  9.4  --   --   --  10.1   CR  --   --  0.57  --  0.69  --  0.7  --  0.68   * 92 101* 97 110*   < >  --  133* 134*    < > = values in this interval not displayed.     CBC  Recent Labs   Lab 04/21/23 0436 04/21/23 0021 04/20/23 1833 04/20/23 1832   WBC 11.6* 13.0*  --  13.7*   RBC 3.81 3.94  --  4.08   HGB 11.3* 11.8 12.9 12.3   HCT 36.4 36.2 38 37.7   MCV 96 92  --  92   MCH 29.7 29.9  --  30.1   MCHC 31.0* 32.6  --  32.6   RDW 14.4 14.1  --  14.1    289  --  326     INR  Recent Labs   Lab 04/21/23 0021 04/20/23 1832   INR 1.04 0.97  1.0*     Liver Enzymes  Recent Labs   Lab 04/21/23 0021 04/20/23 1832   ALKPHOS 60 66   AST 23 28   ALT 21 20   BILITOTAL <0.2 0.2   PROTTOTAL 6.4 7.2   ALBUMIN 4.0 4.5      PANC  Recent Labs   Lab 04/20/23  1832   LIPASE 27       Imaging/Procedures/Studies:

## 2023-04-21 NOTE — PROGRESS NOTES
"  [facility] ADULT ICU PROGRESS NOTE  04/21/2023    Date of Hospital Admission: 4/20/23  Date of ICU Admission: 4/20/23  Reason for Critical Care Admission: Agitated delirium 2/2 cocaine intoxication requiring intubation and mechanical ventilation for airway protection   Date of Service (when I saw the patient): 04/21/2023    ASSESSMENT: Shanda Hinds is a 42 year old female admitted with AMS presenting as agitated delirium likely 2/2 cocaine & possible BZD intoxication requiring intubation and mechanical ventilation. Also found to have multiple magnets in her colon she swallowed on 4/16/23. Hx significant for mild cystic fibrosis, asthma, BRIAN (no CPAP), constipation, HTN, HLD, prior CVA (no residual), hepatic steatosis, questionable seizure disorder, polysubstance use disorder (cocaine, opioids, BZD, and THC), and multiple psychiatric conditions (bipolar disorder, borderline personality disorder, RANDI, PTSD, multiple suicide attempts, and possible pseudoseizures).        Changes for Today:  - add precedex for sedation wean  - go-lytely prep  - 500 ml LR bolus X 2  - serial abdominal exams  - AXR now and q 12H until magnets pass    PLAN:    Neuro:  # Agitated delirium, likely 2/2 cocaine intoxication  # Encephalopathy, metabolic   # Cocaine intoxication  # Possible BZD intoxication  # Poly substance abuse (cocaine, BZD, opioid, THC)  This hospitalization presented with agitated delirium. UDS positive for BZDs and cocaine. Tyl & salicylate negative. Head CT negative for acute intracranial anomalies. Has had seizure like activity in the past, but no reports of convulsive activity since presentation. Work-up for additional metabolic or infectious etiologies contributing to AMS has been thusfar unrevealing. Per S.O. patient taking 1.5-2gm of cocaine daily and last used \"about a gram\" on 19th.     # Epilepsy vs pseudoseizures   # Migraine headaches   # Hx of TBI  New onset tonic clonic seizures early January 2023 (also " possible report of sz in 2021). Neuroimaging negative for acute intracranial pathology, EEGs without epileptiforms while hospitalized. Generally preceded by an aura including migraines and anxiety. Was started on Depakote initially, but reportedly transitioned to lamotrigine. Subsequently [place]like activity. Concern for psychogenic seizure has been raised as pt will at times be interactive during a convulsive event and has variable post-ictal states. Pt following commands and moving extremities X 4 with sedation wean, no evidence of seizure activity since arriving to ICU.   - seizure precautions  - continue PTA lamotrigine  - PRN lorazepam available for seizure activity      # Bipolar disorder  # Borderline personality disorder  # RANDI  # PTDS  # Multiple past suicide attempts   # Pain and sedation for mechanical ventilation  Unclear current psychiatric medication list. Recently discharged from [facility] with AVS med list including amitriptyline 100 mg at bedtime PRN OR zolpidem 10 mg at bedtime PRN, diazepam 5 mg BID PRN, and is currently up-titrating lamotrigine. Saw her psychiatrist on 3/30/23 who also noted she may be on effexor, but this is not in her discharge med list.     Pharmacy consult for med rec    Neuro exam q4h    Will offer chem dep and psychiatry consultation once extubated if pt is willing to see them     Propofol gtt + boluses-> wean to off    Dilaudid gtt + boluses -> wean to off    Initiate precedex gtt to assist with sedation wean     Unclear intent, may need 1:1 sitter once extubated     Supplemental Thiamine and folate (unclear if ETOH abuse is an issue for her)    Possible PTA psych medication regimen:    Amitriptyline 100 mg @ HS PRN - Hold    Zolpidem 10 mg @ HS PRN - Hold     Diazepam 5 mg BID - Hold    Lamotrigine 25 mg qd - Give 25 in AM *pharmacy to verify for subsequent dosing*     Rimegepant 75 mg  every day PRN - Hold     Effexor 150 mg every day - Hold       Pulmonary:  # Intubation for airway protection   # Asthma  # Cystic Fibrosis, mild  # BRIAN, no CPAP    SIMV 18/450/5 with PS 12 -->  PST this AM with possible extubation pending resolution of agitated delirium     Continue PTA fluticasone nasal spray BID & montelukast 10 mg every day     Cardiovascular:  # Lactic acidosis, likely 2/2 agitation, resolved   # HTN  # HLD    Trop negative in ED    Avoid beta blockade given known cocaine intoxication and chronic cocaine use    Hydralazine PRN for SBP > 160 mmHg, escalate to nicardipine gtt if ineffective     Continue PTA atorvastatin 40 mg every day     Holding PTA metoprolol succinate 50 mg every day given cocaine use-> should likely not continue upon discharge given reported daily cocaine use.     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 Once passed, will need to repeat AXR to verify    Minimize propofol infusion rate/duration, repeat TG in am     If change in clinical status/HD instability, signs of worsening abdominal pain, STAT surgery consult    Renal/Fluids/Electrolytes:  # Hyponatremia, resolved   # Volume Status, suspect hypovolemic given poor intake secondary to N/V prior to presentation, oliguria     500 LR bolus X 2     ICU  "electrolyte replacement protocol    Scruggs for strict I&O    Daily weights     Repeat BMP, Mg, Phos this afternoon     Endocrine:  No acute concerns    Goal BG < 180, no current indication for insulin      ID:  # Leukocytosis, likely reactive     Monitor WBC trend and fever curve, no neutrophilia on differential    PCT pending     UA without infectious findings    Blood cultures 4/20 pending     CXR appears atelectatic, no definite acute infiltrates, no reports of aspiration, no secretions suctioned from ETT on arrival    No abx indicated at this time     Hematology:    #Leukocytosis, suspect reactive in setting of acute illness    CBC every day      Musculoskeletal:  No acute concerns    Ambulatory PTA, defer PT/OT consult at this time      Skin:  No acute concerns    Diligent skin cares to prevent breakdown    General Cares/Prophylaxis:    DVT Prophylaxis: Enoxaparin (Lovenox) SQ and Pneumatic Compression Devices  GI Prophylaxis: PPI  Restraints: Bilateral upper extremity soft wrist restraints and mitts to prevent self extubation  Family Communication: Updated S.O. \"Andres\" per phone. Andres reports pt has an adult son, but does not have contact information for him. Pt's parents are both living and located in [place], but he has no contact information for them either. Placed RN care coordinator consult for assistance with locating NOK.   Code Status: Full    Lines/tubes/drains:    PIV x 2    OG    Scruggs    ETT    Disposition:    [facility] adult ICU        Francheska Cobb CNP  Critical care time 70 minutes     Attending note:  Patient seen, examined and discussed with the RUSTAM. All data reviewed including vital signs, medications, imaging, and laboratory studies.  I agree with the assessment and plan as outlined in the above note.  The patient remains critically ill with acute respiratory failure, ingested foreign bodies with concern for potential bowel perforation, and encephalopathy.  I personally adjusted the " ventilator to treat the acute respiratory failure and followed serial abdominal exams and imaging due to risk for bowel perforation.  The patient remains on minimal ventilator settings but unable to extubate awaiting passage of magnets and GI follow-up.  GI consulted with plans for colonoscopy with Surgical back-up.    Debbie Khoury MD  [phone]      REVIEW OF SYSTEMS: unable due to intubation and sedation    PHYSICAL EXAMINATION:  Temp:  [97.5  F (36.4  C)-98  F (36.7  C)] 98  F (36.7  C)  Pulse:  [] 85  Resp:  [13-20] 16  BP: (118-190)/() 144/74  FiO2 (%):  [35 %-70 %] 35 %  SpO2:  [96 %-100 %] 98 %  General: Intubated and sedated, in NAD  HEENT: Normocephalic atraumatic, neck supple  Neuro: Sedated, pupils 3mm PERRLA, moves all extremities to command, nods yes/no   Pulm/Resp: Clear breath sounds bilaterally without rhonchi, crackles or wheeze, breathing non-labored, no secretions suctioned from ETT  CV: RRR, no RMG, 2+ peripheral pulses, trace edema  Abdomen: Soft, non-distended, tender to deep palpation, no masses, hypoactive BS   :  bob catheter in place, urine yellow and clear  Incisions/Skin: No wounds, rashes, or lesions     LABS: Reviewed.   Arterial Blood Gases   Recent Labs   Lab 04/21/23  0102   PH 7.40   PCO2 42   PO2 81   HCO3 26     Complete Blood Count   Recent Labs   Lab 04/21/23  0436 04/21/23  0021 04/20/23 1833 04/20/23  1832 04/14/23  0747   WBC 11.6* 13.0*  --  13.7* 13.2*   HGB 11.3* 11.8 12.9 12.3 12.3    289  --  326 330     Basic Metabolic Panel  Recent Labs   Lab 04/21/23  0436 04/21/23  0434 04/21/23  0021 04/20/23  2347 04/20/23  1836 04/20/23  1833 04/20/23  1832 04/20/23  1830 04/14/23  0747     --  141  --   --  139 130*  --  139   POTASSIUM 3.9  --  4.0  --   --  3.6 3.4  --  3.3*   CHLORIDE 106  --  105  --   --   --  99  --  105   CO2 24  --  25  --   --   --  19*  --  22   BUN 9.5  --  9.4  --   --   --  10.1  --  12.4   CR 0.57  --  0.69  --  0.7  --   0.68  --  0.74   * 97 110* 107*  --  133* 134*   < > 118*    < > = values in this interval not displayed.     Liver Function Tests  Recent Labs   Lab 04/21/23  0021 04/20/23  1832 04/14/23  0747   AST 23 28 22   ALT 21 20 22   ALKPHOS 60 66 62   BILITOTAL <0.2 0.2 0.2   ALBUMIN 4.0 4.5 4.1   INR 1.04 0.97  1.0*  --      Coagulation Profile  Recent Labs   Lab 04/21/23  0021 04/20/23  1832   INR 1.04 0.97  1.0*       IMAGING:  Recent Results (from the past 24 hour(s))   XR Chest Port 1 View    Narrative    EXAM: XR CHEST PORT 1 VIEW  4/20/2023 6:57 PM     HISTORY:  ett verify       COMPARISON:  Chest x-ray 11/15/2019    FINDINGS:   AP view of the chest. The endotracheal tube tip projects approximately  3 cm above the pastora. Enteric tube courses below the diaphragm with  the tip out of the field of view. Heart size is normal. Mild  retrocardiac and perihilar opacities. No significant pleural effusion.  No pneumothorax. The osseous structures are within normal limits.  Cholecystectomy clips in right upper quadrant.      Impression    IMPRESSION:   1. Endotracheal tube tip projects 3 cm above the pastora.  2. Mild retrocardiac and perihilar opacities, likely representing a  combination of atelectasis and edema, and less likely infection.    I have personally reviewed the examination and initial interpretation  and I agree with the findings.    GAYATRI JONES MD         SYSTEM ID:  [id number]   XR Abdomen Port 1 View    Narrative    EXAM: XR ABDOMEN PORT 1 VIEW  4/20/2023 6:58 PM     HISTORY:  og placement       COMPARISON:  Abdominal x-ray 12/25/2017    FINDINGS:   AP view of the abdomen. Enteric tube tip and sidehole overlie the  stomach. Nonobstructive bowel gas pattern. Cholecystectomy clips. Mild  perihilar opacities in the visualized portion of the lungs. The  osseous structures are within normal limits.      Impression    IMPRESSION:  Enteric tube tip and sidehole project over the stomach..     I have  personally reviewed the examination and initial interpretation  and I agree with the findings.    GAYATRI JONES MD         SYSTEM ID:  [id number]   CT Head w/o Contrast    Narrative    EXAM: CT HEAD W/O CONTRAST  4/20/2023 7:31 PM     HISTORY:  Unexplained altered mental status       COMPARISON:  CT 12/4/2019    TECHNIQUE: Using multidetector thin collimation helical acquisition  technique, axial, coronal and sagittal CT images from the skull base  to the vertex were obtained without intravenous contrast.   (topogram) image(s) also obtained and reviewed.    FINDINGS:  No acute intracranial hemorrhage, mass effect, or midline shift. No  acute loss of gray-white matter differentiation in the cerebral  hemispheres. Ventricles are proportionate to the cerebral sulci. Clear  basal cisterns.    The bony calvaria and the bones of the skull base are normal. Mucosal  thickening of the left maxillary sinus with frothy debris. Otherwise  the paranasal sinuses are relatively clear. The mastoid air cells are  clear.       Impression    IMPRESSION:   1. No acute intracranial pathology.   2. Mucosal thickening of the left maxillary sinus with frothy debris,  as can be seen with acute sinusitis in the appropriate clinical  context.    I have personally reviewed the examination and initial interpretation  and I agree with the findings.    GASTON WAGONER MD         SYSTEM ID:  [id number]   CT Abdomen Pelvis w Contrast    Narrative    Examination: CT ABDOMEN PELVIS W CONTRAST, 4/20/2023 7:32 PM    Technique:  Helical CT images from the lung bases through the  symphysis pubis were obtained with contrast.  Coronal reformatted  images were generated at a workstation for further assessment.    Contrast:  135 mL [id number]    Comparison: Radiograph same day, 12/25/2017, CT abdomen and pelvis  7/15/2019    History: By report possible magnet ingestion a few days ago    Findings:    Abdomen and pelvis:   Liver: Diffuse hypoattenuation  of the hepatic parenchyma. No  suspicious liver lesions.   Gallbladder: Surgically absent.  Spleen: Normal size. Small splenules in the left upper quadrant.  Pancreas: No suspicious pancreatic lesions. The pancreatic duct is not  dilated.  Adrenal glands: No adrenal nodules.  Urinary system: No kidney masses. No hydronephrosis, hydroureter, or  obstructing renal stones. Scruggs catheter in place. The bladder is  decompressed.   Reproductive organs: Hysterectomy.  Bowel: Enteric tube tip terminates in the distal stomach. Metallic  density in the proximal ascending colon likely represent ingested  magnets. No signs of obstruction in the bowel. No abnormal bowel wall  enhancement. Mild bowel wall thickening along the descending colon  likely due to underdistention. The appendix is unremarkable.  Lymph nodes: No retroperitoneal, mesenteric, or pelvic  lymphadenopathy.  Fluid: No free fluid within the abdomen.  Vessels: No infrarenal aortic aneurysm. The portal, superior  mesenteric, and splenic veins are patent.    Lung bases: Trace bilateral pleural effusions with adjacent  compressive atelectasis.     Bones and soft tissues: No suspicious osseous lesions. Mild  degenerative changes throughout the visualized spine.      Impression    Impression:   1.  Metallic density in the proximal ascending colon likely represents  ingested magnets. No signs of bowel obstruction.  2.  Diffuse hepatic steatosis.  3.  Trace bilateral pleural effusions with adjacent compressive  atelectasis.    Findings discussed with Dr. Viveros by Dr. Dang at 7:50 PM on  4/20/2023.    I have personally reviewed the examination and initial interpretation  and I agree with the findings.    GAYATRI JONES MD         SYSTEM ID:  [id number]   XR Chest Port 1 View    Narrative    XR CHEST PORT 1 VIEW  4/21/2023 1:09 AM      HISTORY: verify ETT placement    COMPARISON: Chest x-ray 4/20/2023, 11/15/2019.    FINDINGS:   Portable AP supine chest x-ray.  Endotracheal tube terminates over the  mid thoracic trachea. Enteric tube courses into the stomach and below  the field-of-view.    Trachea is midline. Cardiac silhouette is within normal limits.  Pulmonary vasculature is distinct. Stable mild perihilar interstitial  opacities and retrocardiac opacities. No pneumothorax.    Bony structures appear intact.      Impression    IMPRESSION:   1.  Endotracheal tube terminates over the mid thoracic trachea.  2.  Stable retrocardiac and perihilar interstitial  opacities/atelectasis.    I have personally reviewed the examination and initial interpretation  and I agree with the findings.    SONIA TAYLOR DO         SYSTEM ID:  [id number]   XR Abdomen Port 1 View    Narrative    XR ABDOMEN PORT 1 VIEW  4/21/2023 11:41 AM      HISTORY: progression of ingested magnets    COMPARISON: 4/20/2023    FINDINGS:   Supine views of the abdomen and pelvis. Enteric tube tip and side-port  remain over the stomach. Cholecystectomy clips. Presumed ingested  magnets remain over the right lateral abdomen in similar position over  the expected ascending colon. Nonobstructive bowel gas pattern.  Moderate stool burden. No pneumatosis or portal venous gas.      Impression    IMPRESSION:   Presumed ingested magnets remain over the right lateral abdomen in  similar position over the expected location of the ascending colon.    I have personally reviewed the examination and initial interpretation  and I agree with the findings.    SONIA TAYLOR DO         SYSTEM ID:  [id number]

## 2023-04-21 NOTE — PROGRESS NOTES
CLINICAL NUTRITION SERVICES - ASSESSMENT NOTE     Nutrition Prescription    RECOMMENDATIONS FOR MDs/PROVIDERS TO ORDER:  Recommend initiating nutrition support if diet is not advanced within 2-3 days. Will need nutrition support consult if nutrition support becomes part of POC.    Malnutrition Status:    Patient does not meet two of the established criteria necessary for diagnosing malnutrition    Recommendations already ordered by Registered Dietitian (RD):  None at present.    Future/Additional Recommendations:  Monitor weights and GI.    If nutrition support becomes POC:  Enteral nutrition recommendation:  - Dosing wt: 70 kg  - Access: OG tube    Osmolite 1.5 + 1 pkt Prosource TF20 @ goal of  50ml/hr  (1200ml/day) provides: 1880 kcals, 95 g PRO, 914 ml free H20, 244 g CHO, and 0 g fiber daily.  - Initiate Osmolite 1.5 @ 20 mL/hr and advance by 10 mL Q 8 hrs as tolerated to goal of 50 mL/hr.  - Do not start or advance unless K+ >/= 3, Mg++ >/=1.5, and phos >/=1.9     REASON FOR ASSESSMENT  Shanda Hinds is a/an 42 year old female assessed by the dietitian for Admission Nutrition Risk Screen for positive - unsure of wt loss    NUTRITION HISTORY  Chart reviewed.  Pt with PMH significant for mild cystic fibrosis, asthma, BRIAN (no CPAP), constipation, HTN, HLD, prior CVA (no residual), hepatic steatosis, questionable seizure disorder, polysubstance use disorder (cocaine, opioids, BZD, and THC), and multiple psychiatric conditions (bipolar disorder, borderline personality disorder, RANDI, PTSD, multiple suicide attempts, and possible pseudoseizures). She was admitted with AMS presenting as agitated delirium likely 2/2 cocaine & possible BZD intoxication requiring intubation and mechanical ventilation. Also found to have multiple magnets in her colon she swallowed on 4/16/23.    Per team in ICU rounds:  GI consulted for c/f perforation from magnets.   May start TF if pt remains intubated.    Unable to obtain nutrition  "history from pt d/t pt intubated/sedated.    CURRENT NUTRITION ORDERS  Diet: NPO  Intake/Tolerance: No intakes, NPO since admit.    LABS  Labs reviewed  Triglycerides 580 (H).  Na, K+, phos, and Mg++ wnl.    MEDICATIONS  Medications reviewed  Suppository, senna  Folic acid 500 mcg  Magnesium oxide 400 mg  Thiamine 50 mg  Protonix  Propofol and precedex gtt --> plan to wean    ANTHROPOMETRICS  Height: 167.6 cm (5' 6\")  Most Recent Weight: 101 kg (222 lb 10.6 oz)    IBW: 59.1 kg (171% IBW)  BMI: 35.94 kg/m2 - Obesity Grade II BMI 35-39.9  Weight History:   04/20/23 101 kg (222 lb 10.6 oz)   Care Everywhere:  03/24/23         97.1 kg (214 lb)  03/19/23         104.4 kg (230 lb 1.6 oz)  03/02/23         103.2 kg (227 lb 8 oz)  02/12/23         105.2 kg (232 lb)  01/19/23         107.2 kg (236 lb 6.4 oz)  01/01/23         103.4 kb (228 lb)  11/08/22         98.9 kg (218 lb)    3.2% wt loss in 1 month.  5.8% wt loss in 3 months.    Dosing Weight: 70 kg (adjusted based on actual wt and IBW)    ASSESSED NUTRITION NEEDS  Estimated Energy Needs: 1750 - 2100 kcals/day (25 - 30 kcals/kg)  Justification: Maintenance  Estimated Protein Needs: 84 - 105 grams protein/day (1.2 - 1.5 grams of pro/kg)  Justification: Increased needs  Estimated Fluid Needs: 1750 - 2100 mL/day (1 mL/kcal)   Justification: Maintenance    PHYSICAL FINDINGS  See malnutrition section below.     MALNUTRITION  % Intake: Unable to assess d/t limited data from pt and chart. Likely decreased d/t recent admit for n/v at OSH.  % Weight Loss: Weight loss does not meet criteria  Subcutaneous Fat Loss: None observed  Muscle Loss: Temporal:  Mild. Unable to observe other areas d/t pt covered.  Fluid Accumulation/Edema: None noted  Malnutrition Diagnosis: Patient does not meet two of the established criteria necessary for diagnosing malnutrition    NUTRITION DIAGNOSIS  Inadequate oral intake related to NPO d/t intubation as evidenced by no oral intake since admit.  "     INTERVENTIONS  Implementation  Nutrition Education: Not appropriate at this time due to patient condition   Collaboration with other providers - ICU rounds    Goals  Diet adv v nutrition support within 2-3 days.     Monitoring/Evaluation  Progress toward goals will be monitored and evaluated per protocol.    Luther Quinonez  Dietetic Intern

## 2023-04-21 NOTE — ED NOTES
Pt aggressive toward staff and resistive to cares. Intermittently with very diminished LOC with sonorous respirations and inability to protect airway. MD at bedside.

## 2023-04-22 ENCOUNTER — ANESTHESIA (OUTPATIENT)
Dept: SURGERY | Facility: CLINIC | Age: 43
DRG: 917 | End: 2023-04-22
Payer: COMMERCIAL

## 2023-04-22 ENCOUNTER — APPOINTMENT (OUTPATIENT)
Dept: GENERAL RADIOLOGY | Facility: CLINIC | Age: 43
DRG: 917 | End: 2023-04-22
Attending: INTERNAL MEDICINE
Payer: COMMERCIAL

## 2023-04-22 ENCOUNTER — APPOINTMENT (OUTPATIENT)
Dept: GENERAL RADIOLOGY | Facility: CLINIC | Age: 43
DRG: 917 | End: 2023-04-22
Attending: NURSE PRACTITIONER
Payer: COMMERCIAL

## 2023-04-22 ENCOUNTER — APPOINTMENT (OUTPATIENT)
Dept: GENERAL RADIOLOGY | Facility: CLINIC | Age: 43
DRG: 917 | End: 2023-04-22
Payer: COMMERCIAL

## 2023-04-22 ENCOUNTER — ANESTHESIA EVENT (OUTPATIENT)
Dept: SURGERY | Facility: CLINIC | Age: 43
DRG: 917 | End: 2023-04-22
Payer: COMMERCIAL

## 2023-04-22 LAB
ANION GAP SERPL CALCULATED.3IONS-SCNC: 12 MMOL/L (ref 7–15)
BUN SERPL-MCNC: 6.5 MG/DL (ref 6–20)
CALCIUM SERPL-MCNC: 8 MG/DL (ref 8.6–10)
CHLORIDE SERPL-SCNC: 103 MMOL/L (ref 98–107)
COLONOSCOPY: NORMAL
CREAT SERPL-MCNC: 0.55 MG/DL (ref 0.51–0.95)
DEPRECATED HCO3 PLAS-SCNC: 23 MMOL/L (ref 22–29)
ERYTHROCYTE [DISTWIDTH] IN BLOOD BY AUTOMATED COUNT: 14.6 % (ref 10–15)
GFR SERPL CREATININE-BSD FRML MDRD: >90 ML/MIN/1.73M2
GLUCOSE BLDC GLUCOMTR-MCNC: 102 MG/DL (ref 70–99)
GLUCOSE BLDC GLUCOMTR-MCNC: 135 MG/DL (ref 70–99)
GLUCOSE BLDC GLUCOMTR-MCNC: 149 MG/DL (ref 70–99)
GLUCOSE BLDC GLUCOMTR-MCNC: 71 MG/DL (ref 70–99)
GLUCOSE BLDC GLUCOMTR-MCNC: 93 MG/DL (ref 70–99)
GLUCOSE SERPL-MCNC: 108 MG/DL (ref 70–99)
HCT VFR BLD AUTO: 33.6 % (ref 35–47)
HGB BLD-MCNC: 11.1 G/DL (ref 11.7–15.7)
HOLD SPECIMEN: NORMAL
MAGNESIUM SERPL-MCNC: 2 MG/DL (ref 1.7–2.3)
MCH RBC QN AUTO: 29.9 PG (ref 26.5–33)
MCHC RBC AUTO-ENTMCNC: 33 G/DL (ref 31.5–36.5)
MCV RBC AUTO: 91 FL (ref 78–100)
PHOSPHATE SERPL-MCNC: 2.6 MG/DL (ref 2.5–4.5)
PLATELET # BLD AUTO: 273 10E3/UL (ref 150–450)
POTASSIUM SERPL-SCNC: 3 MMOL/L (ref 3.4–5.3)
POTASSIUM SERPL-SCNC: 4.3 MMOL/L (ref 3.4–5.3)
RBC # BLD AUTO: 3.71 10E6/UL (ref 3.8–5.2)
SODIUM SERPL-SCNC: 138 MMOL/L (ref 136–145)
TRIGL SERPL-MCNC: 378 MG/DL
WBC # BLD AUTO: 13.9 10E3/UL (ref 4–11)

## 2023-04-22 PROCEDURE — 250N000011 HC RX IP 250 OP 636: Performed by: NURSE PRACTITIONER

## 2023-04-22 PROCEDURE — 84132 ASSAY OF SERUM POTASSIUM: CPT

## 2023-04-22 PROCEDURE — 250N000009 HC RX 250: Performed by: NURSE PRACTITIONER

## 2023-04-22 PROCEDURE — 83735 ASSAY OF MAGNESIUM: CPT | Performed by: NURSE PRACTITIONER

## 2023-04-22 PROCEDURE — 93010 ELECTROCARDIOGRAM REPORT: CPT | Performed by: INTERNAL MEDICINE

## 2023-04-22 PROCEDURE — 74018 RADEX ABDOMEN 1 VIEW: CPT | Mod: 77

## 2023-04-22 PROCEDURE — 250N000013 HC RX MED GY IP 250 OP 250 PS 637

## 2023-04-22 PROCEDURE — 258N000003 HC RX IP 258 OP 636: Performed by: STUDENT IN AN ORGANIZED HEALTH CARE EDUCATION/TRAINING PROGRAM

## 2023-04-22 PROCEDURE — 370N000017 HC ANESTHESIA TECHNICAL FEE, PER MIN: Performed by: INTERNAL MEDICINE

## 2023-04-22 PROCEDURE — 82310 ASSAY OF CALCIUM: CPT | Performed by: NURSE PRACTITIONER

## 2023-04-22 PROCEDURE — C9113 INJ PANTOPRAZOLE SODIUM, VIA: HCPCS | Performed by: NURSE PRACTITIONER

## 2023-04-22 PROCEDURE — 200N000002 HC R&B ICU UMMC

## 2023-04-22 PROCEDURE — 74018 RADEX ABDOMEN 1 VIEW: CPT

## 2023-04-22 PROCEDURE — 0DJD8ZZ INSPECTION OF LOWER INTESTINAL TRACT, VIA NATURAL OR ARTIFICIAL OPENING ENDOSCOPIC: ICD-10-PCS | Performed by: INTERNAL MEDICINE

## 2023-04-22 PROCEDURE — 250N000009 HC RX 250: Performed by: INTERNAL MEDICINE

## 2023-04-22 PROCEDURE — 93005 ELECTROCARDIOGRAM TRACING: CPT

## 2023-04-22 PROCEDURE — 71045 X-RAY EXAM CHEST 1 VIEW: CPT

## 2023-04-22 PROCEDURE — 85027 COMPLETE CBC AUTOMATED: CPT | Performed by: NURSE PRACTITIONER

## 2023-04-22 PROCEDURE — 272N000001 HC OR GENERAL SUPPLY STERILE: Performed by: INTERNAL MEDICINE

## 2023-04-22 PROCEDURE — 250N000013 HC RX MED GY IP 250 OP 250 PS 637: Performed by: NURSE PRACTITIONER

## 2023-04-22 PROCEDURE — 250N000025 HC SEVOFLURANE, PER MIN: Performed by: INTERNAL MEDICINE

## 2023-04-22 PROCEDURE — 360N000076 HC SURGERY LEVEL 3, PER MIN: Performed by: INTERNAL MEDICINE

## 2023-04-22 PROCEDURE — 999N000063 XR ABDOMEN PORT 1 VIEW

## 2023-04-22 PROCEDURE — 999N000157 HC STATISTIC RCP TIME EA 10 MIN

## 2023-04-22 PROCEDURE — 710N000010 HC RECOVERY PHASE 1, LEVEL 2, PER MIN: Performed by: INTERNAL MEDICINE

## 2023-04-22 PROCEDURE — 99291 CRITICAL CARE FIRST HOUR: CPT

## 2023-04-22 PROCEDURE — 74018 RADEX ABDOMEN 1 VIEW: CPT | Mod: 26 | Performed by: RADIOLOGY

## 2023-04-22 PROCEDURE — 999N000065 XR ABDOMEN PORT 1 VIEW

## 2023-04-22 PROCEDURE — 94003 VENT MGMT INPAT SUBQ DAY: CPT

## 2023-04-22 PROCEDURE — 84478 ASSAY OF TRIGLYCERIDES: CPT | Performed by: NURSE PRACTITIONER

## 2023-04-22 PROCEDURE — 87205 SMEAR GRAM STAIN: CPT | Performed by: NURSE PRACTITIONER

## 2023-04-22 PROCEDURE — 71045 X-RAY EXAM CHEST 1 VIEW: CPT | Mod: 26 | Performed by: RADIOLOGY

## 2023-04-22 PROCEDURE — 84100 ASSAY OF PHOSPHORUS: CPT | Performed by: NURSE PRACTITIONER

## 2023-04-22 PROCEDURE — 87077 CULTURE AEROBIC IDENTIFY: CPT | Performed by: NURSE PRACTITIONER

## 2023-04-22 RX ORDER — ONDANSETRON 4 MG/1
4 TABLET, ORALLY DISINTEGRATING ORAL EVERY 30 MIN PRN
Status: CANCELLED | OUTPATIENT
Start: 2023-04-22

## 2023-04-22 RX ORDER — CHLORHEXIDINE GLUCONATE ORAL RINSE 1.2 MG/ML
15 SOLUTION DENTAL 2 TIMES DAILY
Status: DISCONTINUED | OUTPATIENT
Start: 2023-04-22 | End: 2023-04-27

## 2023-04-22 RX ORDER — MAGNESIUM OXIDE 400 MG/1
400 TABLET ORAL EVERY 4 HOURS
Status: COMPLETED | OUTPATIENT
Start: 2023-04-22 | End: 2023-04-22

## 2023-04-22 RX ORDER — QUETIAPINE FUMARATE 25 MG/1
50 TABLET, FILM COATED ORAL EVERY 8 HOURS PRN
Status: DISCONTINUED | OUTPATIENT
Start: 2023-04-22 | End: 2023-04-23

## 2023-04-22 RX ORDER — SODIUM CHLORIDE, SODIUM LACTATE, POTASSIUM CHLORIDE, CALCIUM CHLORIDE 600; 310; 30; 20 MG/100ML; MG/100ML; MG/100ML; MG/100ML
INJECTION, SOLUTION INTRAVENOUS CONTINUOUS PRN
Status: DISCONTINUED | OUTPATIENT
Start: 2023-04-22 | End: 2023-04-22

## 2023-04-22 RX ORDER — SODIUM CHLORIDE, SODIUM LACTATE, POTASSIUM CHLORIDE, CALCIUM CHLORIDE 600; 310; 30; 20 MG/100ML; MG/100ML; MG/100ML; MG/100ML
INJECTION, SOLUTION INTRAVENOUS CONTINUOUS
Status: CANCELLED | OUTPATIENT
Start: 2023-04-22

## 2023-04-22 RX ORDER — POTASSIUM CHLORIDE 7.45 MG/ML
10 INJECTION INTRAVENOUS
Status: DISPENSED | OUTPATIENT
Start: 2023-04-22 | End: 2023-04-22

## 2023-04-22 RX ORDER — MAGNESIUM SULFATE HEPTAHYDRATE 40 MG/ML
2 INJECTION, SOLUTION INTRAVENOUS ONCE
Status: COMPLETED | OUTPATIENT
Start: 2023-04-22 | End: 2023-04-22

## 2023-04-22 RX ORDER — HYDROMORPHONE HCL IN WATER/PF 6 MG/30 ML
0.2 PATIENT CONTROLLED ANALGESIA SYRINGE INTRAVENOUS EVERY 5 MIN PRN
Status: CANCELLED | OUTPATIENT
Start: 2023-04-22

## 2023-04-22 RX ORDER — LORAZEPAM 2 MG/ML
2 INJECTION INTRAMUSCULAR ONCE
Status: COMPLETED | OUTPATIENT
Start: 2023-04-22 | End: 2023-04-22

## 2023-04-22 RX ORDER — LORAZEPAM 2 MG/ML
2 INJECTION INTRAMUSCULAR EVERY 4 HOURS PRN
Status: DISCONTINUED | OUTPATIENT
Start: 2023-04-22 | End: 2023-04-23

## 2023-04-22 RX ORDER — HYDROMORPHONE HCL IN WATER/PF 6 MG/30 ML
0.4 PATIENT CONTROLLED ANALGESIA SYRINGE INTRAVENOUS EVERY 5 MIN PRN
Status: CANCELLED | OUTPATIENT
Start: 2023-04-22

## 2023-04-22 RX ORDER — ONDANSETRON 2 MG/ML
4 INJECTION INTRAMUSCULAR; INTRAVENOUS EVERY 30 MIN PRN
Status: CANCELLED | OUTPATIENT
Start: 2023-04-22

## 2023-04-22 RX ORDER — FENTANYL CITRATE 50 UG/ML
25 INJECTION, SOLUTION INTRAMUSCULAR; INTRAVENOUS EVERY 5 MIN PRN
Status: CANCELLED | OUTPATIENT
Start: 2023-04-22

## 2023-04-22 RX ADMIN — LORAZEPAM 2 MG: 2 INJECTION INTRAMUSCULAR; INTRAVENOUS at 09:11

## 2023-04-22 RX ADMIN — POLYETHYLENE GLYCOL-3350 AND ELECTROLYTES 2000 ML: 236; 6.74; 5.86; 2.97; 22.74 POWDER, FOR SOLUTION ORAL at 19:51

## 2023-04-22 RX ADMIN — PROPOFOL 50 MCG/KG/MIN: 10 INJECTION, EMULSION INTRAVENOUS at 05:54

## 2023-04-22 RX ADMIN — PROPOFOL 50 MCG/KG/MIN: 10 INJECTION, EMULSION INTRAVENOUS at 09:12

## 2023-04-22 RX ADMIN — Medication 10 MG: at 21:06

## 2023-04-22 RX ADMIN — LAMOTRIGINE 25 MG: 100 TABLET ORAL at 07:37

## 2023-04-22 RX ADMIN — ENOXAPARIN SODIUM 40 MG: 40 INJECTION SUBCUTANEOUS at 07:38

## 2023-04-22 RX ADMIN — ATORVASTATIN CALCIUM 40 MG: 40 TABLET, FILM COATED ORAL at 19:50

## 2023-04-22 RX ADMIN — CHLORHEXIDINE GLUCONATE 15 ML: 1.2 SOLUTION ORAL at 19:50

## 2023-04-22 RX ADMIN — SODIUM CHLORIDE, POTASSIUM CHLORIDE, SODIUM LACTATE AND CALCIUM CHLORIDE: 600; 310; 30; 20 INJECTION, SOLUTION INTRAVENOUS at 13:42

## 2023-04-22 RX ADMIN — Medication 10 MG: at 09:15

## 2023-04-22 RX ADMIN — Medication 50 MG: at 07:38

## 2023-04-22 RX ADMIN — POTASSIUM CHLORIDE 10 MEQ: 7.46 INJECTION, SOLUTION INTRAVENOUS at 13:38

## 2023-04-22 RX ADMIN — PROPOFOL 40 MCG/KG/MIN: 10 INJECTION, EMULSION INTRAVENOUS at 12:02

## 2023-04-22 RX ADMIN — PROPOFOL 45 MCG/KG/MIN: 10 INJECTION, EMULSION INTRAVENOUS at 22:29

## 2023-04-22 RX ADMIN — PROPOFOL 40 MCG/KG/MIN: 10 INJECTION, EMULSION INTRAVENOUS at 18:33

## 2023-04-22 RX ADMIN — POTASSIUM CHLORIDE 10 MEQ: 7.46 INJECTION, SOLUTION INTRAVENOUS at 11:13

## 2023-04-22 RX ADMIN — POTASSIUM CHLORIDE 10 MEQ: 7.46 INJECTION, SOLUTION INTRAVENOUS at 15:44

## 2023-04-22 RX ADMIN — MAGNESIUM OXIDE TAB 400 MG (240 MG ELEMENTAL MG) 400 MG: 400 (240 MG) TAB at 06:05

## 2023-04-22 RX ADMIN — LORAZEPAM 2 MG: 2 INJECTION INTRAMUSCULAR; INTRAVENOUS at 02:50

## 2023-04-22 RX ADMIN — SENNOSIDES AND DOCUSATE SODIUM 2 TABLET: 8.6; 5 TABLET ORAL at 19:50

## 2023-04-22 RX ADMIN — PROPOFOL 50 MCG/KG/MIN: 10 INJECTION, EMULSION INTRAVENOUS at 03:07

## 2023-04-22 RX ADMIN — LORAZEPAM 2 MG: 2 INJECTION INTRAMUSCULAR; INTRAVENOUS at 20:12

## 2023-04-22 RX ADMIN — LORAZEPAM 2 MG: 2 INJECTION INTRAMUSCULAR; INTRAVENOUS at 20:27

## 2023-04-22 RX ADMIN — FOLIC ACID 500 MCG: 1 TABLET ORAL at 07:38

## 2023-04-22 RX ADMIN — Medication 10 MG: at 20:00

## 2023-04-22 RX ADMIN — Medication 10 MG: at 15:35

## 2023-04-22 RX ADMIN — LORAZEPAM 2 MG: 2 INJECTION INTRAMUSCULAR; INTRAVENOUS at 21:21

## 2023-04-22 RX ADMIN — PANTOPRAZOLE SODIUM 40 MG: 40 INJECTION, POWDER, FOR SOLUTION INTRAVENOUS at 07:38

## 2023-04-22 RX ADMIN — POTASSIUM & SODIUM PHOSPHATES POWDER PACK 280-160-250 MG 1 PACKET: 280-160-250 PACK at 06:05

## 2023-04-22 RX ADMIN — Medication 10 MG: at 03:08

## 2023-04-22 RX ADMIN — MAGNESIUM SULFATE HEPTAHYDRATE 2 G: 40 INJECTION, SOLUTION INTRAVENOUS at 11:21

## 2023-04-22 RX ADMIN — MONTELUKAST 10 MG: 10 TABLET, FILM COATED ORAL at 22:30

## 2023-04-22 ASSESSMENT — ACTIVITIES OF DAILY LIVING (ADL)
ADLS_ACUITY_SCORE: 50
ADLS_ACUITY_SCORE: 50
ADLS_ACUITY_SCORE: 46
ADLS_ACUITY_SCORE: 50
ADLS_ACUITY_SCORE: 49
ADLS_ACUITY_SCORE: 49
DEPENDENT_IADLS:: INDEPENDENT
ADLS_ACUITY_SCORE: 49
ADLS_ACUITY_SCORE: 50

## 2023-04-22 NOTE — PROGRESS NOTES
Patient transported off unit. She left between 1100 and 1200 and came back around 1640. Vent settings are the same with a small increase in O2. 18/450/5/12/40%. Will attempt PS trial to possibly extubate shortly when the team is ready.
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
## 2023-04-22 NOTE — PROGRESS NOTES
Pt picked-pup by [facility] transport for colonoscopy procedure on EB. Report and transfer of care given to transport team.   Michelle Ingram RN

## 2023-04-22 NOTE — ANESTHESIA PREPROCEDURE EVALUATION
Anesthesia Pre-Procedure Evaluation    Patient: Shanda Hinds   MRN: [id number] : 1980        Procedure : Procedure(s):  Colonoscopy          Past Medical History:   Diagnosis Date     Cystic fibrosis (H)      Depressive disorder      Insomnia      PTSD (post-traumatic stress disorder)       Past Surgical History:   Procedure Laterality Date     ABDOMEN SURGERY       ABDOMEN SURGERY      removal of shrapnel     ABDOMEN SURGERY      removal of adhesions     BLADDER SURGERY       CHOLECYSTECTOMY       CHOLECYSTECTOMY       CHOLECYSTECTOMY Bilateral      GYN SURGERY      fibroids, endometriosis, torsion, adhesion removal     HYSTERECTOMY       HYSTERECTOMY       LEFT OOPHORECTOMY       SLING BLADDER SUSPENSION WITH FASCIA EILEEN        Allergies   Allergen Reactions     Haldol [Haloperidol] Anaphylaxis     Codeine      Hives, vomiting      Compazine [Prochlorperazine]      Fentanyl      Morphine      Hives, vomiting      Neurontin [Gabapentin] Other (See Comments)     Facial twitching     Precedex Other (See Comments)     Tachy arrhythmias on 2 separate infusion trials      Reglan [Metoclopramide]      Trazodone Nausea and Hives      Social History     Tobacco Use     Smoking status: Every Day     Packs/day: 0.25     Years: 15.00     Pack years: 3.75     Types: Cigarettes     Smokeless tobacco: Never     Tobacco comments:     3-4 per day   Vaping Use     Vaping status: Not on file   Substance Use Topics     Alcohol use: Yes     Comment: Alcoholic Drinks/day: Holidays      Wt Readings from Last 1 Encounters:   23 105.1 kg (231 lb 11.3 oz)        Anesthesia Evaluation   Pt has had prior anesthetic.         ROS/MED HX  ENT/Pulmonary: Comment: Intubated for airway      Neurologic:     (+) delerium, resolved No.     Cardiovascular:  - neg cardiovascular ROS     METS/Exercise Tolerance:     Hematologic:  - neg hematologic  ROS     Musculoskeletal:  - neg musculoskeletal ROS     GI/Hepatic:     (+) liver  disease,     Renal/Genitourinary:  - neg Renal ROS     Endo:  - neg endo ROS     Psychiatric/Substance Use:     (+) psychiatric history bipolar alcohol abuse Recreational drug usage: Cocaine and Cannabis.    Infectious Disease:  - neg infectious disease ROS     Malignancy:  - neg malignancy ROS     Other:  - neg other ROS          Physical Exam    Airway   unable to assess          Respiratory Devices and Support    ETT:      Dental    unable to assess        Cardiovascular   cardiovascular exam normal          Pulmonary    Unable to assess           Other findings: Brought urgently to the OR, intubated and sedated    OUTSIDE LABS:  CBC:   Lab Results   Component Value Date    WBC 13.9 (H) 04/22/2023    WBC 11.6 (H) 04/21/2023    HGB 11.1 (L) 04/22/2023    HGB 11.3 (L) 04/21/2023    HCT 33.6 (L) 04/22/2023    HCT 36.4 04/21/2023     04/22/2023     04/21/2023     BMP:   Lab Results   Component Value Date     04/22/2023     04/21/2023    POTASSIUM 3.0 (L) 04/22/2023    POTASSIUM 3.9 04/21/2023    CHLORIDE 103 04/22/2023    CHLORIDE 103 04/21/2023    CO2 23 04/22/2023    CO2 24 04/21/2023    BUN 6.5 04/22/2023    BUN 10.3 04/21/2023    CR 0.55 04/22/2023    CR 0.57 04/21/2023     (H) 04/22/2023     (H) 04/22/2023     COAGS:   Lab Results   Component Value Date    PTT 32 11/15/2019    INR 1.04 04/21/2023     POC:   Lab Results   Component Value Date    HCG Negative 04/09/2019    HCGS Negative 11/15/2019     HEPATIC:   Lab Results   Component Value Date    ALBUMIN 4.0 04/21/2023    PROTTOTAL 6.4 04/21/2023    ALT 21 04/21/2023    AST 23 04/21/2023    ALKPHOS 60 04/21/2023    BILITOTAL <0.2 04/21/2023    EDWARD 15 04/20/2023     OTHER:   Lab Results   Component Value Date    PH 7.40 04/21/2023    LACT 1.9 04/21/2023    CASSIE 8.0 (L) 04/22/2023    PHOS 2.6 04/22/2023    MAG 2.0 04/22/2023    LIPASE 27 04/20/2023    TSH 1.30 04/20/2023    T4 0.69 (L) 04/19/2018    CRP 0.4 08/27/2018     SED 5 12/25/2017       Anesthesia Plan    ASA Status:  3, emergent    NPO Status:  Will be NPO Appropriate at ...    Anesthesia Type: General.         Techniques and Equipment:     - Lines/Monitors: 2nd IV     Consents    Anesthesia Plan(s) and associated risks, benefits, and realistic alternatives discussed. Questions answered and patient/representative(s) expressed understanding.    - Discussed:     - Discussed with:  Implied consent/emergency         Postoperative Care    Pain management: IV analgesics.   PONV prophylaxis: Ondansetron (or other 5HT-3), Promethazine or metoclopramide     Comments:                Shiv Saenz MD

## 2023-04-22 NOTE — PROGRESS NOTES
[facility] ADULT ICU PROGRESS NOTE  04/22/2023    Date of Hospital Admission: 4/20/23  Date of ICU Admission: 4/20/23  Reason for Critical Care Admission: Agitated delirium 2/2 cocaine intoxication requiring intubation and mechanical ventilation for airway protection   Date of Service (when I saw the patient): 04/22/2023    ASSESSMENT: Shanda Hinds is a 42 year old female admitted with AMS presenting as agitated delirium likely 2/2 cocaine & possible BZD intoxication requiring intubation and mechanical ventilation. Also found to have multiple magnets in her colon she swallowed on 4/16/23. Hx significant for mild cystic fibrosis, asthma, BRIAN (no CPAP), constipation, HTN, HLD, prior CVA (no residual), hepatic steatosis, questionable seizure disorder, polysubstance use disorder (cocaine, opioids, BZD, and THC), and multiple psychiatric conditions (bipolar disorder, borderline personality disorder, RANDI, PTSD, multiple suicide attempts, and possible pseudoseizures).        Changes for Today:  - Repeat AXR -- magnets unmoved.  - Planning for colonoscopy per GI.  - Plan for PST with extubation, pending procedure outcome.  - CXR ordered  - ECHO ordered    Discussion with GI [facility] regarding removal of magnets this AM. Doesn't appear that the magnets have moved with the go-lytely prep overnight per abdominal imaging. Concern that the magnets are adhered to tissue with possibility of necrosis or future injury to GI system. Unable to obtain consent from legal next of kin. Will go for urgent foreign body removal on [facility] this AM, with plans to return to  ICU post pending procedure outcome.     PLAN:    Neuro:  # Agitated delirium, likely 2/2 cocaine intoxication  # Encephalopathy, metabolic   # Cocaine intoxication  # Possible BZD intoxication  # Poly substance abuse (cocaine, BZD, opioid, THC)  This hospitalization presented with agitated delirium. UDS positive for BZDs and cocaine. Tyl & salicylate  "negative. Head CT negative for acute intracranial anomalies. Has had seizure like activity in the past, but no reports of convulsive activity since presentation. Work-up for additional metabolic or infectious etiologies contributing to AMS has been thusfar unrevealing. Per S.O. patient taking 1.5-2gm of cocaine daily and last used \"about a gram\" on 19th.     # Epilepsy vs pseudoseizures   # Migraine headaches   # Hx of TBI  New onset tonic clonic seizures early January 2023 (also possible report of sz in 2021). Neuroimaging negative for acute intracranial pathology, EEGs without epileptiforms while hospitalized. Generally preceded by an aura including migraines and anxiety. Was started on Depakote initially, but reportedly transitioned to lamotrigine. Subsequently [place]like activity. Concern for psychogenic seizure has been raised as pt will at times be interactive during a convulsive event and has variable post-ictal states.   - Seizure precautions  - Continue PTA lamotrigine  - PRN lorazepam available for seizure activity    # Bipolar disorder  # Borderline personality disorder  # RANDI  # PTDS  # Multiple past suicide attempts   Unclear current psychiatric medication list. Recently discharged from [facility] with [facility] med list including amitriptyline 100 mg at bedtime PRN OR zolpidem 10 mg at bedtime PRN, diazepam 5 mg BID PRN, and is currently up-titrating lamotrigine. Saw her psychiatrist on 3/30/23 who also noted she may be on effexor, but this is not in her discharge med list.   - Neuro exam q4h  - Will offer chem dep and psychiatry consultation once extubated if pt is willing to see them   - Unclear intent, may need 1:1 sitter once extubated   - Supplemental Thiamine and folate (unclear if ETOH abuse is an issue for her)  - Possible PTA psych medication regimen:  - Amitriptyline 100 mg @ HS PRN - Hold  - Zolpidem 10 mg @ HS " PRN - Hold   - Diazepam 5 mg BID - Hold  - Lamotrigine 25 mg qd - Give 25 in AM   - Rimegepant 75 mg every day PRN - Hold   - Effexor 150 mg every day - Hold     # Pain and sedation for mechanical ventilation  - Sedation: Propofol gtt  - RASS goal 0 to -1  - Pain: dilaudid gtt + boluses, tylenol prn  - NOTE: patient had tachyarrhythmias when started on precedex infusion yesterday that ceased with stopping of infusion. Will list precedex as an allergy and will need a cardiac workup prior to discharge.    Pulmonary:  # Intubation for airway protection   # Asthma  # Cystic Fibrosis, mild  # BRIAN, no CPAP  - SIMV 18/450/5 with PS 12 -->  PST post endoscopy with hopes to extubate.  - Continue PTA fluticasone nasal spray BID & montelukast 10 mg every day   - reports of new coarse RUL tones, mild to moderate secretions per nursing. CXR ordered.    Cardiovascular:  # Lactic acidosis, likely 2/2 agitation, resolved   # HTN  # HLD  - Hydralazine PRN for SBP > 160 mmHg, escalate to nicardipine gtt if ineffective  - Continue PTA atorvastatin 40 mg every day  - Holding PTA metoprolol succinate 50 mg every day given cocaine use-> should likely not continue upon discharge given reported daily cocaine use.   - Will order ECHO for eval of arrythmias with precedex and c/f cardiomyopathy 2/2 to persistent drug use.     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   - RNs to search BMs until magnets are found   - KUB BID to verify position of magnets until they are passed. Once passed, will need to repeat AXR to verify   - plan for colonoscopy this AM.   - If change in clinical status/HD instability, signs of worsening abdominal pain, STAT surgery  "consult  - Bisacodyl suppository once and daily PRN   - OG for medication admin, will consult RD if pt is to remain intubated after procedure for TF recs.  - Trend triglycerides while on propofol infusion (378 this AM, down from initial 580)    Renal/Fluids/Electrolytes:  # Hyponatremia, resolved   # Volume Status, suspect hypovolemic given poor intake secondary to N/V prior to presentation, oliguria   - ICU electrolyte replacement protocol  - Scruggs for strict I&O  - Daily weights     Endocrine:  No acute concerns  - Goal BG < 180, no current indication for insulin      ID:  # Leukocytosis, likely reactive   - Monitor WBC trend and fever curve   - Note WBC bump to 13.9 today from 11.6 yesterday, 13.0 on admit to ICU, 13.7 on admit to hospital.  - PCT negative 4/21  - UA without infectious findings  - Blood cultures 4/20 pending , NGTD    - No abx indicated at this time     Hematology:    # Leukocytosis, suspect reactive in setting of acute illness  # Anemia, normocytic likely 2/2 to drug use.  - CBC every day      Musculoskeletal:  No acute concerns  - Ambulatory PTA, defer PT/OT consult at this time      Skin:  No acute concerns  - Diligent skin cares to prevent breakdown    General Cares/Prophylaxis:    DVT Prophylaxis: Enoxaparin (Lovenox) SQ and Pneumatic Compression Devices  GI Prophylaxis: PPI  Restraints: Bilateral upper extremity soft wrist restraints and mitts to prevent self extubation  Family Communication: Has S.O. \"Andres\". Andres reports pt has an adult son, but does not have contact information for him. Pt's parents are both living and located in [place], but he has no contact information for them either. Placed RN care coordinator consult for assistance with locating NOK.   Code Status: Full    Lines/tubes/drains:    PIV x 2    OG    Scruggs    ETT    [place]    [facility]    ETTA Pollack CNP  Critical care time 70 minutes     Attending note:  Patient seen, examined and " discussed with the RUSTAM. All data reviewed including vital signs, medications, laboratory studies and imaging.  I agree with the assessment and plan as outlined in the above note.  The patient remains critically ill with acute respiratory failure, encephalopathy and colonic foreign body.  I personally adjusted the ventilator to treat the acute respiratory failure and followed hemodynamics.  No expulsion of the magnetic foreign bodies- will go to [place] today for colonoscopy.  Maintain on ventilator until foreign bodies are removed.  Not requiring vasopressors.  Non-sustained Vtach versus SVT- will obtain non-emergent ECHO to evaluate for underlying cardiovascular disease.     Debbie Khoury MD  [phone]  REVIEW OF SYSTEMS: Overnight course reviewed. Patient evaluated at bedside. Restrained and sedated, per nursing following commands during sedation vacations. Withdraws to pain x4. ROS unable due to intubation and sedation    PHYSICAL EXAMINATION:  Temp:  [97.1  F (36.2  C)-98.7  F (37.1  C)] 98.3  F (36.8  C)  Pulse:  [] 104  Resp:  [0-37] 18  BP: (101-170)/() 125/67  FiO2 (%):  [35 %] 35 %  SpO2:  [94 %-99 %] 96 %    General: Intubated and sedated, in NAD  HEENT: Normocephalic atraumatic, neck supple  Neuro: Sedated, pupils 3mm PERRLA, moves all extremities  Pulm/Resp: Clear breath sounds bilaterally without rhonchi, crackles or wheeze, breathing non-labored, no secretions suctioned from ETT  CV: RRR, no RMG, 2+ peripheral pulses, trace edema  Abdomen: Soft, non-distended, tender to deep palpation, no masses, hypoactive BS   :  bob catheter in place, urine yellow and clear  Incisions/Skin: No wounds, rashes, or lesions     LABS: Reviewed.   Arterial Blood Gases   Recent Labs   Lab 04/21/23  0102   PH 7.40   PCO2 42   PO2 81   HCO3 26     Complete Blood Count   Recent Labs   Lab 04/22/23  0351 04/21/23  0436 04/21/23  0021 04/20/23  1833 04/20/23  1832   WBC 13.9* 11.6* 13.0*  --  13.7*   HGB 11.1*  11.3* 11.8 12.9 12.3    255 289  --  326     Basic Metabolic Panel  Recent Labs   Lab 04/22/23  0351 04/22/23  0020 04/21/23 2005 04/21/23  1427 04/21/23  0827 04/21/23  0436 04/21/23 0434 04/21/23  0021     --   --  137  --  142  --  141   POTASSIUM 3.0*  --   --  3.9  --  3.9  --  4.0   CHLORIDE 103  --   --  103  --  106  --  105   CO2 23  --   --  24  --  24  --  25   BUN 6.5  --   --  10.3  --  9.5  --  9.4   CR 0.55  --   --  0.57  --  0.57  --  0.69   * 135* 108* 118*   < > 101*   < > 110*    < > = values in this interval not displayed.     Liver Function Tests  Recent Labs   Lab 04/21/23  0021 04/20/23  1832   AST 23 28   ALT 21 20   ALKPHOS 60 66   BILITOTAL <0.2 0.2   ALBUMIN 4.0 4.5   INR 1.04 0.97  1.0*     Coagulation Profile  Recent Labs   Lab 04/21/23  0021 04/20/23  1832   INR 1.04 0.97  1.0*       IMAGING:  Recent Results (from the past 24 hour(s))   XR Abdomen Port 1 View    Narrative    XR ABDOMEN PORT 1 VIEW  4/21/2023 11:41 AM      HISTORY: progression of ingested magnets    COMPARISON: 4/20/2023    FINDINGS:   Supine views of the abdomen and pelvis. Enteric tube tip and side-port  remain over the stomach. Cholecystectomy clips. Presumed ingested  magnets remain over the right lateral abdomen in similar position over  the expected ascending colon. Nonobstructive bowel gas pattern.  Moderate stool burden. No pneumatosis or portal venous gas.      Impression    IMPRESSION:   Presumed ingested magnets remain over the right lateral abdomen in  similar position over the expected location of the ascending colon.    I have personally reviewed the examination and initial interpretation  and I agree with the findings.    SONIA TAYLOR DO         SYSTEM ID:  [id number]   XR Abdomen Port 1 View    Narrative    EXAMINATION:  XR ABDOMEN PORT 1 VIEW 4/21/2023 6:42 PM.    COMPARISON: Radiograph same day, 4/20/2023, CT abdomen and pelvis  4/20/2023    HISTORY:  progression of foreign  body ingestion-magnets in ascending  colon    FINDINGS:   Portable AP view of the abdomen. Enteric tube tip and sidehole  projected over the stomach. No significant change in position of the  metallic foreign body projecting over the right lateral abdomen in the  expected location of the ascending colon. Nonobstructive bowel gas  pattern. No pneumatosis or portal venous gas. Cholecystectomy clips.  No acute osseous abnormality. Scruggs catheter in place. Pelvic  phleboliths.      Impression    IMPRESSION:   No significant change in position of the ingested magnets, projecting  over the right lateral abdomen in the expected location of the  ascending colon.    I have personally reviewed the examination and initial interpretation  and I agree with the findings.    SONIA TAYLOR DO         SYSTEM ID:  [id number]   XR Abdomen Port 1 View    Narrative    EXAMINATION:  XR ABDOMEN PORT 1 VIEW 4/22/2023 4:17 AM     COMPARISON: Abdominal x-ray 4/21/2023, 4/20/2023.    HISTORY: Assess position of ingested magnets    TECHNIQUE: Frontal view of the abdomen.    FINDINGS: Gastric tube tip and sidehole terminates over the stomach.  Cylindrical density projects over the right lower quadrant, unchanged.  Surgical clips project over the right upper quadrant. The small  intestine and colon are not distended. No abnormal soft tissue  densities.  No pneumatosis or portal venous gas. The lung bases are  unremarkable.      Impression    IMPRESSION:   1.  Ingested magnet projects over the right lower quadrant without  significant migration compared to prior exam.  2.  Non-obstructed bowel gas pattern.    I have personally reviewed the examination and initial interpretation  and I agree with the findings.    MUKUL REGALADO MD         SYSTEM ID:  [id number]

## 2023-04-22 NOTE — PROGRESS NOTES
Pt returned to  ICU via EMS transport intubated on 60mg/hr prop gtt, 0.4mg/hr dilaudid gtt and vitally stable.     RAAS -2 for vent compliance and safety, minimal to moderate secretions, course LS in  RUL. Pt currently on PS and sedation wean for potential extubation. Pt normotensive and SR. No BM this shift and adequate UOP from bob. IV K+ replacement initiated on 10A and interrupted during procedure on EB. [facility]/u K+ lab rescheduled to 2000. Removed OG per order and placed bridled NG. Nursing tube replacement confirmation with positive air sounds and return of bile/stomach contents. Awaiting confirmation of ab x-ray for line use to initiate next round of bowel prep therapy per order.   Continue to monitor for passing of foreign objects in bowel, decrease sedation as tolerated, and prepare for extubation.    Michelle Ingram RN

## 2023-04-22 NOTE — ANESTHESIA POSTPROCEDURE EVALUATION
Patient: Shanda Hinds    Procedure: Procedure(s):  Colonoscopy       Anesthesia Type:  General    Note:  Disposition: ICU            ICU Sign Out: Anesthesiologist/ICU physician sign out WAS performed   Postop Pain Control: Uneventful            Sign Out: Well controlled pain   PONV: No   Neuro/Psych: Uneventful            Sign Out: PLANNED postop sedation   Airway/Respiratory: Uneventful            Sign Out: AIRWAY IN SITU/Resp. Support               Airway in situ/Resp. Support: ETT                 Reason: Planned Pre-op   CV/Hemodynamics: Uneventful            Sign Out: Acceptable CV status; No obvious hypovolemia; No obvious fluid overload   Other NRE: NONE   DID A NON-ROUTINE EVENT OCCUR? No    Event details/Postop Comments:  Stable for transfer to [facility]           Last vitals:  Vitals Value Taken Time   BP     Temp     Pulse 98 04/22/23 1525   Resp 19 04/22/23 1525   SpO2 94 % 04/22/23 1525   Vitals shown include unvalidated device data.    Electronically Signed By: Shiv Saenz MD  April 22, 2023  3:26 PM

## 2023-04-22 NOTE — PROGRESS NOTES
Gastroenterology Endoscopy Suite Brief Operative Note    Procedure:  Colonoscopy   Post-operative diagnosis:  Foreign bodies ingestion   Staff Physician:  Dr. Pierce   Fellow/Assistant(s):  Dr. Duron    Specimens:  Please see final procedure note for further details.   Findings:  Prep was poor. Unable to locate the foreign bodies despite extensive irrigation.  XR performed showing the magnets to be in similar position.    Complications:  None.   Condition:  Stable   Recommendations  - Return the [facility] [place] [facility]  - Timing of extubation per [facility].   - Another 2L Golytely today and 2L tomorrow.   - Repeat AXR tomorrow after Golytely.  - Please see procedure note for other details.      No magnets were located after multiple exams of the right colon including forward and retroflexion views following extensive lavage.  It is possible that a foreign body remains due to poor prep and nonvisualization.  Agree with continuing bowel prep and serial x-rays.  Omid Pierce MD

## 2023-04-22 NOTE — PROGRESS NOTES
Pt RAAS -2, weaned sedation and pt FC and moves all 4's. Pt attempted to self extubate x2. Titrated sedation per order.  Pt HTN to the 170's  - administered PRN hydralazine and 0.4 diludid w/o effect. Notified provider and restarted dilaudid gtt at 0.2. Administered 6L go lightly bowel prep per order in attempt to flush out ingested magnets. Pt heavily stooling w/o passing magnets. Scruggs patent with improved UOP. Electrolytes replaced per order. Extended dwell PIV placed. Continue to monitor BP and signs of withdrawal, monitor for magnet passing. Prepare for bedside colonoscopy in AM.    Michelle Ingram RN

## 2023-04-22 NOTE — PROGRESS NOTES
Critical Care Brief Update    Patient transferred over to  for foreign body removal / colonoscopy (see separate anesthesia and GI notes for specifics.) Per GI follow, Dr. Duron, unable to remove magnets today. Bowel was not completely cleared out despite Golytely prep completed prior to procedure. Will do two more days of prep, continue serial imaging, with hopes that magnets are expelled. No plans for repeat colonoscopy at this time. No concerns from GI perspective regarding extubation. Discussed concern that patient may not tolerate prep if extubated. GI team will re-evaluate plan if that situation develops. Will place NG in hopes we can still administer prep if patient unable to swallow herself if extubated.      was able to discuss next of kin with patient's SO, Andres. Patient's mother Susan was reached by this provider and given an update on patient's presentation and that she was undergoing a colonoscopy at time of conversation. Mother verbalized that she was next of kin and likely decision maker. She last heard from patient in the past week. Mother stated patient was recently discharged. (Electronic medical records indicate a hospital encounter 4/16 at the [facility], unable to access for details). Provided Dr. Duron with mother's phone number for update as well. Mother given nurses station phone number.     Patient returned to [facility] at 1645 restrained, sedated on propofol with dilaudid gtt.  Plan to place NG (+/- nasal bridle) for enteral access, remove OG, decrease sedation and complete PST with extubation if meets criteria.     Roberta Christianson, APRN CNP on 4/22/2023 at 5:01 PM

## 2023-04-22 NOTE — PROGRESS NOTES
10A ICU End of Shift Summary.     Changes this shift:   Bowel prep for colonoscopy     Neuro: intermittent restlessness and agitation, PRN Ativan x1, dilaudid bolus x1, propofol bolus x1. Propofol at 50 mcg/hr. Moves all extremities   Cardiac: Sinus tach, MAP>65, BP WDL  Respiratory: Pt remain intubated, min secretions via inline and oral suctioning   Mechanical Ventilator Setting: SIMV, FiO2:35 %, Volume: 450, PEEP 5, Rate 18   GI/: OG for meds, large volume watery/ loose stool x xc6, pt yet to pass the magnets. Adequate UO via urethral catheter.    Diet/appetite: NPO  Pain: Dilaudid at 0.4 mg/hr  Skin: No concerns,     Plan: colonoscopy for magnet retrival

## 2023-04-22 NOTE — ANESTHESIA CARE TRANSFER NOTE
Patient: Shanda Hinds    Procedure: Procedure(s):  Colonoscopy       Diagnosis: Foreign body in colon [T18.4XXA]  Diagnosis Additional Information: No value filed.    Anesthesia Type:   General     Note:    Oropharynx: ventilatory support and endotracheal tube in place  Level of Consciousness: iatrogenic sedation        Dentition: dentition unchanged  Vital Signs Stable: post-procedure vital signs reviewed and stable  Report to RN Given: handoff report given  Patient transferred to: PACU    Handoff Report: Identifed the Patient, Identified the Reponsible Provider, Reviewed the pertinent medical history, Discussed the surgical course, Reviewed Intra-OP anesthesia mangement and issues during anesthesia, Set expectations for post-procedure period and Allowed opportunity for questions and acknowledgement of understanding      Vitals:  Vitals Value Taken Time   BP     Temp     Pulse     Resp     SpO2         Electronically Signed By: ETTA Posada CRNA  April 22, 2023  3:19 PM

## 2023-04-22 NOTE — PROGRESS NOTES
[facility] [address] pt's boyfriend, Andres, would like an update: [phone] thanks! Kimberlee [phone]

## 2023-04-22 NOTE — PROGRESS NOTES
BRIEF GI NOTE:    04/22/23    Assessment:  42 year old female with past medical history significant for polysubstance abuse (Cocaine, benzodiazepines, opioids, THC), multiple psychiatric conditions (bipolar disorder, BPD, RANDI, PTSD) fatty liver, constipation, multiple abdominal surgeries due to shrapnel (she is a ) who was admitted for intoxication now s/p intubation in settings of swallowing magnets (occurred on 4/16).     After 6L golytely, the foreign bodies did not move very much. Since there is risk of local ischemia and potentially perforation we will proceed with colonoscopy in the OR.     Recommendations:  - Proceed with colonoscopy in the OR.   - Consent obtained from patient's significant other who is also her next of kin. No other family members available.   - Discussed the case with general surgery and they will be available for any complications.     Patient discussed with on call GI staff Dr. Stan Duron MD  Gastroenterology Fellow  Division of Gastroenterology, Hepatology and Nutrition  [facility]  [id number]

## 2023-04-23 ENCOUNTER — APPOINTMENT (OUTPATIENT)
Dept: CARDIOLOGY | Facility: CLINIC | Age: 43
DRG: 917 | End: 2023-04-23
Payer: COMMERCIAL

## 2023-04-23 ENCOUNTER — APPOINTMENT (OUTPATIENT)
Dept: CT IMAGING | Facility: CLINIC | Age: 43
DRG: 917 | End: 2023-04-23
Attending: NURSE PRACTITIONER
Payer: COMMERCIAL

## 2023-04-23 ENCOUNTER — APPOINTMENT (OUTPATIENT)
Dept: GENERAL RADIOLOGY | Facility: CLINIC | Age: 43
DRG: 917 | End: 2023-04-23
Attending: NURSE PRACTITIONER
Payer: COMMERCIAL

## 2023-04-23 LAB
ALBUMIN SERPL BCG-MCNC: 3.5 G/DL (ref 3.5–5.2)
ALP SERPL-CCNC: 101 U/L (ref 35–104)
ALT SERPL W P-5'-P-CCNC: 20 U/L (ref 10–35)
AMMONIA PLAS-SCNC: 17 UMOL/L (ref 11–51)
ANION GAP SERPL CALCULATED.3IONS-SCNC: 11 MMOL/L (ref 7–15)
ANION GAP SERPL CALCULATED.3IONS-SCNC: 15 MMOL/L (ref 7–15)
AST SERPL W P-5'-P-CCNC: 28 U/L (ref 10–35)
BASE EXCESS BLDV CALC-SCNC: -1.1 MMOL/L (ref -7.7–1.9)
BASOPHILS # BLD AUTO: 0.1 10E3/UL (ref 0–0.2)
BASOPHILS NFR BLD AUTO: 0 %
BILIRUB SERPL-MCNC: 0.8 MG/DL
BUN SERPL-MCNC: 3.1 MG/DL (ref 6–20)
BUN SERPL-MCNC: 3.3 MG/DL (ref 6–20)
CALCIUM SERPL-MCNC: 7.5 MG/DL (ref 8.6–10)
CALCIUM SERPL-MCNC: 7.9 MG/DL (ref 8.6–10)
CHLORIDE SERPL-SCNC: 104 MMOL/L (ref 98–107)
CHLORIDE SERPL-SCNC: 108 MMOL/L (ref 98–107)
CK SERPL-CCNC: 60 U/L (ref 26–192)
CREAT SERPL-MCNC: 0.64 MG/DL (ref 0.51–0.95)
CREAT SERPL-MCNC: 0.66 MG/DL (ref 0.51–0.95)
CRP SERPL-MCNC: 168.33 MG/L
DEPRECATED HCO3 PLAS-SCNC: 19 MMOL/L (ref 22–29)
DEPRECATED HCO3 PLAS-SCNC: 22 MMOL/L (ref 22–29)
EOSINOPHIL # BLD AUTO: 0.9 10E3/UL (ref 0–0.7)
EOSINOPHIL NFR BLD AUTO: 4 %
ERYTHROCYTE [DISTWIDTH] IN BLOOD BY AUTOMATED COUNT: 14.7 % (ref 10–15)
ERYTHROCYTE [DISTWIDTH] IN BLOOD BY AUTOMATED COUNT: 14.9 % (ref 10–15)
GFR SERPL CREATININE-BSD FRML MDRD: >90 ML/MIN/1.73M2
GFR SERPL CREATININE-BSD FRML MDRD: >90 ML/MIN/1.73M2
GLUCOSE BLDC GLUCOMTR-MCNC: 113 MG/DL (ref 70–99)
GLUCOSE BLDC GLUCOMTR-MCNC: 114 MG/DL (ref 70–99)
GLUCOSE BLDC GLUCOMTR-MCNC: 92 MG/DL (ref 70–99)
GLUCOSE BLDC GLUCOMTR-MCNC: 99 MG/DL (ref 70–99)
GLUCOSE BLDC GLUCOMTR-MCNC: 99 MG/DL (ref 70–99)
GLUCOSE SERPL-MCNC: 111 MG/DL (ref 70–99)
GLUCOSE SERPL-MCNC: 98 MG/DL (ref 70–99)
HCO3 BLDV-SCNC: 25 MMOL/L (ref 21–28)
HCT VFR BLD AUTO: 30.4 % (ref 35–47)
HCT VFR BLD AUTO: 33.6 % (ref 35–47)
HGB BLD-MCNC: 10.7 G/DL (ref 11.7–15.7)
HGB BLD-MCNC: 9.9 G/DL (ref 11.7–15.7)
HOLD SPECIMEN: NORMAL
IMM GRANULOCYTES # BLD: 0.2 10E3/UL
IMM GRANULOCYTES NFR BLD: 1 %
INR PPP: 1.27 (ref 0.85–1.15)
LACTATE SERPL-SCNC: 1.1 MMOL/L (ref 0.7–2)
LACTATE SERPL-SCNC: 1.2 MMOL/L (ref 0.7–2)
LAMOTRIGINE SERPL-MCNC: <0.9 UG/ML
LIPASE SERPL-CCNC: 36 U/L (ref 13–60)
LVEF ECHO: NORMAL
LYMPHOCYTES # BLD AUTO: 1.5 10E3/UL (ref 0.8–5.3)
LYMPHOCYTES NFR BLD AUTO: 7 %
MAGNESIUM SERPL-MCNC: 2.1 MG/DL (ref 1.7–2.3)
MAGNESIUM SERPL-MCNC: 2.1 MG/DL (ref 1.7–2.3)
MCH RBC QN AUTO: 29.8 PG (ref 26.5–33)
MCH RBC QN AUTO: 30.1 PG (ref 26.5–33)
MCHC RBC AUTO-ENTMCNC: 31.8 G/DL (ref 31.5–36.5)
MCHC RBC AUTO-ENTMCNC: 32.6 G/DL (ref 31.5–36.5)
MCV RBC AUTO: 92 FL (ref 78–100)
MCV RBC AUTO: 94 FL (ref 78–100)
MONOCYTES # BLD AUTO: 1.8 10E3/UL (ref 0–1.3)
MONOCYTES NFR BLD AUTO: 9 %
MRSA DNA SPEC QL NAA+PROBE: POSITIVE
NEUTROPHILS # BLD AUTO: 16.3 10E3/UL (ref 1.6–8.3)
NEUTROPHILS NFR BLD AUTO: 79 %
NRBC # BLD AUTO: 0 10E3/UL
NRBC BLD AUTO-RTO: 0 /100
O2/TOTAL GAS SETTING VFR VENT: 60 %
PCO2 BLDV: 45 MM HG (ref 40–50)
PH BLDV: 7.35 [PH] (ref 7.32–7.43)
PHOSPHATE SERPL-MCNC: 2.4 MG/DL (ref 2.5–4.5)
PHOSPHATE SERPL-MCNC: 2.8 MG/DL (ref 2.5–4.5)
PLATELET # BLD AUTO: 229 10E3/UL (ref 150–450)
PLATELET # BLD AUTO: 258 10E3/UL (ref 150–450)
PO2 BLDV: 41 MM HG (ref 25–47)
POTASSIUM SERPL-SCNC: 3.6 MMOL/L (ref 3.4–5.3)
POTASSIUM SERPL-SCNC: 3.7 MMOL/L (ref 3.4–5.3)
PROCALCITONIN SERPL IA-MCNC: 0.08 NG/ML
PROT SERPL-MCNC: 6.4 G/DL (ref 6.4–8.3)
RBC # BLD AUTO: 3.29 10E6/UL (ref 3.8–5.2)
RBC # BLD AUTO: 3.59 10E6/UL (ref 3.8–5.2)
SA TARGET DNA: POSITIVE
SODIUM SERPL-SCNC: 138 MMOL/L (ref 136–145)
SODIUM SERPL-SCNC: 141 MMOL/L (ref 136–145)
TRIGL SERPL-MCNC: 205 MG/DL
WBC # BLD AUTO: 16 10E3/UL (ref 4–11)
WBC # BLD AUTO: 20.8 10E3/UL (ref 4–11)

## 2023-04-23 PROCEDURE — 99221 1ST HOSP IP/OBS SF/LOW 40: CPT | Mod: GC | Performed by: SURGERY

## 2023-04-23 PROCEDURE — 99291 CRITICAL CARE FIRST HOUR: CPT

## 2023-04-23 PROCEDURE — 999N000157 HC STATISTIC RCP TIME EA 10 MIN

## 2023-04-23 PROCEDURE — 84145 PROCALCITONIN (PCT): CPT | Performed by: STUDENT IN AN ORGANIZED HEALTH CARE EDUCATION/TRAINING PROGRAM

## 2023-04-23 PROCEDURE — 82140 ASSAY OF AMMONIA: CPT | Performed by: STUDENT IN AN ORGANIZED HEALTH CARE EDUCATION/TRAINING PROGRAM

## 2023-04-23 PROCEDURE — 250N000011 HC RX IP 250 OP 636: Performed by: INTERNAL MEDICINE

## 2023-04-23 PROCEDURE — 93306 TTE W/DOPPLER COMPLETE: CPT

## 2023-04-23 PROCEDURE — 250N000009 HC RX 250: Performed by: NURSE PRACTITIONER

## 2023-04-23 PROCEDURE — 93306 TTE W/DOPPLER COMPLETE: CPT | Mod: 26 | Performed by: INTERNAL MEDICINE

## 2023-04-23 PROCEDURE — 250N000013 HC RX MED GY IP 250 OP 250 PS 637: Performed by: INTERNAL MEDICINE

## 2023-04-23 PROCEDURE — 250N000011 HC RX IP 250 OP 636: Performed by: NURSE PRACTITIONER

## 2023-04-23 PROCEDURE — 250N000013 HC RX MED GY IP 250 OP 250 PS 637: Performed by: NURSE PRACTITIONER

## 2023-04-23 PROCEDURE — 85027 COMPLETE CBC AUTOMATED: CPT | Performed by: NURSE PRACTITIONER

## 2023-04-23 PROCEDURE — 83735 ASSAY OF MAGNESIUM: CPT | Performed by: STUDENT IN AN ORGANIZED HEALTH CARE EDUCATION/TRAINING PROGRAM

## 2023-04-23 PROCEDURE — 83605 ASSAY OF LACTIC ACID: CPT | Performed by: STUDENT IN AN ORGANIZED HEALTH CARE EDUCATION/TRAINING PROGRAM

## 2023-04-23 PROCEDURE — 74177 CT ABD & PELVIS W/CONTRAST: CPT

## 2023-04-23 PROCEDURE — 80053 COMPREHEN METABOLIC PANEL: CPT | Performed by: NURSE PRACTITIONER

## 2023-04-23 PROCEDURE — 250N000009 HC RX 250: Performed by: INTERNAL MEDICINE

## 2023-04-23 PROCEDURE — 94003 VENT MGMT INPAT SUBQ DAY: CPT

## 2023-04-23 PROCEDURE — 250N000011 HC RX IP 250 OP 636

## 2023-04-23 PROCEDURE — 200N000002 HC R&B ICU UMMC

## 2023-04-23 PROCEDURE — 83735 ASSAY OF MAGNESIUM: CPT | Performed by: NURSE PRACTITIONER

## 2023-04-23 PROCEDURE — 74018 RADEX ABDOMEN 1 VIEW: CPT | Mod: 26 | Performed by: RADIOLOGY

## 2023-04-23 PROCEDURE — 83605 ASSAY OF LACTIC ACID: CPT | Performed by: NURSE PRACTITIONER

## 2023-04-23 PROCEDURE — 86140 C-REACTIVE PROTEIN: CPT | Performed by: STUDENT IN AN ORGANIZED HEALTH CARE EDUCATION/TRAINING PROGRAM

## 2023-04-23 PROCEDURE — 87641 MR-STAPH DNA AMP PROBE: CPT | Performed by: NURSE PRACTITIONER

## 2023-04-23 PROCEDURE — 84100 ASSAY OF PHOSPHORUS: CPT | Performed by: STUDENT IN AN ORGANIZED HEALTH CARE EDUCATION/TRAINING PROGRAM

## 2023-04-23 PROCEDURE — 82803 BLOOD GASES ANY COMBINATION: CPT | Performed by: NURSE PRACTITIONER

## 2023-04-23 PROCEDURE — 85027 COMPLETE CBC AUTOMATED: CPT | Performed by: STUDENT IN AN ORGANIZED HEALTH CARE EDUCATION/TRAINING PROGRAM

## 2023-04-23 PROCEDURE — 85610 PROTHROMBIN TIME: CPT | Performed by: STUDENT IN AN ORGANIZED HEALTH CARE EDUCATION/TRAINING PROGRAM

## 2023-04-23 PROCEDURE — 258N000003 HC RX IP 258 OP 636: Performed by: INTERNAL MEDICINE

## 2023-04-23 PROCEDURE — 83690 ASSAY OF LIPASE: CPT | Performed by: STUDENT IN AN ORGANIZED HEALTH CARE EDUCATION/TRAINING PROGRAM

## 2023-04-23 PROCEDURE — C9113 INJ PANTOPRAZOLE SODIUM, VIA: HCPCS | Performed by: NURSE PRACTITIONER

## 2023-04-23 PROCEDURE — 250N000013 HC RX MED GY IP 250 OP 250 PS 637

## 2023-04-23 PROCEDURE — 84100 ASSAY OF PHOSPHORUS: CPT | Performed by: NURSE PRACTITIONER

## 2023-04-23 PROCEDURE — 84478 ASSAY OF TRIGLYCERIDES: CPT

## 2023-04-23 PROCEDURE — 74018 RADEX ABDOMEN 1 VIEW: CPT

## 2023-04-23 PROCEDURE — 82550 ASSAY OF CK (CPK): CPT | Performed by: STUDENT IN AN ORGANIZED HEALTH CARE EDUCATION/TRAINING PROGRAM

## 2023-04-23 PROCEDURE — 74177 CT ABD & PELVIS W/CONTRAST: CPT | Mod: 26 | Performed by: RADIOLOGY

## 2023-04-23 PROCEDURE — 87040 BLOOD CULTURE FOR BACTERIA: CPT | Performed by: NURSE PRACTITIONER

## 2023-04-23 PROCEDURE — 71260 CT THORAX DX C+: CPT | Mod: 26 | Performed by: RADIOLOGY

## 2023-04-23 RX ORDER — PIPERACILLIN SODIUM, TAZOBACTAM SODIUM 4; .5 G/20ML; G/20ML
4.5 INJECTION, POWDER, LYOPHILIZED, FOR SOLUTION INTRAVENOUS EVERY 6 HOURS
Status: DISCONTINUED | OUTPATIENT
Start: 2023-04-23 | End: 2023-04-27

## 2023-04-23 RX ORDER — AMOXICILLIN 250 MG
1 CAPSULE ORAL 2 TIMES DAILY
Status: DISCONTINUED | OUTPATIENT
Start: 2023-04-23 | End: 2023-05-01 | Stop reason: HOSPADM

## 2023-04-23 RX ORDER — IOPAMIDOL 755 MG/ML
100 INJECTION, SOLUTION INTRAVASCULAR ONCE
Status: COMPLETED | OUTPATIENT
Start: 2023-04-23 | End: 2023-04-23

## 2023-04-23 RX ORDER — AMOXICILLIN 250 MG
2 CAPSULE ORAL 2 TIMES DAILY
Status: DISCONTINUED | OUTPATIENT
Start: 2023-04-23 | End: 2023-05-01 | Stop reason: HOSPADM

## 2023-04-23 RX ORDER — TRANSGALACTOOLIGOSACCHARIDES 2.75 G
3 POWDER IN PACKET (EA) ORAL DAILY
Status: DISCONTINUED | OUTPATIENT
Start: 2023-04-23 | End: 2023-04-24

## 2023-04-23 RX ORDER — FUROSEMIDE 10 MG/ML
20 INJECTION INTRAMUSCULAR; INTRAVENOUS ONCE
Status: DISCONTINUED | OUTPATIENT
Start: 2023-04-23 | End: 2023-04-23

## 2023-04-23 RX ORDER — METRONIDAZOLE 500 MG/100ML
500 INJECTION, SOLUTION INTRAVENOUS EVERY 8 HOURS
Status: DISCONTINUED | OUTPATIENT
Start: 2023-04-23 | End: 2023-04-23

## 2023-04-23 RX ORDER — QUETIAPINE FUMARATE 50 MG/1
50 TABLET, FILM COATED ORAL EVERY 8 HOURS PRN
Status: DISCONTINUED | OUTPATIENT
Start: 2023-04-23 | End: 2023-04-27

## 2023-04-23 RX ORDER — LORAZEPAM 2 MG/ML
2 INJECTION INTRAMUSCULAR EVERY 4 HOURS PRN
Status: DISCONTINUED | OUTPATIENT
Start: 2023-04-23 | End: 2023-04-27

## 2023-04-23 RX ORDER — ATORVASTATIN CALCIUM 40 MG/1
40 TABLET, FILM COATED ORAL EVERY EVENING
Status: DISCONTINUED | OUTPATIENT
Start: 2023-04-23 | End: 2023-05-01 | Stop reason: HOSPADM

## 2023-04-23 RX ORDER — FUROSEMIDE 10 MG/ML
20 INJECTION INTRAMUSCULAR; INTRAVENOUS ONCE
Status: COMPLETED | OUTPATIENT
Start: 2023-04-23 | End: 2023-04-23

## 2023-04-23 RX ORDER — DEXTROSE MONOHYDRATE 100 MG/ML
INJECTION, SOLUTION INTRAVENOUS CONTINUOUS PRN
Status: DISCONTINUED | OUTPATIENT
Start: 2023-04-23 | End: 2023-04-27

## 2023-04-23 RX ORDER — MONTELUKAST SODIUM 10 MG/1
10 TABLET ORAL AT BEDTIME
Status: DISCONTINUED | OUTPATIENT
Start: 2023-04-23 | End: 2023-05-01 | Stop reason: HOSPADM

## 2023-04-23 RX ORDER — POTASSIUM CHLORIDE 7.45 MG/ML
10 INJECTION INTRAVENOUS
Status: COMPLETED | OUTPATIENT
Start: 2023-04-23 | End: 2023-04-23

## 2023-04-23 RX ADMIN — FLUTICASONE PROPIONATE 1 SPRAY: 50 SPRAY, METERED NASAL at 08:13

## 2023-04-23 RX ADMIN — FUROSEMIDE 20 MG: 10 INJECTION, SOLUTION INTRAMUSCULAR; INTRAVENOUS at 09:43

## 2023-04-23 RX ADMIN — LORAZEPAM 2 MG: 2 INJECTION INTRAMUSCULAR; INTRAVENOUS at 01:38

## 2023-04-23 RX ADMIN — ACETAMINOPHEN 650 MG: 325 SUSPENSION ORAL at 10:46

## 2023-04-23 RX ADMIN — IOPAMIDOL 100 ML: 755 INJECTION, SOLUTION INTRAVENOUS at 02:32

## 2023-04-23 RX ADMIN — SENNOSIDES AND DOCUSATE SODIUM 1 TABLET: 50; 8.6 TABLET ORAL at 20:13

## 2023-04-23 RX ADMIN — Medication 10 MG: at 16:17

## 2023-04-23 RX ADMIN — POTASSIUM CHLORIDE 10 MEQ: 7.46 INJECTION, SOLUTION INTRAVENOUS at 06:57

## 2023-04-23 RX ADMIN — PIPERACILLIN AND TAZOBACTAM 4.5 G: 4; .5 INJECTION, POWDER, FOR SOLUTION INTRAVENOUS at 01:56

## 2023-04-23 RX ADMIN — PROPOFOL 50 MCG/KG/MIN: 10 INJECTION, EMULSION INTRAVENOUS at 23:11

## 2023-04-23 RX ADMIN — ENOXAPARIN SODIUM 40 MG: 40 INJECTION SUBCUTANEOUS at 08:10

## 2023-04-23 RX ADMIN — PROPOFOL 50 MCG/KG/MIN: 10 INJECTION, EMULSION INTRAVENOUS at 13:36

## 2023-04-23 RX ADMIN — QUETIAPINE FUMARATE 50 MG: 50 TABLET ORAL at 16:51

## 2023-04-23 RX ADMIN — LORAZEPAM 2 MG: 2 INJECTION INTRAMUSCULAR; INTRAVENOUS at 09:40

## 2023-04-23 RX ADMIN — PROPOFOL 40 MCG/KG/MIN: 10 INJECTION, EMULSION INTRAVENOUS at 02:58

## 2023-04-23 RX ADMIN — PROPOFOL 50 MCG/KG/MIN: 10 INJECTION, EMULSION INTRAVENOUS at 16:56

## 2023-04-23 RX ADMIN — SENNOSIDES AND DOCUSATE SODIUM 2 TABLET: 8.6; 5 TABLET ORAL at 08:10

## 2023-04-23 RX ADMIN — VANCOMYCIN HYDROCHLORIDE 1500 MG: 10 INJECTION, POWDER, LYOPHILIZED, FOR SOLUTION INTRAVENOUS at 14:35

## 2023-04-23 RX ADMIN — ACETAMINOPHEN 650 MG: 325 SUSPENSION ORAL at 16:51

## 2023-04-23 RX ADMIN — BISACODYL 10 MG: 10 SUPPOSITORY RECTAL at 00:31

## 2023-04-23 RX ADMIN — PANTOPRAZOLE SODIUM 40 MG: 40 INJECTION, POWDER, FOR SOLUTION INTRAVENOUS at 08:10

## 2023-04-23 RX ADMIN — PIPERACILLIN AND TAZOBACTAM 4.5 G: 4; .5 INJECTION, POWDER, FOR SOLUTION INTRAVENOUS at 08:10

## 2023-04-23 RX ADMIN — VANCOMYCIN HYDROCHLORIDE 1500 MG: 10 INJECTION, POWDER, LYOPHILIZED, FOR SOLUTION INTRAVENOUS at 02:53

## 2023-04-23 RX ADMIN — CHLORHEXIDINE GLUCONATE 15 ML: 1.2 SOLUTION ORAL at 08:10

## 2023-04-23 RX ADMIN — POTASSIUM CHLORIDE 10 MEQ: 7.46 INJECTION, SOLUTION INTRAVENOUS at 05:53

## 2023-04-23 RX ADMIN — Medication 10 MG: at 00:39

## 2023-04-23 RX ADMIN — LAMOTRIGINE 25 MG: 100 TABLET ORAL at 08:11

## 2023-04-23 RX ADMIN — ATORVASTATIN CALCIUM 40 MG: 40 TABLET, FILM COATED ORAL at 20:13

## 2023-04-23 RX ADMIN — FOLIC ACID 500 MCG: 1 TABLET ORAL at 08:13

## 2023-04-23 RX ADMIN — SODIUM CHLORIDE 65 ML: 9 INJECTION, SOLUTION INTRAVENOUS at 02:33

## 2023-04-23 RX ADMIN — ACETAMINOPHEN 650 MG: 325 SUSPENSION ORAL at 21:42

## 2023-04-23 RX ADMIN — METRONIDAZOLE 500 MG: 500 INJECTION, SOLUTION INTRAVENOUS at 02:59

## 2023-04-23 RX ADMIN — MONTELUKAST 10 MG: 10 TABLET, FILM COATED ORAL at 21:42

## 2023-04-23 RX ADMIN — Medication 0.3 MG/HR: at 07:19

## 2023-04-23 RX ADMIN — CHLORHEXIDINE GLUCONATE 15 ML: 1.2 SOLUTION ORAL at 20:14

## 2023-04-23 RX ADMIN — POLYETHYLENE GLYCOL 3350, SODIUM SULFATE ANHYDROUS, SODIUM BICARBONATE, SODIUM CHLORIDE, POTASSIUM CHLORIDE 2000 ML: 236; 22.74; 6.74; 5.86; 2.97 POWDER, FOR SOLUTION ORAL at 19:57

## 2023-04-23 RX ADMIN — PIPERACILLIN AND TAZOBACTAM 4.5 G: 4; .5 INJECTION, POWDER, FOR SOLUTION INTRAVENOUS at 20:13

## 2023-04-23 RX ADMIN — Medication 50 MG: at 08:13

## 2023-04-23 RX ADMIN — PROPOFOL 50 MCG/KG/MIN: 10 INJECTION, EMULSION INTRAVENOUS at 20:06

## 2023-04-23 RX ADMIN — ACETAMINOPHEN 650 MG: 325 SUSPENSION ORAL at 01:24

## 2023-04-23 RX ADMIN — PROPOFOL 35 MCG/KG/MIN: 10 INJECTION, EMULSION INTRAVENOUS at 06:25

## 2023-04-23 RX ADMIN — PIPERACILLIN AND TAZOBACTAM 4.5 G: 4; .5 INJECTION, POWDER, FOR SOLUTION INTRAVENOUS at 13:32

## 2023-04-23 ASSESSMENT — ACTIVITIES OF DAILY LIVING (ADL)
ADLS_ACUITY_SCORE: 50
ADLS_ACUITY_SCORE: 46
ADLS_ACUITY_SCORE: 50
ADLS_ACUITY_SCORE: 50
ADLS_ACUITY_SCORE: 46
ADLS_ACUITY_SCORE: 50
ADLS_ACUITY_SCORE: 46
ADLS_ACUITY_SCORE: 50
ADLS_ACUITY_SCORE: 50

## 2023-04-23 NOTE — PROGRESS NOTES
Critical Care Brief Update    Discussion with GI service regarding plans for magnet removal. GI team unable to remove using endoscopy, recommend general surgery consult for removal. Spoke with Dr. Palm of general surgery team, who was aware of patient from endoscopy on  yesterday. Dr. Palm would like patient to be laterally transferred to [facility] to MICU service primary with general surgery consulting. Called patient placement to facilitate hand off of cares to MICU service and request bed on  campus.    ETTA Pollack CNP      Addendum:   Handoff provided to Dr. Ruano of MICU service. Then spoke with both Dr. Palm of General Surgery and Dr. Duron of GI regarding transfer to . Teams plan to see patient on EB and establish ongoing plans to remove magnets, likely will pursue additional endoscopic approaches in the ICU on . Updated MICU team as well.

## 2023-04-23 NOTE — H&P
MEDICAL ICU H&P  04/23/2023    Date of Hospital Admission: 4/20/23  Date of ICU Admission: 4/20/23  Reason for Critical Care Admission: Agitated delirium 2/2 cocaine and possible benzodiazepine intoxication requiring intubation and mechanical ventilation for airway protection   Date of Service (when I saw the patient): 04/23/2023    ASSESSMENT:   Shanda Hinds is a 43yo F with PMH of mild cystic fibrosis, asthma, BRIAN (no CPAP), constipation, HTN, HLD, prior CVA (no residual), hepatic steatosis, questionable seizure disorder, polysubstance use disorder (cocaine, opioids, benzodiazepine, and THC), and multiple psychiatric conditions (bipolar disorder, borderline personality disorder, RANDI, PTSD, multiple suicide attempts and possible pseudoseizures), who presented with agitated delirium likely 2/2 cocaine & possible benzodiazepine intoxication requiring intubation for airway protection on 4/22. Also found to have multiple magnets in her colon she swallowed on 4/16/23 s/p multiple colon-prep w/o any improvement. Transferred to [facility] for surgical evaluation.     Changes for Today:  - Gen surgery and GI consult. Discussed with GI, plan for daily colonoscopy, no urgency for surgery now, may be okay for trickle feeding  - PT/OT consult     PLAN:     Neuro:  # Agitated delirium, likely 2/2 cocaine intoxication  # Encephalopathy, metabolic   # Cocaine intoxication  # Possible benzodiazepine intoxication  # Poly substance abuse (cocaine, benzodiazepine, opioid, THC)  Patient with a history of polysubstance use presented with agitated delirium. Urine drug screen was positive for benzodiazepines and cocaine. Head CT showed no evidence for acute intracranial pathology. Has had seizure like activity in the past, but no reports of convulsive activity since presentation. Work-up for additional metabolic or infectious etiologies contributing to AMS has been thusfar unrevealing. Per S.O. patient taking 1.5-2gm of cocaine  "daily and last used \"about a gram\" on 19th.   - Wean sedation as able     # Epilepsy vs pseudoseizures   # Migraine headaches   # Hx of TBI  New onset tonic clonic seizures early January 2023 (also possible report of sz in 2021). Neuroimaging negative for acute intracranial pathology, EEGs without epileptiforms while hospitalized. Generally preceded by an aura including migraines and anxiety. Was started on Depakote initially, but reportedly transitioned to lamotrigine. Subsequently [place]-like activity. Concern for psychogenic seizure has been raised as pt will at times be interactive during a convulsive event and has variable post-ictal states.   - Seizure precautions  - Continue PTA lamotrigine  - PRN lorazepam available for seizure activity     # Bipolar disorder  # Borderline personality disorder  # RANDI  # PTDS  # Multiple past suicide attempts   Unclear current psychiatric medication list. Recently discharged from [facility] with [facility] med list including amitriptyline 100 mg at bedtime PRN OR zolpidem 10 mg at bedtime PRN, diazepam 5 mg BID PRN, and is currently up-titrating lamotrigine. Saw her psychiatrist on 3/30/23 who also noted she may be on effexor, but this is not in her discharge med list. Of note, lamotrigine level on 4/21 was <0.9.   - Neuro exam q4h  - Will offer chem dep and psychiatry consultation once extubated if pt is willing to see them   - Unclear intent, may need 1:1 sitter once extubated   - Supplemental Thiamine and folate (unclear if ETOH abuse is an issue for her)  - Possible PTA psych medication regimen:  - Amitriptyline 100 mg @ HS PRN - Hold  - Zolpidem 10 mg @ HS PRN - Hold   - Diazepam 5 mg BID - Hold  - Lamotrigine 25 mg qd - Give 25 in AM   - Rimegepant 75 mg every day PRN - Hold   - Effexor 150 mg every day - Hold      # Pain and sedation for mechanical ventilation  - Sedation: Propofol gtt + " "versed 2 mg Q2H PRN for breakthrough agitation.  - RASS goal 0 to -1  - Pain: dilaudid gtt + boluses, tylenol prn  - NOTE: patient had tachyarrhythmias when started on precedex infusion 4/21 that ceased with stopping of infusion. Will list precedex as an allergy and will need a cardiac workup prior to discharge.     Pulmonary:  # Mechanical ventilation   # Asthma  # Cystic Fibrosis, mild  # BRIAN, no CPAP  # Pneumonia, hospital acquired  - Continue PTA fluticasone nasal spray BID & montelukast 10 mg every day   - Increased secretions overnight, with fever. CT Chest obtained showing \"scattered peripheral groundglass opacities in the right upper and middle lobes suggestive for infection versus aspiration\".   - Continue daily PST  - Antimicrobials per ID section  - Secretions: thick green-yellow copious from ETT and nasal per nursing report.      Vent Mode: SIMV/PS  (Synchronized Intermittent Mandatory Ventilation with Pressure Support)  FiO2 (%): 50 %  Resp Rate (Set): 18 breaths/min  Tidal Volume (Set, mL): 450 mL  PEEP (cm H2O): 5 cmH2O  Pressure Support (cm H2O): 12 cmH2O  Resp: 19      Cardiovascular:  # Lactic acidosis, likely 2/2 agitation, resolved   # HTN  # HLD  - Hydralazine PRN for SBP > 160 mmHg  - Continue PTA atorvastatin 40 mg every day  - Holding PTA metoprolol succinate 50 mg every day given cocaine use-> should likely not continue upon discharge given reported daily cocaine use.   - Holding PTA Hyzaar -- would prefer to use BP for diuresis today rather than treat intermittent HTN 2/2 to agitation, consider resuming if sustaining HTN.     GI/Nutrition:  # Magnet ingestion 4/16, now located in ascending colon  # Hepatic steatosis   # Hypertriglyceridemia  on admission  # Chronic 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               - RNs to " search BMs until magnets are found               - KUB BID to verify position of magnets until they are passed. Once passed, will need to repeat AXR to verify               - Colonoscopy 4/22: unable to locate magnets 2/2 to incomplete bowel prep.                - If change in clinical status/HD instability, signs of worsening abdominal pain, STAT surgery consult               - 4/23: Completed golytely prep overnight x2, no magnets found in stool. CT abd obtained overnight indicates magnets remain in the ileocecal valve, unchanged from previous imaging studies. Will discuss with GI plans.  - Bisacodyl suppository daily PRN   - Trend triglycerides while on propofol infusion (205 this AM, down from initial 580)  - RD consult for recs, appreciate input.        Renal/Fluids/Electrolytes:  # Hyponatremia, resolved   # Volume status  - ICU electrolyte replacement protocol  - Sheba for strict I&O, unable to use external device given high liquid stool output with bowel prep.  - Daily weights   - 4/23: Overall net positive 9.5L since admission, +900 mL / 24 hours, +1.7L since midnight. 3 documented bowel movements, weight up 1.7 kg / 24 hours. Will diurese with 20 mg lasix x1 for a goal of net negative 500 mL to 1L today. Consider re-dosing if not meeting goal this afternoon.      Endocrine:  No acute concerns  - Goal BG < 180, no current indication for insulin       ID:  # Pneumonia, hospital acquired  # MRSA nares positive  - Monitor WBC trend and fever curve  - UA without infectious findings  - Blood cultures 4/20 pending , NGTD  - 4/23: Fever and agitation reported overnight, increased nasal secretions. CT C/A/P obtained, showing c/f pneumonia. BC and sputum cx obtained, Initial sputum with +4 gram positive cocci, MRSA swab positive. Started on vanc/zosyn/flagyl. Flagyl discontinued after negative abdominal findings on imaging. Will continue vanc/zosyn while awaiting cultures to speciate.       Hematology:    # Acute  "normocytic anemia  PTA Hb 11.8-12.3. Hb 9.9 today. No signs of active bleeding, likely 2/2 to drug use/illness.  - CTM  - Enoxaparin for DVT prophy       Musculoskeletal:  # Weakness  Ambulatory PTA.  - PT/OT consult     Skin:  No acute concerns  - Diligent skin cares to prevent breakdown     General Cares/Prophylaxis:    DVT Prophylaxis: Enoxaparin (Lovenox) SQ and Pneumatic Compression Devices  GI Prophylaxis: PPI  Restraints: Bilateral upper extremity soft wrist restraints and mitts to prevent self extubation  Code Status: Full     Lines/tubes/drains:  ? PIV x 2  ? NG  ? Scruggs  ? ETT     Disposition:  [facility]    Patient case discussed with Dr. Perlman who agrees with the above plan.    Liam Durant, DO  PGY-2, Internal Medicine  [facility]    Clinically Significant Risk Factors        # Hypokalemia: Lowest K = 3 mmol/L in last 2 days, will replace as needed                 # Obesity: Estimated body mass index is 38 kg/m  as calculated from the following:    Height as of this encounter: 1.676 m (5' 6\").    Weight as of this encounter: 106.8 kg (235 lb 7.2 oz)., PRESENT ON ADMISSION            -----------------------------------------------------------------------    HISTORY PRESENTING ILLNESS:   Shanda Hinds is a 42 year old female admitted with AMS presenting as agitated delirium likely 2/2 cocaine intoxication requiring intubation and mechanical ventilation. Also found to have magnets in her colon she swallowed on 4/16/23. Hx significant for asthma, BRIAN (no CPAP), constipation, HTN, HLD, prior CVA (no residual), hepatic steatosis, questionable seizure disorder, polysubstance use disorder (cocaine, opioids, BZD, and THC), and multiple psychiatric conditions (bipolar disorder, borderline personality disorder, RANDI, PTSD, multiple suicide attempts, and possible pseudoseizures).      Per H&P 4/20/2023:  Presented to the [facility]- ED for follow-up ABD x-ray due to recent " ingestion of magnets. On arrival she was noted to have severely altered mental status with agitation alternating with somnolence. She was intubated for airway protection. Diagnostics following notable for Na+ 130, WBC 13.7, UDS positive for BZD and cocaine. Other electrolytes, renal function, hepatic function, and blood counts within normal limits. No lactic acidosis or troponin elevation. Head CT negative for acute intracranial anomalies. CT abdomen and pelvis notable for clump of magnets in the ascending colon without evidence of obstruction. EKG negative for ischemic findings with appropriate cardiac intervals. Presentation felt likely secondary to cocaine and benzodiazepine intoxication. She was transferred to [facility] adult ICU for ongoing care of agitated delirium in the setting of polysubstance ingestion.    4/23/2023:  - Worsening respiratory status overnight and spike fever, worsened leukocytosis. CT Chest indicates aspiration pneumonia, SpCx +4 GPC, MRSA +. Vanco/zosyn started. BCx pending.  - Magnets unmoved despite repeat bowel prep and colonoscopy 4/22. Will need surgery evaluation  - Diuresis with lasix, goal net negative 500 mL to 1L    REVIEW OF SYSTEMS: unable to assess due to intubation and sedation    PAST MEDICAL HISTORY:   Past Medical History:   Diagnosis Date     Cystic fibrosis (H)      Depressive disorder      Insomnia      PTSD (post-traumatic stress disorder)      SURGICAL HISTORY:  Past Surgical History:   Procedure Laterality Date     ABDOMEN SURGERY       ABDOMEN SURGERY      removal of shrapnel     ABDOMEN SURGERY      removal of adhesions     BLADDER SURGERY       CHOLECYSTECTOMY       CHOLECYSTECTOMY       CHOLECYSTECTOMY Bilateral 2011     GYN SURGERY      fibroids, endometriosis, torsion, adhesion removal     HYSTERECTOMY       HYSTERECTOMY       LEFT OOPHORECTOMY       SLING BLADDER SUSPENSION WITH FASCIA EILEEN       SOCIAL HISTORY:  Social History     Socioeconomic History      "Marital status:    Tobacco Use     Smoking status: Every Day     Packs/day: 0.25     Years: 15.00     Pack years: 3.75     Types: Cigarettes     Smokeless tobacco: Never     Tobacco comments:     3-4 per day   Substance and Sexual Activity     Alcohol use: Yes     Comment: Alcoholic Drinks/day: Holidays     Drug use: Yes     Types: Cocaine, Marijuana     Comment: Drug use: occassionally     Sexual activity: Yes     Partners: Male   Social History Narrative    Disability from [facility]     FAMILY HISTORY:   Family History   Problem Relation Age of Onset     No Known Problems Mother      Kidney failure Father      Hypertension Father      Pancreatic Cancer Sister      ALLERGIES:   Allergies   Allergen Reactions     Haldol [Haloperidol] Anaphylaxis     Codeine      Hives, vomiting      Compazine [Prochlorperazine]      Fentanyl      Morphine      Hives, vomiting      Neurontin [Gabapentin] Other (See Comments)     Facial twitching     Precedex Other (See Comments)     Tachy arrhythmias on 2 separate infusion trials      Reglan [Metoclopramide]      Trazodone Nausea and Hives     MEDICATIONS:  No current facility-administered medications on file prior to encounter.  amitriptyline (ELAVIL) 50 MG tablet, Take 100 mg by mouth nightly as needed for sleep Per Dr. Alisson Michele urologist note on 3/30/23 \"If she is able to cut the 50 mg tablet - 75 mg at bedtime x 2 weeks than 50 mg at bedtime\"  atorvastatin (LIPITOR) 40 MG tablet, Take 40 mg by mouth At Bedtime  diazepam (VALIUM) 5 MG tablet, Take 5 mg by mouth every 12 hours as needed for anxiety  estradiol (VIVELLE-DOT) 0.1 MG/24HR BIW patch, Place 1 patch onto the skin twice a week On Wednesdays and Sundays.  fluticasone (FLONASE) 50 MCG/ACT nasal spray, Spray 2 sprays into both nostrils daily  lamoTRIgine (LAMICTAL) 25 MG tablet, Take 25 mg by mouth daily Per Dr. Alisson Mendez neurologist note on 3/30/23 \"Okay with trying to titrate lamotrigine faster. LTG 25 mg BID " "x 1 week, 50 mg BID x 1 week than 100 mg BID\"  losartan-hydrochlorothiazide (HYZAAR) 100-25 MG tablet, Take 1 tablet by mouth daily  metoprolol succinate ER (TOPROL XL) 25 MG 24 hr tablet, Take 25 mg by mouth daily  montelukast (SINGULAIR) 10 MG tablet, Take 10 mg by mouth At Bedtime  omeprazole (PRILOSEC) 40 MG DR capsule, Take 40 mg by mouth daily  rimegepant (NURTEC) 75 MG ODT tablet, Place 75 mg under the tongue as needed for migraine  zolpidem (AMBIEN) 10 MG tablet, Take 10 mg by mouth nightly as needed for sleep        PHYSICAL EXAMINATION:  Temp:  [98.3  F (36.8  C)-102.8  F (39.3  C)] 100.6  F (38.1  C)  Pulse:  [] 103  Resp:  [0-29] 18  BP: ()/() 122/71  FiO2 (%):  [40 %-60 %] 40 %  SpO2:  [89 %-99 %] 97 %     General: Sedated  HEENT: Normocephalic/atraumatic  Respiratory: CTAB on ventilatory support  Cardiovascular: RRR without murmurs, rubs or gallop. S1, S2 intact.  Abdomen: Bowel sounds present. Soft, non-distended.  Skin: No overt lesions, bruises, rashes or bleeding on exposed skin surfaces.    LABS: Reviewed.   Arterial Blood Gases   Recent Labs   Lab 04/21/23  0102   PH 7.40   PCO2 42   PO2 81   HCO3 26     Complete Blood Count   Recent Labs   Lab 04/23/23  0451 04/22/23  0351 04/21/23  0436 04/21/23  0021   WBC 16.0* 13.9* 11.6* 13.0*   HGB 9.9* 11.1* 11.3* 11.8    273 255 289     Basic Metabolic Panel  Recent Labs   Lab 04/23/23  1200 04/23/23  0757 04/23/23  0451 04/23/23  0450 04/22/23  2351 04/22/23  1935 04/22/23  0810 04/22/23  0351 04/21/23 2005 04/21/23  1427 04/21/23  0827 04/21/23  0436   NA  --   --  141  --   --   --   --  138  --  137  --  142   POTASSIUM  --   --  3.6  --   --  4.3  --  3.0*  --  3.9  --  3.9   CHLORIDE  --   --  108*  --   --   --   --  103  --  103  --  106   CO2  --   --  22  --   --   --   --  23  --  24  --  24   BUN  --   --  3.3*  --   --   --   --  6.5  --  10.3  --  9.5   CR  --   --  0.66  --   --   --   --  0.55  --  0.57  --  0.57 "   GLC 99 114* 111* 99   < >  --    < > 108*   < > 118*   < > 101*    < > = values in this interval not displayed.     Liver Function Tests  Recent Labs   Lab 04/21/23  0021 04/20/23  1832   AST 23 28   ALT 21 20   ALKPHOS 60 66   BILITOTAL <0.2 0.2   ALBUMIN 4.0 4.5   INR 1.04 0.97  1.0*     Coagulation Profile  Recent Labs   Lab 04/21/23  0021 04/20/23  1832   INR 1.04 0.97  1.0*       IMAGING:  Recent Results (from the past 24 hour(s))   XR Abdomen Port 1 View    Narrative    Exam: XR ABDOMEN PORT 1 VIEW, 4/22/2023 2:52 PM    Indication: Colonoscopy, post-op check    Comparison: None    Findings:   Left lateral decubitus radiographs of the abdomen. Redemonstration of  metallic foreign body in the large bowel. Difficult to localize given  technique. Multiple air-filled dilated loops of bowel, unremarkable on  the recent postprocedural setting. No free air identified.      Impression    Impression:   1. Redemonstration of metallic foreign body overlying the large bowel.  Typical to localize given technique.  2. Multiple air-filled loops of bowel. No free air identified.    I have personally reviewed the examination and initial interpretation  and I agree with the findings.    CROW KAPLAN MD         SYSTEM ID:  [id number]   XR Abdomen Port 1 View    Narrative    Exam: XR ABDOMEN PORT 1 VIEW, 4/22/2023 5:38 PM    Indication: new NG placed    Comparison: X-ray 4/22/2023    Findings:   Frontal radiograph of the abdomen. Gastric tube tip and sidehole  project over the stomach. Clips in the right upper quadrant.  Nonobstructive bowel gas pattern. No pneumatosis or portal venous gas.  Previously seen metallic foreign body is not visualized on this exam,  and may be out of the field of view.      Impression    Impression:   1. Gastric tube tip and sidehole projected over the stomach.    I have personally reviewed the examination and initial interpretation  and I agree with the findings.    CROW KAPLAN MD          SYSTEM ID:  [id number]   CT Chest/Abdomen/Pelvis w Contrast    Narrative    CT of the Chest, Abdomen and Pelvis with contrast, 4/23/2023 2:18 AM.    Comparison: Abdominal x-ray 4/22/2023. Chest x-ray 4/22/2023. CT  abdomen and pelvis 4/20/2023.    History: ingested magnets, developed high fever, assess for bowel  perf.     Technique: Axial images of the chest, abdomen and pelvis were obtained  with contrast. Coronal reconstructions were provided. Images were  reviewed in bone, lung, and soft tissue windows.     Findings:  Endotracheal tube terminates in the low trachea just above the pastora.  Gastric tube tip and sidehole terminates in the stomach.    Chest:  Thyroid gland is unremarkable. Heart size is normal. No pericardial  effusion. No significant coronary artery calcifications. Thoracic  aorta and pulmonary arteries are normal in caliber with conventional  three-vessel branching pattern of the aortic arch. Esophagus is  unremarkable.    Scattered prominent, nonenlarged mediastinal/hilar lymph nodes. No  axillary adenopathy.    Trachea and central airways are clear. Right upper and bilateral lower  lobe consolidative opacities with scattered peripheral groundglass and  consolidative opacities in the right upper and middle lobes.    Abdomen and Pelvis:   Liver is unremarkable. No focal enhancing lesion. No intra or  extrahepatic biliary duct dilation. Postsurgical cholecystectomy  changes. Spleen size within normal limits. Small accessory splenule.  Homogenous enhancement of the pancreas. The main pancreatic and common  bile ducts are not dilated.    Adrenal glands are unremarkable. Symmetric renal enhancement. No  hydronephrosis, calcified stone, or enhancing mass. Bladder is  decompressed with Scruggs catheter balloon in place and small amount of  intraluminal postprocedural gas.    Metallic densities remain, located at the ileocecal valve, unchanged  position since CT 4/20/2023. No small or large bowel wall  thickening  or dilation. No free intraperitoneal air. No intra-abdominal pelvic or  free fluid. No pneumatosis or portal venous gas. The appendix is  normal.    Abdominal aorta and major branches are normal in caliber and patent.  Portal and hepatic veins are patent.    No mesenteric or retroperitoneal, or pelvic lymphadenopathy.    Bones and Soft Tissues:   No suspicious osseous lesion. Nodular subcutaneous densities along the  lower abdomen likely secondary to medication administration.       Impression    Impression:   1.  A few scattered peripheral groundglass opacities in the right  upper and middle lobes suggestive for infection versus aspiration.  2.  Right upper and bilateral lower lobe consolidative opacities  suggestive for dependent atelectasis versus, less likely, infection.  3.  No acute findings in the abdomen or pelvis.  4.  Metallic densities remain at the ileocecal valve, in unchanged  position since CT 4/20/2023 without evidence for bowel obstruction or  perforation.     I have personally reviewed the examination and initial interpretation  and I agree with the findings.    SONIA TAYLOR DO         SYSTEM ID:  [id number]   XR Abdomen Port 1 View    Narrative    Exam: XR ABDOMEN PORT 1 VIEW, 4/23/2023 8:51 AM    Indication: Ingested magnets 4/16, abd CT 4/20 showed them in the  ascending colon. Evaluate current position.    Comparison: Same-day CT    Findings:   Supine frontal views of the abdomen. Enteric tube tip and sidehole  project over the stomach. Right upper quadrant cholecystectomy clips.  Metallic foreign body continues to project over the right lower  quadrant. Paucity of small bowel gas. Air within the nondilated colon.  No pneumatosis or portal venous gas.      Impression    Impression: Metallic foreign body continues to project over the right  lower quadrant in the region of the ileocecal valve, as seen on  same-day CT.    SONIA TAYLOR DO         SYSTEM ID:  [id number]   Echo Complete    Result Value    LVEF  70%    [facility]    [id number]  [id number]  [id number]  [id number]^GEETHA^LEANNA     [facility],[place]  Echocardiography Laboratory  [address] [place] [address]     Name: BIANCA TALAMANTES  MRN: [id number]  : 1980  Study Date: 2023 10:46 AM  Age: 42 yrs  Gender: Female  Patient Location: [facility]  Reason For Study: Arrhythmia  Ordering Physician: LEANNA INIGUEZ  Performed By: Natanael Talavera RDCS     BSA: 2.1 m2  Height: 66 in  Weight: 235 lb  ______________________________________________________________________________  Procedure  Complete Portable Echo Adult.  ______________________________________________________________________________  Interpretation Summary  Global and regional left ventricular function is hyperkinetic with an EF >70%.  Right ventricular function, chamber size, wall motion, and thickness are  normal.  The inferior vena cava is normal.  No pericardial effusion is present.  There is no prior study for direct comparison.  ______________________________________________________________________________  Left Ventricle  Global and regional left ventricular function is hyperkinetic with an EF >70%.  Left ventricular wall thickness is normal. Left ventricular size is normal.  Left ventricular diastolic function is normal. No regional wall motion  abnormalities are seen.     Right Ventricle  Right ventricular function, chamber size, wall motion, and thickness are  normal.     Atria  Both atria appear normal. The atrial septum is intact as assessed by color  Doppler .     Mitral Valve  The mitral valve is normal.     Aortic Valve  Aortic valve is normal in structure and function.     Tricuspid Valve  The tricuspid valve is normal. Pulmonary artery systolic pressure cannot be  assessed.     Pulmonic Valve  The pulmonic valve is normal.     Vessels  The thoracic aorta is normal. The pulmonary artery and bifurcation cannot  be  assessed. The inferior vena cava is normal.     Pericardium  No pericardial effusion is present. Prominent epicardial fat is noted.     Compared to Previous Study  There is no prior study for direct comparison.  ______________________________________________________________________________  MMode/2D Measurements & Calculations  IVSd: 1.1 cm  LVIDd: 4.5 cm  LVIDs: 2.7 cm  LVPWd: 0.92 cm  FS: 38.8 %  LV mass(C)d: 150.3 grams  LV mass(C)dI: 70.2 grams/m2  Ao root diam: 3.6 cm  asc Aorta Diam: 3.6 cm  LVOT diam: 2.4 cm  LVOT area: 4.5 cm2  LA Volume (BP): 47.3 ml     LA Volume Index (BP): 22.1 ml/m2  RWT: 0.41  TAPSE: 2.3 cm     Doppler Measurements & Calculations  MV E max francine: 69.8 cm/sec  MV A max francine: 80.1 cm/sec  MV E/A: 0.87  MV dec slope: 420.0 cm/sec2  MV dec time: 0.17 sec  PA acc time: 0.12 sec  E/E' av.6  Lateral E/e': 6.1  Medial E/e': 7.0     ______________________________________________________________________________  Report approved by: Luis Carlos Oneill 2023 12:33 PM

## 2023-04-23 NOTE — PROGRESS NOTES
"  CLINICAL NUTRITION SERVICES - BRIEF NOTE     Nutrition Prescription    RECOMMENDATIONS FOR MDs/PROVIDERS TO ORDER:  Standard FWF 30 mL q4hrs, additional free water per provider.     Recommendations already ordered by Registered Dietitian (RD):  Access: NG tube   Do not initiate enteral feedings until GI clearance obtained or until provider OK's:   Osmolite 1.5 Nilson + 3 packets ProSource @ goal of  35ml/hr  (840ml/day) provides: 1380 kcals, 85 g PRO, 640 ml free H20, 171 g CHO, and 0 g fiber daily.  - Initiate Osmolite @ 15 mL/hr and advance by 10 mL Q 8 hrs as tolerated to goal of 35 mL/hr.  - Do not start or advance unless K+ >/= 3, Mg++ >/=1.5, and phos >/=1.9    Future/Additional Recommendations:  Monitor extubation and need for adjusted TF provisions, monitor TF tolerance.      REASON FOR ASSESSMENT  Shanda Hinds is a/an 42 year old female assessed by the dietitian for Provider Order - Registered Dietitian to Assess and Order TF per Medical Nutrition Therapy Protocol  - \"Will need to wait until GI clearance before starting enteral nutrition, but likely will be able to start today (4/23).\"     EVALUATION OF THE PROGRESS TOWARD GOALS   Diet: NPO     NEW FINDINGS   Per 4/22 nursing note, OG was removed and bridled NG was placed. \"Nursing tube replacement confirmation with positive air sounds and return of bile/stomach contents. Awaiting confirmation of ab x-ray for line use to initiate next round of bowel prep therapy per order.   Continue to monitor for passing of foreign objects in bowel, decrease sedation as tolerated, and prepare for extubation.\"     Spoke with patients RN, it is still to be seen whether or not patient will be extubated today but it is unlikely- will be confirmed in rounds. Will plan to underfeed patient given propofol and intubation, floor RD will reassess tomorrow.     Will place tube feed orders, with the stipulation that TFs do not start until GI clearance obtained or per provider. "     Current propofol rate: 18.3 mL/hr mL/hr x 24 hrs = 439 mL/day x 1.1 kcal/mL = ~482 kcal from propofol.     Dosing Weight:   Current weight: 106.8 kg   IBW: 59.3      ASSESSED NUTRITION NEEDS  Estimated Energy Needs: 1495 - 1815 kcals/day (14 - 17 kcals/kg actual body weight )  Justification: Intubated, obese   Estimated Protein Needs: 89 - 118.6 grams protein/day (1.5 - 2 grams of pro/kg ideal BW)  Justification: Increased needs    Enteral provisions:  Osmolite 1.5 Nilson + 3 packets ProSource @ goal of  35ml/hr  (840ml/day) provides: 1380 kcals (13 kcal/kg current BW), 85 g PRO (1.4 g/kg IBW), 640 ml free H20, 171 g CHO, and 0 g fiber daily.    Monitoring/Evaluation  Progress toward goals will be monitored and evaluated per protocol.     Carri Ackerman, MPH, RD, LD  Pediatric & Adult Behavioral Health Dietitian   Pager: [phone]  Weekend/holiday pager: [phone]

## 2023-04-23 NOTE — PLAN OF CARE
ICU End of Shift Summary.  For vital signs and complete assessments, please see documentation flowsheets.     Major Shift Events:     Very difficult to meet RASS goal of 0 to -1. Patient fluctuates between RASS of -2 to RASS of +2. Very agitated at beginning of shift, PRN ativan 2mg given x2 plus additional one time doses of ativan 2mg given x2 for total of 8mg ativan given. PRN dilaudid bumps increased to 0.5mg to 1mg q 2hrs.   EKG at beginning of shift shows QTC of 449. Seroquel PRN unheld.  Large amounts of tenacious brown / green secretions from inline ETT. Also large amounts of thick yellow/green nasal drainage. Sputum cultures sent.   Tmax 102.8 early this am. Tylenol given. Blood cx & MRSA sent. Patient down to stat CT to r/o abdominal perf. CT negative. Sputum cx from beginning of shift showing 4+ gram positive cocci. Zosyn and vanco started per RUSTAM.   MRSA positive.  Total 1200ml urine output overnight (from 1800 to 0600 this am).  Golytely given per orders. One large watery loose BM overnight. PRN dulcolax also given along with scheduled senna.   Potassium replaced per protocol. Recheck tomorrow am.     Plan (Upcoming Events):     Continue to attempt to prep / clear bowels for re-scope. Magnet(s?) remain in patients GI tract.   Abx, blood cultures pending. Positive sputum.  Aggressive pulm hygiene.     Discharge/Transfer Needs:   TBD    Right wrist and Left wrist restraints continued 4/23/2023    Clinical Justification: Pulling lines, pulling tubes, and pulling equipment  Less Restrictive Alternative: Reorientation, De-escalation, Alarm, Pain management, Disguise equipment, Re-evaluate equipment, Repositioning  Attending Physician Notified: MD ordered restraint,     New orders placed Yes  Length of Order: 1 Day      Eunice Doss RN

## 2023-04-23 NOTE — PROGRESS NOTES
BRIEF GI NOTE:    04/23/23    Assessment:  42 year old female with past medical history significant for polysubstance abuse (Cocaine, benzodiazepines, opioids, THC), multiple psychiatric conditions (bipolar disorder, BPD, RANDI, PTSD) fatty liver, constipation, multiple abdominal surgeries due to shrapnel (she is a ) who was admitted for intoxication now s/p intubation in settings of swallowing magnets (occurred on 4/16).     Colonoscopy was performed on 4/22 in the OR. Unable to locate the magnets in the right colon despite extensive irrigation, TI intubation, retroflex in the cecum. After transfer back to the [facility], the patient developed fever, leukocytosis likely from pneumonia so she remains intubated and being treated with antibiotics.     An additional 2L Golytely was given post colonoscopy on 4/22 and another 2L given this morning 4/23. CT C/A/P showed the foreign body still in the same position. Confirmed that this foreign body did not exist on CT in 3/2023. General surgery formally consulted and agreed with plan to transfer to [facility] for further care.     Recommendations:  - 2L Golytely ordered for this pm. Repeat XR after.   - 2L Golytely ordered for 4AM tmr. Repeat XR after.    - NPO at MN and hold any tube feed.   - Plan for repeat colonoscopy tomorrow.     Patient discussed with on call GI staff Dr. Stan Duron MD  Gastroenterology Fellow  Division of Gastroenterology, Hepatology and Nutrition  [facility]  [id number]

## 2023-04-23 NOTE — PROVIDER NOTIFICATION
"Kerry RUSTAM called. Nurse performing cares and patient noted to have significant increase in oxygen demands with copious amounts of thick green / yellow secretions both inline ETT and nasal. Patient looks \"worse\" to writer than previous assessment. Tachycardic. Febrile. Agitated. Edematous in face and generalized. Weight up. Temp noted to be 102.8. Plan to give tylenol. RUSTAM discussed abx. Sputum sample already sent earlier in shift. Urine green/yellow but clear.   "

## 2023-04-23 NOTE — PHARMACY-VANCOMYCIN DOSING SERVICE
"Pharmacy Vancomycin Initial Note  Date of Service 2023  Patient's  1980  42 year old, female    Indication: Ventilator-Associated Pneumonia    Current estimated CrCl = Estimated Creatinine Clearance: 161.3 mL/min (based on SCr of 0.55 mg/dL).    Creatinine for last 3 days  2023:  6:32 PM Creatinine 0.68 mg/dL;  6:36 PM Creatinine POCT 0.7 mg/dL  2023: 12:21 AM Creatinine 0.69 mg/dL;  4:36 AM Creatinine 0.57 mg/dL;  2:27 PM Creatinine 0.57 mg/dL  2023:  3:51 AM Creatinine 0.55 mg/dL    Recent Vancomycin Level(s) for last 3 days  No results found for requested labs within last 3 days.      Vancomycin IV Administrations (past 72 hours)      No vancomycin orders with administrations in past 72 hours.                Nephrotoxins and other renal medications (From now, onward)    Start     Dose/Rate Route Frequency Ordered Stop    23 0130  furosemide (LASIX) injection 20 mg         20 mg  over 1-3 Minutes Intravenous ONCE 23 0108      23 0130  piperacillin-tazobactam (ZOSYN) 4.5 g vial to attach to  mL bag        Note to Pharmacy: For [facility], [facility] and [facility]: For Zosyn-naive patients, use the \"Zosyn initial dose + extended infusion\" order panel.    4.5 g  over 30 Minutes Intravenous EVERY 6 HOURS 23 0112            Contrast Orders - past 72 hours (72h ago, onward)    Start     Dose/Rate Route Frequency Stop    23 1850  iopamidol (ISOVUE-370) solution 135 mL         135 mL Intravenous ONCE 23 1926          [employer] Prediction of Planned Initial Vancomycin Regimen  Loading dose: N/A  Regimen: 1500 mg IV every 12 hours.  Start time: 01:20 on 2023  Exposure target: AUC24 (range)400-600 mg/L.hr   AUC24,ss: 545 mg/L.hr  Probability of AUC24 > 400: 81 %  Ctrough,ss: 17 mg/L  Probability of Ctrough,ss > 20: 36 %  Probability of nephrotoxicity (Lodise YANELIS ): 13 %    Plan:  1. Start vancomycin  1500 mg IV q12h.   2. Vancomycin monitoring method: " AUC  3. Vancomycin therapeutic monitoring goal: 400-600 mg*h/L  4. Pharmacy will check vancomycin levels as appropriate in 1-3 Days.    5. Serum creatinine levels will be ordered daily for the first week of therapy and at least twice weekly for subsequent weeks.      Nicollette McMann, PharmD, BCPS  [facility]: [phone]

## 2023-04-23 NOTE — PROGRESS NOTES
"  [facility] ADULT ICU PROGRESS NOTE  04/23/2023    Date of Hospital Admission: 4/20/23  Date of ICU Admission: 4/20/23  Reason for Critical Care Admission: Agitated delirium 2/2 cocaine intoxication requiring intubation and mechanical ventilation for airway protection   Date of Service (when I saw the patient): 04/23/2023    ASSESSMENT: Shanda Hinds is a 42 year old female admitted with AMS presenting as agitated delirium likely 2/2 cocaine & possible BZD intoxication requiring intubation and mechanical ventilation. Also found to have multiple magnets in her colon she swallowed on 4/16/23. Hx significant for mild cystic fibrosis, asthma, BRIAN (no CPAP), constipation, HTN, HLD, prior CVA (no residual), hepatic steatosis, questionable seizure disorder, polysubstance use disorder (cocaine, opioids, BZD, and THC), and multiple psychiatric conditions (bipolar disorder, borderline personality disorder, RANDI, PTSD, multiple suicide attempts, and possible pseudoseizures).      Changes for Today:  - Worsening respiratory status overnight, CT Chest indicates aspiration pneumonia, antimicrobials started.   - Magnets unmoved despite repeat bowel prep and colonoscopy yesterday. Will discuss with GI  - Diuresis with lasix, goal net negative 500 mL to 1L today.    PLAN:    Neuro:  # Agitated delirium, likely 2/2 cocaine intoxication  # Encephalopathy, metabolic   # Cocaine intoxication  # Possible BZD intoxication  # Poly substance abuse (cocaine, BZD, opioid, THC)  This hospitalization presented with agitated delirium. UDS positive for BZDs and cocaine. Tyl & salicylate negative. Head CT negative for acute intracranial anomalies. Has had seizure like activity in the past, but no reports of convulsive activity since presentation. Work-up for additional metabolic or infectious etiologies contributing to AMS has been thusfar unrevealing. Per S.O. patient taking 1.5-2gm of cocaine daily and last used \"about a gram\" on 19th.     # " Epilepsy vs pseudoseizures   # Migraine headaches   # Hx of TBI  New onset tonic clonic seizures early January 2023 (also possible report of sz in 2021). Neuroimaging negative for acute intracranial pathology, EEGs without epileptiforms while hospitalized. Generally preceded by an aura including migraines and anxiety. Was started on Depakote initially, but reportedly transitioned to lamotrigine. Subsequently [place]-like activity. Concern for psychogenic seizure has been raised as pt will at times be interactive during a convulsive event and has variable post-ictal states.   - Seizure precautions  - Continue PTA lamotrigine  - PRN lorazepam available for seizure activity    # Bipolar disorder  # Borderline personality disorder  # RANDI  # PTDS  # Multiple past suicide attempts   Unclear current psychiatric medication list. Recently discharged from [facility] with [facility] med list including amitriptyline 100 mg at bedtime PRN OR zolpidem 10 mg at bedtime PRN, diazepam 5 mg BID PRN, and is currently up-titrating lamotrigine. Saw her psychiatrist on 3/30/23 who also noted she may be on effexor, but this is not in her discharge med list.   - Neuro exam q4h  - Will offer chem dep and psychiatry consultation once extubated if pt is willing to see them   - Unclear intent, may need 1:1 sitter once extubated   - Supplemental Thiamine and folate (unclear if ETOH abuse is an issue for her)  - Possible PTA psych medication regimen:  - Amitriptyline 100 mg @ HS PRN - Hold  - Zolpidem 10 mg @ HS PRN - Hold   - Diazepam 5 mg BID - Hold  - Lamotrigine 25 mg qd - Give 25 in AM   - Rimegepant 75 mg every day PRN - Hold   - Effexor 150 mg every day - Hold     # Pain and sedation for mechanical ventilation  - Sedation: Propofol gtt + versed 2 mg Q2H PRN for breakthrough agitation.  - RASS goal 0 to -1  - Pain: dilaudid gtt + boluses, tylenol prn  - NOTE:  "patient had tachyarrhythmias when started on precedex infusion 4/21 that ceased with stopping of infusion. Will list precedex as an allergy and will need a cardiac workup prior to discharge.    Pulmonary:  # Mechanical ventilation   # Asthma  # Cystic Fibrosis, mild  # BRIAN, no CPAP  # Pneumonia, hospital acquired  - SIMV 18/450/5 with PS 12  - Continue PTA fluticasone nasal spray BID & montelukast 10 mg every day   - Increased secretions overnight, with fever. CT Chest obtained showing \"scattered peripheral groundglass opacities in the right upper and middle lobes suggestive for infection versus aspiration\".   - Continue daily PST  - Antimicrobials per ID section  - Secretions: thick green-yellow copious from ETT and nasal per nursing report.   - VBG overnight acceptable (7.35/45/41/25/-1.1).     Cardiovascular:  # Lactic acidosis, likely 2/2 agitation, resolved   # HTN  # HLD  - Hydralazine PRN for SBP > 160 mmHg  - Continue PTA atorvastatin 40 mg every day  - Holding PTA metoprolol succinate 50 mg every day given cocaine use-> should likely not continue upon discharge given reported daily cocaine use.   - Holding PTA Hyzaar -- would prefer to use BP for diuresis today rather than treat intermittent HTN 2/2 to agitation, consider resuming if sustaining HTN.  - Echo pending, unable to complete 4/22 as patient was off unit for procedure for most of day.    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   - RNs to search BMs until magnets are found   - KUB BID to verify position of magnets until they are passed. Once passed, will need to repeat AXR to verify   - Colonoscopy 4/22: unable to locate magnets 2/2 to incomplete bowel prep. "    - If change in clinical status/HD instability, signs of worsening abdominal pain, STAT surgery consult   - 4/23: Completed golytely prep overnight x2, no magnets found in stool. CT abd obtained overnight indicates magnets remain in the ileocecal valve, unchanged from previous imaging studies. Will discuss with GI plans.  - Bisacodyl suppository daily PRN   - Trend triglycerides while on propofol infusion (205 this AM, down from initial 580)  - RD consult for recs, appreciate input.      Renal/Fluids/Electrolytes:  # Hyponatremia, resolved   # Volume status  - ICU electrolyte replacement protocol  - Scruggs for strict I&O, unable to use external device given high liquid stool output with bowel prep.  - Daily weights   - 4/23: Overall net positive 9.5L since admission, +900 mL / 24 hours, +1.7L since midnight. 3 documented bowel movements, weight up 1.7 kg / 24 hours. Will diurese with 20 mg lasix x1 for a goal of net negative 500 mL to 1L today. Consider re-dosing if not meeting goal this afternoon.     Endocrine:  No acute concerns  - Goal BG < 180, no current indication for insulin      ID:  # Pneumonia, hospital acquired  # MRSA positive  - Monitor WBC trend and fever curve  - UA without infectious findings  - Blood cultures 4/20 pending , NGTD  - 4/23: Fever and agitation reported overnight, increased nasal secretions. CT C/A/P obtained, showing c/f pneumonia. BC and sputum cx obtained, Initial sputum with +4 gram positive cocci, MRSA swab positive. Started on vanc/zosyn/flagyl. Flagyl discontinued after negative abdominal findings on imaging. Will continue vanc/zosyn while awaiting cultures to speciate.      Hematology:    # Anemia, normocytic likely 2/2 to drug use / illness.  - CBC every day  - Enoxaparin for DVT prophy      Musculoskeletal:  # Weakness  - Ambulatory PTA  - Will consult PT/OT today given ongoing critical illness    Skin:  No acute concerns  - Diligent skin cares to prevent  breakdown    General Cares/Prophylaxis:    DVT Prophylaxis: Enoxaparin (Lovenox) SQ and Pneumatic Compression Devices  GI Prophylaxis: PPI  Restraints: Bilateral upper extremity soft wrist restraints and mitts to prevent self extubation  Code Status: Full    Lines/tubes/drains:    PIV x 2    NG    Scruggs    ETT    Disposition:    [facility] adult ICU    Roberta ZuluagaETTA walter CNP  Critical care time 70 minutes       Attending note:  Patient seen, examined and discussed with the RUSTAM. All data reviewed including vital signs, medications, laboratory studies and imaging.  I agree with the assessment and plan as outlined in the above note.  The patient remains critically ill with acute respiratory failure, encephalopathy and colonic foreign body.  I personally adjusted the ventilator to treat the acute respiratory failure and followed hemodynamics.  No expulsion of the magnetic foreign bodies, unable to visualize by colonoscopy yesterday.  Maintain on ventilator until foreign bodies are removed.  Not requiring vasopressors.  Non-sustained Vtach versus SVT- will obtain non-emergent ECHO to evaluate for underlying cardiovascular disease.  New onset fever overnight and chest CT with RUL infiltrate- started on antibiotics.  Ongoing discussions with GI and Surgery regarding options and plans for magnet removal.     Debbie Khoury MD  [phone]    REVIEW OF SYSTEMS: Overnight course reviewed. Patient evaluated at bedside. Restrained and sedated, able to nod head, move all four extremities to command.     PHYSICAL EXAMINATION:  Temp:  [98.3  F (36.8  C)-102.8  F (39.3  C)] 100.4  F (38  C)  Pulse:  [] 114  Resp:  [0-29] 18  BP: (113-202)/() 155/93  FiO2 (%):  [35 %-60 %] 40 %  SpO2:  [89 %-99 %] 93 %    General: Intubated and sedated, in NAD  HEENT: Normocephalic atraumatic, neck supple  Neuro: Sedated, pupils 3mm PERRLA, moves all extremities to command  Pulm/Resp: Coarse breath sounds bilaterally without rhonchi,  crackles or wheeze, breathing non-labored, increased thick white-tan secretions suctioned from ETT  CV: RRR, no RMG, 2+ peripheral pulses, moderate edema  Abdomen: Soft, non-distended, tender to deep palpation, no masses, hypoactive BS   :  bob catheter in place, urine yellow and clear  Incisions/Skin: No wounds, rashes, or lesions     LABS: Reviewed.   Arterial Blood Gases   Recent Labs   Lab 04/21/23  0102   PH 7.40   PCO2 42   PO2 81   HCO3 26     Complete Blood Count   Recent Labs   Lab 04/23/23  0451 04/22/23  0351 04/21/23  0436 04/21/23  0021   WBC 16.0* 13.9* 11.6* 13.0*   HGB 9.9* 11.1* 11.3* 11.8    273 255 289     Basic Metabolic Panel  Recent Labs   Lab 04/23/23  0451 04/23/23  0450 04/22/23  2351 04/22/23  1935 04/22/23  1930 04/22/23  0810 04/22/23  0351 04/21/23 2005 04/21/23  1427 04/21/23  0827 04/21/23  0436     --   --   --   --   --  138  --  137  --  142   POTASSIUM 3.6  --   --  4.3  --   --  3.0*  --  3.9  --  3.9   CHLORIDE 108*  --   --   --   --   --  103  --  103  --  106   CO2 22  --   --   --   --   --  23  --  24  --  24   BUN 3.3*  --   --   --   --   --  6.5  --  10.3  --  9.5   CR 0.66  --   --   --   --   --  0.55  --  0.57  --  0.57   * 99 93  --  149*   < > 108*   < > 118*   < > 101*    < > = values in this interval not displayed.     Liver Function Tests  Recent Labs   Lab 04/21/23 0021 04/20/23  1832   AST 23 28   ALT 21 20   ALKPHOS 60 66   BILITOTAL <0.2 0.2   ALBUMIN 4.0 4.5   INR 1.04 0.97  1.0*     Coagulation Profile  Recent Labs   Lab 04/21/23 0021 04/20/23  1832   INR 1.04 0.97  1.0*       IMAGING:  Recent Results (from the past 24 hour(s))   XR Abdomen Port 1 View    Narrative    Exam: XR ABDOMEN PORT 1 VIEW, 4/22/2023 8:03 AM    Indication: Ingested magnets 4/16, abd CT 4/20 showed them in the  ascending colon. Evaluate current position.    Comparison: Abdominal x-ray 4/22/2023 (4:10 AM)    Findings:   Portable supine AP radiographs of the  abdomen. 1.3 cm round  hyperdensity projects over the ascending colon, not significantly  changed compared to radiograph from same day at 4:10 AM. The tip and  sidehole of the enteric tube project over the stomach. Cholecystectomy  clips. Scruggs catheter. The bowel gas pattern is nonobstructive. No  visualized pneumatosis or portal venous gas. Pelvic phleboliths.      Impression    Impression:   1. The ingested magnet projects over the ascending colon, not  significantly changed compared to abdominal radiograph from earlier in  the day.  2. Nonobstructive bowel gas pattern.    I have personally reviewed the examination and initial interpretation  and I agree with the findings.    MUKUL REGALADO MD         SYSTEM ID:  [id number]   XR Chest Port 1 View    Narrative    Exam: XR CHEST PORT 1 VIEW, 4/22/2023 11:02 AM    Indication: intubated, increased secretions, RUL Coarse lung sounds.    Comparison: 4/21/2023    Findings:   Endotracheal tube in the mid thoracic trachea, enteric tube seen  coursing through the mediastinum. Tip and sidehole project off the  film but at least to the fundus of the stomach.    Borderline cardiomegaly. Pulmonary vasculature within normal limits.  Patchy opacities at both lung bases suggest atelectasis. Low lung  volume.      Impression    Impression:   1. Stable support devices  2. Stable patchy opacities at both lung bases and low lung volume  suggests atelectasis.    MUKUL REGALADO MD         SYSTEM ID:  [id number]   XR Abdomen Port 1 View    Narrative    Exam: XR ABDOMEN PORT 1 VIEW, 4/22/2023 2:52 PM    Indication: Colonoscopy, post-op check    Comparison: None    Findings:   Left lateral decubitus radiographs of the abdomen. Redemonstration of  metallic foreign body in the large bowel. Difficult to localize given  technique. Multiple air-filled dilated loops of bowel, unremarkable on  the recent postprocedural setting. No free air identified.      Impression    Impression:   1.  Redemonstration of metallic foreign body overlying the large bowel.  Typical to localize given technique.  2. Multiple air-filled loops of bowel. No free air identified.    I have personally reviewed the examination and initial interpretation  and I agree with the findings.    CROW KAPLAN MD         SYSTEM ID:  [id number]   XR Abdomen Port 1 View    Narrative    Exam: XR ABDOMEN PORT 1 VIEW, 4/22/2023 5:38 PM    Indication: new NG placed    Comparison: X-ray 4/22/2023    Findings:   Frontal radiograph of the abdomen. Gastric tube tip and sidehole  project over the stomach. Clips in the right upper quadrant.  Nonobstructive bowel gas pattern. No pneumatosis or portal venous gas.  Previously seen metallic foreign body is not visualized on this exam,  and may be out of the field of view.      Impression    Impression:   1. Gastric tube tip and sidehole projected over the stomach.    I have personally reviewed the examination and initial interpretation  and I agree with the findings.    CROW KAPLAN MD         SYSTEM ID:  [id number]   CT Chest/Abdomen/Pelvis w Contrast    Narrative    CT of the Chest, Abdomen and Pelvis with contrast, 4/23/2023 2:18 AM.    Comparison: Abdominal x-ray 4/22/2023. Chest x-ray 4/22/2023. CT  abdomen and pelvis 4/20/2023.    History: ingested magnets, developed high fever, assess for bowel  perf.     Technique: Axial images of the chest, abdomen and pelvis were obtained  with contrast. Coronal reconstructions were provided. Images were  reviewed in bone, lung, and soft tissue windows.     Findings:  Endotracheal tube terminates in the low trachea just above the pastora.  Gastric tube tip and sidehole terminates in the stomach.    Chest:  Thyroid gland is unremarkable. Heart size is normal. No pericardial  effusion. No significant coronary artery calcifications. Thoracic  aorta and pulmonary arteries are normal in caliber with conventional  three-vessel branching pattern of the aortic  arch. Esophagus is  unremarkable.    Scattered prominent, nonenlarged mediastinal/hilar lymph nodes. No  axillary adenopathy.    Trachea and central airways are clear. Right upper and bilateral lower  lobe consolidative opacities with scattered peripheral groundglass and  consolidative opacities in the right upper and middle lobes.    Abdomen and Pelvis:   Liver is unremarkable. No focal enhancing lesion. No intra or  extrahepatic biliary duct dilation. Postsurgical cholecystectomy  changes. Spleen size within normal limits. Small accessory splenule.  Homogenous enhancement of the pancreas. The main pancreatic and common  bile ducts are not dilated.    Adrenal glands are unremarkable. Symmetric renal enhancement. No  hydronephrosis, calcified stone, or enhancing mass. Bladder is  decompressed with Scruggs catheter balloon in place and small amount of  intraluminal postprocedural gas.    Metallic densities remain, located at the ileocecal valve, unchanged  position since CT 4/20/2023. No small or large bowel wall thickening  or dilation. No free intraperitoneal air. No intra-abdominal pelvic or  free fluid. No pneumatosis or portal venous gas. The appendix is  normal.    Abdominal aorta and major branches are normal in caliber and patent.  Portal and hepatic veins are patent.    No mesenteric or retroperitoneal, or pelvic lymphadenopathy.    Bones and Soft Tissues:   No suspicious osseous lesion. Nodular subcutaneous densities along the  lower abdomen likely secondary to medication administration.       Impression    Impression:   1.  A few scattered peripheral groundglass opacities in the right  upper and middle lobes suggestive for infection versus aspiration.  2.  Right upper and bilateral lower lobe consolidative opacities  suggestive for dependent atelectasis versus, less likely, infection.  3.  No acute findings in the abdomen or pelvis.  4.  Metallic densities remain at the ileocecal valve, in  unchanged  position since CT 4/20/2023 without evidence for bowel obstruction or  perforation.     I have personally reviewed the examination and initial interpretation  and I agree with the findings.    SONIA TAYLOR,          SYSTEM ID:  [id number]

## 2023-04-23 NOTE — PLAN OF CARE
Major Shift Events:  Assumed care ~1400  1500: following commands: wigling toes, attempting to open eyes on command  Agitated at 1600; PRN propofol, dilaudid and seroquel administered, propofol increased back up to 50.  Report given to EB  Plan: Pt discharged to [place] for General Surgery consult. Sent pt with scheduled GoLytely  For vital signs and complete assessments, please see documentation flowsheets.

## 2023-04-24 ENCOUNTER — APPOINTMENT (OUTPATIENT)
Dept: GENERAL RADIOLOGY | Facility: CLINIC | Age: 43
DRG: 917 | End: 2023-04-24
Attending: INTERNAL MEDICINE
Payer: COMMERCIAL

## 2023-04-24 ENCOUNTER — APPOINTMENT (OUTPATIENT)
Dept: GENERAL RADIOLOGY | Facility: CLINIC | Age: 43
DRG: 917 | End: 2023-04-24
Payer: COMMERCIAL

## 2023-04-24 ENCOUNTER — APPOINTMENT (OUTPATIENT)
Dept: NEUROLOGY | Facility: CLINIC | Age: 43
DRG: 917 | End: 2023-04-24
Attending: STUDENT IN AN ORGANIZED HEALTH CARE EDUCATION/TRAINING PROGRAM
Payer: COMMERCIAL

## 2023-04-24 ENCOUNTER — APPOINTMENT (OUTPATIENT)
Dept: GENERAL RADIOLOGY | Facility: CLINIC | Age: 43
DRG: 917 | End: 2023-04-24
Attending: NURSE PRACTITIONER
Payer: COMMERCIAL

## 2023-04-24 LAB
ANION GAP SERPL CALCULATED.3IONS-SCNC: 13 MMOL/L (ref 7–15)
ATRIAL RATE - MUSE: 113 BPM
BASE EXCESS BLDV CALC-SCNC: -1.8 MMOL/L (ref -7.7–1.9)
BASE EXCESS BLDV CALC-SCNC: 0 MMOL/L (ref -7.7–1.9)
BASE EXCESS BLDV CALC-SCNC: 1.6 MMOL/L (ref -7.7–1.9)
BUN SERPL-MCNC: 4 MG/DL (ref 6–20)
CALCIUM SERPL-MCNC: 7.8 MG/DL (ref 8.6–10)
CHLORIDE SERPL-SCNC: 109 MMOL/L (ref 98–107)
COLONOSCOPY: NORMAL
CREAT SERPL-MCNC: 0.64 MG/DL (ref 0.51–0.95)
DEPRECATED HCO3 PLAS-SCNC: 20 MMOL/L (ref 22–29)
DIASTOLIC BLOOD PRESSURE - MUSE: NORMAL MMHG
ERYTHROCYTE [DISTWIDTH] IN BLOOD BY AUTOMATED COUNT: 15 % (ref 10–15)
GFR SERPL CREATININE-BSD FRML MDRD: >90 ML/MIN/1.73M2
GLUCOSE SERPL-MCNC: 116 MG/DL (ref 70–99)
HCO3 BLDV-SCNC: 24 MMOL/L (ref 21–28)
HCO3 BLDV-SCNC: 28 MMOL/L (ref 21–28)
HCO3 BLDV-SCNC: 28 MMOL/L (ref 21–28)
HCT VFR BLD AUTO: 33 % (ref 35–47)
HGB BLD-MCNC: 10.3 G/DL (ref 11.7–15.7)
HOLD SPECIMEN: NORMAL
HOLD SPECIMEN: NORMAL
INTERPRETATION ECG - MUSE: NORMAL
LACTATE SERPL-SCNC: 1.3 MMOL/L (ref 0.7–2)
MAGNESIUM SERPL-MCNC: 2 MG/DL (ref 1.7–2.3)
MCH RBC QN AUTO: 29.9 PG (ref 26.5–33)
MCHC RBC AUTO-ENTMCNC: 31.2 G/DL (ref 31.5–36.5)
MCV RBC AUTO: 96 FL (ref 78–100)
O2/TOTAL GAS SETTING VFR VENT: 21 %
O2/TOTAL GAS SETTING VFR VENT: 3 %
O2/TOTAL GAS SETTING VFR VENT: 97 %
P AXIS - MUSE: 33 DEGREES
PCO2 BLDV: 43 MM HG (ref 40–50)
PCO2 BLDV: 48 MM HG (ref 40–50)
PCO2 BLDV: 56 MM HG (ref 40–50)
PH BLDV: 7.3 [PH] (ref 7.32–7.43)
PH BLDV: 7.35 [PH] (ref 7.32–7.43)
PH BLDV: 7.37 [PH] (ref 7.32–7.43)
PHOSPHATE SERPL-MCNC: 2.9 MG/DL (ref 2.5–4.5)
PLATELET # BLD AUTO: 263 10E3/UL (ref 150–450)
PO2 BLDV: 23 MM HG (ref 25–47)
PO2 BLDV: 29 MM HG (ref 25–47)
PO2 BLDV: 63 MM HG (ref 25–47)
POTASSIUM SERPL-SCNC: 3.8 MMOL/L (ref 3.4–5.3)
PR INTERVAL - MUSE: 156 MS
QRS DURATION - MUSE: 96 MS
QT - MUSE: 328 MS
QTC - MUSE: 449 MS
R AXIS - MUSE: 53 DEGREES
RBC # BLD AUTO: 3.45 10E6/UL (ref 3.8–5.2)
SODIUM SERPL-SCNC: 142 MMOL/L (ref 136–145)
SYSTOLIC BLOOD PRESSURE - MUSE: NORMAL MMHG
T AXIS - MUSE: 74 DEGREES
T PALLIDUM AB SER QL: NONREACTIVE
VANCOMYCIN SERPL-MCNC: 18.2 UG/ML
VANCOMYCIN SERPL-MCNC: 9.6 UG/ML
VENTRICULAR RATE- MUSE: 113 BPM
WBC # BLD AUTO: 20.2 10E3/UL (ref 4–11)

## 2023-04-24 PROCEDURE — 0DCK8ZZ EXTIRPATION OF MATTER FROM ASCENDING COLON, VIA NATURAL OR ARTIFICIAL OPENING ENDOSCOPIC: ICD-10-PCS | Performed by: INTERNAL MEDICINE

## 2023-04-24 PROCEDURE — 999N000157 HC STATISTIC RCP TIME EA 10 MIN

## 2023-04-24 PROCEDURE — 999N000248 HC STATISTIC IV INSERT WITH US BY RN

## 2023-04-24 PROCEDURE — 95718 EEG PHYS/QHP 2-12 HR W/VEEG: CPT | Performed by: PSYCHIATRY & NEUROLOGY

## 2023-04-24 PROCEDURE — 85027 COMPLETE CBC AUTOMATED: CPT

## 2023-04-24 PROCEDURE — 80321 ALCOHOLS BIOMARKERS 1OR 2: CPT | Performed by: STUDENT IN AN ORGANIZED HEALTH CARE EDUCATION/TRAINING PROGRAM

## 2023-04-24 PROCEDURE — 83735 ASSAY OF MAGNESIUM: CPT | Performed by: NURSE PRACTITIONER

## 2023-04-24 PROCEDURE — 94003 VENT MGMT INPAT SUBQ DAY: CPT

## 2023-04-24 PROCEDURE — 95711 VEEG 2-12 HR UNMONITORED: CPT

## 2023-04-24 PROCEDURE — 99291 CRITICAL CARE FIRST HOUR: CPT | Mod: GC | Performed by: INTERNAL MEDICINE

## 2023-04-24 PROCEDURE — 36415 COLL VENOUS BLD VENIPUNCTURE: CPT | Performed by: INTERNAL MEDICINE

## 2023-04-24 PROCEDURE — 250N000013 HC RX MED GY IP 250 OP 250 PS 637: Performed by: INTERNAL MEDICINE

## 2023-04-24 PROCEDURE — 71045 X-RAY EXAM CHEST 1 VIEW: CPT

## 2023-04-24 PROCEDURE — 45379 COLONOSCOPY W/FB REMOVAL: CPT | Performed by: INTERNAL MEDICINE

## 2023-04-24 PROCEDURE — 74018 RADEX ABDOMEN 1 VIEW: CPT | Mod: 77

## 2023-04-24 PROCEDURE — 258N000003 HC RX IP 258 OP 636: Performed by: INTERNAL MEDICINE

## 2023-04-24 PROCEDURE — 250N000011 HC RX IP 250 OP 636

## 2023-04-24 PROCEDURE — C9113 INJ PANTOPRAZOLE SODIUM, VIA: HCPCS | Performed by: NURSE PRACTITIONER

## 2023-04-24 PROCEDURE — 82310 ASSAY OF CALCIUM: CPT

## 2023-04-24 PROCEDURE — 74018 RADEX ABDOMEN 1 VIEW: CPT | Mod: 26 | Performed by: RADIOLOGY

## 2023-04-24 PROCEDURE — 82803 BLOOD GASES ANY COMBINATION: CPT | Performed by: INTERNAL MEDICINE

## 2023-04-24 PROCEDURE — 250N000011 HC RX IP 250 OP 636: Performed by: STUDENT IN AN ORGANIZED HEALTH CARE EDUCATION/TRAINING PROGRAM

## 2023-04-24 PROCEDURE — 80202 ASSAY OF VANCOMYCIN: CPT | Performed by: INTERNAL MEDICINE

## 2023-04-24 PROCEDURE — 82803 BLOOD GASES ANY COMBINATION: CPT

## 2023-04-24 PROCEDURE — 250N000011 HC RX IP 250 OP 636: Performed by: INTERNAL MEDICINE

## 2023-04-24 PROCEDURE — 84100 ASSAY OF PHOSPHORUS: CPT | Performed by: INTERNAL MEDICINE

## 2023-04-24 PROCEDURE — 250N000013 HC RX MED GY IP 250 OP 250 PS 637

## 2023-04-24 PROCEDURE — 99207 EEG VIDEO 2-12 HRS UNMONITORED: CPT | Performed by: PSYCHIATRY & NEUROLOGY

## 2023-04-24 PROCEDURE — 86780 TREPONEMA PALLIDUM: CPT

## 2023-04-24 PROCEDURE — 74018 RADEX ABDOMEN 1 VIEW: CPT

## 2023-04-24 PROCEDURE — 83605 ASSAY OF LACTIC ACID: CPT

## 2023-04-24 PROCEDURE — 200N000002 HC R&B ICU UMMC

## 2023-04-24 PROCEDURE — 87040 BLOOD CULTURE FOR BACTERIA: CPT | Performed by: INTERNAL MEDICINE

## 2023-04-24 PROCEDURE — 250N000013 HC RX MED GY IP 250 OP 250 PS 637: Performed by: NURSE PRACTITIONER

## 2023-04-24 PROCEDURE — 36415 COLL VENOUS BLD VENIPUNCTURE: CPT

## 2023-04-24 PROCEDURE — 250N000011 HC RX IP 250 OP 636: Performed by: NURSE PRACTITIONER

## 2023-04-24 PROCEDURE — 71045 X-RAY EXAM CHEST 1 VIEW: CPT | Mod: 26 | Performed by: RADIOLOGY

## 2023-04-24 RX ORDER — FUROSEMIDE 10 MG/ML
40 INJECTION INTRAMUSCULAR; INTRAVENOUS ONCE
Status: COMPLETED | OUTPATIENT
Start: 2023-04-24 | End: 2023-04-24

## 2023-04-24 RX ORDER — LAMOTRIGINE 25 MG/1
25 TABLET ORAL DAILY
Status: DISCONTINUED | OUTPATIENT
Start: 2023-04-24 | End: 2023-04-27

## 2023-04-24 RX ORDER — POLYETHYLENE GLYCOL 3350 17 G/17G
17 POWDER, FOR SOLUTION ORAL DAILY
Status: DISCONTINUED | OUTPATIENT
Start: 2023-04-25 | End: 2023-05-01 | Stop reason: HOSPADM

## 2023-04-24 RX ORDER — FOLIC ACID 1 MG/1
1 TABLET ORAL DAILY
Status: DISCONTINUED | OUTPATIENT
Start: 2023-04-24 | End: 2023-05-01 | Stop reason: HOSPADM

## 2023-04-24 RX ORDER — LABETALOL HYDROCHLORIDE 5 MG/ML
10 INJECTION, SOLUTION INTRAVENOUS EVERY 4 HOURS PRN
Status: DISCONTINUED | OUTPATIENT
Start: 2023-04-24 | End: 2023-04-26

## 2023-04-24 RX ADMIN — ACETAMINOPHEN 650 MG: 325 SUSPENSION ORAL at 08:45

## 2023-04-24 RX ADMIN — FLUTICASONE PROPIONATE 1 SPRAY: 50 SPRAY, METERED NASAL at 09:03

## 2023-04-24 RX ADMIN — PROPOFOL 75 MCG/KG/MIN: 10 INJECTION, EMULSION INTRAVENOUS at 08:13

## 2023-04-24 RX ADMIN — POTASSIUM & SODIUM PHOSPHATES POWDER PACK 280-160-250 MG 1 PACKET: 280-160-250 PACK at 07:46

## 2023-04-24 RX ADMIN — QUETIAPINE FUMARATE 50 MG: 50 TABLET ORAL at 14:17

## 2023-04-24 RX ADMIN — MIDAZOLAM 2 MG: 1 INJECTION INTRAMUSCULAR; INTRAVENOUS at 10:48

## 2023-04-24 RX ADMIN — PANTOPRAZOLE SODIUM 40 MG: 40 INJECTION, POWDER, FOR SOLUTION INTRAVENOUS at 07:46

## 2023-04-24 RX ADMIN — VANCOMYCIN HYDROCHLORIDE 1500 MG: 10 INJECTION, POWDER, LYOPHILIZED, FOR SOLUTION INTRAVENOUS at 03:38

## 2023-04-24 RX ADMIN — CHLORHEXIDINE GLUCONATE 15 ML: 1.2 SOLUTION ORAL at 20:40

## 2023-04-24 RX ADMIN — MONTELUKAST 10 MG: 10 TABLET, FILM COATED ORAL at 22:02

## 2023-04-24 RX ADMIN — POLYETHYLENE GLYCOL 3350, SODIUM SULFATE ANHYDROUS, SODIUM BICARBONATE, SODIUM CHLORIDE, POTASSIUM CHLORIDE 2000 ML: 236; 22.74; 6.74; 5.86; 2.97 POWDER, FOR SOLUTION ORAL at 05:54

## 2023-04-24 RX ADMIN — SENNOSIDES AND DOCUSATE SODIUM 1 TABLET: 50; 8.6 TABLET ORAL at 20:40

## 2023-04-24 RX ADMIN — PROPOFOL 55 MCG/KG/MIN: 10 INJECTION, EMULSION INTRAVENOUS at 02:18

## 2023-04-24 RX ADMIN — PIPERACILLIN AND TAZOBACTAM 4.5 G: 4; .5 INJECTION, POWDER, FOR SOLUTION INTRAVENOUS at 20:39

## 2023-04-24 RX ADMIN — ACETAMINOPHEN 650 MG: 325 SUSPENSION ORAL at 18:28

## 2023-04-24 RX ADMIN — PIPERACILLIN AND TAZOBACTAM 4.5 G: 4; .5 INJECTION, POWDER, FOR SOLUTION INTRAVENOUS at 01:58

## 2023-04-24 RX ADMIN — PROPOFOL 75 MCG/KG/MIN: 10 INJECTION, EMULSION INTRAVENOUS at 04:11

## 2023-04-24 RX ADMIN — Medication 10 MG: at 10:57

## 2023-04-24 RX ADMIN — LABETALOL HYDROCHLORIDE 10 MG: 5 INJECTION, SOLUTION INTRAVENOUS at 14:16

## 2023-04-24 RX ADMIN — ATORVASTATIN CALCIUM 40 MG: 40 TABLET, FILM COATED ORAL at 20:40

## 2023-04-24 RX ADMIN — POTASSIUM & SODIUM PHOSPHATES POWDER PACK 280-160-250 MG 1 PACKET: 280-160-250 PACK at 03:37

## 2023-04-24 RX ADMIN — PROPOFOL 65 MCG/KG/MIN: 10 INJECTION, EMULSION INTRAVENOUS at 12:16

## 2023-04-24 RX ADMIN — FUROSEMIDE 40 MG: 10 INJECTION, SOLUTION INTRAVENOUS at 12:18

## 2023-04-24 RX ADMIN — LABETALOL HYDROCHLORIDE 10 MG: 5 INJECTION, SOLUTION INTRAVENOUS at 03:38

## 2023-04-24 RX ADMIN — PROPOFOL 40 MCG/KG/MIN: 10 INJECTION, EMULSION INTRAVENOUS at 16:31

## 2023-04-24 RX ADMIN — CHLORHEXIDINE GLUCONATE 15 ML: 1.2 SOLUTION ORAL at 08:45

## 2023-04-24 RX ADMIN — PROPOFOL 45 MCG/KG/MIN: 10 INJECTION, EMULSION INTRAVENOUS at 19:30

## 2023-04-24 RX ADMIN — LAMOTRIGINE 25 MG: 25 TABLET ORAL at 09:23

## 2023-04-24 RX ADMIN — PIPERACILLIN AND TAZOBACTAM 4.5 G: 4; .5 INJECTION, POWDER, FOR SOLUTION INTRAVENOUS at 07:46

## 2023-04-24 RX ADMIN — POTASSIUM & SODIUM PHOSPHATES POWDER PACK 280-160-250 MG 1 PACKET: 280-160-250 PACK at 01:59

## 2023-04-24 RX ADMIN — ENOXAPARIN SODIUM 40 MG: 40 INJECTION SUBCUTANEOUS at 07:46

## 2023-04-24 RX ADMIN — PROPOFOL 75 MCG/KG/MIN: 10 INJECTION, EMULSION INTRAVENOUS at 06:13

## 2023-04-24 RX ADMIN — PROPOFOL 75 MCG/KG/MIN: 10 INJECTION, EMULSION INTRAVENOUS at 10:51

## 2023-04-24 RX ADMIN — VANCOMYCIN HYDROCHLORIDE 1500 MG: 10 INJECTION, POWDER, LYOPHILIZED, FOR SOLUTION INTRAVENOUS at 17:48

## 2023-04-24 RX ADMIN — LABETALOL HYDROCHLORIDE 10 MG: 5 INJECTION, SOLUTION INTRAVENOUS at 20:47

## 2023-04-24 RX ADMIN — PROPOFOL 45 MCG/KG/MIN: 10 INJECTION, EMULSION INTRAVENOUS at 22:38

## 2023-04-24 RX ADMIN — ACETAMINOPHEN 650 MG: 325 TABLET ORAL at 03:37

## 2023-04-24 RX ADMIN — ACETAMINOPHEN 650 MG: 325 SUSPENSION ORAL at 14:17

## 2023-04-24 RX ADMIN — PIPERACILLIN AND TAZOBACTAM 4.5 G: 4; .5 INJECTION, POWDER, FOR SOLUTION INTRAVENOUS at 12:36

## 2023-04-24 ASSESSMENT — ACTIVITIES OF DAILY LIVING (ADL)
ADLS_ACUITY_SCORE: 50

## 2023-04-24 NOTE — PROGRESS NOTES
MEDICAL ICU H&P  04/24/2023    Date of Hospital Admission: 4/20/23  Date of ICU Admission: 4/20/23  Reason for Critical Care Admission: Agitated delirium 2/2 cocaine and possible benzodiazepine intoxication requiring intubation and mechanical ventilation for airway protection   Date of Service (when I saw the patient): 04/24/2023    ASSESSMENT:   Shanda Hinds is a 41yo F with PMH of mild cystic fibrosis, asthma, BRIAN (no CPAP), constipation, HTN, HLD, prior CVA (no residual), hepatic steatosis, questionable seizure disorder, polysubstance use disorder (cocaine, opioids, BZD, and THC), and multiple psychiatric conditions (bipolar disorder, borderline personality disorder, RADNI, PTSD, multiple suicide attempts, and possible pseudoseizures), who presented with agitated delirium likely 2/2 cocaine & possible BZD intoxication, AMS and AHHRF 2/2 PNA requiring intubation on 4/22. Also found to have 2x magnets at ileocecal valve she swallowed on 4/16/23 s/p multiple colon-prep and 1x colonoscopy. Transferred to [facility] for surgery evaluation.     Changes for Today:  - Colonoscopy today, able to remove 2x magnets from the ascending colon  - Neurocrit consult for encephalopathy with possible EEG d/t h/o seizure  - Add on Peth  - Avoid hydralazine for HTN given c/f reflex tachy. Use labetalol prn. Consider scheduling clonidine   - 1x IV 40mg Lasix   - Follow SpCx. Cont vanco/zosyn  - Advance TF     PLAN:     Neuro:  # Pain and sedation for mechanical ventilation  - Sedation: Propofol gtt + versed 2 mg Q2H PRN for breakthrough agitation  - RASS goal 0 to -1  - Pain: dilaudid gtt + boluses, tylenol prn  - NOTE: patient had tachyarrhythmias when started on precedex infusion 4/21 that ceased with stopping of infusion. Will list precedex as an allergy and will need a cardiac workup prior to discharge.    # Agitated delirium, likely 2/2 cocaine intoxication  # Encephalopathy, metabolic   # Cocaine intoxication  # Possible  "benzodiazepine intoxication  # Poly substance abuse (cocaine, benzodiazepine, opioid, THC)  Patient with a history of polysubstance use presented with agitated delirium. Urine drug screen was positive for benzodiazepines and cocaine. Head CT showed no evidence for acute intracranial pathology. Has had seizure like activity in the past, but no reports of convulsive activity since presentation. Work-up for additional metabolic or infectious etiologies contributing to AMS has been thusfar unrevealing. Per S.O. patient taking 1.5-2gm of cocaine daily and last used \"about a gram\" on 19th.   - Wean sedation as able  - Neurocrit consult for encephalopathy with possible EEG d/t h/o seizure  - Consider Addiction Medicine and Psychiatry consults given polysubstance abuse and psychiatric comorbidities when able to participate  - Watch for benzos/cocaine withdrawal  - Add on Peth     # Epilepsy vs pseudoseizures   # Migraine headaches   # Hx of TBI  New onset tonic clonic seizures early January 2023 (also possible report of sz in 2021). Neuroimaging negative for acute intracranial pathology, EEGs without epileptiforms while hospitalized. Generally preceded by an aura including migraines and anxiety. Was started on Depakote initially, but reportedly transitioned to lamotrigine. Subsequently [place]-like activity. 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   - Seizure precautions  - Continue PTA lamotrigine  - PRN lorazepam available for seizure activity  - Neurocrit consult as above     # Bipolar disorder  # Borderline personality disorder  # RANDI  # PTDS  # Multiple past suicide attempts   Unclear current psychiatric medication list. Recently discharged from [facility] with [facility] med list including amitriptyline 100 mg at bedtime PRN OR zolpidem 10 mg at " bedtime PRN, diazepam 5 mg BID PRN, and is currently up-titrating lamotrigine. Saw her psychiatrist on 3/30/23 who also noted she may be on effexor, but this is not in her discharge med list. Of note, lamotrigine level on 4/21 was <0.9.   - Neuro exam q4h  - Will offer chem dep and psychiatry consultation once extubated if pt is willing to see them   - Unclear intent, may need 1:1 sitter once extubated   - Supplemental Thiamine and folate (unclear if ETOH abuse is an issue for her)  - Possible PTA psych medication regimen:  - Amitriptyline 100 mg @ HS PRN - Hold  - Zolpidem 10 mg @ HS PRN - Hold   - Diazepam 5 mg BID - Hold  - Lamotrigine 25 mg qd - Give 25 in AM   - Rimegepant 75 mg every day PRN - Hold   - Effexor 150 mg every day - Hold      Pulmonary:  # Mechanical ventilation   # H/o asthma  # ?Cystic Fibrosis, mild  # BRIAN, no CPAP  # Pneumonia, hospital acquired  Thick green-yellow copious from ETT and nasal per RN. Increased secretions overnight on 4/22, with fever. CT Chest showing scattered peripheral groundglass opacities in the right upper and middle lobes suggestive for infection versus aspiration. SpCx pending. +ve MRSA on swab.  - Continue PTA fluticasone nasal spray BID & montelukast 10 mg every day   - Continue daily PST  - Antimicrobials per ID section     Vent Mode: SIMV/PS  (Synchronized Intermittent Mandatory Ventilation with Pressure Support)  FiO2 (%): 50 %  Resp Rate (Set): 18 breaths/min  Tidal Volume (Set, mL): 450 mL  PEEP (cm H2O): 5 cmH2O  Pressure Support (cm H2O): 12 cmH2O  Resp: (!) 38    Cardiovascular:  # Lactic acidosis, likely 2/2 agitation, resolved   # HTN  # HLD  - Avoid hydralazine for HTN given c/f reflex tachy. Use labetalol prn. Consider scheduling clonidine   - Continue labetalol 10mg IV Q4H prn for SBP>160 mmHg or DBP>110 mmHg  - Continue PTA atorvastatin 40 mg every day  - Hold PTA metoprolol succinate given cocaine use-> should likely not continue upon discharge given  reported daily cocaine use   - Holding PTA Hyzaar- would prefer to use BP for diuresis rather than treat intermittent HTN 2/2 to agitation, consider resuming if sustaining HTN  - 1x IV 40mg Lasix      GI/Nutrition:  # Magnet ingestion 4/16, now located in ascending colon  # Hepatic steatosis   # Hypertriglyceridemia  on admission  # Chronic 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               - RNs to search BMs until magnets are found               - KUB BID to verify position of magnets until they are passed. Once passed, will need to repeat AXR to verify               - Colonoscopy 4/22: unable to locate magnets 2/2 to incomplete bowel prep.                - 4/23: Completed golytely prep overnight x2, no magnets found in stool. CT abd obtained overnight indicates magnets remain in the ileocecal valve, unchanged from previous imaging studies. Discussed with Gen surgery, decided to try colonoscopy again before surgery  - Colonoscopy 4/24, able to remove 2x magnets from the ascending colon  - Senna BID   - Bisacodyl suppository daily PRN   - Trend triglycerides while on propofol infusion (downtrending from initial 580)  - RD consult for recs, appreciate input.     Renal/Fluids/Electrolytes:  # Hyponatremia, resolved   # Volume status  - ICU electrolyte replacement protocol  - Scruggs for strict I&O, unable to use external device given high liquid stool output with bowel prep  - Daily weights   - 1x IV 40mg lasix (4/24). Goal net negative 500mL to 1L     Endocrine:  No acute concerns  - Goal BG < 180, no current indication for insulin       ID:  # Pneumonia, hospital acquired  # MRSA nares positive  - Monitor WBC trend and fever curve  - UA without infectious findings  - Blood cultures 4/20 pending , NGTD  - 4/23: Fever and agitation reported overnight,  "increased nasal secretions. CT C/A/P obtained, showing c/f pneumonia. BC and sputum cx obtained, Initial sputum with +4 gram positive cocci, MRSA swab positive. Started on vanc/zosyn/flagyl. Flagyl discontinued after negative abdominal findings on imaging. SpCx pending, +ve MRSA  - Continue vanc/zosyn while awaiting cultures to speciate  - If patient clinically decompensating or spiking fevers, add Cipro for double GNB coverage     Hematology:    # Acute normocytic anemia  PTA Hb  11.8-12.3. Hb 9.9 today. No signs of active bleeding, likely 2/2 to drug use/illness.  - CTM  - Enoxaparin for DVT prophy      Musculoskeletal:  # Weakness  Ambulatory PTA.  - PT/OT consult     Skin:  No acute concerns  - Diligent skin cares to prevent breakdown     General Cares/Prophylaxis:    DVT Prophylaxis: Enoxaparin (Lovenox) SQ and Pneumatic Compression Devices  GI Prophylaxis: PPI  Restraints: Bilateral upper extremity soft wrist restraints and mitts to prevent self extubation  Code Status: Full     Lines/tubes/drains:  ? PIV x 2  ? NG  ? Scruggs  ? ETT     Disposition: MICU    Patient case discussed with Dr. Reid who agrees with the above plan.    Jason Lowry  Medical Student    Resident/Fellow Attestation   I, Lia Lu MD, was present with the medical/RUSTAM student who participated in the service and in the documentation of the note.  I have verified the history and personally performed the physical exam and medical decision making.  I agree with the assessment and plan of care as documented in the note.      Lia Lu MD  PGY3  Date of Service (when I saw the patient): 04/24/23      Clinically Significant Risk Factors          # Hypocalcemia: Lowest Ca = 7.5 mg/dL in last 2 days, will monitor and replace as appropriate               # Obesity: Estimated body mass index is 38.15 kg/m  as calculated from the following:    Height as of this encounter: 1.676 m (5' 6\").    Weight as of this encounter: 107.2 kg (236 lb 5.3 " oz)., PRESENT ON ADMISSION            -----------------------------------------------------------------------  Interval History:   Patient intubated, sedated. Lots of secretions requiring frequent suction.      PHYSICAL EXAMINATION:  Temp:  [94.8  F (34.9  C)-102.9  F (39.4  C)] 98.1  F (36.7  C)  Pulse:  [] 90  Resp:  [18-38] 38  BP: ()/(61-96) 95/67  FiO2 (%):  [45 %-50 %] 50 %  SpO2:  [93 %-99 %] 97 %     General: Sedated, lots of secretions  HEENT: Normocephalic/atraumatic  Respiratory: CTAB on ventilatory support  Cardiovascular: RRR without murmurs, rubs or gallop. S1, S2 intact.  Abdomen: Bowel sounds present. Soft, non-distended.  Skin: No overt lesions, bruises, rashes or bleeding on exposed skin surfaces.    LABS: Reviewed.   Arterial Blood Gases   Recent Labs   Lab 04/21/23  0102   PH 7.40   PCO2 42   PO2 81   HCO3 26     Complete Blood Count   Recent Labs   Lab 04/24/23  0549 04/23/23  1852 04/23/23  0451 04/22/23  0351   WBC 20.2* 20.8* 16.0* 13.9*   HGB 10.3* 10.7* 9.9* 11.1*    258 229 273     Basic Metabolic Panel  Recent Labs   Lab 04/24/23  0549 04/23/23  1852 04/23/23  1831 04/23/23  1638 04/23/23  0757 04/23/23  0451 04/22/23  2351 04/22/23  1935 04/22/23  0810 04/22/23  0351    138  --   --   --  141  --   --   --  138   POTASSIUM 3.8 3.7  --   --   --  3.6  --  4.3  --  3.0*   CHLORIDE 109* 104  --   --   --  108*  --   --   --  103   CO2 20* 19*  --   --   --  22  --   --   --  23   BUN 4.0* 3.1*  --   --   --  3.3*  --   --   --  6.5   CR 0.64 0.64  --   --   --  0.66  --   --   --  0.55   * 98 113* 92   < > 111*   < >  --    < > 108*    < > = values in this interval not displayed.     Liver Function Tests  Recent Labs   Lab 04/23/23  1852 04/21/23  0021 04/20/23  1832   AST 28 23 28   ALT 20 21 20   ALKPHOS 101 60 66   BILITOTAL 0.8 <0.2 0.2   ALBUMIN 3.5 4.0 4.5   INR 1.27* 1.04 0.97  1.0*     Coagulation Profile  Recent Labs   Lab 04/23/23 1852  04/21/23  0021 04/20/23  1832   INR 1.27* 1.04 0.97  1.0*       IMAGING:  Recent Results (from the past 24 hour(s))   XR Chest Port 1 View    Narrative    Exam: XR CHEST PORT 1 VIEW, 4/24/2023 4:20 AM    Indication: fever, increased secretions    Comparison: Chest abdomen pelvis CT 4/23/2023, chest radiograph  4/22/2023, 4/21/2023    Findings:   Semiupright AP view of the chest. Endotracheal tube terminates over  the mid thoracic trachea. Gastric tube terminates over the stomach.  Esophageal temperature probe terminates over the gastroesophageal  junction. Low lung volumes. Similar appearance of bibasilar opacities  and increasing consolidative opacity right upper lung zone. Cardiac  silhouette is stable in size.      Impression    Impression:   1. Increasing consolidative opacity in the right upper lobe compared  to comparison CT. Given the patient's history of secretions pneumonia  would have to be the prime consideration.  2. Stable low lung volumes with bibasilar atelectasis.    I have personally reviewed the examination and initial interpretation  and I agree with the findings.    MUKUL REGALADO MD         SYSTEM ID:  [id number]   XR Abdomen Port 1 View    Narrative    Exam: XR ABDOMEN PORT 1 VIEW, 4/24/2023 4:24 AM    Indication: pt ingested magnets 4/16, previous AXR showed magnet in  the RLQ near ileocecal valve. Evaluate current position    Comparison: Abdominal radiograph 4/23/2023, chest and pelvis CT  4/23/2023, abdomen/pelvis CT 4/20/2023    Findings:   Supine AP view of the abdomen. Persistent round metallic density  projecting over the proximal ascending colon, most likely represents  ingested magnet. Nonobstructive bowel gas pattern. No pneumatosis.      Impression    Impression:   Metallic density representing presumed magnet projects over the  proximal ascending colon, unchanged.    I have personally reviewed the examination and initial interpretation  and I agree with the findings.    MUKUL  MD FABRICIO         SYSTEM ID:  [id number]   XR Abdomen Port 1 View    Narrative    Exam: XR ABDOMEN PORT 1 VIEW, 4/24/2023 9:43 AM    Indication: progression of ingested magnets    Comparison: Abdominal radiograph 4/24/2023 4:18 AM    Findings:   Portable AP supine abdominal radiographs. Nasogastric tube tip and  sidehole overlying the proximal stomach. Esophageal temperature probe  at the GE junction. Cholecystectomy clips.    Rounded radiodense structures projected over the right mid abdomen  consistent with ingested magnets which appear apposed on imaging,  likely in the ascending colonic region, minimally progressed distally  from prior study. No small bowel obstruction. Limited assessment for  free air on supine imaging.    No acute osseous abnormalities. Bibasilar opacities. Please see same  day chest radiograph.      Impression    Impression: Ingested radiopaque magnets are opposed overlying the  right lower quadrant, most likely in the ascending colonic region,  minimally progressed distally from prior study.    JONN CHOI MD         SYSTEM ID:  [id number]   XR Abdomen Port 1 View    Narrative    Exam: XR ABDOMEN PORT 1 VIEW, 4/24/2023 11:56 AM    Indication: magnets retrieved from colon    Comparison: 4/24/2023    Findings:   No residual ingested radiopaque foreign body is identified.  Cholecystectomy clips. No obstructive bowel gas pattern or  pneumatosis. Scruggs catheter and pelvic phleboliths.      Impression    Impression: No remaining ingested radiopaque foreign bodies.  Nonobstructive bowel gas pattern.    I have personally reviewed the examination and initial interpretation  and I agree with the findings.    CROW KAPLAN MD         SYSTEM ID:  [id number]

## 2023-04-24 NOTE — CARE PLAN
Admitted/transferred from: [facility]  Reason for admission/transfer: Advanced monitoring  2 RN skin assessment: completed by Alysha Rahman and Sylvia Barron  Result of skin assessment and interventions/actions: No interventions required  Height, weight, drug calc weight: Done  Patient belongings (see Flowsheet)  MDRO education added to care plan: Yes    Alysha Rahman, RN on 4/23/2023 at 7:32 PM  ?

## 2023-04-24 NOTE — CARE PLAN
Called MICU2 three times about clarification on giving vancomycin with regards to drug monitoring level 9.6ug/mL. Awaiting response.    Alysha Rahman RN on 4/24/2023 at 5:17 PM

## 2023-04-24 NOTE — CONSULTS
Neurocritical Care Consultation    Reason for critical care admission: Agitated delirium 2/2 cocaine and possible benzodiazepine intoxication requiring intubation and mechanical ventilation for airway protection   Admitting Team: Medical ICU  Date of Service:  04/24/2023  Date of Admission:  4/20/2023  Hospital Day: 5    Assessment/Plan  Shanda Hinds is a 41 yo female patient admitted with AMS presenting as agitated delirium likely 2/2 cocaine intoxication, possible BZ, requiring intubation and mechanical ventilation. Patient was also found to have magnets in her colon. Per chart, she swallowed them on 4/16/23. PMHx significant for asthma, BRIAN, HTN, HLD, prior CVA (no residual), questionable seizure disorder, polysubstance use disorder (cocaine, opioids, BZD, and THC), and multiple psychiatric conditions (bipolar disorder, borderline personality disorder, RANDI, PTSD, multiple suicide attempts, and possible pseudoseizures).    On 4/23, patient developed aspiration pneumonia with sputum cultures showing GPC 4+ and MRSA +, currently on vancomycin/Zosyn.     24 hour events:  Patient underwent colonoscopy this morning with successful removal of 2 magnets.     Neuro  #Agitated delirium secondary to cocaine +/- BZ intoxication  #Metabolic encephalopathy  #Polysubstance abuse (cocaine, BZ, opioid, THC)  #History of TIA versus HTN mergency versus Migraine with motor/braimstem aura 6/21/2022 - received ivTPA  -Neurochecks every 4 hrs  -SBP goal < 140 mmHg  -HOB > 30   -PT/OT/SLP  -Urine sample on 4/20/23 positive for BZ and cocaine  -Drip: Dilaudid at 0.3 mg/hr, propofol  -limit CNS acting medications including opioids, benzodiazepines, anticholinergics; consider utilizing local measures or scheduled pain regimens that minimize opioids for pain.  -treat underlying infections, correct metabolic derangements as able and provide supportive medical cares, including correction of any electrolyte abnormalities, consider checking  "TSH, ammonia, thiamine levels  -utilize delirium precautions including frequent reorientation, normal day night cycles; blinds up and awake during day and sleeping at night, minimize frequent interruptions, clutter, and loud noises. Encourage family visitations and therapeutic interactions. Lastly, consider melatonin at 1900 nightly  -avoid sensory deprivation (provide patient with eyeglasses or hearing aids if using)    #Hx of new onset seizure on 1/1/2023 with possible recurrence on 1/19/2023  #History of migraine with aura  -Seizure precautions  -Depakote; report in January patient was taking this but not compliant)  -PTA Lamotrigine 25 mg  -Lamotrigine levels 4/21: <0.9  -vEEG ordered. We will assess results tomorrow    #Bipolar disorder  #Borderline personality disorder  #Generalized Anxiety disorder  #Post-traumatic Stress Disorder  #History of multiple suicide attempts  -PTA Lamotrigine   -PRN Seroquel 50q8    #Analgesics & sedation  RASS goal:  -Propofol at 45 and Dilaudid   -PRN: Dilaudid, Ativan 2mg q2, Versed 2mg q2, Tylenol 650 q4        Clinically Significant Risk Factors          # Hypocalcemia: Lowest Ca = 7.5 mg/dL in last 2 days, will monitor and replace as appropriate               # Obesity: Estimated body mass index is 38.15 kg/m  as calculated from the following:    Height as of this encounter: 1.676 m (5' 6\").    Weight as of this encounter: 107.2 kg (236 lb 5.3 oz)., PRESENT ON ADMISSION          The patient was seen and discussed with the NCC attending, Dr. Tanvir Gregg.    Nav Barnes MD  GenSurg/NSG Preliminary Resident  Neurocritical Care    History of Present Illness:  Shanda Hinds is a 41 yo female patient admitted with AMS presenting as agitated delirium likely 2/2 cocaine intoxication, possible BZ, requiring intubation and mechanical ventilation. Patient was also found to have magnets in her colon. Per chart, she swallowed them on 4/16/23. PMHx significant for asthma, " BRIAN, HTN, HLD, prior CVA (no residual), questionable seizure disorder, polysubstance use disorder (cocaine, opioids, BZD, and THC), and multiple psychiatric conditions (bipolar disorder, borderline personality disorder, RANDI, PTSD, multiple suicide attempts, and possible pseudoseizures).       On 4/20, she presented to our ED for follow-up ABD x-ray due to recent ingestion of magnets. On arrival she was noted to have severely altered mental status with agitation alternating with somnolence. She was intubated for airway protection. She was transferred to [facility] adult ICU for ongoing care of agitated delirium in the setting of polysubstance ingestion. On 4/23, patient presented worsening respiratory status and a fever spike with worsening leukocytosis. Her CT chest was suggestive for aspiration pneumonia. Cultures were sent showing GPC 4+ and MRSA +. Thus, patient was started on vancomycin/Zosyn.        Allergies   Allergen Reactions     Haldol [Haloperidol] Anaphylaxis     Codeine      Hives, vomiting      Compazine [Prochlorperazine]      Fentanyl      Morphine      Hives, vomiting      Neurontin [Gabapentin] Other (See Comments)     Facial twitching     Precedex Other (See Comments)     Tachy arrhythmias on 2 separate infusion trials      Reglan [Metoclopramide]      Trazodone Nausea and Hives       Current Medications:    atorvastatin  40 mg Oral or Feeding Tube QPM     chlorhexidine  15 mL Mouth/Throat BID     enoxaparin ANTICOAGULANT  40 mg Subcutaneous Q24H     fluticasone  1 spray Both Nostrils Daily     folic acid  1 mg Oral or Feeding Tube Daily     furosemide  40 mg Intravenous Once     lamoTRIgine  25 mg Oral or Feeding Tube Daily     montelukast  10 mg Oral or Feeding Tube At Bedtime     pantoprazole  40 mg Per Feeding Tube QAM AC    Or     pantoprazole  40 mg Intravenous QAM AC     piperacillin-tazobactam  4.5 g Intravenous Q6H     senna-docusate  1 tablet Oral or Feeding Tube BID    Or      "senna-docusate  2 tablet Oral or Feeding Tube BID     thiamine  100 mg Oral or Feeding Tube Daily     vancomycin  1,500 mg Intravenous Q12H       PRN Medications:  acetaminophen **OR** acetaminophen, bisacodyl, dextrose, glucose **OR** dextrose **OR** glucagon, hydromorphone, labetalol, levalbuterol, LORazepam, propofol **AND** propofol **AND** CK total **AND** Triglycerides **AND** - MEDICATION INSTRUCTIONS - **AND** Notify Physician, midazolam, naloxone **OR** naloxone **OR** naloxone **OR** naloxone, ondansetron **OR** ondansetron, - MEDICATION INSTRUCTIONS -, QUEtiapine    Infusions:    dextrose       HYDROmorphone 0.4 mg/hr (04/24/23 1100)     propofol 75 mcg/kg/min (04/24/23 1100)    And     - MEDICATION INSTRUCTIONS -       - MEDICATION INSTRUCTIONS -         Physical Examination:  Vitals: /73   Pulse 98   Temp 100  F (37.8  C)   Resp 18   Ht 1.676 m (5' 6\")   Wt 107.2 kg (236 lb 5.3 oz)   SpO2 94%   BMI 38.15 kg/m    General: Adult female patient, lying in bed, critically-ill  HEENT: Normocephalic, atraumatic, no icterus, oral cavity/oropharynx pink and moist  Cardiac: RRR, s1/s2 auscultated without murmur  Pulm: Intubated  Abdomen: Soft, non-distended, bowel sounds present  Extremities: Warm, no edema, distal pulses +2, well perfused  Skin: No rash or lesion  Psych: Not able to examinate  Neuro:  Mental status: Intubated and deeply sedated on propofol and with IV dilaudid  Cranial nerves: PERRL. EOM Not able to examinate  Motor: Not able to examinate  Sensory: Not able to examinate  Coordination: Not able to examinate  Gait: WILLIAM, deferred.    Labs and Imaging:    Results for orders placed or performed during the hospital encounter of 04/20/23 (from the past 24 hour(s))   Glucose by meter   Result Value Ref Range    GLUCOSE BY METER POCT 92 70 - 99 mg/dL   Glucose by meter   Result Value Ref Range    GLUCOSE BY METER POCT 113 (H) 70 - 99 mg/dL   CBC with Platelets & Differential    Narrative    " The following orders were created for panel order CBC with Platelets & Differential.  Procedure                               Abnormality         Status                     ---------                               -----------         ------                     CBC with platelets and d...[[id number]]  Abnormal            Final result                 Please view results for these tests on the individual orders.   Comprehensive metabolic panel   Result Value Ref Range    Sodium 138 136 - 145 mmol/L    Potassium 3.7 3.4 - 5.3 mmol/L    Chloride 104 98 - 107 mmol/L    Carbon Dioxide (CO2) 19 (L) 22 - 29 mmol/L    Anion Gap 15 7 - 15 mmol/L    Urea Nitrogen 3.1 (L) 6.0 - 20.0 mg/dL    Creatinine 0.64 0.51 - 0.95 mg/dL    Calcium 7.9 (L) 8.6 - 10.0 mg/dL    Glucose 98 70 - 99 mg/dL    Alkaline Phosphatase 101 35 - 104 U/L    AST 28 10 - 35 U/L    ALT 20 10 - 35 U/L    Protein Total 6.4 6.4 - 8.3 g/dL    Albumin 3.5 3.5 - 5.2 g/dL    Bilirubin Total 0.8 <=1.2 mg/dL    GFR Estimate >90 >60 mL/min/1.73m2   Magnesium   Result Value Ref Range    Magnesium 2.1 1.7 - 2.3 mg/dL   Phosphorus   Result Value Ref Range    Phosphorus 2.4 (L) 2.5 - 4.5 mg/dL   Lactic acid whole blood   Result Value Ref Range    Lactic Acid 1.2 0.7 - 2.0 mmol/L   Lipase   Result Value Ref Range    Lipase 36 13 - 60 U/L   INR   Result Value Ref Range    INR 1.27 (H) 0.85 - 1.15   CK total   Result Value Ref Range    CK 60 26 - 192 U/L   CBC with platelets and differential   Result Value Ref Range    WBC Count 20.8 (H) 4.0 - 11.0 10e3/uL    RBC Count 3.59 (L) 3.80 - 5.20 10e6/uL    Hemoglobin 10.7 (L) 11.7 - 15.7 g/dL    Hematocrit 33.6 (L) 35.0 - 47.0 %    MCV 94 78 - 100 fL    MCH 29.8 26.5 - 33.0 pg    MCHC 31.8 31.5 - 36.5 g/dL    RDW 14.9 10.0 - 15.0 %    Platelet Count 258 150 - 450 10e3/uL    % Neutrophils 79 %    % Lymphocytes 7 %    % Monocytes 9 %    % Eosinophils 4 %    % Basophils 0 %    % Immature Granulocytes 1 %    NRBCs per 100 WBC 0 <1 /100     Absolute Neutrophils 16.3 (H) 1.6 - 8.3 10e3/uL    Absolute Lymphocytes 1.5 0.8 - 5.3 10e3/uL    Absolute Monocytes 1.8 (H) 0.0 - 1.3 10e3/uL    Absolute Eosinophils 0.9 (H) 0.0 - 0.7 10e3/uL    Absolute Basophils 0.1 0.0 - 0.2 10e3/uL    Absolute Immature Granulocytes 0.2 <=0.4 10e3/uL    Absolute NRBCs 0.0 10e3/uL   Extra Tube ([place] Draw)    Narrative    The following orders were created for panel order Extra Tube ([place] Draw).  Procedure                               Abnormality         Status                     ---------                               -----------         ------                     Extra Purple Top Tube[[id number]]                            Final result                 Please view results for these tests on the individual orders.   Extra Purple Top Tube   Result Value Ref Range    Hold Specimen JIC    Ammonia   Result Value Ref Range    Ammonia 17 11 - 51 umol/L   XR Chest Port 1 View    Narrative    Exam: XR CHEST PORT 1 VIEW, 4/24/2023 4:20 AM    Indication: fever, increased secretions    Comparison: Chest abdomen pelvis CT 4/23/2023, chest radiograph  4/22/2023, 4/21/2023    Findings:   Semiupright AP view of the chest. Endotracheal tube terminates over  the mid thoracic trachea. Gastric tube terminates over the stomach.  Esophageal temperature probe terminates over the gastroesophageal  junction. Low lung volumes. Similar appearance of bibasilar opacities  and increasing consolidative opacity right upper lung zone. Cardiac  silhouette is stable in size.      Impression    Impression:   1. Increasing consolidative opacity in the right upper lobe compared  to comparison CT. Given the patient's history of secretions pneumonia  would have to be the prime consideration.  2. Stable low lung volumes with bibasilar atelectasis.    I have personally reviewed the examination and initial interpretation  and I agree with the findings.    MUKUL REGALADO MD         SYSTEM ID:  [id number]   XR Abdomen  Port 1 View    Narrative    Exam: XR ABDOMEN PORT 1 VIEW, 4/24/2023 4:24 AM    Indication: pt ingested magnets 4/16, previous AXR showed magnet in  the RLQ near ileocecal valve. Evaluate current position    Comparison: Abdominal radiograph 4/23/2023, chest and pelvis CT  4/23/2023, abdomen/pelvis CT 4/20/2023    Findings:   Supine AP view of the abdomen. Persistent round metallic density  projecting over the proximal ascending colon, most likely represents  ingested magnet. Nonobstructive bowel gas pattern. No pneumatosis.      Impression    Impression:   Metallic density representing presumed magnet projects over the  proximal ascending colon, unchanged.    I have personally reviewed the examination and initial interpretation  and I agree with the findings.    MUKUL REGALADO MD         SYSTEM ID:  [id number]   Blood gas venous   Result Value Ref Range    pH Venous 7.35 7.32 - 7.43    pCO2 Venous 43 40 - 50 mm Hg    pO2 Venous 63 (H) 25 - 47 mm Hg    Bicarbonate Venous 24 21 - 28 mmol/L    Base Excess/Deficit (+/-) -1.8 -7.7 - 1.9 mmol/L    FIO2 3    Lactic acid whole blood   Result Value Ref Range    Lactic Acid 1.3 0.7 - 2.0 mmol/L   Extra Tube    Narrative    The following orders were created for panel order Extra Tube.  Procedure                               Abnormality         Status                     ---------                               -----------         ------                     Extra Purple Top Tube[[id number]]                            Final result                 Please view results for these tests on the individual orders.   Extra Purple Top Tube   Result Value Ref Range    Hold Specimen [facility]    Magnesium   Result Value Ref Range    Magnesium 2.0 1.7 - 2.3 mg/dL   Vancomycin level   Result Value Ref Range    Vancomycin 18.2   ug/mL   Basic metabolic panel   Result Value Ref Range    Sodium 142 136 - 145 mmol/L    Potassium 3.8 3.4 - 5.3 mmol/L    Chloride 109 (H) 98 - 107 mmol/L    Carbon Dioxide  (CO2) 20 (L) 22 - 29 mmol/L    Anion Gap 13 7 - 15 mmol/L    Urea Nitrogen 4.0 (L) 6.0 - 20.0 mg/dL    Creatinine 0.64 0.51 - 0.95 mg/dL    Calcium 7.8 (L) 8.6 - 10.0 mg/dL    Glucose 116 (H) 70 - 99 mg/dL    GFR Estimate >90 >60 mL/min/1.73m2   CBC with platelets   Result Value Ref Range    WBC Count 20.2 (H) 4.0 - 11.0 10e3/uL    RBC Count 3.45 (L) 3.80 - 5.20 10e6/uL    Hemoglobin 10.3 (L) 11.7 - 15.7 g/dL    Hematocrit 33.0 (L) 35.0 - 47.0 %    MCV 96 78 - 100 fL    MCH 29.9 26.5 - 33.0 pg    MCHC 31.2 (L) 31.5 - 36.5 g/dL    RDW 15.0 10.0 - 15.0 %    Platelet Count 263 150 - 450 10e3/uL   Phosphorus   Result Value Ref Range    Phosphorus 2.9 2.5 - 4.5 mg/dL   Treponema Abs w Reflex to RPR and Titer   Result Value Ref Range    Treponema Antibody Total Nonreactive Nonreactive   XR Abdomen Port 1 View    Narrative    Exam: XR ABDOMEN PORT 1 VIEW, 4/24/2023 9:43 AM    Indication: progression of ingested magnets    Comparison: Abdominal radiograph 4/24/2023 4:18 AM    Findings:   Portable AP supine abdominal radiographs. Nasogastric tube tip and  sidehole overlying the proximal stomach. Esophageal temperature probe  at the GE junction. Cholecystectomy clips.    Rounded radiodense structures projected over the right mid abdomen  consistent with ingested magnets which appear apposed on imaging,  likely in the ascending colonic region, minimally progressed distally  from prior study. No small bowel obstruction. Limited assessment for  free air on supine imaging.    No acute osseous abnormalities. Bibasilar opacities. Please see same  day chest radiograph.      Impression    Impression: Ingested radiopaque magnets are opposed overlying the  right lower quadrant, most likely in the ascending colonic region,  minimally progressed distally from prior study.    JONN CHOI MD         SYSTEM ID:  [id number]       All relevant imaging and laboratory values personally reviewed.

## 2023-04-24 NOTE — PROGRESS NOTES
CLINICAL NUTRITION SERVICES - BRIEF NOTE      Nutrition Prescription     RECOMMENDATIONS FOR MDs/PROVIDERS TO ORDER:  --If able to use GI tract, recommend starting TF within 24 hrs (pt has been NPO x at least 3 days).      Recommendations already ordered by Registered Dietitian (RD):  --Discontinued current TF and prosource orders.      Future/Additional Recommendations:  --If EN becomes POC:  --Enteral access: OGT  --Pending AXR confirmation of feeding tube position  --GOAL: Vital High Protein @ goal of 65ml/hr (1560ml/day) will provide: 1560 kcals (22 kcal/kg), 136 g PRO (1.9 g/kg), 1304 ml free H20, 173 g CHO, and 0 g fiber daily.  --Start TF @ 15 ml/hr and advance by 10 ml q 8 hrs until goal rate.  --Do not start or advance TF rate unless K+ >3.0, Mg++ > 1.5,  and Phos > 1.9.  --Minimum 30 ml q 4 hrs water flushes for tube patency.  --If gastric enteral access: HOB > 30 degrees.  --MVI/minerals supplement if not already ordered (Certavite).     --Propofol.       *Please see full assessment note from 4/21/2023    New Findings:  Pt transferred from [facility] ICU for ongoing care. Per notes, pt was intubated 4/21, TF not started (though ordered 4/23); NPO x at least 3 days. Pt ingested magnets on 4/16, currently located in the proximal ascending colon. GI and surgery following. NGT vs OGT in place (AXR). +stool output (colonoscopy prep).     Labs: K+ 3.8 (WNL), Mg++ 2 (WNL), Phos 2.9 (WNL)  Meds: Folic acid, Prosource TF Free 3 packets (not given), Senna-docusate BID, Thiamine, Dilaudid gtt, Propofol @ 39.7 ml/hr    Nutrition needs modified:   Dosing Weight: 70 kg (adjusted based on actual wt and IBW 59.1 kg)     ASSESSED NUTRITION NEEDS  Estimated Energy Needs: 1904-1307 kcals/day (20-25 kcals/kg)  Justification: Maintenance, vented, obesity  Estimated Protein Needs: 105-140 grams protein/day (1.5-2 grams of pro/kg)  Justification: Increased needs, obesity    Interventions  Collaboration with other providers -  rounds  Enteral Nutrition - discontinued current TF orders (not being given); new TF recommendations above.    RD to follow per protocol.    Li Gillette MS, RD, LD, [facility] pager: [phone]  ASCOM: [id number]

## 2023-04-24 NOTE — CONSULTS
General surgery Consult Note  04/23/2023    Reason for consult: Foreign body  Consult requested by: Medicine      ASSESSMENT: 42-year-old female with a history of polysubstance abuse, multiple psychiatric disorders, constipation, multiple abdominal surgeries admitted to the hospital for altered mental status likely secondary to cocaine versus other intoxication required mechanical ventilation and found to have swallowed magnets which are currently near the ileocecal valve GI attempted to retrieve magnets but were unsuccessful in locating them.  They will try again tomorrow    RECOMMENDATIONS:    Spoke with GI and Agree with GI attempting scope again tomorrow before surgical intervention is considered     No acute surgical intervention planned at this time    Surgery will continue to follow        Discussed with chief resident who will discuss with attending    Nick Enrique PGY2  Surgery      =======================    History of Present Illness:    Shanda Hinds is a 42 year old female with a history of polysubstance abuse, multiple psychiatric disorders, abdominal surgeries admitted to the hospital for altered mental status likely secondary to cocaine versus other intoxication currently requiring mechanical ventilation therefore subjective information is unobtainable    Past Medical History:  Past Medical History:   Diagnosis Date     Cystic fibrosis (H)      Depressive disorder      Insomnia      PTSD (post-traumatic stress disorder)        Past Surgical History  Past Surgical History:   Procedure Laterality Date     ABDOMEN SURGERY       ABDOMEN SURGERY      removal of shrapnel     ABDOMEN SURGERY      removal of adhesions     BLADDER SURGERY       CHOLECYSTECTOMY       CHOLECYSTECTOMY       CHOLECYSTECTOMY Bilateral 2011     GYN SURGERY      fibroids, endometriosis, torsion, adhesion removal     HYSTERECTOMY       HYSTERECTOMY       LEFT OOPHORECTOMY       SLING BLADDER SUSPENSION WITH FASCIA EILEEN          Family History:  Family History   Problem Relation Age of Onset     No Known Problems Mother      Kidney failure Father      Hypertension Father      Pancreatic Cancer Sister        Social History:  Social History     Social History Narrative    Disability from [employer]        Medications:  No current outpatient medications on file.       Allergies:  Allergies   Allergen Reactions     Haldol [Haloperidol] Anaphylaxis     Codeine      Hives, vomiting      Compazine [Prochlorperazine]      Fentanyl      Morphine      Hives, vomiting      Neurontin [Gabapentin] Other (See Comments)     Facial twitching     Precedex Other (See Comments)     Tachy arrhythmias on 2 separate infusion trials      Reglan [Metoclopramide]      Trazodone Nausea and Hives       Review of Symptoms:  Unobtainable    Physical Exam:    Temp:  [98.3  F (36.8  C)-102.8  F (39.3  C)] 101.1  F (38.4  C)  Pulse:  [] 112  Resp:  [14-29] 19  BP: ()/() 133/74  FiO2 (%):  [40 %-60 %] 50 %  SpO2:  [89 %-99 %] 99 %    Gen: Intubated sedated  HEENT: Mild scleral icterus  CV: RRR  Resp: Intubated on vent  Abd: soft, nontender, nondistended, multiple laparoscopic incision scars  Ext: WWP w/o edema  Neuro: no focal deficits  Skin: No rashes    Labs:  Electrolytes within normal limits  Creatinine 0.66    WBC 16  Hemoglobin 9.9    Imaging:    CT    Impression:   1.  A few scattered peripheral groundglass opacities in the right  upper and middle lobes suggestive for infection versus aspiration.  2.  Right upper and bilateral lower lobe consolidative opacities  suggestive for dependent atelectasis versus, less likely, infection.  3.  No acute findings in the abdomen or pelvis.  4.  Metallic densities remain at the ileocecal valve, in unchanged  position since CT 4/20/2023 without evidence for bowel obstruction or  perforation.

## 2023-04-24 NOTE — PLAN OF CARE
Major Shift Events: Colonoscopy performed this morning. 10mcg propofol bolus, 1mg Dilaudid bolus and 2mg versed given during procedure on top of 75/mcg/kg/min propofol and 0.4 mg/hr Dilaudid gtt. Pt tolerated procedure well. 2 magnets retrieved. Abd XR performed to verify all magnets have been successfully retrieved. vEEG monitoring started around 1830.     Neuro: vEEG monitor in place. Pupils ERR, 2-3mm. Propofol titrated down to 45mcg/kg/min. Sedation paused for as long as 5 minutes until pt became agitated, restless, and was attempting to pull at ETT, kick legs off of the bed and push RN away with arms. Propofol reinitiated for safety of pt at 45mcg/kg/min. Dilaudid paused briefly, pt started grimacing, tearing, and fighting ventilator. Dilaudid reinitiated at 0.3mg/hr.     CV: SR/ST 90-low 100s. Febrile, Tmax esophageal temp probe 100.6. Arctin sun applied. PRN Tylenol administered x3. Systolic goal <170, 10mg labetalol given once.     Resp: 7.0 ETT 23 cm @ gums. SIMV settings FiO2 50%, , PEEP 5, RR 18. Copious green secretions from nasotracheal suctioning, copious white/yellow secretions from ETT inline. Respiratory cultures pending.     GI/: OG at 70 cm at nares. Adequate green/yellow UO from [place]. Extremely large amounts of watery stool. Rectal tube replaced post-colonostomy.     Skin: Cracked lips/ corners of mouth.     Access: Extended PIV indwelling catheter on R, propofol and dilaudid running through chickenfoot. PIV x 2 on L, saline locked.     Gtt: Propofol at 75mck/kg/min, Dilaudid at 0.4 mg/hr.     Plan: Wean sedation as able, monitor for seizure activity with vEEG in place.     Alysha Rahman RN on 4/24/2023 at 6:09 PM

## 2023-04-24 NOTE — PROGRESS NOTES
Gastroenterology Endoscopy [facility] Brief Operative Note    Procedure:  Colonoscopy   Post-operative diagnosis: Successful removal of foreign body ( 2 magnets) with a aguirre net   Staff Physician:  Dr. Charlene Martinez   Fellow/Assistant(s):  Lokesh Aparicio    Specimens:  Please see final procedure note for further details.   Findings:  two magnets removed from the ascending colon using a aguirre net    Complications:  None.   Condition:  Stable   Recommendations  Diet:  Per ICU team  PPI:  N/A  Anti-coagulants/platelets:  N/A  Octreotide:  N/A  Discharge Planning:   Pending clinical improvement

## 2023-04-24 NOTE — PROGRESS NOTES
Brief GI Luminal Service Sign-Off Note:    S/p Colonoscopy 4/24/2023 with successful removal of foreign bodies (2 magnets) from the ascending colon using a aguirre net.     Recommendations:  -- Please see full procedure report when available.   -- Consider Maintenance bowel regimen: Miralax 2 capfuls daily, titrated to promote a soft, continent, easy to evaluate bowel movements, minimum of 3 bowel movements per week.   -- Consider Addiction Medicine and Psychiatry consults given polysubstance abuse and psychiatric comorbidities.  -- Continue Supportive Cares  - Adequate volume resuscitation with IVF, pRBCs.  - Monitor Hemoglobin closely. Transfuse to keep Hgb > 7 g/dL from GI standpoint.   - Monitor Platelets closely. Keep PLT > 50 10e3/uL from GI standpoint.  - Maintain hemodynamics: MAP >65 mmHg and HR <100.  - Monitor and optimize electrolytes.  - Monitor and optimize nutrition. --> Diet per primary team.   - Reposition/Ambulate every 2 hours while awake.   -- Avoid NSAIDs.  -- Analgesics/Antiemetics per primary team.  -- If the patient experiences overt GI bleeding with hemodynamic instability, please page the GI Luminal Service (listed on [facility]).     Outpatient:  -- [facility] ?due for surveillance colonoscopy.   - Last colonoscopy on file was in 2017 with poor prep and polyps on exam, recommending repeat colonoscopy with extended prep. Unclear whether the patient had repeat colonoscopy with extended prep for surveillance purposes. Also noted patient's father had colon cancer in age 60s.     The inpatient gastroenterology service will sign off at this time. Thank you for allowing us to participate in the care of this patient. Please do not hesitate to page the GI service with any questions or concerns.     Plan discussed and agreed upon with Dr. Charlene Zimmer, GI Luminal Service Staff Physician.     Leonela Adame PA-C  Inpatient Gastroenterology Service  [facility]  [facility]  Pager: *[phone]  Text Page

## 2023-04-24 NOTE — OR NURSING
Colonoscopy performed at bedside in the ICU. 2 magnets removed with aguirre net. Patient tolerated well. ICU nurse managed sedation and monitored VS. Left in the care of the ICU.

## 2023-04-24 NOTE — PHARMACY-VANCOMYCIN DOSING SERVICE
"Pharmacy Vancomycin Note  Date of Service 2023  Patient's  1980   42 year old, female    Indication: Community Acquired Pneumonia  Day of Therapy: 2  Current vancomycin regimen:  1500 mg IV q12h  Current vancomycin monitoring method: AUC  Current vancomycin therapeutic monitoring goal: 400-600 mg*h/L    InsightRX Prediction of Current Vancomycin Regimen  Loading dose: N/A  Regimen: 1500 mg IV every 12 hours.  Start time: 17:25 on 2023  Exposure target: AUC24 (range)400-600 mg/L.hr   AUC24,ss: 447 mg/L.hr  Probability of AUC24 > 400: 69 %  Ctrough,ss: 14.1 mg/L  Probability of Ctrough,ss > 20: 15 %  Probability of nephrotoxicity (Lodise YANELIS ): 9 %      Current estimated CrCl = Estimated Creatinine Clearance: 139 mL/min (based on SCr of 0.64 mg/dL).    Creatinine for last 3 days  2023:  3:51 AM Creatinine 0.55 mg/dL  2023:  4:51 AM Creatinine 0.66 mg/dL;  6:52 PM Creatinine 0.64 mg/dL  2023:  5:49 AM Creatinine 0.64 mg/dL    Recent Vancomycin Levels (past 3 days)  2023:  5:49 AM Vancomycin 18.2 ug/mL;  2:12 PM Vancomycin 9.6 ug/mL    Vancomycin IV Administrations (past 72 hours)                   vancomycin (VANCOCIN) 1,500 mg in 0.9% NaCl 250 mL intermittent infusion (mg) 1,500 mg New Bag 23 0338    vancomycin (VANCOCIN) 1,500 mg in 0.9% NaCl 250 mL intermittent infusion (mg) 1,500 mg New Bag 23 1435     1,500 mg New Bag  0253                Nephrotoxins and other renal medications (From now, onward)    Start     Dose/Rate Route Frequency Ordered Stop    23 0330  vancomycin (VANCOCIN) 1,500 mg in 0.9% NaCl 250 mL intermittent infusion         1,500 mg  over 90 Minutes Intravenous EVERY 12 HOURS 23 0258      23 0130  piperacillin-tazobactam (ZOSYN) 4.5 g vial to attach to  mL bag        Note to Pharmacy: For [facility], [facility] and [facility]: For Zosyn-naive patients, use the \"Zosyn initial dose + extended infusion\" order panel.    4.5 g  over 30 " Minutes Intravenous EVERY 6 HOURS 04/23/23 0112               Contrast Orders - past 72 hours (72h ago, onward)    Start     Dose/Rate Route Frequency Stop    04/23/23 0230  iopamidol (ISOVUE-370) solution 100 mL         100 mL Intravenous ONCE 04/23/23 0232          Interpretation of levels and current regimen:  Vancomycin level is reflective of -600,however pAUC is less than 70%     Has serum creatinine changed greater than 50% in last 72 hours: No    Urine output:  good urine output    Renal Function: Stable    InsightRX Prediction of Planned New Vancomycin Regimen  Loading dose: N/A  Regimen: 1750 mg IV every 12 hours.  Start time: 17:25 on 04/24/2023  Exposure target: AUC24 (range)400-600 mg/L.hr   AUC24,ss: 521 mg/L.hr  Probability of AUC24 > 400: 88 %  Ctrough,ss: 16.4 mg/L  Probability of Ctrough,ss > 20: 28 %  Probability of nephrotoxicity (Lodise YANELIS 2009): 12 %      Plan:  1. Increase Dose to 1750mg IV q12h  2. Vancomycin monitoring method: AUC  3. Vancomycin therapeutic monitoring goal: 400-600 mg*h/L  4. Pharmacy will check vancomycin levels as appropriate in 1-3 Days.  5. Serum creatinine levels will be ordered daily for the first week of therapy and at least twice weekly for subsequent weeks.    Irma Garcia [facility]

## 2023-04-24 NOTE — PLAN OF CARE
Major Shift Events:  Patient sedated on propofol and dilaudid. RAAS goal 0 to -1. Patient restless, hypertensive and tachycardic, requiring maximum dosing of sedation. Pupils equal and reactive. Follows commands, moves all extremities. Sinus/sinus tach. Hypertensive, SBP < 170. Labetololgiven x 1 for BP and HR. Tmax 102.9 F. Given PRN tylenol, ice packes appiled. Esophageal temp probe in place. SIMV settings, tolerating well. Coarse  Lung sounds. Large amount of thick, creamy secretions via ETT, oral, and nasal suctioning. OG in place, OK for meds. 4L total of Golytely given this shift. Liquid watery BM. Rectal pouch in place. Checking stool for magnet. Scruggs in place, adequate urine output.   Plan: Manage fever. Frequent oral care and suctioning to manage secretions.  Continue to check stool for magnet, monitor for any bowel blockage or perforation. Continue imaging for status of magnet.   For vital signs and complete assessments, please see documentation flowsheets.

## 2023-04-25 ENCOUNTER — APPOINTMENT (OUTPATIENT)
Dept: NEUROLOGY | Facility: CLINIC | Age: 43
DRG: 917 | End: 2023-04-25
Attending: STUDENT IN AN ORGANIZED HEALTH CARE EDUCATION/TRAINING PROGRAM
Payer: COMMERCIAL

## 2023-04-25 ENCOUNTER — APPOINTMENT (OUTPATIENT)
Dept: CT IMAGING | Facility: CLINIC | Age: 43
DRG: 917 | End: 2023-04-25
Attending: STUDENT IN AN ORGANIZED HEALTH CARE EDUCATION/TRAINING PROGRAM
Payer: COMMERCIAL

## 2023-04-25 LAB
ANION GAP SERPL CALCULATED.3IONS-SCNC: 9 MMOL/L (ref 7–15)
BACTERIA SPT CULT: ABNORMAL
BACTERIA SPT CULT: ABNORMAL
BASE EXCESS BLDV CALC-SCNC: 0.4 MMOL/L (ref -7.7–1.9)
BUN SERPL-MCNC: 7.9 MG/DL (ref 6–20)
CALCIUM SERPL-MCNC: 7.9 MG/DL (ref 8.6–10)
CHLORIDE SERPL-SCNC: 110 MMOL/L (ref 98–107)
CREAT SERPL-MCNC: 0.57 MG/DL (ref 0.51–0.95)
DEPRECATED HCO3 PLAS-SCNC: 23 MMOL/L (ref 22–29)
ERYTHROCYTE [DISTWIDTH] IN BLOOD BY AUTOMATED COUNT: 14.9 % (ref 10–15)
GFR SERPL CREATININE-BSD FRML MDRD: >90 ML/MIN/1.73M2
GLUCOSE BLDC GLUCOMTR-MCNC: 146 MG/DL (ref 70–99)
GLUCOSE SERPL-MCNC: 100 MG/DL (ref 70–99)
GRAM STAIN RESULT: ABNORMAL
GRAM STAIN RESULT: ABNORMAL
HCO3 BLDV-SCNC: 26 MMOL/L (ref 21–28)
HCT VFR BLD AUTO: 32.3 % (ref 35–47)
HGB BLD-MCNC: 10.1 G/DL (ref 11.7–15.7)
LACTATE SERPL-SCNC: 0.9 MMOL/L (ref 0.7–2)
MAGNESIUM SERPL-MCNC: 2 MG/DL (ref 1.7–2.3)
MCH RBC QN AUTO: 29.6 PG (ref 26.5–33)
MCHC RBC AUTO-ENTMCNC: 31.3 G/DL (ref 31.5–36.5)
MCV RBC AUTO: 95 FL (ref 78–100)
O2/TOTAL GAS SETTING VFR VENT: 50 %
PCO2 BLDV: 45 MM HG (ref 40–50)
PH BLDV: 7.37 [PH] (ref 7.32–7.43)
PHOSPHATE SERPL-MCNC: 2.2 MG/DL (ref 2.5–4.5)
PHOSPHATE SERPL-MCNC: 2.7 MG/DL (ref 2.5–4.5)
PLATELET # BLD AUTO: 267 10E3/UL (ref 150–450)
PO2 BLDV: 50 MM HG (ref 25–47)
POTASSIUM SERPL-SCNC: 3.9 MMOL/L (ref 3.4–5.3)
RBC # BLD AUTO: 3.41 10E6/UL (ref 3.8–5.2)
SODIUM SERPL-SCNC: 142 MMOL/L (ref 136–145)
WBC # BLD AUTO: 17.3 10E3/UL (ref 4–11)

## 2023-04-25 PROCEDURE — 250N000011 HC RX IP 250 OP 636: Performed by: INTERNAL MEDICINE

## 2023-04-25 PROCEDURE — 250N000013 HC RX MED GY IP 250 OP 250 PS 637: Performed by: NURSE PRACTITIONER

## 2023-04-25 PROCEDURE — 95711 VEEG 2-12 HR UNMONITORED: CPT

## 2023-04-25 PROCEDURE — 36415 COLL VENOUS BLD VENIPUNCTURE: CPT | Performed by: STUDENT IN AN ORGANIZED HEALTH CARE EDUCATION/TRAINING PROGRAM

## 2023-04-25 PROCEDURE — 84100 ASSAY OF PHOSPHORUS: CPT | Performed by: STUDENT IN AN ORGANIZED HEALTH CARE EDUCATION/TRAINING PROGRAM

## 2023-04-25 PROCEDURE — 258N000003 HC RX IP 258 OP 636: Performed by: INTERNAL MEDICINE

## 2023-04-25 PROCEDURE — 250N000011 HC RX IP 250 OP 636: Performed by: NURSE PRACTITIONER

## 2023-04-25 PROCEDURE — 82803 BLOOD GASES ANY COMBINATION: CPT | Performed by: STUDENT IN AN ORGANIZED HEALTH CARE EDUCATION/TRAINING PROGRAM

## 2023-04-25 PROCEDURE — 94003 VENT MGMT INPAT SUBQ DAY: CPT

## 2023-04-25 PROCEDURE — 99223 1ST HOSP IP/OBS HIGH 75: CPT | Mod: GC | Performed by: PSYCHIATRY & NEUROLOGY

## 2023-04-25 PROCEDURE — 84100 ASSAY OF PHOSPHORUS: CPT | Performed by: NURSE PRACTITIONER

## 2023-04-25 PROCEDURE — 99222 1ST HOSP IP/OBS MODERATE 55: CPT | Performed by: PSYCHIATRY & NEUROLOGY

## 2023-04-25 PROCEDURE — 70490 CT SOFT TISSUE NECK W/O DYE: CPT

## 2023-04-25 PROCEDURE — 83735 ASSAY OF MAGNESIUM: CPT | Performed by: INTERNAL MEDICINE

## 2023-04-25 PROCEDURE — 85027 COMPLETE CBC AUTOMATED: CPT

## 2023-04-25 PROCEDURE — 200N000002 HC R&B ICU UMMC

## 2023-04-25 PROCEDURE — 999N000157 HC STATISTIC RCP TIME EA 10 MIN

## 2023-04-25 PROCEDURE — 999N000253 HC STATISTIC WEANING TRIALS

## 2023-04-25 PROCEDURE — 99291 CRITICAL CARE FIRST HOUR: CPT | Mod: GC | Performed by: INTERNAL MEDICINE

## 2023-04-25 PROCEDURE — 999N000185 HC STATISTIC TRANSPORT TIME EA 15 MIN

## 2023-04-25 PROCEDURE — 95718 EEG PHYS/QHP 2-12 HR W/VEEG: CPT | Performed by: PSYCHIATRY & NEUROLOGY

## 2023-04-25 PROCEDURE — C9113 INJ PANTOPRAZOLE SODIUM, VIA: HCPCS | Performed by: NURSE PRACTITIONER

## 2023-04-25 PROCEDURE — 70490 CT SOFT TISSUE NECK W/O DYE: CPT | Mod: 26 | Performed by: RADIOLOGY

## 2023-04-25 PROCEDURE — 80048 BASIC METABOLIC PNL TOTAL CA: CPT

## 2023-04-25 PROCEDURE — 83605 ASSAY OF LACTIC ACID: CPT | Performed by: STUDENT IN AN ORGANIZED HEALTH CARE EDUCATION/TRAINING PROGRAM

## 2023-04-25 PROCEDURE — 250N000013 HC RX MED GY IP 250 OP 250 PS 637

## 2023-04-25 PROCEDURE — 250N000009 HC RX 250: Performed by: INTERNAL MEDICINE

## 2023-04-25 PROCEDURE — 250N000013 HC RX MED GY IP 250 OP 250 PS 637: Performed by: INTERNAL MEDICINE

## 2023-04-25 PROCEDURE — 250N000009 HC RX 250: Performed by: STUDENT IN AN ORGANIZED HEALTH CARE EDUCATION/TRAINING PROGRAM

## 2023-04-25 PROCEDURE — 250N000013 HC RX MED GY IP 250 OP 250 PS 637: Performed by: STUDENT IN AN ORGANIZED HEALTH CARE EDUCATION/TRAINING PROGRAM

## 2023-04-25 PROCEDURE — 250N000011 HC RX IP 250 OP 636

## 2023-04-25 PROCEDURE — 250N000011 HC RX IP 250 OP 636: Performed by: STUDENT IN AN ORGANIZED HEALTH CARE EDUCATION/TRAINING PROGRAM

## 2023-04-25 RX ORDER — GUAIFENESIN 600 MG/1
15 TABLET, EXTENDED RELEASE ORAL DAILY
Status: DISCONTINUED | OUTPATIENT
Start: 2023-04-25 | End: 2023-04-27

## 2023-04-25 RX ORDER — DEXAMETHASONE SODIUM PHOSPHATE 4 MG/ML
4 INJECTION, SOLUTION INTRA-ARTICULAR; INTRALESIONAL; INTRAMUSCULAR; INTRAVENOUS; SOFT TISSUE EVERY 6 HOURS
Status: COMPLETED | OUTPATIENT
Start: 2023-04-25 | End: 2023-04-26

## 2023-04-25 RX ORDER — FUROSEMIDE 10 MG/ML
40 INJECTION INTRAMUSCULAR; INTRAVENOUS ONCE
Status: COMPLETED | OUTPATIENT
Start: 2023-04-25 | End: 2023-04-25

## 2023-04-25 RX ORDER — DEXMEDETOMIDINE HYDROCHLORIDE 4 UG/ML
.1-1.5 INJECTION, SOLUTION INTRAVENOUS CONTINUOUS
Status: DISCONTINUED | OUTPATIENT
Start: 2023-04-25 | End: 2023-04-27

## 2023-04-25 RX ORDER — MAGNESIUM OXIDE 400 MG/1
400 TABLET ORAL EVERY 4 HOURS
Status: COMPLETED | OUTPATIENT
Start: 2023-04-25 | End: 2023-04-25

## 2023-04-25 RX ORDER — DEXTROSE MONOHYDRATE 100 MG/ML
INJECTION, SOLUTION INTRAVENOUS CONTINUOUS PRN
Status: DISCONTINUED | OUTPATIENT
Start: 2023-04-25 | End: 2023-04-27

## 2023-04-25 RX ADMIN — ACETAMINOPHEN 650 MG: 325 SUSPENSION ORAL at 13:36

## 2023-04-25 RX ADMIN — PIPERACILLIN AND TAZOBACTAM 4.5 G: 4; .5 INJECTION, POWDER, FOR SOLUTION INTRAVENOUS at 01:41

## 2023-04-25 RX ADMIN — Medication 0.3 MG/HR: at 06:47

## 2023-04-25 RX ADMIN — PROPOFOL 45 MCG/KG/MIN: 10 INJECTION, EMULSION INTRAVENOUS at 06:47

## 2023-04-25 RX ADMIN — PROPOFOL 45 MCG/KG/MIN: 10 INJECTION, EMULSION INTRAVENOUS at 04:28

## 2023-04-25 RX ADMIN — PROPOFOL 45 MCG/KG/MIN: 10 INJECTION, EMULSION INTRAVENOUS at 01:42

## 2023-04-25 RX ADMIN — LABETALOL HYDROCHLORIDE 10 MG: 5 INJECTION, SOLUTION INTRAVENOUS at 08:46

## 2023-04-25 RX ADMIN — Medication 10 MG: at 05:06

## 2023-04-25 RX ADMIN — Medication 10 MG: at 01:05

## 2023-04-25 RX ADMIN — PROPOFOL 45 MCG/KG/MIN: 10 INJECTION, EMULSION INTRAVENOUS at 20:39

## 2023-04-25 RX ADMIN — PROPOFOL 45 MCG/KG/MIN: 10 INJECTION, EMULSION INTRAVENOUS at 09:53

## 2023-04-25 RX ADMIN — PANTOPRAZOLE SODIUM 40 MG: 40 INJECTION, POWDER, FOR SOLUTION INTRAVENOUS at 07:57

## 2023-04-25 RX ADMIN — Medication 15 ML: at 15:10

## 2023-04-25 RX ADMIN — ENOXAPARIN SODIUM 40 MG: 40 INJECTION SUBCUTANEOUS at 07:57

## 2023-04-25 RX ADMIN — POTASSIUM PHOSPHATE, MONOBASIC POTASSIUM PHOSPHATE, DIBASIC 9 MMOL: 224; 236 INJECTION, SOLUTION, CONCENTRATE INTRAVENOUS at 18:38

## 2023-04-25 RX ADMIN — FLUTICASONE PROPIONATE 1 SPRAY: 50 SPRAY, METERED NASAL at 07:59

## 2023-04-25 RX ADMIN — FOLIC ACID 1 MG: 1 TABLET ORAL at 07:57

## 2023-04-25 RX ADMIN — SENNOSIDES AND DOCUSATE SODIUM 2 TABLET: 50; 8.6 TABLET ORAL at 20:46

## 2023-04-25 RX ADMIN — PROPOFOL 50 MCG/KG/MIN: 10 INJECTION, EMULSION INTRAVENOUS at 14:59

## 2023-04-25 RX ADMIN — DEXAMETHASONE SODIUM PHOSPHATE 4 MG: 4 INJECTION, SOLUTION INTRA-ARTICULAR; INTRALESIONAL; INTRAMUSCULAR; INTRAVENOUS; SOFT TISSUE at 20:42

## 2023-04-25 RX ADMIN — POTASSIUM PHOSPHATE, MONOBASIC POTASSIUM PHOSPHATE, DIBASIC 15 MMOL: 224; 236 INJECTION, SOLUTION, CONCENTRATE INTRAVENOUS at 09:52

## 2023-04-25 RX ADMIN — CHLORHEXIDINE GLUCONATE 15 ML: 1.2 SOLUTION ORAL at 20:46

## 2023-04-25 RX ADMIN — LAMOTRIGINE 25 MG: 25 TABLET ORAL at 07:57

## 2023-04-25 RX ADMIN — THIAMINE HCL TAB 100 MG 100 MG: 100 TAB at 07:57

## 2023-04-25 RX ADMIN — DEXMEDETOMIDINE HYDROCHLORIDE 0.4 MCG/KG/HR: 400 INJECTION INTRAVENOUS at 12:04

## 2023-04-25 RX ADMIN — PROPOFOL 45 MCG/KG/MIN: 10 INJECTION, EMULSION INTRAVENOUS at 23:04

## 2023-04-25 RX ADMIN — MAGNESIUM OXIDE TAB 400 MG (241.3 MG ELEMENTAL MG) 400 MG: 400 (241.3 MG) TAB at 13:36

## 2023-04-25 RX ADMIN — MAGNESIUM OXIDE TAB 400 MG (241.3 MG ELEMENTAL MG) 400 MG: 400 (241.3 MG) TAB at 09:52

## 2023-04-25 RX ADMIN — FUROSEMIDE 40 MG: 10 INJECTION, SOLUTION INTRAVENOUS at 12:09

## 2023-04-25 RX ADMIN — PIPERACILLIN AND TAZOBACTAM 4.5 G: 4; .5 INJECTION, POWDER, FOR SOLUTION INTRAVENOUS at 21:02

## 2023-04-25 RX ADMIN — PIPERACILLIN AND TAZOBACTAM 4.5 G: 4; .5 INJECTION, POWDER, FOR SOLUTION INTRAVENOUS at 13:36

## 2023-04-25 RX ADMIN — ATORVASTATIN CALCIUM 40 MG: 40 TABLET, FILM COATED ORAL at 20:46

## 2023-04-25 RX ADMIN — VANCOMYCIN HYDROCHLORIDE 1750 MG: 10 INJECTION, POWDER, LYOPHILIZED, FOR SOLUTION INTRAVENOUS at 03:41

## 2023-04-25 RX ADMIN — CHLORHEXIDINE GLUCONATE 15 ML: 1.2 SOLUTION ORAL at 07:57

## 2023-04-25 RX ADMIN — QUETIAPINE FUMARATE 50 MG: 50 TABLET ORAL at 12:11

## 2023-04-25 RX ADMIN — MONTELUKAST 10 MG: 10 TABLET, FILM COATED ORAL at 22:02

## 2023-04-25 RX ADMIN — VANCOMYCIN HYDROCHLORIDE 1750 MG: 10 INJECTION, POWDER, LYOPHILIZED, FOR SOLUTION INTRAVENOUS at 15:10

## 2023-04-25 RX ADMIN — PIPERACILLIN AND TAZOBACTAM 4.5 G: 4; .5 INJECTION, POWDER, FOR SOLUTION INTRAVENOUS at 07:57

## 2023-04-25 RX ADMIN — DEXAMETHASONE SODIUM PHOSPHATE 4 MG: 4 INJECTION, SOLUTION INTRA-ARTICULAR; INTRALESIONAL; INTRAMUSCULAR; INTRAVENOUS; SOFT TISSUE at 15:10

## 2023-04-25 ASSESSMENT — ACTIVITIES OF DAILY LIVING (ADL)
ADLS_ACUITY_SCORE: 46
ADLS_ACUITY_SCORE: 45
ADLS_ACUITY_SCORE: 45
ADLS_ACUITY_SCORE: 47
ADLS_ACUITY_SCORE: 45
ADLS_ACUITY_SCORE: 49
ADLS_ACUITY_SCORE: 45
ADLS_ACUITY_SCORE: 45
ADLS_ACUITY_SCORE: 49
ADLS_ACUITY_SCORE: 45

## 2023-04-25 NOTE — PLAN OF CARE
Major Shift Events:    Neuro: Sedated on prop and dilaudid. Arouses to pain or spontaneously at times. Very agitated when awake. Moves all extremities. PERRLA  Cards: Sinus rhythm. Hypertensive when awake (systolic's 180's and above). Febrile throughout day, tmax 102.4, responded well to tylenol.  Pulm: PS well, did not extubate due to no cuff leak. On CMV settings due to sedation. 45%/14/450/5  GI/: TF started @ 15ml/hr. No BM. Scruggs patent with good UOP after lasix.   Plan: Continue to monitor.   For vital signs and complete assessments, please see documentation flowsheets.

## 2023-04-25 NOTE — PROGRESS NOTES
MEDICAL ICU H&P  04/25/2023    Date of Hospital Admission: 4/20/23  Date of ICU Admission: 4/20/23  Reason for Critical Care Admission: Agitated delirium 2/2 cocaine and possible benzodiazepine intoxication requiring intubation and mechanical ventilation for airway protection   Date of Service (when I saw the patient): 04/25/2023    ASSESSMENT:   Shanda Hinds is a 41yo F with PMH of mild cystic fibrosis, asthma, BRIAN (no CPAP), constipation, HTN, HLD, prior CVA (no residual), hepatic steatosis, questionable seizure disorder, polysubstance use disorder (cocaine, opioids, BZD, and THC), and multiple psychiatric conditions (bipolar disorder, borderline personality disorder, RANDI, PTSD, multiple suicide attempts, and possible pseudoseizures), who presented with agitated delirium likely 2/2 cocaine & possible BZD intoxication, AMS and AHHRF 2/2 PNA requiring intubation on 4/22. Also found to have 2x magnets at ileocecal valve she swallowed on 4/16/23, finally got removed by colonoscopy on 4/24/2023. Now with tracheal edema.     Changes for Today:  - Doing well with sedation holiday (off propofol, only on precedex), alert and follow commands  - Doing well on PS today. Unable to extubate due to no cuff leak c/f airway edema  - Started on decadron 4mg Q6H x4 doses. Reassess tmr  - vEEG: mod diffuse encephalopathy, no seizure  - Psych consult given complicated psych history  - 1x IV 40mg Lasix with good response  - Follow SpCx. Cont vanco/zosyn  - Start TF       PLAN:     Neuro:  # Pain and sedation for mechanical ventilation  - Sedation: Propofol gtt + versed 2 mg Q2H PRN for breakthrough agitation  Doing well with sedation holiday (off propofol, only on precedex), alert and follow commands  - RASS goal 0 to -1  - Pain: dilaudid gtt + boluses, tylenol prn  - NOTE: patient had tachyarrhythmias when started on precedex infusion 4/21 that ceased with stopping of infusion. Will list precedex as an allergy and will need a  "cardiac workup prior to discharge.    # Agitated delirium, likely 2/2 cocaine intoxication  # Encephalopathy, metabolic   # Cocaine intoxication  # Possible benzodiazepine intoxication  # Poly substance abuse (cocaine, benzodiazepine, opioid, THC)  Patient with a history of polysubstance use presented with agitated delirium. Urine drug screen was positive for benzodiazepines and cocaine. Head CT showed no evidence for acute intracranial pathology. Has had seizure like activity in the past, but no reports of convulsive activity since presentation. Work-up for additional metabolic or infectious etiologies contributing to AMS has been thusfar unrevealing. Per S.O. patient taking 1.5-2gm of cocaine daily and last used \"about a gram\" on 19th.   - Wean sedation as able  - Neurocrit consult for encephalopathy with possible EEG d/t h/o seizure  - Psychiatry consulted given polysubstance abuse and psychiatric comorbidities  - Watch for benzos/cocaine withdrawal  - Add on Peth     # Epilepsy vs pseudoseizures   # Migraine headaches   # Hx of TBI  New onset tonic clonic seizures early January 2023 (also possible report of sz in 2021). Neuroimaging negative for acute intracranial pathology, EEGs without epileptiforms while hospitalized. Generally preceded by an aura including migraines and anxiety. Was started on Depakote initially, but reportedly transitioned to lamotrigine. Subsequently [place]like activity. 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   - Seizure precautions  - Continue PTA lamotrigine  - PRN lorazepam available for seizure activity  - Neurocrit consult as above --> EEG in place     # Bipolar disorder  # Borderline personality disorder  # RANDI  # PTDS  # Multiple past suicide attempts   Unclear current psychiatric medication list. Recently " discharged from [facility] with [facility] med list including amitriptyline 100 mg at bedtime PRN OR zolpidem 10 mg at bedtime PRN, diazepam 5 mg BID PRN, and is currently up-titrating lamotrigine. Saw her psychiatrist on 3/30/23 who also noted she may be on effexor, but this is not in her discharge med list. Of note, lamotrigine level on 4/21 was <0.9.   - Neuro exam q4h  - Will offer chem dep and psychiatry consultation once extubated if pt is willing to see them   - Unclear intent, may need 1:1 sitter once extubated   - Supplemental Thiamine and folate (unclear if ETOH abuse is an issue for her)  - Possible PTA psych medication regimen:  - Amitriptyline 100 mg @ HS PRN - Hold  - Zolpidem 10 mg @ HS PRN - Hold   - Diazepam 5 mg BID - Hold  - Lamotrigine 25 mg qd - Give 25 in AM   - Rimegepant 75 mg every day PRN - Hold   - Effexor 150 mg every day - Hold      Pulmonary:  # Mechanical ventilation   # H/o asthma  # ?Cystic Fibrosis, mild  # BRIAN, no CPAP  # Pneumonia, hospital acquired  Thick green-yellow copious from ETT and nasal per RN. Increased secretions overnight on 4/22, with fever. CT Chest showing scattered peripheral groundglass opacities in the right upper and middle lobes suggestive for infection versus aspiration. SpCx pending. +ve MRSA on swab.  - Continue PTA fluticasone nasal spray BID & montelukast 10 mg every day   - Continue daily PST   - Patient with successful completion of SBT, however unable to extubate due to airway edema  - Started on decadron 4mg IV q6H --> plan to re-attempt SBT tomorrow (4/26)  - Antimicrobials per ID section     Vent Mode: SIMV/PS  (Synchronized Intermittent Mandatory Ventilation with Pressure Support)  FiO2 (%): 50 %  Resp Rate (Set): 18 breaths/min  Tidal Volume (Set, mL): 450 mL  PEEP (cm H2O): 5 cmH2O  Pressure Support (cm H2O): 12 cmH2O  Resp: 18    Cardiovascular:  # Lactic acidosis, likely 2/2 agitation, resolved   # HTN  # HLD  - Avoid hydralazine for HTN given  c/f reflex tachy. Use labetalol prn. Consider scheduling clonidine   - Continue labetalol 10mg IV Q4H prn for SBP>160 mmHg or DBP>110 mmHg  - Continue PTA atorvastatin 40 mg every day  - Hold PTA metoprolol succinate given cocaine use-> should likely not continue upon discharge given reported daily cocaine use   - Holding PTA Hyzaar- would prefer to use BP for diuresis rather than treat intermittent HTN 2/2 to agitation, consider resuming if sustaining HTN  - Continue 1x IV 40mg Lasix today (4/25) as patient remains overall volume up     GI/Nutrition:  # Magnet ingestion 4/16, now located in ascending colon  # Hepatic steatosis   # Hypertriglyceridemia  on admission  # Chronic 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               - RNs to search BMs until magnets are found               - KUB BID to verify position of magnets until they are passed. Once passed, will need to repeat AXR to verify               - Colonoscopy 4/22: unable to locate magnets 2/2 to incomplete bowel prep.                - 4/23: Completed golytely prep overnight x2, no magnets found in stool. CT abd obtained overnight indicates magnets remain in the ileocecal valve, unchanged from previous imaging studies. Discussed with Gen surgery, decided to try colonoscopy again before surgery  - Colonoscopy 4/24, able to remove 2x magnets from the ascending colon  - Senna BID   - Bisacodyl suppository daily PRN   - Trend triglycerides while on propofol infusion (downtrending from initial 580)  - RD consult for recs, appreciate input     Renal/Fluids/Electrolytes:  # Hyponatremia, resolved   # Volume status  - ICU electrolyte replacement protocol  - Scruggs for strict I&O, unable to use external device given high liquid stool output with bowel prep  - Daily weights   - 1x IV 40mg lasix  (4/24). Goal net negative 500mL to 1L     Endocrine:  No acute concerns  - Goal BG < 180, no current indication for insulin       ID:  # Pneumonia, hospital acquired  # MRSA nares positive  - Monitor WBC trend and fever curve  - UA without infectious findings  - Blood cultures 4/20 pending , NGTD  - 4/23: Fever and agitation reported overnight, increased nasal secretions. CT C/A/P obtained, showing c/f pneumonia. BC and sputum cx obtained, Initial sputum with +4 gram positive cocci, MRSA swab positive. Started on vanc/zosyn/flagyl. Flagyl discontinued after negative abdominal findings on imaging. SpCx pending, +ve MRSA  - Continue vanc/zosyn while awaiting cultures to speciate  - If patient clinically decompensating or spiking fevers, add Cipro for double GNB coverage     Hematology:    # Acute normocytic anemia  PTA Hb  11.8-12.3. Hb 9.9 today. No signs of active bleeding, likely 2/2 to drug use/illness.  - CTM  - Enoxaparin for DVT prophy      Musculoskeletal:  # Weakness  Ambulatory PTA.  - PT/OT consult     Skin:  No acute concerns  - Diligent skin cares to prevent breakdown     General Cares/Prophylaxis:    DVT Prophylaxis: Enoxaparin (Lovenox) SQ and Pneumatic Compression Devices  GI Prophylaxis: PPI  Restraints: Bilateral upper extremity soft wrist restraints and mitts to prevent self extubation  Code Status: Full     Lines/tubes/drains:  ? PIV x 2  ? NG  ? Scruggs  ? ETT     Disposition: MICU    Patient case discussed with Dr. Reid who agrees with the above plan.    Jason Lowry  Medical Student    Resident/Fellow Attestation   I, Lia Lu MD, was present with the medical/RUSTAM student who participated in the service and in the documentation of the note.  I have verified the history and personally performed the physical exam and medical decision making.  I agree with the assessment and plan of care as documented in the note.      Lai Lu MD  PGY3  Date of Service (when I saw the patient):  "04/25/23      Clinically Significant Risk Factors          # Hypocalcemia: Lowest Ca = 7.8 mg/dL in last 2 days, will monitor and replace as appropriate               # Obesity: Estimated body mass index is 35.94 kg/m  as calculated from the following:    Height as of this encounter: 1.676 m (5' 6\").    Weight as of this encounter: 101 kg (222 lb 10.6 oz).             -----------------------------------------------------------------------  Interval History:   Patient intubated, sedated. Some agitation overnight, will attempt sedation holiday today with SBT.      PHYSICAL EXAMINATION:  Temp:  [97.9  F (36.6  C)-100.6  F (38.1  C)] 98.8  F (37.1  C)  Pulse:  [] 87  Resp:  [14-38] 18  BP: ()/() 124/75  FiO2 (%):  [50 %] 50 %  SpO2:  [90 %-98 %] 97 %     General: Sedated, intubated  HEENT: Normocephalic/atraumatic  Respiratory: CTAB on ventilatory support  Cardiovascular: RRR without murmurs, rubs or gallop. S1, S2 intact.  Abdomen: Soft, non-distended.  Skin: No overt lesions, bruises, rashes or bleeding on exposed skin surfaces.    LABS: Reviewed.   Arterial Blood Gases   Recent Labs   Lab 04/21/23  0102   PH 7.40   PCO2 42   PO2 81   HCO3 26     Complete Blood Count   Recent Labs   Lab 04/25/23  0637 04/24/23  0549 04/23/23  1852 04/23/23  0451   WBC 17.3* 20.2* 20.8* 16.0*   HGB 10.1* 10.3* 10.7* 9.9*    263 258 229     Basic Metabolic Panel  Recent Labs   Lab 04/25/23  0637 04/24/23  0549 04/23/23  1852 04/23/23  1831 04/23/23  0757 04/23/23  0451    142 138  --   --  141   POTASSIUM 3.9 3.8 3.7  --   --  3.6   CHLORIDE 110* 109* 104  --   --  108*   CO2 23 20* 19*  --   --  22   BUN 7.9 4.0* 3.1*  --   --  3.3*   CR 0.57 0.64 0.64  --   --  0.66   * 116* 98 113*   < > 111*    < > = values in this interval not displayed.     Liver Function Tests  Recent Labs   Lab 04/23/23  1852 04/21/23  0021 04/20/23  1832   AST 28 23 28   ALT 20 21 20   ALKPHOS 101 60 66   BILITOTAL 0.8 <0.2 " 0.2   ALBUMIN 3.5 4.0 4.5   INR 1.27* 1.04 0.97  1.0*     Coagulation Profile  Recent Labs   Lab 04/23/23  1852 04/21/23  0021 04/20/23  1832   INR 1.27* 1.04 0.97  1.0*       IMAGING:  Recent Results (from the past 24 hour(s))   XR Abdomen Port 1 View    Narrative    Exam: XR ABDOMEN PORT 1 VIEW, 4/24/2023 9:43 AM    Indication: progression of ingested magnets    Comparison: Abdominal radiograph 4/24/2023 4:18 AM    Findings:   Portable AP supine abdominal radiographs. Nasogastric tube tip and  sidehole overlying the proximal stomach. Esophageal temperature probe  at the GE junction. Cholecystectomy clips.    Rounded radiodense structures projected over the right mid abdomen  consistent with ingested magnets which appear apposed on imaging,  likely in the ascending colonic region, minimally progressed distally  from prior study. No small bowel obstruction. Limited assessment for  free air on supine imaging.    No acute osseous abnormalities. Bibasilar opacities. Please see same  day chest radiograph.      Impression    Impression: Ingested radiopaque magnets are opposed overlying the  right lower quadrant, most likely in the ascending colonic region,  minimally progressed distally from prior study.    JONN COHI MD         SYSTEM ID:  [id number]   XR Abdomen Port 1 View    Narrative    Exam: XR ABDOMEN PORT 1 VIEW, 4/24/2023 11:56 AM    Indication: magnets retrieved from colon    Comparison: 4/24/2023    Findings:   No residual ingested radiopaque foreign body is identified.  Cholecystectomy clips. No obstructive bowel gas pattern or  pneumatosis. Scruggs catheter and pelvic phleboliths.      Impression    Impression: No remaining ingested radiopaque foreign bodies.  Nonobstructive bowel gas pattern.    I have personally reviewed the examination and initial interpretation  and I agree with the findings.    CROW KAPLAN MD         SYSTEM ID:  [id number]

## 2023-04-25 NOTE — PLAN OF CARE
Major Shift Events:  Pt intermittently agitated with coughing spells causing SBP up to 200's. PRN dilaudid and propofol given off pumps per MAR. SBP back within goal range of <140.    Q 4 hr neuro checks being complete. Pt sedated on propofol @ 45 mcg/kg/min, and dilaudid @ 0.3 mg/hr. Pt RASS -3 to +2. Pt does not follow commands but RODRIGUEZ spontaneously. Pupils equal and brisk. Bilateral mitts and wrist restraints on for safety. Pt remains febrile. Tmax 100.4 with cooling blanket on. HR 80's-100's with no ectopy noted. SBP goal <140, PRN labetalol given x1, PRN propofol and dilaudid given x2. Pt remains intubated, SIMV settings, 50%. Lungs coarse. Secretions tan/creamy and thick. OG clamped, OK for meds, with standard flush. Rectal tube in place, output decreased since colonoscopy. Scruggs in place with adequate UOP.    Plan: Continue to monitor pt and notify MD with acute changes in exam  For vital signs and complete assessments, please see documentation flowsheets.

## 2023-04-25 NOTE — CONSULTS
"          Initial Psychiatric Consult   Consult date: April 26, 2023         Reason for Consult, requesting source:    Overdose, agitation.   Requesting source: Dharmesh Ruano    Labs and imaging reviewed. Discussed with nursing     Total time spent in chart review, patient interview and coordination of care; 65 min          HPI:   From ICU H and P 4/23:   \"Shanda Hinds is a 43yo F with PMH of mild cystic fibrosis, asthma, BRIAN (no CPAP), constipation, HTN, HLD, prior CVA (no residual), hepatic steatosis, questionable seizure disorder, polysubstance use disorder (cocaine, opioids, benzodiazepine, and THC), and multiple psychiatric conditions (bipolar disorder, borderline personality disorder, RANDI, PTSD, multiple suicide attempts and possible pseudoseizures), who presented with agitated delirium likely 2/2 cocaine & possible benzodiazepine intoxication requiring intubation for airway protection on 4/22. Also found to have multiple magnets in her colon she swallowed on 4/16/23 s/p multiple colon-prep w/o any improvement. Transferred to [place] for surgical evaluation.\"      I came to see her several times, but she remained intubated and sedated, so unable to get the history from her.      Per our pharmacist note 4/21:  \"Diazepam 5 mg tablets: last fill on 4/11/23 60 tablets for 30 days   Lamotrigine: no record on refill history. Per Dr. Alisson Mendez neurologist note on 3/30/23, \"Okay with trying to titrate lamotrigine faster. LTG 25 mg BID x 1 week, 50 mg BID x 1 week than 100 mg BID\". Patient's current lamotrigine dose is unknown.   Per 3/26/23 [facility]'s hospital discharge summary: Atorvastatin, Amitriptyline, Zolpidem, diazepam, Estradiol patch, fluticasone nasal spray, lamotrigine, Losartan/HCTZ, metoprolol XL, montelukast, Rimegepant, and Omeprazole         Past Psychiatric History:     From 4/18/18 hospitalization following overdose:   \"She estimates that she has been hospitalized about 10 times, beginning " "at age 11.  [facility] [place] and in [facility].  She does not currently have a therapist.  [facility].  She reports that she has had 8-10 suicide attempts in the past, all by overdose, the most recent of which was in 2008.  She denies any history of self-injurious behavior.\"    During that hospitalization she was diagnosed with MDD, RANDI, PTSD, Borderline PD, polysubstance dependence   She was discharged on 25 mg Lamictal and clonidine.  I see a psychiatry telephone contact by ETTA Cedillo, CNP 3/30/23. The plan was to slowly taper off amitriptyline. He is prescribing her Valium (see  report below)         Substance Use and History:   Opioid use since age 24; abdominal wound while in the service. Abuses a variety of other drugs; stimulants and benzodiazepines   Is a smoker         Past Medical History:   PAST MEDICAL HISTORY:   Past Medical History:   Diagnosis Date     Cystic fibrosis (H)      Depressive disorder      Insomnia      PTSD (post-traumatic stress disorder)        PAST SURGICAL HISTORY:   Past Surgical History:   Procedure Laterality Date     ABDOMEN SURGERY       ABDOMEN SURGERY      removal of shrapnel     ABDOMEN SURGERY      removal of adhesions     BLADDER SURGERY       CHOLECYSTECTOMY       CHOLECYSTECTOMY       CHOLECYSTECTOMY Bilateral 2011     COLONOSCOPY N/A 4/22/2023    Procedure: Colonoscopy;  Surgeon: Omid Pierce MD;  Location: [facility] OR     GYN SURGERY      fibroids, endometriosis, torsion, adhesion removal     HYSTERECTOMY       HYSTERECTOMY       LEFT OOPHORECTOMY       SLING BLADDER SUSPENSION WITH FASCIA EILEEN               Family History:   FAMILY HISTORY:   Family History   Problem Relation Age of Onset     No Known Problems Mother      Kidney failure Father      Hypertension Father      Pancreatic Cancer Sister            Social History:   Please refer to sticky note for information regarding " PAU, decision maker.   I do not know her living situation.          Physical ROS:   The 10 point Review of Systems is negative other than noted in the HPI or here.           Medications:       atorvastatin  40 mg Oral or Feeding Tube QPM     chlorhexidine  15 mL Mouth/Throat BID     dexamethasone  4 mg Intravenous Q6H     enoxaparin ANTICOAGULANT  40 mg Subcutaneous Q24H     fluticasone  1 spray Both Nostrils Daily     folic acid  1 mg Oral or Feeding Tube Daily     lamoTRIgine  25 mg Oral or Feeding Tube Daily     montelukast  10 mg Oral or Feeding Tube At Bedtime     pantoprazole  40 mg Per Feeding Tube QAM AC    Or     pantoprazole  40 mg Intravenous QAM AC     piperacillin-tazobactam  4.5 g Intravenous Q6H     polyethylene glycol  17 g Oral Daily     senna-docusate  1 tablet Oral or Feeding Tube BID    Or     senna-docusate  2 tablet Oral or Feeding Tube BID     thiamine  100 mg Oral or Feeding Tube Daily     vancomycin  1,750 mg Intravenous Q12H              Allergies:     Allergies   Allergen Reactions     Haldol [Haloperidol] Anaphylaxis     Codeine      Hives, vomiting      Compazine [Prochlorperazine]      Dexmedetomidine Hcl In Nacl Other (See Comments)     Tachy arrhythmias on 2 separate infusion trials      Fentanyl      Morphine      Hives, vomiting      Neurontin [Gabapentin] Other (See Comments)     Facial twitching     Reglan [Metoclopramide]      Trazodone Nausea and Hives          Labs:     Recent Results (from the past 48 hour(s))   Glucose by meter    Collection Time: 04/23/23  4:38 PM   Result Value Ref Range    GLUCOSE BY METER POCT 92 70 - 99 mg/dL   Glucose by meter    Collection Time: 04/23/23  6:31 PM   Result Value Ref Range    GLUCOSE BY METER POCT 113 (H) 70 - 99 mg/dL   Comprehensive metabolic panel    Collection Time: 04/23/23  6:52 PM   Result Value Ref Range    Sodium 138 136 - 145 mmol/L    Potassium 3.7 3.4 - 5.3 mmol/L    Chloride 104 98 - 107 mmol/L    Carbon Dioxide (CO2) 19 (L)  22 - 29 mmol/L    Anion Gap 15 7 - 15 mmol/L    Urea Nitrogen 3.1 (L) 6.0 - 20.0 mg/dL    Creatinine 0.64 0.51 - 0.95 mg/dL    Calcium 7.9 (L) 8.6 - 10.0 mg/dL    Glucose 98 70 - 99 mg/dL    Alkaline Phosphatase 101 35 - 104 U/L    AST 28 10 - 35 U/L    ALT 20 10 - 35 U/L    Protein Total 6.4 6.4 - 8.3 g/dL    Albumin 3.5 3.5 - 5.2 g/dL    Bilirubin Total 0.8 <=1.2 mg/dL    GFR Estimate >90 >60 mL/min/1.73m2   Magnesium    Collection Time: 04/23/23  6:52 PM   Result Value Ref Range    Magnesium 2.1 1.7 - 2.3 mg/dL   Phosphorus    Collection Time: 04/23/23  6:52 PM   Result Value Ref Range    Phosphorus 2.4 (L) 2.5 - 4.5 mg/dL   Lactic acid whole blood    Collection Time: 04/23/23  6:52 PM   Result Value Ref Range    Lactic Acid 1.2 0.7 - 2.0 mmol/L   Lipase    Collection Time: 04/23/23  6:52 PM   Result Value Ref Range    Lipase 36 13 - 60 U/L   INR    Collection Time: 04/23/23  6:52 PM   Result Value Ref Range    INR 1.27 (H) 0.85 - 1.15   CK total    Collection Time: 04/23/23  6:52 PM   Result Value Ref Range    CK 60 26 - 192 U/L   CBC with platelets and differential    Collection Time: 04/23/23  6:52 PM   Result Value Ref Range    WBC Count 20.8 (H) 4.0 - 11.0 10e3/uL    RBC Count 3.59 (L) 3.80 - 5.20 10e6/uL    Hemoglobin 10.7 (L) 11.7 - 15.7 g/dL    Hematocrit 33.6 (L) 35.0 - 47.0 %    MCV 94 78 - 100 fL    MCH 29.8 26.5 - 33.0 pg    MCHC 31.8 31.5 - 36.5 g/dL    RDW 14.9 10.0 - 15.0 %    Platelet Count 258 150 - 450 10e3/uL    % Neutrophils 79 %    % Lymphocytes 7 %    % Monocytes 9 %    % Eosinophils 4 %    % Basophils 0 %    % Immature Granulocytes 1 %    NRBCs per 100 WBC 0 <1 /100    Absolute Neutrophils 16.3 (H) 1.6 - 8.3 10e3/uL    Absolute Lymphocytes 1.5 0.8 - 5.3 10e3/uL    Absolute Monocytes 1.8 (H) 0.0 - 1.3 10e3/uL    Absolute Eosinophils 0.9 (H) 0.0 - 0.7 10e3/uL    Absolute Basophils 0.1 0.0 - 0.2 10e3/uL    Absolute Immature Granulocytes 0.2 <=0.4 10e3/uL    Absolute NRBCs 0.0 10e3/uL   Extra  Purple Top Tube    Collection Time: 04/23/23  8:14 PM   Result Value Ref Range    Hold Specimen JIC    Ammonia    Collection Time: 04/23/23  8:36 PM   Result Value Ref Range    Ammonia 17 11 - 51 umol/L   Blood Culture Hand, Left    Collection Time: 04/24/23  5:10 AM    Specimen: Hand, Left; Blood   Result Value Ref Range    Culture No growth after 1 day    Blood Culture Hand, Right    Collection Time: 04/24/23  5:10 AM    Specimen: Hand, Right; Blood   Result Value Ref Range    Culture No growth after 1 day    Blood gas venous    Collection Time: 04/24/23  5:10 AM   Result Value Ref Range    pH Venous 7.35 7.32 - 7.43    pCO2 Venous 43 40 - 50 mm Hg    pO2 Venous 63 (H) 25 - 47 mm Hg    Bicarbonate Venous 24 21 - 28 mmol/L    Base Excess/Deficit (+/-) -1.8 -7.7 - 1.9 mmol/L    FIO2 3    Lactic acid whole blood    Collection Time: 04/24/23  5:10 AM   Result Value Ref Range    Lactic Acid 1.3 0.7 - 2.0 mmol/L   Extra Purple Top Tube    Collection Time: 04/24/23  5:10 AM   Result Value Ref Range    Hold Specimen JIC    Magnesium    Collection Time: 04/24/23  5:49 AM   Result Value Ref Range    Magnesium 2.0 1.7 - 2.3 mg/dL   Vancomycin level    Collection Time: 04/24/23  5:49 AM   Result Value Ref Range    Vancomycin 18.2   ug/mL   Basic metabolic panel    Collection Time: 04/24/23  5:49 AM   Result Value Ref Range    Sodium 142 136 - 145 mmol/L    Potassium 3.8 3.4 - 5.3 mmol/L    Chloride 109 (H) 98 - 107 mmol/L    Carbon Dioxide (CO2) 20 (L) 22 - 29 mmol/L    Anion Gap 13 7 - 15 mmol/L    Urea Nitrogen 4.0 (L) 6.0 - 20.0 mg/dL    Creatinine 0.64 0.51 - 0.95 mg/dL    Calcium 7.8 (L) 8.6 - 10.0 mg/dL    Glucose 116 (H) 70 - 99 mg/dL    GFR Estimate >90 >60 mL/min/1.73m2   CBC with platelets    Collection Time: 04/24/23  5:49 AM   Result Value Ref Range    WBC Count 20.2 (H) 4.0 - 11.0 10e3/uL    RBC Count 3.45 (L) 3.80 - 5.20 10e6/uL    Hemoglobin 10.3 (L) 11.7 - 15.7 g/dL    Hematocrit 33.0 (L) 35.0 - 47.0 %    MCV  96 78 - 100 fL    MCH 29.9 26.5 - 33.0 pg    MCHC 31.2 (L) 31.5 - 36.5 g/dL    RDW 15.0 10.0 - 15.0 %    Platelet Count 263 150 - 450 10e3/uL   Phosphorus    Collection Time: 04/24/23  5:49 AM   Result Value Ref Range    Phosphorus 2.9 2.5 - 4.5 mg/dL   Treponema Abs w Reflex to RPR and Titer    Collection Time: 04/24/23  7:42 AM   Result Value Ref Range    Treponema Antibody Total Nonreactive Nonreactive   COLONOSCOPY    Collection Time: 04/24/23 10:05 AM   Result Value Ref Range    COLONOSCOPY       [address] [place] [address] [phone]     Endoscopy Department  _______________________________________________________________________________  Patient Name: Shanda Hinds          Procedure Date: 4/24/2023 10:05 AM  MRN: [id number]                       Account Number: [id number]  YOB: 1980               Admit Type: Inpatient  Age: 42                               Room: [place] [address]  Gender: Female                        Note Status: Finalized  Attending MD: PAVEL MURRAY DO,   Total Sedation Time:   _______________________________________________________________________________     Procedure:             Colonoscopy  Indications:           Therapeutic procedure for removal of foreign body                          (magnets)  Providers:             PAVEL MURRAY DO, Manju Nobles RN, GUEVARA MURILLO MD  Patient Profile:       42 year old female with past medical history                           significant for polysubstance abuse (Cocaine,                          benzodiazepines, opioids, THC), multiple psychiatric                          conditions (bipolar disorder,Â BPD, RANDI, PTSD) fatty                          liver, constipation, multiple abdominal surgeries due                          to shrapnel (she is a ) who was admitted for                          intoxicationÂ now s/p intubationÂ in settings  of                          swallowing magnets (occurred on 4/16).  Referring MD:            Medicines:             per ICU  Complications:         No immediate complications. Estimated blood loss:                          Minimal.  _______________________________________________________________________________  Procedure:             Pre-Anesthesia Assessment:                         - Prior to the procedure, a History and Physical was                          performed, and patient medications and allergies were                          reviewed. The  patient is competent. The risks and                          benefits of the procedure and the sedation options and                          risks were discussed with the patient. All questions                          were answered and informed consent was obtained.                          Patient identification and proposed procedure were                          verified by the physician and the nurse in the                          pre-procedure area in the endoscopy suite. Mental                          Status Examination: sedated. Airway Examination:                          orotracheal intubation. Respiratory Examination: clear                          to auscultation. CV Examination: regular rate and                          rhythm. Prophylactic Antibiotics: The patient does not                          require prophylactic antibiotics. Prior                          Anticoagulants: The patient has taken no anticoagulant                          or antiplatelet agents. ASA G rade Assessment: III - A                          patient with severe systemic disease. After reviewing                          the risks and benefits, the patient was deemed in                          satisfactory condition to undergo the procedure. The                          anesthesia plan was to use on propofol drip per ICU.                          Immediately prior to  administration of medications,                          the patient was re-assessed for adequacy to receive                          sedatives. The heart rate, respiratory rate, oxygen                          saturations, blood pressure, adequacy of pulmonary                          ventilation, and response to care were monitored                          throughout the procedure. The physical status of the                          patient was re-assessed after the procedure.                         After obtaining informed consent, the colonoscope was                          passed under direct vision. Throu ghout the procedure,                          the patient's blood pressure, pulse, and oxygen                          saturations were monitored continuously. The                          Colonoscope was introduced through the anus and                          advanced to the cecum, identified by appendiceal                          orifice and ileocecal valve. The colonoscopy was                          performed without difficulty. The patient tolerated                          the procedure well. The quality of the bowel                          preparation was good.                                                                                   Findings:       The perianal and digital rectal examinations were normal.       Two magnets found together in the ascending colon. These were not        adhered to the mucosa. Removal of a magnet was accomplished with a Thacker        net. Given report that patient initially swallowed 4 magnets, an x-ray        was done at bedside after sco pe was removed that showed no remaining        foreign bodies.                                                                                   Impression:            - Foreign body (2 magnets) in the ascending colon.                          Removal was successful.                         - No detailed exam of mucosa made on  withdrawal - not                          an adequate exam for screening/surveillance  Recommendation:        - Further cares per ICU                         - Per chart review, is likely due to repeat                          surveillance colonoscopy - can be done as an                          outpatient when acute issues resolve.                         - GI will sign off at this time, please call with any                          questions or concerns.                                                                                     Electronically Signed by Dr. Murray  ______________________  PAVEL MURRAY DO  4/24/2023 1:26:25 PM  I was physically p resent for the entire viewing portion of the exam.  __________________________  Signature of teaching physician  Brenda/Milan MURRAY DO    _______________  GUEVARA MURILLO MD  Number of Addenda: 0    Note Initiated On: 4/24/2023 10:05 AM     Vancomycin level    Collection Time: 04/24/23  2:12 PM   Result Value Ref Range    Vancomycin 9.6   ug/mL   Blood gas venous    Collection Time: 04/24/23  2:12 PM   Result Value Ref Range    pH Venous 7.30 (L) 7.32 - 7.43    pCO2 Venous 56 (H) 40 - 50 mm Hg    pO2 Venous 23 (L) 25 - 47 mm Hg    Bicarbonate Venous 28 21 - 28 mmol/L    Base Excess/Deficit (+/-) 0.0 -7.7 - 1.9 mmol/L    FIO2 21    Blood gas venous    Collection Time: 04/24/23  8:42 PM   Result Value Ref Range    pH Venous 7.37 7.32 - 7.43    pCO2 Venous 48 40 - 50 mm Hg    pO2 Venous 29 25 - 47 mm Hg    Bicarbonate Venous 28 21 - 28 mmol/L    Base Excess/Deficit (+/-) 1.6 -7.7 - 1.9 mmol/L    FIO2 97    Extra Green Top (Lithium Heparin) Tube    Collection Time: 04/24/23  8:42 PM   Result Value Ref Range    Hold Specimen JIC    Phosphorus    Collection Time: 04/25/23  6:37 AM   Result Value Ref Range    Phosphorus 2.2 (L) 2.5 - 4.5 mg/dL   Blood gas venous    Collection Time: 04/25/23  6:37 AM   Result Value Ref Range    pH Venous 7.37 7.32 - 7.43    pCO2  "Venous 45 40 - 50 mm Hg    pO2 Venous 50 (H) 25 - 47 mm Hg    Bicarbonate Venous 26 21 - 28 mmol/L    Base Excess/Deficit (+/-) 0.4 -7.7 - 1.9 mmol/L    FIO2 50    Lactic acid whole blood    Collection Time: 04/25/23  6:37 AM   Result Value Ref Range    Lactic Acid 0.9 0.7 - 2.0 mmol/L   Basic metabolic panel    Collection Time: 04/25/23  6:37 AM   Result Value Ref Range    Sodium 142 136 - 145 mmol/L    Potassium 3.9 3.4 - 5.3 mmol/L    Chloride 110 (H) 98 - 107 mmol/L    Carbon Dioxide (CO2) 23 22 - 29 mmol/L    Anion Gap 9 7 - 15 mmol/L    Urea Nitrogen 7.9 6.0 - 20.0 mg/dL    Creatinine 0.57 0.51 - 0.95 mg/dL    Calcium 7.9 (L) 8.6 - 10.0 mg/dL    Glucose 100 (H) 70 - 99 mg/dL    GFR Estimate >90 >60 mL/min/1.73m2   CBC with platelets    Collection Time: 04/25/23  6:37 AM   Result Value Ref Range    WBC Count 17.3 (H) 4.0 - 11.0 10e3/uL    RBC Count 3.41 (L) 3.80 - 5.20 10e6/uL    Hemoglobin 10.1 (L) 11.7 - 15.7 g/dL    Hematocrit 32.3 (L) 35.0 - 47.0 %    MCV 95 78 - 100 fL    MCH 29.6 26.5 - 33.0 pg    MCHC 31.3 (L) 31.5 - 36.5 g/dL    RDW 14.9 10.0 - 15.0 %    Platelet Count 267 150 - 450 10e3/uL   Magnesium    Collection Time: 04/25/23  6:37 AM   Result Value Ref Range    Magnesium 2.0 1.7 - 2.3 mg/dL          Physical and Psychiatric Examination:     BP (!) 189/101   Pulse 111   Temp (!) 102.4  F (39.1  C) (Esophageal)   Resp 24   Ht 1.676 m (5' 6\")   Wt 101 kg (222 lb 10.6 oz)   SpO2 92%   BMI 35.94 kg/m    Weight is 222 lbs 10.63 oz  Body mass index is 35.94 kg/m .    Physical Exam:  I have reviewed the physical exam as documented by by the medical team and agree with findings and assessment and have no additional findings to add at this time.         MSE:   Appearance: adequately groomed   Intubated and sedated       QTc: 449 4/22 vs 504 4/20          DSM-5 Diagnosis:   311 (F32.9) Unspecified Depressive Disorder   300.01 (F41.0) Panic Disorder  300.02 (F41.1) Generalized Anxiety Disorder  309.81 " "(F43.10) Posttraumatic Stress Disorder (includes Posttraumatic Stress Disorder for Children 6 Years and Younger)  Without dissociative symptoms   Borderline PD   encephalopathy   Polysubstance dependence (DSM IV term)           Assessment:   I think that it is important that she be on Lamictal, but I would not use the aggressive dosing schedule suggested by the neurologist Dr Mendez. Usual titration of Lamictal is 25 mg per day for 2 weeks, then 50 mg per day for 2 weeks, then to 100 mg, and increase more rapidly from there. Unfortunately we don't know how long she has been taking it, so unsure of when we can increase it.   She was getting at least 10 mg per day of Valium, but I don't think that she is at risk for withdrawal; is self tapering.   I see that amitriptyline is being held (I assume due to QTc on admission); however, if this is abruptly stopped it can trigger lorena.  It appears that she was taking Effexor, and abruptly stopping this can lead to discontinuation symptoms.           Summary of Recommendations:   I would cover her with Zyprexa Zydis 5 mg HS scheduled and 5 mg BID PRN agitation; either Zydis or IM.  Increase Lamictal to 50 mg once she has been on 25 mg for 2 weeks   I would restart amitriptyline, but just at 25 mg HS   IR Effexor 50 mg BID. Can change to  mg once she is swallowing   Continue clonidine 0.1 mg BID. If BP drops you can change to guanfacine 1 mg BID (doesn't lower BP)   Please re consult us when she can communicate     Page me as needed.  Da Sherman M.D.   [facility]   Contact information available via [facility] Paging/Directory  If I am not available, then [facility] CL line ([phone]) should know who   Is covering our consult service.         From [place] :           \"This dictation was performed with voice recognition software and may contain errors,  omissions and inadvertent word substitution.\"           "

## 2023-04-25 NOTE — PROGRESS NOTES
EEG CLINICAL NEUROPHYSIOLOGY  PRELIMINARY REPORT    Video EEG through approximately 6 AM today reviewed.  Varying doses of propofol (35 to 75 mcg/kg/min).  Hydromorphone 0.2 to 0.4 mg/h.    Findings continue: Abnormal.  1) findings variously consistent with moderate or moderate to to severe diffuse encephalopathy.  EEG is reactive.  Findings are within the range typically seen in patients treated with sedative drips at the doses employed.  Variable extent of EEG measures of encephalopathy probably related to varying doses of sedative drips.  Presence of encephalopathy beyond that related to anesthetic drips cannot be excluded; however, malignant patterns are not seen.  2) epileptiform discharges or seizures not seen.     Full report in chart.    Richard Jo MD  Contact information for physicians covering Epilepsy and EEG is available on [facility].  Click search, enter neurology adult/[facility] in group name box, click on neurology adult/[facility], then click Staff Epilepsy and EEG.

## 2023-04-25 NOTE — PROGRESS NOTES
EEG CLINICAL NEUROPHYSIOLOGY PRELIMINARY REPORT    First two hours of VEEG reviewed. Propofoll 35 to 75 micrograms/kg/min, hydromorphone 0.2 to 0.4 mg/hour throughout. Continuous polymorphic delta. Intermittent superimposed diffuse alpha. No epileptiform discharges or seizures.    Study consistent with moderate diffuse encephalopathy. Findings consistent with sedative drips at levels reported. Thus far no epileptiform discharges or seizures. Propofol has signficant antiseizure effects so seizures could potentially emerge as this treatment is tapered. Will continue video EEG monitoring. Full report to follow.    Richard Jo MD  Contact information for physicians covering Epilepsy and EEG is available on [facility].  Click search, enter neurology adult/[facility] in group name box, click on neurology adult/[facility], then click Staff Epilepsy and EEG.

## 2023-04-25 NOTE — PROGRESS NOTES
CLINICAL NUTRITION SERVICES - BRIEF NOTE  *Please see full assessment note from 4/21/2023    Consult received per provider to start TF.     New Findings:  Two magnets removed with colonoscopy 4/24. Pt remains intubated. Okay to start TF today per MICU. Pt has been NPO x at least 4 days.     Labs:K+ 3.9 (WNL), Mg++ 2 (WNL), Phos 2.2 (L)  NEW Meds: Precedex, Dilaudid gtt, Miralax, Senna-docusate     Interventions  Collaboration with other providers - MICU team  Fecal elastase level with questionable mild CF history.   Enteral Nutrition - Initiate  --Enteral access: OGT  --GOAL: Vital High Protein @ goal of 65ml/hr (1560ml/day) will provide: 1560 kcals (22 kcal/kg), 136 g PRO (1.9 g/kg), 1304 ml free H20, 173 g CHO, and 0 g fiber daily.  --Start TF @ 15 ml/hr and advance by 10 ml q 8 hrs until goal rate.  --Do not advance TF rate unless K+ >3.0, Mg++ > 1.5,  and Phos > 1.9.  --Minimum 30 ml q 4 hrs water flushes for tube patency.  --If gastric enteral access: HOB > 30 degrees.  --MVI/minerals supplement if not already ordered (Certavite).     RD to follow per protocol.    Li Gillette, MS, RD, LD, [facility]  MICU pager: [phone]  ASCOM: [id number]

## 2023-04-26 ENCOUNTER — APPOINTMENT (OUTPATIENT)
Dept: GENERAL RADIOLOGY | Facility: CLINIC | Age: 43
DRG: 917 | End: 2023-04-26
Attending: NURSE PRACTITIONER
Payer: COMMERCIAL

## 2023-04-26 LAB
ALBUMIN SERPL BCG-MCNC: 3.5 G/DL (ref 3.5–5.2)
ALP SERPL-CCNC: 108 U/L (ref 35–104)
ALT SERPL W P-5'-P-CCNC: 13 U/L (ref 10–35)
ANION GAP SERPL CALCULATED.3IONS-SCNC: 13 MMOL/L (ref 7–15)
APPEARANCE FLD: ABNORMAL
AST SERPL W P-5'-P-CCNC: 15 U/L (ref 10–35)
BACTERIA BLD CULT: NO GROWTH
BACTERIA BLD CULT: NO GROWTH
BACTERIA SPEC CULT: NORMAL
BILIRUB DIRECT SERPL-MCNC: <0.2 MG/DL (ref 0–0.3)
BILIRUB SERPL-MCNC: 0.3 MG/DL
BUN SERPL-MCNC: 10.8 MG/DL (ref 6–20)
C PNEUM DNA SPEC QL NAA+PROBE: NOT DETECTED
C PNEUM DNA SPEC QL NAA+PROBE: NOT DETECTED
CALCIUM SERPL-MCNC: 8.8 MG/DL (ref 8.6–10)
CELL COUNT BODY FLUID SOURCE: ABNORMAL
CHLORIDE SERPL-SCNC: 105 MMOL/L (ref 98–107)
COLOR FLD: COLORLESS
CREAT SERPL-MCNC: 0.56 MG/DL (ref 0.51–0.95)
CRP SERPL-MCNC: 262 MG/L
DEPRECATED HCO3 PLAS-SCNC: 22 MMOL/L (ref 22–29)
ERYTHROCYTE [DISTWIDTH] IN BLOOD BY AUTOMATED COUNT: 14 % (ref 10–15)
FLUAV H1 2009 PAND RNA SPEC QL NAA+PROBE: NOT DETECTED
FLUAV H1 2009 PAND RNA SPEC QL NAA+PROBE: NOT DETECTED
FLUAV H1 RNA SPEC QL NAA+PROBE: NOT DETECTED
FLUAV H1 RNA SPEC QL NAA+PROBE: NOT DETECTED
FLUAV H3 RNA SPEC QL NAA+PROBE: NOT DETECTED
FLUAV H3 RNA SPEC QL NAA+PROBE: NOT DETECTED
FLUAV RNA SPEC QL NAA+PROBE: NOT DETECTED
FLUAV RNA SPEC QL NAA+PROBE: NOT DETECTED
FLUBV RNA SPEC QL NAA+PROBE: NOT DETECTED
FLUBV RNA SPEC QL NAA+PROBE: NOT DETECTED
GFR SERPL CREATININE-BSD FRML MDRD: >90 ML/MIN/1.73M2
GLUCOSE BLDC GLUCOMTR-MCNC: 124 MG/DL (ref 70–99)
GLUCOSE BLDC GLUCOMTR-MCNC: 126 MG/DL (ref 70–99)
GLUCOSE BLDC GLUCOMTR-MCNC: 129 MG/DL (ref 70–99)
GLUCOSE SERPL-MCNC: 135 MG/DL (ref 70–99)
GRAM STAIN RESULT: NORMAL
GRAM STAIN RESULT: NORMAL
HADV DNA SPEC QL NAA+PROBE: NOT DETECTED
HADV DNA SPEC QL NAA+PROBE: NOT DETECTED
HCOV PNL SPEC NAA+PROBE: NOT DETECTED
HCOV PNL SPEC NAA+PROBE: NOT DETECTED
HCT VFR BLD AUTO: 36.5 % (ref 35–47)
HGB BLD-MCNC: 11.5 G/DL (ref 11.7–15.7)
HMPV RNA SPEC QL NAA+PROBE: NOT DETECTED
HMPV RNA SPEC QL NAA+PROBE: NOT DETECTED
HPIV1 RNA SPEC QL NAA+PROBE: NOT DETECTED
HPIV1 RNA SPEC QL NAA+PROBE: NOT DETECTED
HPIV2 RNA SPEC QL NAA+PROBE: NOT DETECTED
HPIV2 RNA SPEC QL NAA+PROBE: NOT DETECTED
HPIV3 RNA SPEC QL NAA+PROBE: NOT DETECTED
HPIV3 RNA SPEC QL NAA+PROBE: NOT DETECTED
HPIV4 RNA SPEC QL NAA+PROBE: NOT DETECTED
HPIV4 RNA SPEC QL NAA+PROBE: NOT DETECTED
KOH PREPARATION: NORMAL
KOH PREPARATION: NORMAL
LYMPHOCYTES NFR FLD MANUAL: 2 %
M PNEUMO DNA SPEC QL NAA+PROBE: NOT DETECTED
M PNEUMO DNA SPEC QL NAA+PROBE: NOT DETECTED
MAGNESIUM SERPL-MCNC: 2.6 MG/DL (ref 1.7–2.3)
MCH RBC QN AUTO: 29.5 PG (ref 26.5–33)
MCHC RBC AUTO-ENTMCNC: 31.5 G/DL (ref 31.5–36.5)
MCV RBC AUTO: 94 FL (ref 78–100)
MONOS+MACROS NFR FLD MANUAL: NORMAL %
NEUTS BAND NFR FLD MANUAL: 24 %
OTHER CELLS FLD MANUAL: 74 %
PHOSPHATE SERPL-MCNC: 2.6 MG/DL (ref 2.5–4.5)
PLATELET # BLD AUTO: 336 10E3/UL (ref 150–450)
POTASSIUM SERPL-SCNC: 4.2 MMOL/L (ref 3.4–5.3)
POTASSIUM SERPL-SCNC: 4.3 MMOL/L (ref 3.4–5.3)
PROCALCITONIN SERPL IA-MCNC: 0.09 NG/ML
PROT SERPL-MCNC: 7.1 G/DL (ref 6.4–8.3)
RBC # BLD AUTO: 3.9 10E6/UL (ref 3.8–5.2)
RSV RNA SPEC QL NAA+PROBE: NOT DETECTED
RV+EV RNA SPEC QL NAA+PROBE: NOT DETECTED
RV+EV RNA SPEC QL NAA+PROBE: NOT DETECTED
SODIUM SERPL-SCNC: 140 MMOL/L (ref 136–145)
WBC # BLD AUTO: 13.9 10E3/UL (ref 4–11)
WBC # FLD AUTO: 397 /UL

## 2023-04-26 PROCEDURE — 250N000013 HC RX MED GY IP 250 OP 250 PS 637: Performed by: INTERNAL MEDICINE

## 2023-04-26 PROCEDURE — 87106 FUNGI IDENTIFICATION YEAST: CPT | Performed by: STUDENT IN AN ORGANIZED HEALTH CARE EDUCATION/TRAINING PROGRAM

## 2023-04-26 PROCEDURE — 200N000002 HC R&B ICU UMMC

## 2023-04-26 PROCEDURE — 250N000011 HC RX IP 250 OP 636: Performed by: INTERNAL MEDICINE

## 2023-04-26 PROCEDURE — 31624 DX BRONCHOSCOPE/LAVAGE: CPT | Mod: GC | Performed by: INTERNAL MEDICINE

## 2023-04-26 PROCEDURE — 87103 BLOOD FUNGUS CULTURE: CPT | Performed by: STUDENT IN AN ORGANIZED HEALTH CARE EDUCATION/TRAINING PROGRAM

## 2023-04-26 PROCEDURE — 71045 X-RAY EXAM CHEST 1 VIEW: CPT | Mod: 26 | Performed by: RADIOLOGY

## 2023-04-26 PROCEDURE — 83735 ASSAY OF MAGNESIUM: CPT | Performed by: INTERNAL MEDICINE

## 2023-04-26 PROCEDURE — 250N000013 HC RX MED GY IP 250 OP 250 PS 637: Performed by: STUDENT IN AN ORGANIZED HEALTH CARE EDUCATION/TRAINING PROGRAM

## 2023-04-26 PROCEDURE — 87077 CULTURE AEROBIC IDENTIFY: CPT | Performed by: STUDENT IN AN ORGANIZED HEALTH CARE EDUCATION/TRAINING PROGRAM

## 2023-04-26 PROCEDURE — 87205 SMEAR GRAM STAIN: CPT | Performed by: STUDENT IN AN ORGANIZED HEALTH CARE EDUCATION/TRAINING PROGRAM

## 2023-04-26 PROCEDURE — 84100 ASSAY OF PHOSPHORUS: CPT | Performed by: INTERNAL MEDICINE

## 2023-04-26 PROCEDURE — 87581 M.PNEUMON DNA AMP PROBE: CPT | Performed by: NURSE PRACTITIONER

## 2023-04-26 PROCEDURE — 250N000013 HC RX MED GY IP 250 OP 250 PS 637

## 2023-04-26 PROCEDURE — 36415 COLL VENOUS BLD VENIPUNCTURE: CPT | Performed by: STUDENT IN AN ORGANIZED HEALTH CARE EDUCATION/TRAINING PROGRAM

## 2023-04-26 PROCEDURE — 250N000011 HC RX IP 250 OP 636: Performed by: STUDENT IN AN ORGANIZED HEALTH CARE EDUCATION/TRAINING PROGRAM

## 2023-04-26 PROCEDURE — 94003 VENT MGMT INPAT SUBQ DAY: CPT

## 2023-04-26 PROCEDURE — 87206 SMEAR FLUORESCENT/ACID STAI: CPT | Performed by: STUDENT IN AN ORGANIZED HEALTH CARE EDUCATION/TRAINING PROGRAM

## 2023-04-26 PROCEDURE — 258N000003 HC RX IP 258 OP 636: Performed by: INTERNAL MEDICINE

## 2023-04-26 PROCEDURE — 84145 PROCALCITONIN (PCT): CPT | Performed by: STUDENT IN AN ORGANIZED HEALTH CARE EDUCATION/TRAINING PROGRAM

## 2023-04-26 PROCEDURE — 250N000011 HC RX IP 250 OP 636: Performed by: NURSE PRACTITIONER

## 2023-04-26 PROCEDURE — 87210 SMEAR WET MOUNT SALINE/INK: CPT | Performed by: STUDENT IN AN ORGANIZED HEALTH CARE EDUCATION/TRAINING PROGRAM

## 2023-04-26 PROCEDURE — 80053 COMPREHEN METABOLIC PANEL: CPT | Performed by: STUDENT IN AN ORGANIZED HEALTH CARE EDUCATION/TRAINING PROGRAM

## 2023-04-26 PROCEDURE — 250N000009 HC RX 250

## 2023-04-26 PROCEDURE — 89050 BODY FLUID CELL COUNT: CPT | Performed by: STUDENT IN AN ORGANIZED HEALTH CARE EDUCATION/TRAINING PROGRAM

## 2023-04-26 PROCEDURE — 250N000009 HC RX 250: Performed by: STUDENT IN AN ORGANIZED HEALTH CARE EDUCATION/TRAINING PROGRAM

## 2023-04-26 PROCEDURE — 0B9C8ZX DRAINAGE OF RIGHT UPPER LUNG LOBE, VIA NATURAL OR ARTIFICIAL OPENING ENDOSCOPIC, DIAGNOSTIC: ICD-10-PCS | Performed by: INTERNAL MEDICINE

## 2023-04-26 PROCEDURE — 250N000011 HC RX IP 250 OP 636

## 2023-04-26 PROCEDURE — 87101 SKIN FUNGI CULTURE: CPT | Performed by: STUDENT IN AN ORGANIZED HEALTH CARE EDUCATION/TRAINING PROGRAM

## 2023-04-26 PROCEDURE — 250N000013 HC RX MED GY IP 250 OP 250 PS 637: Performed by: NURSE PRACTITIONER

## 2023-04-26 PROCEDURE — 31624 DX BRONCHOSCOPE/LAVAGE: CPT

## 2023-04-26 PROCEDURE — 85027 COMPLETE CBC AUTOMATED: CPT

## 2023-04-26 PROCEDURE — 71045 X-RAY EXAM CHEST 1 VIEW: CPT

## 2023-04-26 PROCEDURE — 99291 CRITICAL CARE FIRST HOUR: CPT | Mod: 25 | Performed by: INTERNAL MEDICINE

## 2023-04-26 PROCEDURE — 82248 BILIRUBIN DIRECT: CPT | Performed by: STUDENT IN AN ORGANIZED HEALTH CARE EDUCATION/TRAINING PROGRAM

## 2023-04-26 PROCEDURE — 84132 ASSAY OF SERUM POTASSIUM: CPT | Performed by: INTERNAL MEDICINE

## 2023-04-26 PROCEDURE — 87581 M.PNEUMON DNA AMP PROBE: CPT | Performed by: STUDENT IN AN ORGANIZED HEALTH CARE EDUCATION/TRAINING PROGRAM

## 2023-04-26 PROCEDURE — 999N000157 HC STATISTIC RCP TIME EA 10 MIN

## 2023-04-26 PROCEDURE — 36415 COLL VENOUS BLD VENIPUNCTURE: CPT | Performed by: INTERNAL MEDICINE

## 2023-04-26 PROCEDURE — C9113 INJ PANTOPRAZOLE SODIUM, VIA: HCPCS | Performed by: NURSE PRACTITIONER

## 2023-04-26 PROCEDURE — 86140 C-REACTIVE PROTEIN: CPT | Performed by: STUDENT IN AN ORGANIZED HEALTH CARE EDUCATION/TRAINING PROGRAM

## 2023-04-26 RX ORDER — HYDRALAZINE HYDROCHLORIDE 20 MG/ML
10 INJECTION INTRAMUSCULAR; INTRAVENOUS ONCE
Status: COMPLETED | OUTPATIENT
Start: 2023-04-26 | End: 2023-04-26

## 2023-04-26 RX ORDER — CLONIDINE HYDROCHLORIDE 0.1 MG/1
0.1 TABLET ORAL 2 TIMES DAILY
Status: DISCONTINUED | OUTPATIENT
Start: 2023-04-26 | End: 2023-05-01

## 2023-04-26 RX ORDER — FUROSEMIDE 10 MG/ML
40 INJECTION INTRAMUSCULAR; INTRAVENOUS ONCE
Status: DISCONTINUED | OUTPATIENT
Start: 2023-04-26 | End: 2023-04-26

## 2023-04-26 RX ORDER — HYDRALAZINE HYDROCHLORIDE 20 MG/ML
10-20 INJECTION INTRAMUSCULAR; INTRAVENOUS EVERY 6 HOURS PRN
Status: DISCONTINUED | OUTPATIENT
Start: 2023-04-26 | End: 2023-05-01 | Stop reason: HOSPADM

## 2023-04-26 RX ORDER — FUROSEMIDE 10 MG/ML
40 INJECTION INTRAMUSCULAR; INTRAVENOUS ONCE
Status: COMPLETED | OUTPATIENT
Start: 2023-04-26 | End: 2023-04-26

## 2023-04-26 RX ORDER — LIDOCAINE HYDROCHLORIDE 10 MG/ML
INJECTION, SOLUTION EPIDURAL; INFILTRATION; INTRACAUDAL; PERINEURAL
Status: COMPLETED
Start: 2023-04-26 | End: 2023-04-26

## 2023-04-26 RX ORDER — FUROSEMIDE 10 MG/ML
30 INJECTION INTRAMUSCULAR; INTRAVENOUS ONCE
Status: COMPLETED | OUTPATIENT
Start: 2023-04-26 | End: 2023-04-26

## 2023-04-26 RX ORDER — LABETALOL HYDROCHLORIDE 5 MG/ML
10-20 INJECTION, SOLUTION INTRAVENOUS EVERY 4 HOURS PRN
Status: DISCONTINUED | OUTPATIENT
Start: 2023-04-26 | End: 2023-05-01 | Stop reason: HOSPADM

## 2023-04-26 RX ORDER — CARBOXYMETHYLCELLULOSE SODIUM 5 MG/ML
1 SOLUTION/ DROPS OPHTHALMIC
Status: DISCONTINUED | OUTPATIENT
Start: 2023-04-26 | End: 2023-05-01 | Stop reason: HOSPADM

## 2023-04-26 RX ADMIN — PIPERACILLIN AND TAZOBACTAM 4.5 G: 4; .5 INJECTION, POWDER, FOR SOLUTION INTRAVENOUS at 08:19

## 2023-04-26 RX ADMIN — CHLORHEXIDINE GLUCONATE 15 ML: 1.2 SOLUTION ORAL at 08:19

## 2023-04-26 RX ADMIN — POLYETHYLENE GLYCOL 3350 17 G: 17 POWDER, FOR SOLUTION ORAL at 08:18

## 2023-04-26 RX ADMIN — PIPERACILLIN AND TAZOBACTAM 4.5 G: 4; .5 INJECTION, POWDER, FOR SOLUTION INTRAVENOUS at 13:30

## 2023-04-26 RX ADMIN — DEXAMETHASONE SODIUM PHOSPHATE 4 MG: 4 INJECTION, SOLUTION INTRA-ARTICULAR; INTRALESIONAL; INTRAMUSCULAR; INTRAVENOUS; SOFT TISSUE at 01:25

## 2023-04-26 RX ADMIN — ENOXAPARIN SODIUM 40 MG: 40 INJECTION SUBCUTANEOUS at 08:18

## 2023-04-26 RX ADMIN — MONTELUKAST 10 MG: 10 TABLET, FILM COATED ORAL at 21:57

## 2023-04-26 RX ADMIN — DEXMEDETOMIDINE HYDROCHLORIDE 1.2 MCG/KG/HR: 400 INJECTION INTRAVENOUS at 17:47

## 2023-04-26 RX ADMIN — CLONIDINE HYDROCHLORIDE 0.1 MG: 0.1 TABLET ORAL at 19:44

## 2023-04-26 RX ADMIN — HYDRALAZINE HYDROCHLORIDE 10 MG: 20 INJECTION INTRAMUSCULAR; INTRAVENOUS at 04:23

## 2023-04-26 RX ADMIN — ATORVASTATIN CALCIUM 40 MG: 40 TABLET, FILM COATED ORAL at 19:44

## 2023-04-26 RX ADMIN — DEXMEDETOMIDINE HYDROCHLORIDE 1.2 MCG/KG/HR: 400 INJECTION INTRAVENOUS at 13:29

## 2023-04-26 RX ADMIN — ACETAMINOPHEN 650 MG: 325 SUSPENSION ORAL at 21:57

## 2023-04-26 RX ADMIN — Medication 10 MG: at 00:49

## 2023-04-26 RX ADMIN — DEXMEDETOMIDINE HYDROCHLORIDE 1.2 MCG/KG/HR: 400 INJECTION INTRAVENOUS at 22:42

## 2023-04-26 RX ADMIN — QUETIAPINE FUMARATE 50 MG: 50 TABLET ORAL at 23:55

## 2023-04-26 RX ADMIN — CLONIDINE HYDROCHLORIDE 0.1 MG: 0.1 TABLET ORAL at 10:16

## 2023-04-26 RX ADMIN — PROPOFOL 55 MCG/KG/MIN: 10 INJECTION, EMULSION INTRAVENOUS at 08:17

## 2023-04-26 RX ADMIN — Medication 10 MG: at 00:06

## 2023-04-26 RX ADMIN — FUROSEMIDE 30 MG: 10 INJECTION, SOLUTION INTRAVENOUS at 15:27

## 2023-04-26 RX ADMIN — QUETIAPINE FUMARATE 50 MG: 50 TABLET ORAL at 08:19

## 2023-04-26 RX ADMIN — VANCOMYCIN HYDROCHLORIDE 1750 MG: 10 INJECTION, POWDER, LYOPHILIZED, FOR SOLUTION INTRAVENOUS at 02:47

## 2023-04-26 RX ADMIN — LAMOTRIGINE 25 MG: 25 TABLET ORAL at 08:19

## 2023-04-26 RX ADMIN — SENNOSIDES AND DOCUSATE SODIUM 2 TABLET: 50; 8.6 TABLET ORAL at 19:45

## 2023-04-26 RX ADMIN — FUROSEMIDE 40 MG: 10 INJECTION, SOLUTION INTRAVENOUS at 23:55

## 2023-04-26 RX ADMIN — Medication 10 MG: at 06:01

## 2023-04-26 RX ADMIN — PIPERACILLIN AND TAZOBACTAM 4.5 G: 4; .5 INJECTION, POWDER, FOR SOLUTION INTRAVENOUS at 01:31

## 2023-04-26 RX ADMIN — POTASSIUM & SODIUM PHOSPHATES POWDER PACK 280-160-250 MG 1 PACKET: 280-160-250 PACK at 06:48

## 2023-04-26 RX ADMIN — ACETAMINOPHEN 650 MG: 325 TABLET ORAL at 15:28

## 2023-04-26 RX ADMIN — ACETAMINOPHEN 650 MG: 325 SUSPENSION ORAL at 08:18

## 2023-04-26 RX ADMIN — PANTOPRAZOLE SODIUM 40 MG: 40 INJECTION, POWDER, FOR SOLUTION INTRAVENOUS at 08:19

## 2023-04-26 RX ADMIN — VANCOMYCIN HYDROCHLORIDE 1750 MG: 10 INJECTION, POWDER, LYOPHILIZED, FOR SOLUTION INTRAVENOUS at 15:28

## 2023-04-26 RX ADMIN — LIDOCAINE HYDROCHLORIDE 30 MG: 10 INJECTION, SOLUTION EPIDURAL; INFILTRATION; INTRACAUDAL; PERINEURAL at 13:04

## 2023-04-26 RX ADMIN — HYDRALAZINE HYDROCHLORIDE 10 MG: 20 INJECTION INTRAMUSCULAR; INTRAVENOUS at 05:12

## 2023-04-26 RX ADMIN — POTASSIUM & SODIUM PHOSPHATES POWDER PACK 280-160-250 MG 1 PACKET: 280-160-250 PACK at 09:02

## 2023-04-26 RX ADMIN — PROPOFOL 55 MCG/KG/MIN: 10 INJECTION, EMULSION INTRAVENOUS at 02:18

## 2023-04-26 RX ADMIN — THIAMINE HCL TAB 100 MG 100 MG: 100 TAB at 08:19

## 2023-04-26 RX ADMIN — FUROSEMIDE 40 MG: 10 INJECTION, SOLUTION INTRAVENOUS at 10:02

## 2023-04-26 RX ADMIN — PIPERACILLIN AND TAZOBACTAM 4.5 G: 4; .5 INJECTION, POWDER, FOR SOLUTION INTRAVENOUS at 19:45

## 2023-04-26 RX ADMIN — FOLIC ACID 1 MG: 1 TABLET ORAL at 08:19

## 2023-04-26 RX ADMIN — QUETIAPINE FUMARATE 50 MG: 50 TABLET ORAL at 15:28

## 2023-04-26 RX ADMIN — DEXAMETHASONE SODIUM PHOSPHATE 4 MG: 4 INJECTION, SOLUTION INTRA-ARTICULAR; INTRALESIONAL; INTRAMUSCULAR; INTRAVENOUS; SOFT TISSUE at 08:20

## 2023-04-26 RX ADMIN — Medication 15 ML: at 08:19

## 2023-04-26 RX ADMIN — LABETALOL HYDROCHLORIDE 10 MG: 5 INJECTION, SOLUTION INTRAVENOUS at 06:08

## 2023-04-26 RX ADMIN — SENNOSIDES AND DOCUSATE SODIUM 1 TABLET: 50; 8.6 TABLET ORAL at 08:20

## 2023-04-26 RX ADMIN — CHLORHEXIDINE GLUCONATE 15 ML: 1.2 SOLUTION ORAL at 19:45

## 2023-04-26 RX ADMIN — LABETALOL HYDROCHLORIDE 10 MG: 5 INJECTION, SOLUTION INTRAVENOUS at 01:03

## 2023-04-26 RX ADMIN — DEXMEDETOMIDINE HYDROCHLORIDE 0.2 MCG/KG/HR: 400 INJECTION INTRAVENOUS at 08:56

## 2023-04-26 RX ADMIN — Medication 10 MG: at 04:54

## 2023-04-26 ASSESSMENT — ACTIVITIES OF DAILY LIVING (ADL)
ADLS_ACUITY_SCORE: 48
ADLS_ACUITY_SCORE: 47
ADLS_ACUITY_SCORE: 48
ADLS_ACUITY_SCORE: 47
ADLS_ACUITY_SCORE: 47
ADLS_ACUITY_SCORE: 48
ADLS_ACUITY_SCORE: 47
ADLS_ACUITY_SCORE: 51
ADLS_ACUITY_SCORE: 47
ADLS_ACUITY_SCORE: 48

## 2023-04-26 NOTE — PLAN OF CARE
Major Shift Events: Bronch done today.   Neuro- Weaned off Propofol. Sedated with Precedex to maintain RASS 0 to -1. RASS goal largely met today except for periods when patient intermittently agitated/ coughing spells. PRN Dilaudid given with good effect. Patient follows commands in all extremities. Pupils equal and brisk. Intermittently nods yes/no to questions.   CV- SR with HR 60-80's. SBP < 140 met without intervention.   Resp- Lung Sounds clear/diminished. Secretions tan/creamy, scant. Vent mode CMV, PEEP increased per order to 8.0 by RT due to hypoxia.   GI- Abdomen soft. TF advanced to 45 mL/hr, tolerating well.   -  Scruggs in place with adequate UO.   Skin- Bilateral mitts and wrist restraints to prevent ETT dislodgement. Other interventions include: Reorientation, reposition, pain assessment, de escalation.   Updated SO over the phone.   Plan: Continue to monitor and notify MD of any changes.   For vital signs and complete assessments, please see documentation flowsheets.

## 2023-04-26 NOTE — PROGRESS NOTES
"Bronchoscopy Risk Assessment Guidelines      A. Patient symptoms to consider when assessing pulmonary TB risk are:    I. Cough greater than 3 weeks; and fever, hemoptysis, pleuritic chest    pain, weight loss greater than 10 lbs, night sweats, fatigue, infiltrates on    upper lobes or superior segments of lower lobes, cavitation on chest    x-ray.   B. Patient risk factors to consider when assessing pulmonary TB risk are:    I. Exposure to known TB case, foreign-born persons (within 5 years of    arrival to [place]), residence in a crowded setting (correctional facility,     long-term care center, etc.), persons with HIV or immunosuppression.    Patients with symptoms and risk factors should generally be considered \"suspect risk\" and bronchoscopies should be performed in airborne precautions.    This patient has NO KNOWN RISK of Tuberculosis (proceed with bronchoscopy)    Specimens sent: yes  Complications: None  Scope used: #[id number] Adult  Attending Physician: Dr. Franklin Woodson, RT on 4/26/2023 at 3:58 PM  "

## 2023-04-26 NOTE — PLAN OF CARE
ICU End of Shift Summary. See flowsheets for vital signs and detailed assessment.    Major Changes: Increasing O2 requirements. Agitation managed with propofol and dilaudid drip. Hypertensive at 0400 after labetol dose. Pt appeared uncomfortable, 2x hydralazine dose given, dilaudid and propofol bolus given. Ineffective, second labetalol dose given. Team notified 30 mins after drug dose given of continued hypertension. BP slowly dropping after last labetalol and dilaudid bolus.     Neuro: PERRLA. Opens eyes to voice. Intermittently follows commands. Agitated on and off. Sedated with prop.   Cardiac: SR 80s. Low grade fever. Labetol given PRN for sysotlic >140. BP high with agitation. UE edema.  Respiratory: CMV settings unchanged, FiO2 titrated to keep sats >92, 60%. Secretions thick green, yellow.  GI/: TF increased to 25 @0100. No BM. Large amount of UO via bob.  Skin: No changes, generalized bruising  Drips: Prop 55, dilaudid 0.4  Labs: replacing phos.     Plan: Extubate today    ALEX Sarkar  April 26, 2023          Goal Outcome Evaluation:      Plan of Care Reviewed With: patient    Overall Patient Progress: no changeOverall Patient Progress: no change    Outcome Evaluation: No acute changes overnight, extubation planned for tomorrow

## 2023-04-26 NOTE — PROGRESS NOTES
MEDICAL ICU PROGRESS NOTE   04/26/2023    Date of Hospital Admission: 4/20/23  Date of ICU Admission: 4/20/23  Reason for Critical Care Admission: Agitated delirium 2/2 cocaine and possible benzodiazepine intoxication requiring intubation and mechanical ventilation for airway protection   Date of Service (when I saw the patient): 04/26/2023    ASSESSMENT:   Shanda Hinds is a 43yo F with PMH of mild cystic fibrosis, asthma, BRIAN (no CPAP), constipation, HTN, HLD, prior CVA (no residual), hepatic steatosis, questionable seizure disorder, polysubstance use disorder (cocaine, opioids, BZD, and THC), and multiple psychiatric conditions (bipolar disorder, borderline personality disorder, RANDI, PTSD, multiple suicide attempts, and possible pseudoseizures), who presented with agitated delirium likely 2/2 cocaine & possible BZD intoxication, AMS and AHHRF 2/2 PNA requiring intubation on 4/22. Also found to have 2x magnets at ileocecal valve she swallowed on 4/16/23, removed by colonoscopy on 4/24/2023. Now with ongoing pneumonia, likely secondary to aspiration.     Changes for Today:  - Procal, CRP   - Bronch, narrow abx per Gram stain  - Lasix 40mg goal net neg 1-2L  - Stop propofol after bronch  - Add clonidine  - Hydralazine PRN    PLAN:     Neuro:  # Pain and sedation for mechanical ventilation  - Sedation: Patient did well with sedation holiday (off propofol, only on precedex) on 4/25, alert and following commands  - RASS goal 0 to -1  - Pain: dilaudid gtt + boluses, tylenol prn  - Will hold propofol after bronch, continue precedex      # Agitated delirium, likely 2/2 cocaine intoxication  # Encephalopathy, metabolic   # Cocaine intoxication  # Possible benzodiazepine intoxication  # Poly substance abuse (cocaine, benzodiazepine, opioid, THC)  Patient with a history of polysubstance use presented with agitated delirium. Urine drug screen was positive for benzodiazepines and cocaine. Head CT showed no evidence for  "acute intracranial pathology. Has had seizure like activity in the past, but no reports of convulsive activity since presentation. Work-up for additional metabolic or infectious etiologies contributing to AMS has been thusfar unrevealing. Per S.O. patient taking 1.5-2gm of cocaine daily and last used \"about a gram\" on 19th.   - Wean sedation as able  - Neurocrit consult for encephalopathy   - EEG findings consistent with diffuse encephalopathy, no seizures seen.  - Psychiatry consulted given polysubstance abuse and psychiatric comorbidities  - Monitor for benzos/cocaine withdrawal    - Add on Peth     # Epilepsy vs pseudoseizures   # Migraine headaches   # Hx of TBI  New onset tonic clonic seizures early January 2023 (also possible report of sz in 2021). Neuroimaging negative for acute intracranial pathology, EEGs without epileptiforms while hospitalized. Generally preceded by an aura including migraines and anxiety. Was started on Depakote initially, but reportedly transitioned to lamotrigine. Subsequently [place]-like activity. 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   - Seizure precautions  - Continue PTA lamotrigine  - PRN lorazepam available for seizure activity  - Neurocrit consulted and signed off --> EEG discontinued, no seizure activity noted.       # Bipolar disorder  # Borderline personality disorder  # RANDI  # PTDS  # Multiple past suicide attempts   Unclear current psychiatric medication list. Recently discharged from [facility] with [facility] med list including amitriptyline 100 mg at bedtime PRN OR zolpidem 10 mg at bedtime PRN, diazepam 5 mg BID PRN, and is currently up-titrating lamotrigine. Saw her psychiatrist on 3/30/23 who also noted she may be on effexor, but this is not in her discharge med list. Of note, lamotrigine level " on 4/21 was <0.9.   - Neuro exam q4h  - Will offer chem dep and psychiatry consultation once extubated if pt is willing to see them   - Unclear intent, may need 1:1 sitter once extubated   - Supplemental Thiamine and folate (unclear if ETOH abuse is an issue for her)  - Unclear what active PTA psych medication regimen was, multiple prescriptions found from numerous different ED visit/providers:  - Amitriptyline 100 mg @ HS PRN - Hold  - Zolpidem 10 mg @ HS PRN - Hold   - Diazepam 5 mg BID - Hold  - Lamotrigine 25 mg qd - Give 25 q AM   - Rimegepant 75 mg every day PRN - Hold   - Effexor 150 mg every day - Hold      Pulmonary:  # Pneumonia, hospital acquired vs aspiration  # H/o asthma  # ?Cystic Fibrosis, mild  # BRIAN, no CPAP  # Concern for airway edema, resolved. No cuff leak noted 4/25 with cuff deflation. C/f airway edema. CT neck soft tissue negative for peritracheal edema.   Thick green-yellow copious from ETT and nasal per RN. Increased secretions overnight on 4/22, with fever. CT Chest showing scattered peripheral groundglass opacities in the right upper and middle lobes suggestive for infection versus aspiration. SpCx pending. +ve MRSA on swab.  - Continue PTA fluticasone nasal spray BID & montelukast 10 mg every day   - increase 02 demands, bronchoalveolar lavage today   - increase PEEP 5-> 8  - Antimicrobials per ID section  - CRP and procalcitonin pending  - Bronchoalveolar lavage today (4/26) and narrow antibiotic treatment per Gram stain results  - Will not attempt SBT today (4/26) in setting of patient's increasing oxygen requirement, attempt SBT tomorrow     Vent Mode: CMV/AC  (Continuous Mandatory Ventilation/ Assist Control)  FiO2 (%): 65 %  Resp Rate (Set): 14 breaths/min  Tidal Volume (Set, mL): 450 mL  PEEP (cm H2O): (S) 8 cmH2O  Pressure Support (cm H2O): 7 cmH2O  Resp: 13    Cardiovascular:  # Lactic acidosis, likely 2/2 agitation, resolved   # HTN  # HLD  - add scheduled Clonidine 0.1mg BID  -  PRN Hydralazine 10mg-20mg q 4H. Labetalol 10-20 q 4H PRN to keep SBP>160 mmHg or DBP>110 mmHg  - Continue PTA atorvastatin 40 mg every day  - Hold PTA metoprolol succinate given cocaine use-> should likely not continue upon discharge given reported daily cocaine use   - Holding PTA Hyzaar- would prefer to use BP for diuresis rather than treat intermittent HTN 2/2 to agitation, consider resuming if sustaining HTN  - diuresis per renal section      GI/Nutrition:  # Magnet ingestion 4/16, resolved.   # Hepatic steatosis   # Hypertriglyceridemia  on admission, downtrending   # Chronic 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 requiring multiple abdominal surgeries   - GI consulted, appreciate recommendations               - magnets removed via colonscopy on 4/24, gi signed off   - Senna BID   - Bisacodyl suppository daily PRN   - Trend triglycerides while on propofol infusion (downtrending from initial 580)  - RD consult for recs, TF advancing to goal      Renal/Fluids/Electrolytes:  # Hyponatremia, resolved   # Volume status, remains hypervolemic, net positive 6L since admission   - Scruggs for strict I&O, unable to use external device given high liquid stool output with bowel prep  - Daily weights   - 2x IV 40mg lasix (4/24). Goal net negative 1-2L  - recheck BMP at 1600  - ICU electrolyte replacement protocol     Endocrine:  No acute concerns  - Goal BG < 180, no current indication for insulin       ID:  # Pneumonia, hospital acquired vs. Aspiration   # MRSA nares positive  - Monitor WBC trend and fever curve  - UA without infectious findings  - Blood cultures 4/20 pending , NGTD  - 4/23: Fever and agitation reported overnight, increased nasal secretions. CT C/A/P obtained, showing c/f pneumonia. BC and sputum cx obtained, Initial sputum with +4 gram positive cocci, MRSA swab positive. Started on vanc/zosyn/flagyl. Flagyl discontinued after negative abdominal findings on imaging. SpCx +ve Pan susceptible staph  aureus.Concern for new vs. Worsening infection given worsening fever.   - bronch today with cultures  - Continue vanc/zosyn while awaiting bronch cx.      Hematology:    # Acute normocytic anemia  PTA Hb  11.8-12.3. Hb 9.9 today. No signs of active bleeding, likely 2/2 to drug use/illness. No evidence of bleeding.   - CTM  - Enoxaparin for DVT prophy      Musculoskeletal:  # Weakness  Ambulatory PTA.  - PT/OT consult     Skin:  No acute concerns  - Diligent skin cares to prevent breakdown     General Cares/Prophylaxis:    DVT Prophylaxis: Enoxaparin (Lovenox) SQ and Pneumatic Compression Devices  GI Prophylaxis: PPI  Restraints: Bilateral upper extremity soft wrist restraints and mitts   Code Status: Full     Lines/tubes/drains:  ? PIV x 2  ? NG  ? Scruggs  ? ETT     Disposition: MICU    Patient case discussed with Dr. Reid who agrees with the above plan.    Jason Lowry  Medical Student      Resident/Fellow Attestation   I, Francheska Cobb CNP, was present with the medical/RUSTAM student who participated in the service and in the documentation of the note.  I have verified the history and personally performed the physical exam and medical decision making.  I agree with the assessment and plan of care as documented in the note.      Key findings: worsening fi02 needs/CXR, concerning for new/worsening Pna. Will increase PEEP, plan for bronchoscopy with cultures. Continue broad spectrum abx, transition to precedex from propofol, continue aggressive diuresis.     Francheska Cobb CNP    Date of Service (when I saw the patient): 04/26/23          -----------------------------------------------------------------------  Interval History:   Patient intubated, sedated. Some agitation overnight and increased oxygen needs.     PHYSICAL EXAMINATION:  Temp:  [98.4  F (36.9  C)-102.4  F (39.1  C)] 98.4  F (36.9  C)  Pulse:  [] 92  Resp:  [14-28] 16  BP: ()/() 123/68  FiO2 (%):  [45 %-70 %] 55 %  SpO2:  [91 %-99  %] 93 %     General: Sedated, intubated  HEENT: Normocephalic/atraumatic  Respiratory: CTAB on ventilatory support  Cardiovascular: RRR without murmurs, rubs or gallop. S1, S2 intact.  Abdomen: Soft, non-distended.  Skin: No overt lesions, bruises, rashes or bleeding on exposed skin surfaces.    LABS: Reviewed.   Arterial Blood Gases   Recent Labs   Lab 04/21/23  0102   PH 7.40   PCO2 42   PO2 81   HCO3 26     Complete Blood Count   Recent Labs   Lab 04/26/23  0451 04/25/23  0637 04/24/23  0549 04/23/23  1852   WBC 13.9* 17.3* 20.2* 20.8*   HGB 11.5* 10.1* 10.3* 10.7*    267 263 258     Basic Metabolic Panel  Recent Labs   Lab 04/26/23  0834 04/26/23  0424 04/26/23  0357 04/26/23  0011 04/25/23 2051 04/25/23 0637 04/24/23  0549 04/23/23  1852   NA  --  140  --   --   --  142 142 138   POTASSIUM  --  4.3  --   --   --  3.9 3.8 3.7   CHLORIDE  --  105  --   --   --  110* 109* 104   CO2  --  22  --   --   --  23 20* 19*   BUN  --  10.8  --   --   --  7.9 4.0* 3.1*   CR  --  0.56  --   --   --  0.57 0.64 0.64   * 135* 124* 126*   < > 100* 116* 98    < > = values in this interval not displayed.     Liver Function Tests  Recent Labs   Lab 04/26/23  0424 04/23/23 1852 04/21/23  0021 04/20/23  1832   AST 15 28 23 28   ALT 13 20 21 20   ALKPHOS 108* 101 60 66   BILITOTAL 0.3 0.8 <0.2 0.2   ALBUMIN 3.5 3.5 4.0 4.5   INR  --  1.27* 1.04 0.97  1.0*     Coagulation Profile  Recent Labs   Lab 04/23/23  1852 04/21/23  0021 04/20/23  1832   INR 1.27* 1.04 0.97  1.0*       IMAGING:  Recent Results (from the past 24 hour(s))   CT Soft Tissue Neck w/o Contrast    Narrative    EXAM: CT SOFT TISSUE NECK W/O CONTRAST  4/25/2023 2:55 PM     HISTORY: Patient intubated with no air leak , assess for airway edema          COMPARISON: CT chest, abdomen and pelvis 4/23/2023    TECHNIQUE: Thin section helical CT images were obtained from the skull  base down to the level of the aortic arch.  Axial, coronal and  sagittal  reformations were performed with 2-3 mm slice thickness  reconstruction. Images were reviewed in soft tissue, lung and bone  windows.    CONTRAST: none    FINDINGS:   Endotracheal tube is present in the thoracic trachea. No substantial  paratracheal soft tissue edema. Subglottic airway is patent. Feeding  tube and esophageal temperature probe are within the esophagus.  Prominent, nonenlarged level 1b and 2 lymph nodes.    Salivary glands are within normal limits.    Normal thyroid gland.    No suspicious osseus lesion. No high grade spinal canal stenosis.    Bilateral maxillary sinus mucosal thickening. Bilateral mastoid  effusions. The imaged skull base, intracranial and orbital structures  are within normal limits.    Bilateral dependent pulmonary opacities with air bronchograms. Similar  smaller opacities in apices.      Impression    IMPRESSION:   1. Endotracheal tube is within the mid thoracic trachea, which is  patent. No substantial paratracheal edema.  2. Bilateral pulmonary dependent opacities with air bronchograms.    I have personally reviewed the examination and initial interpretation  and I agree with the findings.    MIMI SOLIS MD         SYSTEM ID:  [id number]

## 2023-04-26 NOTE — PROCEDURES
[facility]   Bronchoscopy Procedure Note    PROCEDURE: Bronchoscopy with Lavage     INDICATION: Abnormal imaging     CONSENT: Obtained and in the paper chart      PROCEDURE :   Everardo Lee MD     MEDICATIONS: Propofol gtt, dilaudid gtt   Sedation Time: 10  minutes of continuous 1:1 monitoring   Time Out:  Performed    The patient was assessed for the adequacy for the procedure and to receive medications.      After clinical evaluation and reviewing the indication, risks, alternatives and benefits of the procedure the patient was deemed to be in satisfactory condition to undergo the procedure.       Immediately before administration of medications the patient was re-assessed for adequacy to receive sedatives including the heart rate, respiratory rate, mental status, oxygen saturation, blood pressure and adequacy of pulmonary ventilation.These same parameters were continuously monitored throughout the procedure.    PROCEDURE:  3 mL of lidocaine instilled via ET tube with minimal cough. Flexible bronchoscope was inserted through patient's ET tube. The ET tube was in ok position. The pastora was sharp. An airway inspection was done to the subsegmental level which found: no sigfnicant mucus plugging, mild erythema. The bronchoscope was then advanced to the RUL and placed into wedge. A BAL was performed where 120 mL of saline was instilled in 60 mL aliquots. A total of 20 mL were collected (combination of lavage and pooled sspecimen trap).       SAMPLES:   20  mL of  fluid was sent for microscopic analysis and cell counts.     COMPLICATIONS: None    Supervised by Dr. Franklin Lee MD  Pulmonary/Critical Care Fellow

## 2023-04-26 NOTE — PROCEDURES
Bridle Placement:   Reason for bridle placement: securement of nasogastric feeding tube   Medicine delivered during procedure: lubricating jelly  Procedure: Successful   Location of top of clip on FT: @ 71 cm marker (NGT at 70 cm at the nare)  Condition of nose/skin at time of bridle placement: Unremarkable   Face to Face time with patient: < 5 minutes.    Li Gillette, MS, RD, LD, [facility]  MICU pager: [phone]  ASCOM: [id number]

## 2023-04-26 NOTE — PROGRESS NOTES
ICU Daily Rounding Checklist     Checklist Response Notes   Can sedation be reduced?  Yes Discontinue propofol s/p bronch   Can analgesia be reduced? No    Is delirium being assessed, addressed and prevented? Yes    Spontaneous awakening trial and/or Spontaneous breathing trial candidate?  Yes    Total fluid balance goal reviewed?  Yes Target Goals:       Neg 1-2 L [24h]   Is the patient at goals for lung protective ventilation? Yes    Head of bed elevation (30 degrees)? Yes    Skin breakdown assessment (prevention) completed? Yes    Is enteral nutrition at goal? No advancing   Is blood glucose at goal? Yes    Deep venous thrombosis prophylaxis? Yes      Gastric ulcer prophylaxis?  Yes If coagulopathy (INR-1.5 PTT2x normal. Ph<50k), mechanical ventilation 48hr, history of GI bleed/ulcer within past year. TBL, SCI, or burn, or if >= 2 minor risk factors (sepsis, ICU stay 1 week, occult GI bleed > 6 days. glucocorticoid therapy, NSAID use, antiplatelet use)   Can Antibiotics be narrowed or discontinued? No    Early mobility candidate and physical therapy consulted? Yes    Is bob catheter needed? Yes    Is central venous/arterial catheter needed? NA    Has the family been updated? Yes    Are the patient's goals of care and code status current? Yes

## 2023-04-26 NOTE — PROGRESS NOTES
Patient RASS +3, kicking legs, banging hands on pillows, grimacing. Shakes head no to pain. Reoriented patient and explained that she was safe and rational for ETT and wrist restraints. Continued to bang fists and kick legs. Gave PRN bolus of Dilaudid and increased Precedex.

## 2023-04-27 ENCOUNTER — APPOINTMENT (OUTPATIENT)
Dept: GENERAL RADIOLOGY | Facility: CLINIC | Age: 43
DRG: 917 | End: 2023-04-27
Payer: COMMERCIAL

## 2023-04-27 LAB
ANION GAP SERPL CALCULATED.3IONS-SCNC: 12 MMOL/L (ref 7–15)
ATRIAL RATE - MUSE: 64 BPM
BASE EXCESS BLDV CALC-SCNC: 4.3 MMOL/L (ref -7.7–1.9)
BASOPHILS # BLD AUTO: 0.1 10E3/UL (ref 0–0.2)
BASOPHILS NFR BLD AUTO: 1 %
BUN SERPL-MCNC: 28.4 MG/DL (ref 6–20)
CALCIUM SERPL-MCNC: 8.6 MG/DL (ref 8.6–10)
CHLORIDE SERPL-SCNC: 107 MMOL/L (ref 98–107)
CREAT SERPL-MCNC: 0.66 MG/DL (ref 0.51–0.95)
CRP SERPL-MCNC: 104 MG/L
DEPRECATED HCO3 PLAS-SCNC: 24 MMOL/L (ref 22–29)
DIASTOLIC BLOOD PRESSURE - MUSE: NORMAL MMHG
EOSINOPHIL # BLD AUTO: 0.1 10E3/UL (ref 0–0.7)
EOSINOPHIL NFR BLD AUTO: 1 %
ERYTHROCYTE [DISTWIDTH] IN BLOOD BY AUTOMATED COUNT: 13.9 % (ref 10–15)
GFR SERPL CREATININE-BSD FRML MDRD: >90 ML/MIN/1.73M2
GLUCOSE SERPL-MCNC: 145 MG/DL (ref 70–99)
HCO3 BLDV-SCNC: 29 MMOL/L (ref 21–28)
HCT VFR BLD AUTO: 31.4 % (ref 35–47)
HGB BLD-MCNC: 10 G/DL (ref 11.7–15.7)
IMM GRANULOCYTES # BLD: 0.2 10E3/UL
IMM GRANULOCYTES NFR BLD: 1 %
INTERPRETATION ECG - MUSE: NORMAL
LACTATE SERPL-SCNC: 0.8 MMOL/L (ref 0.7–2)
LYMPHOCYTES # BLD AUTO: 2.3 10E3/UL (ref 0.8–5.3)
LYMPHOCYTES NFR BLD AUTO: 15 %
MAGNESIUM SERPL-MCNC: 2.2 MG/DL (ref 1.7–2.3)
MCH RBC QN AUTO: 29.1 PG (ref 26.5–33)
MCHC RBC AUTO-ENTMCNC: 31.8 G/DL (ref 31.5–36.5)
MCV RBC AUTO: 91 FL (ref 78–100)
MONOCYTES # BLD AUTO: 1.5 10E3/UL (ref 0–1.3)
MONOCYTES NFR BLD AUTO: 10 %
NEUTROPHILS # BLD AUTO: 11.3 10E3/UL (ref 1.6–8.3)
NEUTROPHILS NFR BLD AUTO: 72 %
O2/TOTAL GAS SETTING VFR VENT: 35 %
OXYHGB MFR BLDV: 85 % (ref 70–75)
P AXIS - MUSE: 40 DEGREES
PCO2 BLDV: 42 MM HG (ref 40–50)
PH BLDV: 7.45 [PH] (ref 7.32–7.43)
PHOSPHATE SERPL-MCNC: 2 MG/DL (ref 2.5–4.5)
PLATELET # BLD AUTO: 382 10E3/UL (ref 150–450)
PO2 BLDV: 49 MM HG (ref 25–47)
POTASSIUM SERPL-SCNC: 3 MMOL/L (ref 3.4–5.3)
POTASSIUM SERPL-SCNC: 3.1 MMOL/L (ref 3.4–5.3)
PR INTERVAL - MUSE: 158 MS
PROCALCITONIN SERPL IA-MCNC: 0.08 NG/ML
QRS DURATION - MUSE: 104 MS
QT - MUSE: 432 MS
QTC - MUSE: 445 MS
R AXIS - MUSE: 8 DEGREES
RBC # BLD AUTO: 3.44 10E6/UL (ref 3.8–5.2)
SODIUM SERPL-SCNC: 143 MMOL/L (ref 136–145)
SYSTOLIC BLOOD PRESSURE - MUSE: NORMAL MMHG
T AXIS - MUSE: 95 DEGREES
VENTRICULAR RATE- MUSE: 64 BPM
WBC # BLD AUTO: 15 10E3/UL (ref 4–11)
WBC # BLD AUTO: ABNORMAL 10*3/UL

## 2023-04-27 PROCEDURE — 250N000013 HC RX MED GY IP 250 OP 250 PS 637: Performed by: PEDIATRICS

## 2023-04-27 PROCEDURE — 84100 ASSAY OF PHOSPHORUS: CPT | Performed by: STUDENT IN AN ORGANIZED HEALTH CARE EDUCATION/TRAINING PROGRAM

## 2023-04-27 PROCEDURE — 250N000009 HC RX 250: Performed by: STUDENT IN AN ORGANIZED HEALTH CARE EDUCATION/TRAINING PROGRAM

## 2023-04-27 PROCEDURE — 83605 ASSAY OF LACTIC ACID: CPT | Performed by: STUDENT IN AN ORGANIZED HEALTH CARE EDUCATION/TRAINING PROGRAM

## 2023-04-27 PROCEDURE — 250N000013 HC RX MED GY IP 250 OP 250 PS 637: Performed by: INTERNAL MEDICINE

## 2023-04-27 PROCEDURE — 82805 BLOOD GASES W/O2 SATURATION: CPT | Performed by: NURSE PRACTITIONER

## 2023-04-27 PROCEDURE — 250N000011 HC RX IP 250 OP 636: Performed by: NURSE PRACTITIONER

## 2023-04-27 PROCEDURE — 83735 ASSAY OF MAGNESIUM: CPT | Performed by: STUDENT IN AN ORGANIZED HEALTH CARE EDUCATION/TRAINING PROGRAM

## 2023-04-27 PROCEDURE — 999N000157 HC STATISTIC RCP TIME EA 10 MIN

## 2023-04-27 PROCEDURE — 71045 X-RAY EXAM CHEST 1 VIEW: CPT

## 2023-04-27 PROCEDURE — 250N000013 HC RX MED GY IP 250 OP 250 PS 637: Performed by: PHYSICIAN ASSISTANT

## 2023-04-27 PROCEDURE — 250N000013 HC RX MED GY IP 250 OP 250 PS 637: Performed by: NURSE PRACTITIONER

## 2023-04-27 PROCEDURE — 84132 ASSAY OF SERUM POTASSIUM: CPT | Performed by: INTERNAL MEDICINE

## 2023-04-27 PROCEDURE — 93010 ELECTROCARDIOGRAM REPORT: CPT | Performed by: INTERNAL MEDICINE

## 2023-04-27 PROCEDURE — 250N000013 HC RX MED GY IP 250 OP 250 PS 637: Performed by: STUDENT IN AN ORGANIZED HEALTH CARE EDUCATION/TRAINING PROGRAM

## 2023-04-27 PROCEDURE — 250N000011 HC RX IP 250 OP 636

## 2023-04-27 PROCEDURE — 250N000011 HC RX IP 250 OP 636: Performed by: INTERNAL MEDICINE

## 2023-04-27 PROCEDURE — 250N000013 HC RX MED GY IP 250 OP 250 PS 637

## 2023-04-27 PROCEDURE — 99233 SBSQ HOSP IP/OBS HIGH 50: CPT | Performed by: PSYCHIATRY & NEUROLOGY

## 2023-04-27 PROCEDURE — 84145 PROCALCITONIN (PCT): CPT | Performed by: STUDENT IN AN ORGANIZED HEALTH CARE EDUCATION/TRAINING PROGRAM

## 2023-04-27 PROCEDURE — 36415 COLL VENOUS BLD VENIPUNCTURE: CPT | Performed by: NURSE PRACTITIONER

## 2023-04-27 PROCEDURE — 86140 C-REACTIVE PROTEIN: CPT | Performed by: STUDENT IN AN ORGANIZED HEALTH CARE EDUCATION/TRAINING PROGRAM

## 2023-04-27 PROCEDURE — 99233 SBSQ HOSP IP/OBS HIGH 50: CPT | Mod: GC | Performed by: INTERNAL MEDICINE

## 2023-04-27 PROCEDURE — 258N000003 HC RX IP 258 OP 636: Performed by: INTERNAL MEDICINE

## 2023-04-27 PROCEDURE — C9113 INJ PANTOPRAZOLE SODIUM, VIA: HCPCS | Performed by: NURSE PRACTITIONER

## 2023-04-27 PROCEDURE — 94003 VENT MGMT INPAT SUBQ DAY: CPT

## 2023-04-27 PROCEDURE — 85025 COMPLETE CBC W/AUTO DIFF WBC: CPT | Performed by: NURSE PRACTITIONER

## 2023-04-27 PROCEDURE — 93005 ELECTROCARDIOGRAM TRACING: CPT

## 2023-04-27 PROCEDURE — 250N000011 HC RX IP 250 OP 636: Performed by: STUDENT IN AN ORGANIZED HEALTH CARE EDUCATION/TRAINING PROGRAM

## 2023-04-27 PROCEDURE — 71045 X-RAY EXAM CHEST 1 VIEW: CPT | Mod: 26 | Performed by: RADIOLOGY

## 2023-04-27 PROCEDURE — 80048 BASIC METABOLIC PNL TOTAL CA: CPT | Performed by: STUDENT IN AN ORGANIZED HEALTH CARE EDUCATION/TRAINING PROGRAM

## 2023-04-27 PROCEDURE — 120N000002 HC R&B MED SURG/OB UMMC

## 2023-04-27 RX ORDER — METRONIDAZOLE 500 MG/1
500 TABLET ORAL 3 TIMES DAILY
Status: DISCONTINUED | OUTPATIENT
Start: 2023-04-27 | End: 2023-05-01 | Stop reason: HOSPADM

## 2023-04-27 RX ORDER — POTASSIUM CHLORIDE 1.5 G/1.58G
40 POWDER, FOR SOLUTION ORAL ONCE
Status: COMPLETED | OUTPATIENT
Start: 2023-04-27 | End: 2023-04-27

## 2023-04-27 RX ORDER — FUROSEMIDE 10 MG/ML
40 INJECTION INTRAMUSCULAR; INTRAVENOUS ONCE
Status: COMPLETED | OUTPATIENT
Start: 2023-04-27 | End: 2023-04-27

## 2023-04-27 RX ORDER — POTASSIUM CHLORIDE 1.5 G/1.58G
20 POWDER, FOR SOLUTION ORAL ONCE
Status: COMPLETED | OUTPATIENT
Start: 2023-04-27 | End: 2023-04-27

## 2023-04-27 RX ORDER — HALOPERIDOL 5 MG/ML
2-4 INJECTION INTRAMUSCULAR EVERY 4 HOURS PRN
Status: DISCONTINUED | OUTPATIENT
Start: 2023-04-27 | End: 2023-04-27

## 2023-04-27 RX ORDER — CEFTRIAXONE 2 G/1
2 INJECTION, POWDER, FOR SOLUTION INTRAMUSCULAR; INTRAVENOUS EVERY 24 HOURS
Status: DISCONTINUED | OUTPATIENT
Start: 2023-04-27 | End: 2023-04-30

## 2023-04-27 RX ORDER — OLANZAPINE 10 MG/1
5 INJECTION, POWDER, LYOPHILIZED, FOR SOLUTION INTRAMUSCULAR 2 TIMES DAILY PRN
Status: DISCONTINUED | OUTPATIENT
Start: 2023-04-27 | End: 2023-04-30

## 2023-04-27 RX ORDER — LAMOTRIGINE 25 MG/1
50 TABLET ORAL DAILY
Status: DISCONTINUED | OUTPATIENT
Start: 2023-04-28 | End: 2023-05-01 | Stop reason: HOSPADM

## 2023-04-27 RX ORDER — OLANZAPINE 5 MG/1
5 TABLET ORAL AT BEDTIME
Status: DISCONTINUED | OUTPATIENT
Start: 2023-04-27 | End: 2023-04-30

## 2023-04-27 RX ADMIN — FUROSEMIDE 40 MG: 10 INJECTION, SOLUTION INTRAVENOUS at 10:46

## 2023-04-27 RX ADMIN — LABETALOL HYDROCHLORIDE 20 MG: 5 INJECTION, SOLUTION INTRAVENOUS at 17:29

## 2023-04-27 RX ADMIN — POTASSIUM CHLORIDE 20 MEQ: 1.5 POWDER, FOR SOLUTION ORAL at 09:21

## 2023-04-27 RX ADMIN — POTASSIUM & SODIUM PHOSPHATES POWDER PACK 280-160-250 MG 1 PACKET: 280-160-250 PACK at 11:36

## 2023-04-27 RX ADMIN — METRONIDAZOLE 500 MG: 500 TABLET ORAL at 20:21

## 2023-04-27 RX ADMIN — LABETALOL HYDROCHLORIDE 20 MG: 5 INJECTION, SOLUTION INTRAVENOUS at 11:31

## 2023-04-27 RX ADMIN — OLANZAPINE 5 MG: 5 TABLET, FILM COATED ORAL at 21:44

## 2023-04-27 RX ADMIN — ENOXAPARIN SODIUM 40 MG: 40 INJECTION SUBCUTANEOUS at 07:37

## 2023-04-27 RX ADMIN — POLYETHYLENE GLYCOL 3350 17 G: 17 POWDER, FOR SOLUTION ORAL at 07:38

## 2023-04-27 RX ADMIN — POTASSIUM CHLORIDE 40 MEQ: 1.5 POWDER, FOR SOLUTION ORAL at 07:55

## 2023-04-27 RX ADMIN — OXYCODONE HYDROCHLORIDE 5 MG: 5 TABLET ORAL at 10:46

## 2023-04-27 RX ADMIN — OXYCODONE HYDROCHLORIDE 5 MG: 5 TABLET ORAL at 14:51

## 2023-04-27 RX ADMIN — CLONIDINE HYDROCHLORIDE 0.1 MG: 0.1 TABLET ORAL at 20:21

## 2023-04-27 RX ADMIN — ONDANSETRON 4 MG: 2 INJECTION INTRAMUSCULAR; INTRAVENOUS at 09:07

## 2023-04-27 RX ADMIN — ACETAMINOPHEN 650 MG: 325 TABLET ORAL at 11:36

## 2023-04-27 RX ADMIN — SENNOSIDES AND DOCUSATE SODIUM 1 TABLET: 50; 8.6 TABLET ORAL at 07:37

## 2023-04-27 RX ADMIN — FOLIC ACID 1 MG: 1 TABLET ORAL at 07:37

## 2023-04-27 RX ADMIN — LAMOTRIGINE 25 MG: 25 TABLET ORAL at 07:37

## 2023-04-27 RX ADMIN — Medication 2 MG: at 21:44

## 2023-04-27 RX ADMIN — DEXMEDETOMIDINE HYDROCHLORIDE 1.2 MCG/KG/HR: 400 INJECTION INTRAVENOUS at 01:55

## 2023-04-27 RX ADMIN — POTASSIUM & SODIUM PHOSPHATES POWDER PACK 280-160-250 MG 1 PACKET: 280-160-250 PACK at 18:17

## 2023-04-27 RX ADMIN — LORAZEPAM 2 MG: 2 INJECTION INTRAMUSCULAR; INTRAVENOUS at 07:35

## 2023-04-27 RX ADMIN — ATORVASTATIN CALCIUM 40 MG: 40 TABLET, FILM COATED ORAL at 20:21

## 2023-04-27 RX ADMIN — LABETALOL HYDROCHLORIDE 20 MG: 5 INJECTION, SOLUTION INTRAVENOUS at 22:36

## 2023-04-27 RX ADMIN — CLONIDINE HYDROCHLORIDE 0.1 MG: 0.1 TABLET ORAL at 07:37

## 2023-04-27 RX ADMIN — POTASSIUM CHLORIDE 40 MEQ: 1.5 POWDER, FOR SOLUTION ORAL at 23:37

## 2023-04-27 RX ADMIN — POTASSIUM & SODIUM PHOSPHATES POWDER PACK 280-160-250 MG 1 PACKET: 280-160-250 PACK at 07:55

## 2023-04-27 RX ADMIN — PANTOPRAZOLE SODIUM 40 MG: 40 INJECTION, POWDER, FOR SOLUTION INTRAVENOUS at 07:38

## 2023-04-27 RX ADMIN — PIPERACILLIN AND TAZOBACTAM 4.5 G: 4; .5 INJECTION, POWDER, FOR SOLUTION INTRAVENOUS at 01:55

## 2023-04-27 RX ADMIN — CEFTRIAXONE SODIUM 2 G: 2 INJECTION, POWDER, FOR SOLUTION INTRAMUSCULAR; INTRAVENOUS at 12:59

## 2023-04-27 RX ADMIN — DEXMEDETOMIDINE HYDROCHLORIDE 1.2 MCG/KG/HR: 400 INJECTION INTRAVENOUS at 05:18

## 2023-04-27 RX ADMIN — MONTELUKAST 10 MG: 10 TABLET, FILM COATED ORAL at 21:44

## 2023-04-27 RX ADMIN — ACETAMINOPHEN 650 MG: 325 TABLET ORAL at 07:37

## 2023-04-27 RX ADMIN — METRONIDAZOLE 500 MG: 500 TABLET ORAL at 13:11

## 2023-04-27 RX ADMIN — PIPERACILLIN AND TAZOBACTAM 4.5 G: 4; .5 INJECTION, POWDER, FOR SOLUTION INTRAVENOUS at 07:37

## 2023-04-27 RX ADMIN — THIAMINE HCL TAB 100 MG 100 MG: 100 TAB at 07:37

## 2023-04-27 RX ADMIN — HYDRALAZINE HYDROCHLORIDE 10 MG: 20 INJECTION INTRAMUSCULAR; INTRAVENOUS at 11:44

## 2023-04-27 RX ADMIN — VANCOMYCIN HYDROCHLORIDE 1750 MG: 10 INJECTION, POWDER, LYOPHILIZED, FOR SOLUTION INTRAVENOUS at 03:06

## 2023-04-27 ASSESSMENT — ACTIVITIES OF DAILY LIVING (ADL)
ADLS_ACUITY_SCORE: 44
ADLS_ACUITY_SCORE: 44
ADLS_ACUITY_SCORE: 48
ADLS_ACUITY_SCORE: 44
ADLS_ACUITY_SCORE: 48
ADLS_ACUITY_SCORE: 44
ADLS_ACUITY_SCORE: 48
ADLS_ACUITY_SCORE: 48
ADLS_ACUITY_SCORE: 44
ADLS_ACUITY_SCORE: 46
ADLS_ACUITY_SCORE: 44
ADLS_ACUITY_SCORE: 46

## 2023-04-27 NOTE — PROGRESS NOTES
Pt self extubated on 0550 . Placed on oxymask 10L sating 95%. Pt able to vocalize and no stridor noted. RT will continue to follow.

## 2023-04-27 NOTE — PROGRESS NOTES
CLINICAL NUTRITION SERVICES - REASSESSMENT NOTE   Nutrition Prescription    RECOMMENDATIONS FOR MDs/PROVIDERS TO ORDER:  Monitor K+, Mg++, phos - Consider 2 pkts Neutraphos TID - QID x 1 day as current dosing might prove inadequate. Recommend IV repletion if enteral Neutra-Phos is not improving labs at recheck.       Consider SLP swallow evaluation for diet advancement once appropriate given intubated for nearly a week.    Malnutrition Status:    Patient does not meet two of the established criteria necessary for diagnosing malnutrition    Recommendations already ordered by Registered Dietitian (RD):  Continue EN support as ordered via NGT:  Vital High Protein @ goal of 65ml/hr (1560ml/day) will provide: 1560 kcals (22 kcal/kg), 136 g PRO (1.9 g/kg), 1304 ml free H20, 173 g CHO, and 0 g fiber daily.      Future/Additional Recommendations:  Monitor lytes and ongoing tolerance to goal TF via NGT.    Monitor diet advancement (pending mental status, swallow eval)      EVALUATION OF THE PROGRESS TOWARD GOALS   Diet: NPO    Enteral Access: NGT +  Bridle (verified tip and sidehole in stomach per CXR today 4/27 after extubation)    FWF: 30 mL q4h    Nutrition Support: EN  4/23: (never started) Osmolite 1.5 Nilson + 3 packets ProSource @ goal of  35ml/hr  (840ml/day) provides: 1380 kcals, 85 g PRO, 640 ml free H20, 171 g CHO, and 0 g fiber daily.        **Prosource TF Free 3 packets daily ordered in MAR but notes/TF order don't indicate which type     4/25 - Current: Vital High Protein @ goal of 65ml/hr (1560ml/day) will provide: 1560 kcals (22 kcal/kg), 136 g PRO (1.9 g/kg), 1304 ml free H20, 173 g CHO, and 0 g fiber daily.     Enteral Intake: Just reached goal TF rate at 0900 today (4/27) per I/Os.      NEW FINDINGS   -Wt trends: ~2.7% wt loss over past week per I/Os. Suspect fluid-related given hypervolemic status and on diuretics. Not counting toward malnutrition dx.  04/27/23 0400 99 kg (218 lb 4.1 oz) Bed scale   04/26/23  0400 99.3 kg (218 lb 14.7 oz) Bed scale   04/25/23 0000 101 kg (222 lb 10.6 oz) Bed scale   04/24/23 0900 107.2 kg (236 lb 5.3 oz) Bed scale   04/23/23 0100 106.8 kg (235 lb 7.2 oz) Bed scale   04/22/23 0200 105.1 kg (231 lb 11.3 oz) --   04/20/23 2345 101 kg (222 lb 10.6 oz) Bed scale   04/20/23 1835 101.9 kg (224 lb 10.4 oz) Bed scale      -Labs: Reviewed, notable for:     K+ 3.0 (L, down significantly from yesterday)    Phos 2.0 (L, intermittently low this admit)    -GI: Magnets ingestion, magnets removed 4/24 via colonoscopy by GI. Rectal tube removed 4/25, last BM 4/24    -Neuro: Confusion, mitts and beside sitter     -Renal: Hypervolemic, has been receiving Lasix    -Respiratory: Pt self-extubated this AM (4/27)    -Skin: Skin beneath nasal bridle was inspected; appears appropriate, with no skin breakdown or tension on the bridle string.     -Meds & Vitamin/Mineral Supplementation: Reviewed, notable for:     Folvite    Lasix    Liquid MVI/mineral    Neutra-Phos 1 pkt q4h x 3 doses    KCl    Thiamine 100 mg    NEW Dosing Weight: 69 kg (adjusted based on actual wt of 99 kg on 4/27 and IBW 59.1 kg)       UPDATED ASSESSED NUTRITION NEEDS   Estimated Energy Needs: 0272-6379 kcals/day (20-25 kcals/kg)   Justification: Maintenance, vented, obesity   Estimated Protein Needs: 104-138 grams protein/day (1.5-2 grams of pro/kg)   Justification: Increased needs, obesity   Estimated Fluid Needs: (1 mL/kcal)   Justification: Maintenance or per provider     MALNUTRITION  % Intake: Decreased intake does not meet criteria  % Weight Loss: Weight loss does not meet criteria - wt loss confounded by hypervolemia, on diuretics  Subcutaneous Fat Loss: None observed  Muscle Loss: None observed - pt appears to have good muscle bulk/tone  Fluid Accumulation/Edema: Mild (2+ edema per RN flowsheets)  Malnutrition Diagnosis: Patient does not meet two of the established criteria necessary for diagnosing malnutrition    Previous Goals   Diet  adv v nutrition support within 2-3 days.  Evaluation: Not met (TF started day 4, 4/25)    Previous Nutrition Diagnosis  Inadequate oral intake related to NPO d/t intubation as evidenced by no oral intake since admit.   Evaluation: No change, but details updated below    CURRENT NUTRITION DIAGNOSIS  Inadequate oral intake related to NPO status in setting of AMS s/p extubation as evidenced by continued need for EN support to meet nutrition needs until appropriate to start oral diet.       INTERVENTIONS  Implementation  -Enteral Nutrition - Continue as ordered  -Nutrition education - Discussed writer's role in care, reason for NFPE. Pt confused but pleasant.    Goals  Total avg nutritional intake to meet a minimum of 20 kcal/kg and 1.5 g PRO/kg daily (per dosing wt 69 kg).    Monitoring/Evaluation  Progress toward goals will be monitored and evaluated per protocol.     Sosa Rock RD, LD  Pager: [phone]

## 2023-04-27 NOTE — PLAN OF CARE
Major Shift Events: RASS -1. Drowsy. Restless and agitated easily - can reorient and reassure. Opens eyes to voice. RODRIGUEZ to commands. Follows commands after repeat x1/2. PERR. Rarely tracks. Intermittently nods yes/no for questions. Precedex and dilaudid to maintain RASS goal. PRN dilaudid bumps given for pain. PRN Seroquel given for agitation. Wrist restraints in place. Sinus 55-70. SBP < 140 met w/o intervention. Temp dropped to 35.8 - bare hugger, warm blankets and heat packs used - interventions effective. Lasix 40mg given overnight. LS diminished. CMV FiO2 35% and PEEP 8. Minimal ETT secretions, small/moderate oral secretions. TF advanced to 55 mL/hr, which is goal. No BM overnight - unable to collect fecal sample. Scruggs on place w/ AUO.     Plan: Continue to wean off vent as tolerated. Continue to reorient and deescalate. Continue pain and comfort management. Continue POC. Will continue to monitor pt status and will notify the MICU 2 team w/ any concerns or changes.    For vital signs and complete assessments, please see documentation flowsheets.   Plan of Care Reviewed With: patient  Overall Patient Progress: no change

## 2023-04-27 NOTE — CONSULTS
Psychiatry Consultation; Follow up              Reason for Consult, requesting source:    AMS, extensive psych history. I saw her on the 25th and 26th initially   Requesting source: Dharmesh Ruano    Labs and imaging reviewed, discussed with nursing and primary team     Total time spent in chart review, patient interview and coordination of care; 55 min    I was unable to reach her SO Andres for collateral information                Interim history:    This 42 year old woman with an extensive psychiatric history (refer to my note and the admission note) had presented with agitated delirium likely attributed to cocaine, alcohol and benzodiazepines (she now admits to using them in addition to opiates.   She has now been extubated and Precedex weaned this morning.  She is still quite groggy but is a reasonably good historian.  She denies that this was a suicide attempt but is a bit vague about events that preceded her hospital admission.  She is not currently suicidal and feels safe in the hospital.  However she is feeling somewhat depressed and her PTSD symptoms (flashbacks and nightmares) are quite active recently.  Her outpatient psychiatric provider Raleigh Osuna at a visit 3/30 mentions that he would like to taper off amitriptyline.  However, she said she is going down from 100 mg to 75 mg was very difficult for her due to sleep difficulties.  Also her PTSD related nightmares became worse.  She had been taking 25 mg of lamotrigine for at least a month, but she is vague about why she did not increase the dose.  Apparently she had done quite well with that when used years ago.  She became very tearful when she started talking about her abdominal wound due to shrapnel, and did not want to provide me with any details of the events surrounding the injury.        Current Medications:       atorvastatin  40 mg Oral or Feeding Tube QPM     cefTRIAXone  2 g Intravenous Q24H     chlorhexidine  15 mL Mouth/Throat BID  "    cloNIDine  0.1 mg Oral BID     enoxaparin ANTICOAGULANT  40 mg Subcutaneous Q24H     fluticasone  1 spray Both Nostrils Daily     folic acid  1 mg Oral or Feeding Tube Daily     lamoTRIgine  25 mg Oral or Feeding Tube Daily     metroNIDAZOLE  500 mg Oral TID     montelukast  10 mg Oral or Feeding Tube At Bedtime     multivitamins w/minerals  15 mL Per Feeding Tube Daily     pantoprazole  40 mg Per Feeding Tube QAM AC    Or     pantoprazole  40 mg Intravenous QAM AC     polyethylene glycol  17 g Oral Daily     potassium & sodium phosphates  1 packet Oral or Feeding Tube Q4H     senna-docusate  1 tablet Oral or Feeding Tube BID    Or     senna-docusate  2 tablet Oral or Feeding Tube BID     thiamine  100 mg Oral or Feeding Tube Daily              MSE:   Appearance: fatigued, intermittently tearful   Attitude:  mostly cooperative   Eye Contact:  fair  Mood:  depressed  Affect:  : mood congruent  Speech:  mumbling at times   Psychomotor Behavior:  no evidence of tardive dyskinesia, dystonia, or tics  Muscle strength and tone: intact   Thought Process:  circumstantial  Associations:  loosening of associations present  Thought Content:  no evidence of suicidal ideation or homicidal ideation and no evidence of psychotic thought  Insight:  limited  Judgement:  limited  Oriented to:  person and place  Attention Span and Concentration:  fair  Recent and Remote Memory:  fair    Vital signs:  Temp: 98.7  F (37.1  C) Temp src: Oral BP: (!) 136/92 Pulse: 92   Resp: 21 SpO2: 93 % O2 Device: Oxymask Oxygen Delivery: 3 LPM Height: 167.6 cm (5' 6\") Weight: 99 kg (218 lb 4.1 oz)  Estimated body mass index is 35.23 kg/m  as calculated from the following:    Height as of this encounter: 1.676 m (5' 6\").    Weight as of this encounter: 99 kg (218 lb 4.1 oz).    Qtc: 445 ms this morning          DSM-5 Diagnosis:   311 (F32.9) Unspecified Depressive Disorder   300.01 (F41.0) Panic Disorder  300.02 (F41.1) Generalized Anxiety " "Disorder  309.81 (F43.10) Posttraumatic Stress Disorder (includes Posttraumatic Stress Disorder for Children 6 Years and Younger)  Without dissociative symptoms   Borderline PD   encephalopathy   Polysubstance dependence (DSM IV term)           Assessment:   She is clearing somewhat but still will need coverage for agitation.  At this point it should be safe to increase the lamotrigine to 50 mg.;  Due to her mood swings and impulsivity and this is the best medicine for her.  She had difficulty going down from 100 to 75 mg of amitriptyline, therefore we may need to increase her being on the current 25 mg (which should be ok since her QTc is much better than tme of admission.  Also Haldol should be safe to use at this point due to her normal QTc.  She was not interested in speaking to our addictions team .  I have heard that her SO does not think that this was a suicide attempt, and she apparently ingested the magnets when she was quite impaired.          Summary of Recommendations:   At this point is okay to increase the lamotrigine to 50 mg/day, with a plan to go to 100 mg in 2 weeks.  For now I would continue with the Zyprexa scheduled at bedtime and have it available as a as needed.  If she is not sleeping with Zyprexa you can change to  mg of Seroquel HS   Hold on her OP Ambien for now   Haldol 2-5 mg IV q 4 hours for severe agitation.   No need for suicide precautions or attendant (unless she is too impulsive)   She will be following with her outpatient psychiatric provider Raleigh Osuna     Page me or re-consult psychiatry as needed (psychiatry is signing off).     Da Sherman M.D.   Consult liaison psychiatry   [facility]   Contact information available via [facility] Paging/Directory.  If I am not available, then [facility] intake ([phone]) should know who   Is on call        \"Much or all of the text in this note was generated through the use of Dragon Dictate voice " "to text software. Errors in spelling or words which appear to be out of contact are unintentional, may be present due having escaped editing\"           "

## 2023-04-27 NOTE — PLAN OF CARE
Goal Outcome Evaluation:      Plan of Care Reviewed With: patient    Overall Patient Progress: improvingOverall Patient Progress: improving    Outcome Evaluation: Continue TF as ordered.

## 2023-04-27 NOTE — PLAN OF CARE
Transferred to: Unit 5th floor [facility]  at (time) 1605.   Belongings: Cell phone, , upper and lower dentures, clothing, backpack sent with Patient.   Scruggs removed? Yes, see flow sheets   Central line removed? NA  Chart sent with patient: Yes  Family notified: Yes, spoke with Mother Marlin over the phone.     Major Shift Events: Weaned O2 to 4 L via NC.   Neuro-  Patient alert to self, place, situation. Patient follows commands in all extremities. Pupils equal and brisk.   CV- SR with HR 's. SBP < 140 met with PRN Labetalol and Hydralazine. Afebrile.    Resp- Lung Sounds clear/diminished. Secretions tan/creamy, scant. Patient has good cough.    GI- Abdomen soft. TF advanced to 65 mL/hr, tolerating well. Multiple watery BMs.   -  Scruggs discontinued at 1230 per order.   Skin- Bilateral  wrist restraints to prevent NGT dislodgement. Other interventions include: Reorientation, reposition, pain assessment, de escalation.   Updated Mother and SO over the phone.   Plan: Continue to monitor and notify MD of any changes.   For vital signs and complete assessments, please see documentation flowsheets.

## 2023-04-27 NOTE — PROGRESS NOTES
MEDICAL ICU PROGRESS NOTE   04/27/2023    Date of Hospital Admission: 4/20/23  Date of ICU Admission: 4/20/23  Reason for Critical Care Admission: Agitated delirium 2/2 cocaine and possible benzodiazepine intoxication requiring intubation and mechanical ventilation for airway protection. Foreign item ingestion.   Date of Service (when I saw the patient): 04/27/2023    ASSESSMENT:   Shanda Hinds is a 41yo F with PMH of mild cystic fibrosis, asthma, BRIAN (no CPAP), constipation, HTN, HLD, prior CVA (no residual), hepatic steatosis, questionable seizure disorder, polysubstance use disorder (cocaine, opioids, BZD, and THC), and multiple psychiatric conditions (bipolar disorder, borderline personality disorder, RANDI, PTSD, multiple suicide attempts, and possible pseudoseizures), who presented with agitated delirium likely 2/2 cocaine & possible BZD intoxication, AMS and AHHRF 2/2 PNA requiring intubation on 4/22, self-extubated on 4/27. Also found to have 2x magnets at ileocecal valve she swallowed on 4/16/23, removed by colonoscopy on 4/24/2023. Now with ongoing pneumonia, likely secondary to aspiration.     Changes for Today:  - Self-extubated 6am. Now on oxymask 4L  - 1x Lasix 40mg. Redose PRN with goal net neg 1-2L  - Off precedex. PRN sedatives available  - Re-consult psych        > Increase lamotrigine to 50 mg/day, with a plan to go to 100 mg in 2 weeks        > Schedule zyprexa at bedtime with PRN         > No need for suicide precautions  - 1:1 sitter for delirium  - 1x Lasix 40mg. Redose PRN with goal net neg 1-2L   - Switch ceftriaxone + flagyl to cover both MSSA and anaerobics, plan for 8d abx course  - Add on diff, procal, CRP, CXR  - Replace K and P. Repeat in the pm  - Continue TF. Closely monitoring refeeding and lytes replacemen. Pass bedside swallow test. Okay with small sip of water  - Transfer to [facility] ([facility])    PLAN:     Neuro:  # Pain and sedation for mechanical ventilation  - Off precedex,  "PRN sedatives available  - Tylenol, oxycodone 2.5-5 mg Q4H PRN    # Agitated delirium, likely 2/2 cocaine intoxication  # Encephalopathy, metabolic   # Cocaine intoxication  # Possible benzodiazepine intoxication  # Poly substance abuse (cocaine, benzodiazepine, opioid, THC)  Patient with a history of polysubstance use presented with agitated delirium. Urine drug screen was positive for benzodiazepines and cocaine. No reports of convulsive activity since presentation. Work-up for additional metabolic or infectious etiologies contributing to AMS has been thus far unrevealing. Per S.O. patient taking 1.5-2gm of cocaine daily and last used \"about a gram\" on 19th.   - Neurocrit consult for encephalopathy   - EEG findings consistent with diffuse encephalopathy, no seizures seen.  - Off precedex. PRN sedatives available  - Re-consult psych        > Increase lamotrigine to 50 mg/day, with a plan to go to 100 mg in 2 weeks        > Schedule zyprexa at bedtime with PRN         > Haldol 2-5mg IV Q4H PRN        > No need for suicide precautions  - 1:1 sitter for delirium  - Monitor for benzos/cocaine withdrawal  - Add on Peth     # Epilepsy vs pseudoseizures   # Migraine headaches   # Hx of TBI  New onset tonic clonic seizures early January 2023 (also possible report of sz in 2021). Neuroimaging negative for acute intracranial pathology, EEGs without epileptiforms while hospitalized. Generally preceded by an aura including migraines and anxiety. Was started on Depakote initially, but reportedly transitioned to lamotrigine. Subsequently [place]-like activity. 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   - Seizure precautions  - Continue PTA lamotrigine  - PRN lorazepam available for seizure activity  - Neurocrit consulted and signed off --> EEG discontinued, " no seizure activity noted.       # Bipolar disorder  # Borderline personality disorder  # RANDI  # PTDS  # Multiple past suicide attempts   Unclear current psychiatric medication list. Recently discharged from [facility] with [facility] med list including amitriptyline 100 mg at bedtime PRN OR zolpidem 10 mg at bedtime PRN, diazepam 5 mg BID PRN, and is currently up-titrating lamotrigine. Saw her psychiatrist on 3/30/23 who also noted she may be on effexor, but this is not in her discharge med list. Of note, lamotrigine level on 4/21 was <0.9.   - Neuro exam q4h  - Will offer chem dep and psychiatry consultation once extubated if pt is willing to see them   - Supplemental Thiamine and folate (unclear if ETOH abuse is an issue for her)  - Unclear what active PTA psych medication regimen was, multiple prescriptions found from numerous different ED visit/providers:  - Amitriptyline 100 mg @ HS PRN - Hold  - Zolpidem 10 mg @ HS PRN - Hold   - Diazepam 5 mg BID - Hold  - Lamotrigine 25 mg qd - start 50mg daily as above  - Rimegepant 75 mg every day PRN - Hold   - Effexor 150 mg every day - Hold      Pulmonary:  # Pneumonia, hospital acquired vs aspiration  # H/o asthma  # ?Cystic Fibrosis, mild  # BRIAN, no CPAP  # Concern for airway edema, resolved. No cuff leak noted 4/25 with cuff deflation. C/f airway edema. CT neck soft tissue negative for peritracheal edema. CT Chest showing scattered peripheral groundglass opacities in the right upper and middle lobes suggestive for infection versus aspiration. SpCx pending. +ve MRSA on swab. Patient FiO2 had been weaned to 35% before patient self-extubated on AM of 4/27.  - Continue PTA fluticasone nasal spray BID & montelukast 10 mg every day   - No significant findings on gram stain from BAL or respiratory panel  - Antimicrobials per ID section  - CXR 4/27 with improving pulmonary capacities  - Trend CRP and procalcitonin  - 1x Lasix 40mg. Redose PRN with goal net neg 1-2L   -  Switch ceftriaxone + flagyl to cover both MSSA and anaerobics, plan for 8d abx course  - Add on diff, procal, CRP, CXR     Cardiovascular:  # Lactic acidosis, likely 2/2 agitation, resolved   # HTN  # HLD  - Add scheduled Clonidine 0.1mg BID  - PRN Hydralazine 10mg-20mg q 4H. Labetalol 10-20 q 4H PRN to keep SBP>160 mmHg or DBP>110 mmHg  - Continue PTA atorvastatin 40 mg every day  - Hold PTA metoprolol succinate given cocaine use-> should likely not continue upon discharge given reported daily cocaine use   - Holding PTA Hyzaar- would prefer to use BP for diuresis rather than treat intermittent HTN 2/2 to agitation, consider resuming if sustaining HTN  - diuresis per renal section      GI/Nutrition:  # Magnet ingestion 4/16, resolved.   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 Repeat in the pm  - Continue TF. Closely monitoring refeeding and lytes replacemen. Pass bedside swallow test. Okay with small sip of water     Renal/Fluids/Electrolytes:  # Hyponatremia, resolved   # Volume status, remains hypervolemic, net positive 6L since admission   - Scruggs for strict I&O, unable to use external device given high liquid stool output with bowel prep  - Daily weights   - 1x IV lasix 40 mg. Goal net negative 1-2L  - ICU electrolyte replacement protocol     Endocrine:  No acute concerns  - Goal BG < 180, no current indication for insulin       ID:  # Pneumonia, hospital acquired vs. Aspiration   # MRSA nares positive  - Monitor WBC trend and fever curve  - UA without infectious findings  - Blood cultures 4/20 pending , NGTD  - 4/23: Fever and agitation reported overnight, increased nasal secretions. CT C/A/P obtained,  showing c/f pneumonia. BC and sputum cx obtained, Initial sputum with +4 gram positive cocci, MRSA swab positive. Started on vanc/zosyn/flagyl. SpCx +ve Pan susceptible staph aureus. Concern for new vs. Worsening infection given worsening fever. Patient bronched on 4/26  - Bronch with unremarkable findings  - Abx treatment with ceftriaxone and flagyl, vancomycin can be discontinued.   - Plan to cover both MSSA and anaerobics with 8d of above abx course    Hematology:    # Acute normocytic anemia  PTA Hb  11.8-12.3. Hb 9.9 today. No signs of active bleeding, likely 2/2 to drug use/illness. No evidence of bleeding.   - CTM  - Enoxaparin for DVT prophy      Musculoskeletal:  # Weakness  Ambulatory PTA.  - PT/OT consult     Skin:  No acute concerns  - Diligent skin cares to prevent breakdown     General Cares/Prophylaxis:    DVT Prophylaxis: Enoxaparin (Lovenox) SQ and Pneumatic Compression Devices  GI Prophylaxis: PPI  Restraints: Bilateral upper extremity soft wrist restraints and mitts   Code Status: Full     Lines/tubes/drains:  ? PIV x 2  ? NG  ? Scruggs     Disposition: MICU    Patient case discussed with Dr. Reid who agrees with the above plan.    Jason Lowry  Medical Student    Resident/Fellow Attestation   I, Lia Lu MD, was present with the medical/RUSTAM student who participated in the service and in the documentation of the note.  I have verified the history and personally performed the physical exam and medical decision making.  I agree with the assessment and plan of care as documented in the note.      Lia Lu MD  PGY3  Date of Service (when I saw the patient): 04/27/23      -----------------------------------------------------------------------  Interval History:   Patient self-extubated at approximately 0600 on 4/27 and placed on 10L nasal cannula, now weaned down to 6L. Continues to be agitated and is frustrated by restraints.    PHYSICAL EXAMINATION:  Temp:  [96.4  F (35.8  C)-98.6  F (37   C)] 98.2  F (36.8  C)  Pulse:  [53-96] 59  Resp:  [13-40] 13  BP: ()/(53-79) 114/68  FiO2 (%):  [35 %-65 %] 35 %  SpO2:  [90 %-100 %] 93 %     General: On nasal cannula, agitated  HEENT: Normocephalic/atraumatic  Respiratory: Diminished lung sounds   Cardiovascular: RRR without murmurs, rubs or gallop. S1, S2 intact.  Abdomen: Soft, non-distended.  Skin: No overt lesions, bruises, rashes or bleeding on exposed skin surfaces.    LABS: Reviewed.   Arterial Blood Gases   Recent Labs   Lab 04/21/23  0102   PH 7.40   PCO2 42   PO2 81   HCO3 26     Complete Blood Count   Recent Labs   Lab 04/27/23  0431 04/26/23  0451 04/25/23  0637 04/24/23  0549   WBC 15.0* 13.9* 17.3* 20.2*   HGB 10.0* 11.5* 10.1* 10.3*    336 267 263     Basic Metabolic Panel  Recent Labs   Lab 04/27/23  0431 04/26/23  1504 04/26/23  0834 04/26/23  0424 04/26/23  0357 04/25/23  2051 04/25/23  0637 04/24/23  0549     --   --  140  --   --  142 142   POTASSIUM 3.0* 4.2  --  4.3  --   --  3.9 3.8   CHLORIDE 107  --   --  105  --   --  110* 109*   CO2 24  --   --  22  --   --  23 20*   BUN 28.4*  --   --  10.8  --   --  7.9 4.0*   CR 0.66  --   --  0.56  --   --  0.57 0.64   *  --  129* 135* 124*   < > 100* 116*    < > = values in this interval not displayed.     Liver Function Tests  Recent Labs   Lab 04/26/23  0424 04/23/23  1852 04/21/23  0021 04/20/23  1832   AST 15 28 23 28   ALT 13 20 21 20   ALKPHOS 108* 101 60 66   BILITOTAL 0.3 0.8 <0.2 0.2   ALBUMIN 3.5 3.5 4.0 4.5   INR  --  1.27* 1.04 0.97  1.0*     Coagulation Profile  Recent Labs   Lab 04/23/23  1852 04/21/23  0021 04/20/23  1832   INR 1.27* 1.04 0.97  1.0*       IMAGING:  Recent Results (from the past 24 hour(s))   XR Chest Port 1 View    Narrative    Portable chest    INDICATION: Pneumonia    COMPARISON: 4/24/2023    FINDINGS: Patchy densities in the right upper lobe slightly less  confluent then previous. Patchy densities in the left lower lobe also  slightly  less confluent. Heart size normal. Silhouetting again of the  left hemidiaphragm. Endotracheal tube tip in the low thoracic  trachea.. NG/OG tube tip and side hole both projected within the  stomach. Apparent esophageal temperature probe located at or just  below the diaphragmatic hiatus.      Impression    IMPRESSION: Continued but slightly decreased confluence of pneumonia.  Continued left lung base pneumonia/atelectasis. Intubated.    GAYATRI JONES MD         SYSTEM ID:  [id number]

## 2023-04-27 NOTE — PROGRESS NOTES
Pt self-extubated around 0615. Wrist restraints in place at time of extubation. Pt talking and coughing. Team called. Placed on 10L of oxymask. Dilaudid gtt turned off. TF turned off, HOB @ 45, aspiration precautions in place. Pt exhibits confusion. Mitts placed and sitter at bedside now for safety. Continue to monitor pt and notify team of any changes.

## 2023-04-28 ENCOUNTER — APPOINTMENT (OUTPATIENT)
Dept: OCCUPATIONAL THERAPY | Facility: CLINIC | Age: 43
DRG: 917 | End: 2023-04-28
Payer: COMMERCIAL

## 2023-04-28 ENCOUNTER — APPOINTMENT (OUTPATIENT)
Dept: SPEECH THERAPY | Facility: CLINIC | Age: 43
DRG: 917 | End: 2023-04-28
Attending: INTERNAL MEDICINE
Payer: COMMERCIAL

## 2023-04-28 ENCOUNTER — APPOINTMENT (OUTPATIENT)
Dept: PHYSICAL THERAPY | Facility: CLINIC | Age: 43
DRG: 917 | End: 2023-04-28
Payer: COMMERCIAL

## 2023-04-28 LAB
ALBUMIN SERPL BCG-MCNC: 4.2 G/DL (ref 3.5–5.2)
ALP SERPL-CCNC: 103 U/L (ref 35–104)
ALT SERPL W P-5'-P-CCNC: 23 U/L (ref 10–35)
ANION GAP SERPL CALCULATED.3IONS-SCNC: 15 MMOL/L (ref 7–15)
AST SERPL W P-5'-P-CCNC: 24 U/L (ref 10–35)
BACTERIA BLD CULT: NO GROWTH
BACTERIA BLD CULT: NO GROWTH
BASOPHILS # BLD AUTO: 0.2 10E3/UL (ref 0–0.2)
BASOPHILS NFR BLD AUTO: 1 %
BILIRUB SERPL-MCNC: 0.3 MG/DL
BUN SERPL-MCNC: 30.6 MG/DL (ref 6–20)
CALCIUM SERPL-MCNC: 9.7 MG/DL (ref 8.6–10)
CHLORIDE SERPL-SCNC: 105 MMOL/L (ref 98–107)
CREAT SERPL-MCNC: 0.54 MG/DL (ref 0.51–0.95)
DEPRECATED HCO3 PLAS-SCNC: 21 MMOL/L (ref 22–29)
EOSINOPHIL # BLD AUTO: 0.8 10E3/UL (ref 0–0.7)
EOSINOPHIL NFR BLD AUTO: 5 %
ERYTHROCYTE [DISTWIDTH] IN BLOOD BY AUTOMATED COUNT: 14.3 % (ref 10–15)
GFR SERPL CREATININE-BSD FRML MDRD: >90 ML/MIN/1.73M2
GLUCOSE SERPL-MCNC: 114 MG/DL (ref 70–99)
HCT VFR BLD AUTO: 36.5 % (ref 35–47)
HGB BLD-MCNC: 11.8 G/DL (ref 11.7–15.7)
IMM GRANULOCYTES # BLD: 0.7 10E3/UL
IMM GRANULOCYTES NFR BLD: 4 %
LYMPHOCYTES # BLD AUTO: 3.2 10E3/UL (ref 0.8–5.3)
LYMPHOCYTES NFR BLD AUTO: 19 %
MAGNESIUM SERPL-MCNC: 2.2 MG/DL (ref 1.7–2.3)
MCH RBC QN AUTO: 29.5 PG (ref 26.5–33)
MCHC RBC AUTO-ENTMCNC: 32.3 G/DL (ref 31.5–36.5)
MCV RBC AUTO: 91 FL (ref 78–100)
MONOCYTES # BLD AUTO: 1.3 10E3/UL (ref 0–1.3)
MONOCYTES NFR BLD AUTO: 8 %
NEUTROPHILS # BLD AUTO: 10.3 10E3/UL (ref 1.6–8.3)
NEUTROPHILS NFR BLD AUTO: 63 %
NRBC # BLD AUTO: 0 10E3/UL
NRBC BLD AUTO-RTO: 0 /100
PHOSPHATE SERPL-MCNC: 3.4 MG/DL (ref 2.5–4.5)
PLATELET # BLD AUTO: 529 10E3/UL (ref 150–450)
PLPETH BLD-MCNC: 23 NG/ML
POPETH BLD-MCNC: 34 NG/ML
POTASSIUM SERPL-SCNC: 3.4 MMOL/L (ref 3.4–5.3)
POTASSIUM SERPL-SCNC: 3.7 MMOL/L (ref 3.4–5.3)
PROCALCITONIN SERPL IA-MCNC: 0.03 NG/ML
PROT SERPL-MCNC: 8 G/DL (ref 6.4–8.3)
RBC # BLD AUTO: 4 10E6/UL (ref 3.8–5.2)
SODIUM SERPL-SCNC: 141 MMOL/L (ref 136–145)
WBC # BLD AUTO: 16.4 10E3/UL (ref 4–11)

## 2023-04-28 PROCEDURE — 250N000013 HC RX MED GY IP 250 OP 250 PS 637: Performed by: NURSE PRACTITIONER

## 2023-04-28 PROCEDURE — 250N000013 HC RX MED GY IP 250 OP 250 PS 637: Performed by: INTERNAL MEDICINE

## 2023-04-28 PROCEDURE — 250N000011 HC RX IP 250 OP 636: Performed by: NURSE PRACTITIONER

## 2023-04-28 PROCEDURE — 84132 ASSAY OF SERUM POTASSIUM: CPT | Performed by: PEDIATRICS

## 2023-04-28 PROCEDURE — 99232 SBSQ HOSP IP/OBS MODERATE 35: CPT | Performed by: INTERNAL MEDICINE

## 2023-04-28 PROCEDURE — 250N000011 HC RX IP 250 OP 636: Performed by: STUDENT IN AN ORGANIZED HEALTH CARE EDUCATION/TRAINING PROGRAM

## 2023-04-28 PROCEDURE — 83735 ASSAY OF MAGNESIUM: CPT | Performed by: PEDIATRICS

## 2023-04-28 PROCEDURE — 84145 PROCALCITONIN (PCT): CPT | Performed by: INTERNAL MEDICINE

## 2023-04-28 PROCEDURE — 97535 SELF CARE MNGMENT TRAINING: CPT | Mod: GO

## 2023-04-28 PROCEDURE — 250N000013 HC RX MED GY IP 250 OP 250 PS 637: Performed by: STUDENT IN AN ORGANIZED HEALTH CARE EDUCATION/TRAINING PROGRAM

## 2023-04-28 PROCEDURE — 84100 ASSAY OF PHOSPHORUS: CPT | Performed by: INTERNAL MEDICINE

## 2023-04-28 PROCEDURE — 250N000013 HC RX MED GY IP 250 OP 250 PS 637: Performed by: PHYSICIAN ASSISTANT

## 2023-04-28 PROCEDURE — 97161 PT EVAL LOW COMPLEX 20 MIN: CPT | Mod: GP

## 2023-04-28 PROCEDURE — 85025 COMPLETE CBC W/AUTO DIFF WBC: CPT | Performed by: INTERNAL MEDICINE

## 2023-04-28 PROCEDURE — 92526 ORAL FUNCTION THERAPY: CPT | Mod: GN | Performed by: SPEECH-LANGUAGE PATHOLOGIST

## 2023-04-28 PROCEDURE — 250N000013 HC RX MED GY IP 250 OP 250 PS 637: Performed by: PEDIATRICS

## 2023-04-28 PROCEDURE — 36415 COLL VENOUS BLD VENIPUNCTURE: CPT | Performed by: INTERNAL MEDICINE

## 2023-04-28 PROCEDURE — 92610 EVALUATE SWALLOWING FUNCTION: CPT | Mod: GN | Performed by: SPEECH-LANGUAGE PATHOLOGIST

## 2023-04-28 PROCEDURE — 84132 ASSAY OF SERUM POTASSIUM: CPT | Performed by: INTERNAL MEDICINE

## 2023-04-28 PROCEDURE — 120N000002 HC R&B MED SURG/OB UMMC

## 2023-04-28 PROCEDURE — 36416 COLLJ CAPILLARY BLOOD SPEC: CPT | Performed by: INTERNAL MEDICINE

## 2023-04-28 PROCEDURE — 97166 OT EVAL MOD COMPLEX 45 MIN: CPT | Mod: GO

## 2023-04-28 PROCEDURE — 97116 GAIT TRAINING THERAPY: CPT | Mod: GP

## 2023-04-28 RX ORDER — ESTRADIOL 0.1 MG/D
1 FILM, EXTENDED RELEASE TRANSDERMAL
Status: DISCONTINUED | OUTPATIENT
Start: 2023-04-30 | End: 2023-05-01 | Stop reason: HOSPADM

## 2023-04-28 RX ORDER — POTASSIUM CHLORIDE 1.5 G/1.58G
20 POWDER, FOR SOLUTION ORAL ONCE
Status: COMPLETED | OUTPATIENT
Start: 2023-04-28 | End: 2023-04-28

## 2023-04-28 RX ORDER — LANOLIN ALCOHOL/MO/W.PET/CERES
3 CREAM (GRAM) TOPICAL
Status: DISCONTINUED | OUTPATIENT
Start: 2023-04-27 | End: 2023-05-01 | Stop reason: HOSPADM

## 2023-04-28 RX ORDER — POTASSIUM CHLORIDE 20MEQ/15ML
40 LIQUID (ML) ORAL ONCE
Status: COMPLETED | OUTPATIENT
Start: 2023-04-28 | End: 2023-04-28

## 2023-04-28 RX ADMIN — Medication 40 MG: at 09:21

## 2023-04-28 RX ADMIN — ONDANSETRON 4 MG: 2 INJECTION INTRAMUSCULAR; INTRAVENOUS at 18:56

## 2023-04-28 RX ADMIN — THIAMINE HCL TAB 100 MG 100 MG: 100 TAB at 09:22

## 2023-04-28 RX ADMIN — METRONIDAZOLE 500 MG: 500 TABLET ORAL at 09:21

## 2023-04-28 RX ADMIN — Medication 2 MG: at 15:55

## 2023-04-28 RX ADMIN — OLANZAPINE 5 MG: 5 TABLET, FILM COATED ORAL at 21:06

## 2023-04-28 RX ADMIN — ATORVASTATIN CALCIUM 40 MG: 40 TABLET, FILM COATED ORAL at 21:04

## 2023-04-28 RX ADMIN — LAMOTRIGINE 50 MG: 25 TABLET ORAL at 09:21

## 2023-04-28 RX ADMIN — ENOXAPARIN SODIUM 40 MG: 40 INJECTION SUBCUTANEOUS at 09:22

## 2023-04-28 RX ADMIN — CLONIDINE HYDROCHLORIDE 0.1 MG: 0.1 TABLET ORAL at 09:21

## 2023-04-28 RX ADMIN — LABETALOL HYDROCHLORIDE 10 MG: 5 INJECTION, SOLUTION INTRAVENOUS at 06:36

## 2023-04-28 RX ADMIN — METRONIDAZOLE 500 MG: 500 TABLET ORAL at 21:06

## 2023-04-28 RX ADMIN — MONTELUKAST 10 MG: 10 TABLET, FILM COATED ORAL at 21:06

## 2023-04-28 RX ADMIN — Medication 2 MG: at 02:16

## 2023-04-28 RX ADMIN — POTASSIUM CHLORIDE 40 MEQ: 20 SOLUTION ORAL at 09:21

## 2023-04-28 RX ADMIN — METRONIDAZOLE 500 MG: 500 TABLET ORAL at 15:56

## 2023-04-28 RX ADMIN — CEFTRIAXONE SODIUM 2 G: 2 INJECTION, POWDER, FOR SOLUTION INTRAMUSCULAR; INTRAVENOUS at 14:51

## 2023-04-28 RX ADMIN — POTASSIUM CHLORIDE 20 MEQ: 1.5 POWDER, FOR SOLUTION ORAL at 21:06

## 2023-04-28 RX ADMIN — CLONIDINE HYDROCHLORIDE 0.1 MG: 0.1 TABLET ORAL at 21:05

## 2023-04-28 RX ADMIN — MELATONIN TAB 3 MG 3 MG: 3 TAB at 00:46

## 2023-04-28 RX ADMIN — FOLIC ACID 1 MG: 1 TABLET ORAL at 09:22

## 2023-04-28 ASSESSMENT — ACTIVITIES OF DAILY LIVING (ADL)
ADLS_ACUITY_SCORE: 42
ADLS_ACUITY_SCORE: 44
ADLS_ACUITY_SCORE: 42
ADLS_ACUITY_SCORE: 44
ADLS_ACUITY_SCORE: 42
ADLS_ACUITY_SCORE: 44
ADLS_ACUITY_SCORE: 42
ADLS_ACUITY_SCORE: 46

## 2023-04-28 NOTE — PLAN OF CARE
ADMISSION to [address] [place] [address] UNIT:    Shanda Hinds was admitted from ICU for encephalopathy related to cocaine abuse and ingestion of magnets.  2 RN skin assessment: completed by Jolly LOVING  Result of skin assessment and interventions/actions: Blanchable redness to back, sacrum/IT, and perineum.   Height, weight: completed? Yes  Patient belongings & admission documents: see Flowsheets, completed? Yes  MDRO education added to care plan:     End of shift Summary: See flowsheet for VS and detail assessments.    Changes this Shift: Pt arrived from ICU at 1645. Encephalopathy related to cocaine abuse and ingestion to magnets. Self extubated this morning and transferred to [facility]. Pt alert to self and situation, VSS x HTN, PRN labetalol given twice this shift. Sats maintained on 6 L NC. RN managed high replacement for K, mag, and phos. Triggered sepsis, lactic acid 0.8. 1:1 sitter at bedside for safety.    Pulmonary: Lungs CTA, diminished, clear. Infrequent productive cough, suctioning PRN.    Intake: NG tube, 70 cm, bridled. Tube feeding running 65 mL/hr. Meds crushed and flushed. Okayed for small sips of water under supervision.    Output: Bladder scanned 1645 = 480. Straight cath 1730 = 550 mL. Bladder scanned 2030 = 94. Continue to encourage voiding. Watery, loose stool once this shift.    Activity: Not oob, turning L to R, pillows to support. Lift transfers OOB.    Skin: Blanchable redness to back, perineium, buttocks/IT. Repositioning q 2 hr.    Pain: 9/10 bladder & abdominal pain. 2 mg PRN dilaudid given once.    Neuro/CMS: Denies N/T.    IV: R extended dwell, SL. R PIV SL.    Plan: Speech and nutrition consults placed. Continue POC.

## 2023-04-28 NOTE — PLAN OF CARE
Goal Outcome Evaluation:  Major Shift Events:    Neuro: Awake,orientated with self and situation.PERRLA.Moving all extremities.Complaints inability to sleep and  requesting for ambien which she takes at home.MD denied ambien for now and instead ordered melatonin 3mg.Per MD,benjamin doesn't want her to have her ambien.Pt remains calm,cooperative entirety the night.  Cards: Sinus rhythm on monitor.  Pulm: Infrequent productive cough.Able to expectorate whitish colored phlegm,not requiring yankeur for now.  Keeping oxygen at 5-6lpm via NC to keep satts above 90's.  Diet: TF via NG AT 65ml/hr with water flushes of 30ml q 4hrs.Per boy JOHNSON to continue with rate she had at ICU.Speech/nutrition will reeval pt in the morning.NPO but can have small sips of water under supervision.  GI/: Had large loose stool on bedpan.Unable to void,st cathed for 475ml at pass 2am.  Skin:  Bruising on lower abdominal area.  Pain: Endorsing abdominal and generalized pain.Dilaudid 2mg given.Appears more relaxed and resting better after.  Lytes:  On K/Phos/Mg protocol.Potassium replaced via NG.Recheck for levels this morning.  Plan: Speech/Nutrition, and psyche to follow pt.Continue with bedside attendant in room for safety.    BP elevated this morning.IV labetalol given. For vital signs and complete assessments, please see documentation flowsheets.

## 2023-04-28 NOTE — PROGRESS NOTES
"[facility]    Medicine Progress Note - Hospitalist Service, GOLD TEAM 16    Date of Admission:  4/20/2023    Assessment & Plan      Shanda Hinds is a 41yo F with PMH of mild cystic fibrosis, asthma, BRIAN (no CPAP), constipation, HTN, HLD, prior CVA (no residual), hepatic steatosis, questionable seizure disorder, polysubstance use disorder (cocaine, opioids, BZD, and THC), and multiple psychiatric conditions (bipolar disorder, borderline personality disorder, RANDI, PTSD, multiple suicide attempts, and possible pseudoseizures), who presented with agitated delirium likely 2/2 cocaine & possible BZD intoxication, AMS and AHHRF 2/2 PNA requiring intubation on 4/22, self-extubated on 4/27. Also found to have 2x magnets at ileocecal valve she swallowed on 4/16/23, removed by colonoscopy on 4/24/2023. Now with ongoing pneumonia, likely secondary to aspiration.  Patient has now been transferred to the medical floor for ongoing management and care on 4/27/2023.     # Agitated delirium, likely 2/2 cocaine intoxication  # Encephalopathy, metabolic   # Cocaine intoxication  # Possible benzodiazepine intoxication  # Poly substance abuse (cocaine, benzodiazepine, opioid, THC)  Patient with a history of polysubstance use presented with agitated delirium. Urine drug screen was positive for benzodiazepines and cocaine. No reports of convulsive activity since presentation. Work-up for additional metabolic or infectious etiologies contributing to AMS has been thus far unrevealing. Per S.O. patient taking 1.5-2gm of cocaine daily and last used \"about a gram\" on 19th.   > Neurocrit consult for encephalopathy. EEG findings consistent with diffuse encephalopathy, no seizures seen.  > Off precedex. PRN sedatives available  > Evaluated by psychiatry on 4/27/2023.  Advised to increase lamotrigine to 50 mg/day with a plan to go to 100 mg in 2 weeks.  Schedule olanzapine at bedtime and as needed.  " Haloperidol 2 to 5 mg IV every 4 hours as needed for agitation.  Advised to discontinue suicide precaution.  Advised to continue one-to-one sitter.    Plan:  - Continue lamotrigine to 50 mg daily, plan to go to 100 mg in 2 weeks.  - Continue olanzapine 5 mg at bedtime.  Olanzapine 5 mg IV 2 times daily as needed for agitation.  - Continue one-to-one bedside attendant.  - Monitor for withdrawal symptoms.  - Delirium prevention/treatment: Control pain, maintain sleep hygiene, advance activity, avoid psychoactive medications, orient daily          # Epilepsy vs pseudoseizures   # Migraine headaches   # Hx of TBI  New onset tonic clonic seizures early January 2023 (also possible report of sz in 2021). Neuroimaging negative for acute intracranial pathology, EEGs without epileptiforms while hospitalized. Generally preceded by an aura including migraines and anxiety. Was started on Depakote initially, but reportedly transitioned to lamotrigine. Subsequently [place]-like activity. 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   >Patient was evaluated by neuro critical service.  EEG was done.  No seizure activity was noted.  - Seizure precautions  - Continue PTA lamotrigine.  - PRN lorazepam available for seizure activity     # Bipolar disorder  # Borderline personality disorder  # RANDI  # PTDS  # Multiple past suicide attempts   Unclear current psychiatric medication list. Recently discharged from [facility] with [facility] med list including amitriptyline 100 mg at bedtime PRN OR zolpidem 10 mg at bedtime PRN, diazepam 5 mg BID PRN, and is currently up-titrating lamotrigine. Saw her psychiatrist on 3/30/23 who also noted she may be on effexor, but this is not in her discharge med list. Of note, lamotrigine level on 4/21 was <0.9.   > Supplemental Thiamine and  folate (unclear if ETOH abuse is an issue for her)  > Unclear what active PTA psych medication regimen was, multiple prescriptions found from numerous different ED visit/providers:  Amitriptyline 100 mg @ HS PRN - Hold; Zolpidem 10 mg @ HS PRN - Hold; Diazepam 5 mg BID - Hold; Lamotrigine 25 mg qd - start 50mg daily as above; Rimegepant 75 mg every day PRN - Hold; Effexor 150 mg every day - Hold   >Patient has been evaluated by psych as above.  Appreciate psych recommendations.    Plan:  - Continue lamotrigine on 50 mg daily.  -Follow-up with psychiatry for further management.         Pulmonary:  # Pneumonia, hospital acquired vs aspiration  # H/o asthma  # ?Cystic Fibrosis, mild  # BRIAN, no CPAP  # Concern for airway edema, resolved. No cuff leak noted 4/25 with cuff deflation. C/f airway edema. CT neck soft tissue negative for peritracheal edema. CT Chest showing scattered peripheral groundglass opacities in the right upper and middle lobes suggestive for infection versus aspiration. SpCx pending. +ve MRSA on swab. Patient FiO2 had been weaned to 35% before patient self-extubated on AM of 4/27.  > CT C/A/Pshowing c/f pneumonia. BC and sputum cx obtained, Initial sputum with +4 gram positive cocci, MRSA swab positive. Started on vanc/zosyn/flagyl. SpCx +ve Pan susceptible staph aureus. Concern for new vs. Worsening infection given worsening fever. Patient bronched on 4/26  - Bronch with unremarkable findings  > PTA fluticasone nasal spray BID,  montelukast 10 mg every day   > No significant findings on gram stain from BAL or respiratory panel  > CXR 4/27 with improving pulmonary capacities  > Antibiotics switched to ceftriaxone + flagyl to cover both MSSA and anaerobics, plan for 8d abx course  >Patient is currently on 6 L of oxygen.  She has no laborious breathing.  Plan:  - Continue ceftriaxone and metronidazole.  Plan for 8-day course.  - Continue fluticasone nasal spray twice daily.  - Continue montelukast at  bedtime.  - Continue to wean down oxygen.  - Continue to monitor CBC, CRP, procalcitonin.     # Lactic acidosis:  likely 2/2 agitation, resolved     # HTN:  Prior to admission on metoprolol succinate, and losartan.  Currently on clonidine 0.1 mg twice daily,  Hydralazine IV as needed, labetalol IV as needed  Plan:  - Continue current regime.  - Continue to monitor and titrate.      # HLD:  # Hypertriglyceridemia on admission:  Prior to admission on atorvastatin.  Stable.  Plan:  - Continue atorvastatin.       # Magnet ingestion 4/16, resolved.   >GI consulted, appreciate recommendations  >magnets removed via colonscopy on 4/24  >GI has now signed off.      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    # Nutritional status:  Patient was started on formula feeding by NG tube while she was in the ICU.  She did not meet to criteria for malnutrition.  > On formula feeding via nasoenteral tube.  >She was being followed by registered dietitian.    Plan:  - Continue formula feeding via nasoenteral tube.  Dietitian consult for ongoing management.  - Senna BID  - Bisacodyl suppository daily PRN        # Hyponatremia, resolved     # Volume status, remains hypervolemic, net positive 6L since admission   - Scruggs for strict I&O, unable to use external device given high liquid stool output with bowel prep  - Daily weights   - Patient was being treated with furosemide IV 40 mg doses.      Plan:    - Awaiting BMP from today.  - We will give furosemide if normal renal function.  - Goal net negative 1-2L    # Acute normocytic anemia  PTA Hb  11.8-12.3. Hb 9.9 today. No signs of active bleeding, likely 2/2 to drug use/illness. No evidence of bleeding.   -Monitor CBC      # Deconditioning:  Ambulatory PTA.  - PT/OT consult          Diet: NPO for Medical/Clinical Reasons Except for: Meds, Other; Specify: ok for small sips of  "water or sprite with supervision    DVT Prophylaxis: Enoxaparin (Lovenox) SQ  Scruggs Catheter: Not present  Lines: None     Cardiac Monitoring: ACTIVE order. Indication: ICU  Code Status: Full Code      Clinically Significant Risk Factors        # Hypokalemia: Lowest K = 3 mmol/L in last 2 days, will replace as needed                 # Obesity: Estimated body mass index is 35.48 kg/m  as calculated from the following:    Height as of this encounter: 1.676 m (5' 6\").    Weight as of this encounter: 99.7 kg (219 lb 12.8 oz).          Disposition Plan      Expected Discharge Date: 05/01/2023      Destination: [facility]            Zach Hurt MD  Hospitalist Service, GOLD TEAM [address] [place] [address]  Securely message with Vocera (more info)  Text page via [employer] Paging/Directory   See signed in provider for up to date coverage information  ______________________________________________________________________    Interval History     Overnight events reviewed.  Patient reports doing okay.  She would want to get feeding tube out.  Denies any chest pain, shortness of breath, lightheadedness or dizziness.  Denies any fever and chills.  No nausea and vomiting.  Reports some lower abdominal pain.    Physical Exam   Vital Signs: Temp: 99.6  F (37.6  C) Temp src: Oral BP: (!) 170/96 Pulse: 85   Resp: 16 SpO2: 96 % O2 Device: Nasal cannula Oxygen Delivery: 6 LPM  Weight: 219 lbs 12.78 oz    General Appearance: Laying in bed in no acute distress.  HEENT: NG tube in place.  Respiratory: Bilateral coarse breath sounds.  Decreased breath sound at bases.  Cardiovascular: S1, S2 normal.  GI: Soft, nontender, bowel sounds positive  Skin: No obvious rashes  Neuro: Alert, oriented, nonfocal exam.      Medical Decision Making       **CLEAR ALL SELECTIONS**      Data   ------------------------- PAST 24 HR DATA REVIEWED -----------------------------------------------    I have personally " reviewed the following data over the past 24 hrs:    N/A  \   N/A   / N/A     N/A N/A N/A /  N/A   3.4 N/A N/A \       Procal: N/A CRP: N/A Lactic Acid: 0.8         Imaging results reviewed over the past 24 hrs:   No results found for this or any previous visit (from the past 24 hour(s)).

## 2023-04-28 NOTE — PROGRESS NOTES
CLINICAL NUTRITION SERVICES - BRIEF NOTE     Nutrition Prescription    Recommendations already ordered by Registered Dietitian (RD):  RD placed TF orders back in the chart earlier today as they were removed from the chart but still running upon transfer from [place], but then diet advanced by SLP this afternoon, so RD paged rounding Provider to have tube removed per pt request, orders were placed and bedside RN removed tube     Future/Additional Recommendations:  Continue to monitor meal intakes/tolerance per protocol      NEW FINDINGS   RD visited pt at bedside earlier this AM, reviewed that pt remained NPO, but that SLP would visit and assess ability to advance po diet, pt really wanting feeding tube out, states causing discomfort and pain, but agreeable to waiting for SLP.     SLP assessed pt and advanced diet, reviewed pt with SLP on the unit, discussed will page Provider for tube removal then per pt request.     INTERVENTIONS  Implementation  Collaboration with other staff and Provider - as noted above  Diet - per SLP  Enteral nutrition/water flushes - discontinued as tube removed     Monitoring/Evaluation  Will continue to monitor and evaluate per protocol.    Anh Mcdermott RD, CNSC, LD  [facility] [place] [facility] [address] pager: [phone]  Weekend/holiday pager: [phone]

## 2023-04-28 NOTE — PROGRESS NOTES
Occupational Therapy Discharge Summary    Reason for therapy discharge:    All goals and outcomes met, no further needs identified.    Progress towards therapy goal(s). See goals on Care Plan in [facility] electronic health record for goal details.  Goals met    Therapy recommendation(s):    No further therapy is recommended.

## 2023-04-28 NOTE — PROGRESS NOTES
"   04/28/23 1245   Appointment Info   Signing Clinician's Name / Credentials (OT) María Summers, OTR/L   Rehab Comments (OT) 1:1 present   Living Environment   People in Home significant other   Current Living Arrangements apartment   Living Environment Comments no concerns; tub shower   Self-Care   Usual Activity Tolerance good   Current Activity Tolerance moderate   Equipment Currently Used at Home none   Fall history within last six months no   Activity/Exercise/Self-Care Comment ind with ADLs/IADLs; pt's boyfriend is able to assist as needed at home   General Information   Onset of Illness/Injury or Date of Surgery 04/20/23   Referring Physician Dr. Reid   Patient/Family Therapy Goal Statement (OT) none stated   Additional Occupational Profile Info/Pertinent History of Current Problem per chart, 41yo F with PMH of mild cystic fibrosis, asthma, BRIAN (no CPAP), constipation, HTN, HLD, prior CVA (no residual), hepatic steatosis, questionable seizure disorder, polysubstance use disorder (cocaine, opioids, benzodiazepine, and THC), and multiple psychiatric conditions (bipolar disorder, borderline personality disorder, RANDI, PTSD, multiple suicide attempts and possible pseudoseizures), who presented with agitated delirium likely 2/2 cocaine & possible benzodiazepine intoxication requiring intubation for airway protection on 4/22. Also found to have multiple magnets in her colon she swallowed on 4/16/23 s/p multiple colon-prep w/o any improvement. Transferred to [facility] for surgical evaluation.\"   Existing Precautions/Restrictions no known precautions/restrictions   Cognitive Status Examination   Orientation Status orientation to person, place and time   Affect/Mental Status (Cognitive) WNL   Follows Commands WNL   Visual Perception   Visual Impairment/Limitations corrective lenses full-time   Sensory   Sensory Quick Adds sensation intact   Pain Assessment   Patient Currently in Pain No   Posture   Posture " not impaired   Range of Motion Comprehensive   General Range of Motion no range of motion deficits identified   Strength Comprehensive (MMT)   General Manual Muscle Testing (MMT) Assessment no strength deficits identified   Muscle Tone Assessment   Muscle Tone Quick Adds No deficits were identified   Coordination   Upper Extremity Coordination No deficits were identified   Bed Mobility   Bed Mobility supine-sit   Supine-Sit [place] (Bed Mobility) modified independence   Transfers   Transfers bed-chair transfer;sit-stand transfer;toilet transfer   Transfer Skill: Bed to Chair/Chair to Bed   Bed-Chair [place] (Transfers) supervision   Sit-Stand Transfer   Sit-Stand [place] (Transfers) supervision   Toilet Transfer   [place] Level (Toilet Transfer) supervision   Balance   Balance Assessment no deficits were identified   Activities of Daily Living   BADL Assessment/Intervention lower body dressing;toileting;upper body dressing   Upper Body Dressing Assessment/Training   [place] Level (Upper Body Dressing) modified independence   Lower Body Dressing Assessment/Training   [place] Level (Lower Body Dressing) supervision   Toileting   [place] Level (Toileting) supervision   Clinical Impression   Criteria for Skilled Therapeutic Interventions Met (OT) Yes, treatment indicated   OT Diagnosis decreased ADL independence   Influenced by the following impairments encephalopathy   OT Problem List-Impairments impacting ADL problems related to;activity tolerance impaired   Assessment of Occupational Performance 3-5 Performance Deficits   Identified Performance Deficits dressing, toileting, showering   Planned Therapy Interventions (OT) ADL retraining   Clinical Decision Making Complexity (OT) moderate complexity   OT Total Evaluation Time   OT Eval, Moderate Complexity Minutes (40926) 10   OT Goals   Therapy Frequency (OT) One time eval and treatment   OT Predicted Duration/Target Date for Goal  Attainment 04/28/23   OT Goals Upper Body Dressing;Lower Body Dressing;Transfers;Bed Mobility;Toilet Transfer/Toileting   OT: Upper Body Dressing Modified independent;Goal Met;Completed   OT: Lower Body Dressing Modified independent;using adaptive equipment;Completed;Goal Met   OT: Bed Mobility Modified independent;Goal Met;Completed   OT: Transfer Modified independent;Goal Met;Completed   OT: Toilet Transfer/Toileting Modified independent;Goal Met;Completed;using adaptive equipment   Interventions   Interventions Quick Adds Self-Care/Home Management   Self-Care/Home Management   Self-Care/Home Mgmt/ADL, Compensatory, Meal Prep Minutes (31345) 40   Symptoms Noted During/After Treatment (Meal Preparation/Planning Training) none   Treatment Detail/Skilled Intervention Pt supine in bed upon OT arrival and agreeable to therapy. Educ on role OT. Pt Mod I supine to sit bed mob with use of bed rail and elevated HOB. Pt Mod I for STS, chair, bed and toilet transfer with FWW. Required assistance to manage cords duirng func ambulation to bathroom. Pt Mod I for toileting including clothing management and epifanio care. Pt Mod I for UB dressing, pt required assistance to manage heart tele cords. Pt Mod I for LB dressing to don socks, underwear and pants while sitting in chair and standing with FWW. v/c for task continuation once distracted. Increased time and effort d/t generalized weakness and distractibility. Pt left in recliner with 1:1 present and all needs with in reach.   OT Discharge Planning   OT Plan DC OT   OT Discharge Recommendation (DC Rec) home with assist   OT Rationale for DC Rec Pt appears near ADL baseline and reports that her boyfriend is able to assist as needed at home.   OT Brief overview of current status Mod I for ADLs and func mob   Total Session Time   Timed Code Treatment Minutes 40   Total Session Time (sum of timed and untimed services) 50

## 2023-04-28 NOTE — PROGRESS NOTES
"   04/28/23 1213   Appointment Info   Signing Clinician's Name / Credentials (SLP) Daniel Carrington MA [facility] SLP   General Information   Onset of Illness/Injury or Date of Surgery 04/22/23   Referring Physician Alfonso Crowley MD   Patient/Family Therapy Goal Statement (SLP) To eat and drink   Pertinent History of Current Problem Per provider notes: \"Shanda Hinds is a 41yo F with PMH of mild cystic fibrosis, asthma, BRIAN (no CPAP), constipation, HTN, HLD, prior CVA (no residual), hepatic steatosis, questionable seizure disorder, polysubstance use disorder (cocaine, opioids, BZD, and THC), and multiple psychiatric conditions (bipolar disorder, borderline personality disorder, RANDI, PTSD, multiple suicide attempts, and possible pseudoseizures), who presented with agitated delirium likely 2/2 cocaine & possible BZD intoxication, AMS and AHHRF 2/2 PNA requiring intubation on 4/22, self-extubated on 4/27. Also found to have 2x magnets at ileocecal valve she swallowed on 4/16/23, removed by colonoscopy on 4/24/2023. Now with ongoing pneumonia, likely secondary to aspiration.\"   General Observations Patient upright and alert in bed for evaluation. She was cooperative throughout.   Type of Evaluation   Type of Evaluation Swallow Evaluation   Oral Motor   Oral Musculature generally intact   Mucosal Quality adequate   Dentition (Oral Motor)   Comment, Dentition (Oral Motor) Patient reports she has been edentulous since birth. Wears dentures now, but 2 teeth from dentures were missing.   Dentition (Oral Motor) edentulous;dental appliance/dentures   Dental Appliance/Denture (Oral Motor) upper;lower   Facial Symmetry (Oral Motor)   Facial Symmetry (Oral Motor) WNL   Lip Function (Oral Motor)   Lip Range of Motion (Oral Motor) WNL   Lip Strength (Oral Motor) WNL   Tongue Function (Oral Motor)   Tongue Coordination/Speed (Oral Motor) WNL   Tongue ROM (Oral Motor) WNL   Jaw Function (Oral Motor)   Jaw Function (Oral Motor) WNL "   Cough/Swallow/Gag Reflex (Oral Motor)   Volitional Throat Clear/Cough (Oral Motor) WNL   Volitional Swallow (Oral Motor) WNL   Vocal Quality/Secretion Management (Oral Motor)   Vocal Quality (Oral Motor) hoarse   Secretion Management (Oral Motor) WNL   General Swallowing Observations   Past History of Dysphagia None prior to most recent hospitalization.   Respiratory Support (General Swallowing Observations) nasal cannula   Current Diet/Method of Nutritional Intake (General Swallowing Observations, NIS) NPO;nasogastric tube (NG);other (see comments)  (Pending SLP evaluation)   Swallowing Evaluation Clinical swallow evaluation   Clinical Swallow Evaluation   Feeding Assistance set up only required   Clinical Swallow Evaluation Textures Trialed thin liquids;pureed;soft & bite-sized   Clinical Swallow Eval: Thin Liquid Texture Trial   Mode of Presentation, Thin Liquids cup;straw;self-fed   Volume of Liquid or Food Presented >4 oz thin water   Oral Phase of Swallow WFL   Pharyngeal Phase of Swallow throat clearing   Diagnostic Statement Following initial 2 swallows via single cup sips, throat clearing after the swallow. However, with ongoing trials via single/consecutive cup and single/consecutive straw sips, no further s/s of aspiration observed.   Clinical Swallow Evaluation: Puree Solid Texture Trial   Mode of Presentation, Puree spoon;self-fed   Volume of Puree Presented 5 tsps   Oral Phase, Puree WFL   Pharyngeal Phase, Puree intact   Diagnostic Statement No overt s/s of aspiration observed.   Clinical Swallow Eval: Soft & Bite Sized   Mode of Presentation self-fed;spoon   Volume Presented 3 tsps   Oral Phase WFL   Pharyngeal Phase intact   Diagnostic Statement Mildly extended, but functional mastication of solids.   Esophageal Phase of Swallow   Patient reports or presents with symptoms of esophageal dysphagia No   Swallowing Recommendations   Diet Consistency Recommendations soft & bite-sized (level 6);thin  liquids (level 0)   Supervision Level for Intake 1:1 supervision needed   Mode of Delivery Recommendations bolus size, small;slow rate of intake   Swallowing Maneuver Recommendations alternate food and liquid intake   Monitoring/Assistance Required (Eating/Swallowing) stop eating activities when fatigue is present;monitor for cough or change in vocal quality with intake   Recommended Feeding/Eating Techniques (Swallow Eval) encourage use of dentures;maintain upright sitting position for eating;set-up and prepare tray   Medication Administration Recommendations, Swallowing (SLP) As tolerated   Instrumental Assessment Recommendations   (pending progress)   General Therapy Interventions   Planned Therapy Interventions Dysphagia Treatment   Dysphagia treatment Modified diet education;Instruction of safe swallow strategies   Clinical Impression   Criteria for Skilled Therapeutic Interventions Met (SLP Eval) Yes, treatment indicated   SLP Diagnosis Mild oropharyngeal dysphagia   Risks & Benefits of therapy have been explained evaluation/treatment results reviewed;care plan/treatment goals reviewed;risks/benefits reviewed;current/potential barriers reviewed;participants voiced agreement with care plan;participants included;patient   Clinical Impression Comments   Clinical swallow evaluation completed per provider orders. Patient presents with mild oropharyngeal dysphagia in setting of 5 day intubation period, AHRF, AMS and suspected aspiration pneumonia in setting of cocaine & possible BZD intoxication. Patient self-extubated 4/27. Oral motor evaluation revealed edentulous state. Patient reports she has been edentulous since birth and wears dentures when eating solids. No other oral motor impairments noted. Vocal quality mildly hoarse. Trials of thin liquids (cup, straw), puree textures, and soft & bite size textures completed at the bedside. Following initial single cup sips, 2x instances of throat clearing after the  swallow. With remaining thin liquid trials (single/consecutive sips of cup & straw) and solids, no further overt s/s of aspiration observed. Oral phase c/b adequate bolus acceptance and closure, mildly extended but functional mastication of solids, and good oral clearance.     Recommend advancing patient's diet to Soft & Bite size textures and Thin liquids with 1:1 supervision and set-up assistance. Encourage patient's use of dentures with solids. Patient should be fully upright/alert, taking small bites/sips, using a slow rate of intake, and alternating liquids and solids. Please monitor for coughing/choking. SLP will continue to follow for dysphagia management.     SLP Total Evaluation Time   Eval: oral/pharyngeal swallow function, clinical swallow Minutes [id number]) 16   SLP Discharge Planning   SLP Discharge Recommendation Transitional Care Facility   SLP Rationale for DC Rec Below baseline swallow fx

## 2023-04-28 NOTE — PROGRESS NOTES
Pt is an assist of 1 with a walker and gait belt.     Loose stools this shift-- pt was able to walk to the bathroom.     Has been able to void independently this shift-- bladder scan around 1200 was < 50mL.      K replaced this shift-- P and Mg ordered for AM.     Tele in place.     NG tube pulled this shift-- pt seen by dietary and speech-- new dietary orders placed-- continue to monitor.

## 2023-04-29 ENCOUNTER — APPOINTMENT (OUTPATIENT)
Dept: PHYSICAL THERAPY | Facility: CLINIC | Age: 43
DRG: 917 | End: 2023-04-29
Payer: COMMERCIAL

## 2023-04-29 LAB
ANION GAP SERPL CALCULATED.3IONS-SCNC: 12 MMOL/L (ref 7–15)
BACTERIA BLD CULT: NO GROWTH
BACTERIA BLD CULT: NO GROWTH
BASOPHILS # BLD AUTO: 0.1 10E3/UL (ref 0–0.2)
BASOPHILS NFR BLD AUTO: 1 %
BUN SERPL-MCNC: 23 MG/DL (ref 6–20)
C DIFF TOX B STL QL: NEGATIVE
CALCIUM SERPL-MCNC: 9.2 MG/DL (ref 8.6–10)
CHLORIDE SERPL-SCNC: 106 MMOL/L (ref 98–107)
CREAT SERPL-MCNC: 0.51 MG/DL (ref 0.51–0.95)
DEPRECATED HCO3 PLAS-SCNC: 21 MMOL/L (ref 22–29)
EOSINOPHIL # BLD AUTO: 1 10E3/UL (ref 0–0.7)
EOSINOPHIL NFR BLD AUTO: 5 %
ERYTHROCYTE [DISTWIDTH] IN BLOOD BY AUTOMATED COUNT: 14.3 % (ref 10–15)
GFR SERPL CREATININE-BSD FRML MDRD: >90 ML/MIN/1.73M2
GLUCOSE SERPL-MCNC: 132 MG/DL (ref 70–99)
HCT VFR BLD AUTO: 32.8 % (ref 35–47)
HGB BLD-MCNC: 10.7 G/DL (ref 11.7–15.7)
IMM GRANULOCYTES # BLD: 0.7 10E3/UL
IMM GRANULOCYTES NFR BLD: 4 %
LYMPHOCYTES # BLD AUTO: 3.7 10E3/UL (ref 0.8–5.3)
LYMPHOCYTES NFR BLD AUTO: 20 %
MAGNESIUM SERPL-MCNC: 2 MG/DL (ref 1.7–2.3)
MCH RBC QN AUTO: 29.2 PG (ref 26.5–33)
MCHC RBC AUTO-ENTMCNC: 32.6 G/DL (ref 31.5–36.5)
MCV RBC AUTO: 89 FL (ref 78–100)
MONOCYTES # BLD AUTO: 1.4 10E3/UL (ref 0–1.3)
MONOCYTES NFR BLD AUTO: 7 %
NEUTROPHILS # BLD AUTO: 11.9 10E3/UL (ref 1.6–8.3)
NEUTROPHILS NFR BLD AUTO: 63 %
NRBC # BLD AUTO: 0 10E3/UL
NRBC BLD AUTO-RTO: 0 /100
PHOSPHATE SERPL-MCNC: 3.1 MG/DL (ref 2.5–4.5)
PLATELET # BLD AUTO: 506 10E3/UL (ref 150–450)
POTASSIUM SERPL-SCNC: 3.6 MMOL/L (ref 3.4–5.3)
POTASSIUM SERPL-SCNC: 3.7 MMOL/L (ref 3.4–5.3)
RBC # BLD AUTO: 3.67 10E6/UL (ref 3.8–5.2)
SODIUM SERPL-SCNC: 139 MMOL/L (ref 136–145)
WBC # BLD AUTO: 18.8 10E3/UL (ref 4–11)

## 2023-04-29 PROCEDURE — 84132 ASSAY OF SERUM POTASSIUM: CPT | Performed by: INTERNAL MEDICINE

## 2023-04-29 PROCEDURE — 250N000013 HC RX MED GY IP 250 OP 250 PS 637: Performed by: PEDIATRICS

## 2023-04-29 PROCEDURE — 250N000013 HC RX MED GY IP 250 OP 250 PS 637: Performed by: INTERNAL MEDICINE

## 2023-04-29 PROCEDURE — 250N000013 HC RX MED GY IP 250 OP 250 PS 637: Performed by: STUDENT IN AN ORGANIZED HEALTH CARE EDUCATION/TRAINING PROGRAM

## 2023-04-29 PROCEDURE — 82653 EL-1 FECAL QUANTITATIVE: CPT | Performed by: STUDENT IN AN ORGANIZED HEALTH CARE EDUCATION/TRAINING PROGRAM

## 2023-04-29 PROCEDURE — 85025 COMPLETE CBC W/AUTO DIFF WBC: CPT | Performed by: PEDIATRICS

## 2023-04-29 PROCEDURE — 87493 C DIFF AMPLIFIED PROBE: CPT | Performed by: INTERNAL MEDICINE

## 2023-04-29 PROCEDURE — 250N000011 HC RX IP 250 OP 636: Performed by: NURSE PRACTITIONER

## 2023-04-29 PROCEDURE — 250N000009 HC RX 250: Performed by: INTERNAL MEDICINE

## 2023-04-29 PROCEDURE — 999N000157 HC STATISTIC RCP TIME EA 10 MIN

## 2023-04-29 PROCEDURE — 250N000011 HC RX IP 250 OP 636: Performed by: INTERNAL MEDICINE

## 2023-04-29 PROCEDURE — 94640 AIRWAY INHALATION TREATMENT: CPT

## 2023-04-29 PROCEDURE — 83735 ASSAY OF MAGNESIUM: CPT | Performed by: INTERNAL MEDICINE

## 2023-04-29 PROCEDURE — 94640 AIRWAY INHALATION TREATMENT: CPT | Mod: 76

## 2023-04-29 PROCEDURE — 250N000011 HC RX IP 250 OP 636: Performed by: STUDENT IN AN ORGANIZED HEALTH CARE EDUCATION/TRAINING PROGRAM

## 2023-04-29 PROCEDURE — 97116 GAIT TRAINING THERAPY: CPT | Mod: GP

## 2023-04-29 PROCEDURE — 84132 ASSAY OF SERUM POTASSIUM: CPT | Performed by: PEDIATRICS

## 2023-04-29 PROCEDURE — 84100 ASSAY OF PHOSPHORUS: CPT | Performed by: INTERNAL MEDICINE

## 2023-04-29 PROCEDURE — 97530 THERAPEUTIC ACTIVITIES: CPT | Mod: GP

## 2023-04-29 PROCEDURE — 99232 SBSQ HOSP IP/OBS MODERATE 35: CPT | Performed by: INTERNAL MEDICINE

## 2023-04-29 PROCEDURE — 120N000002 HC R&B MED SURG/OB UMMC

## 2023-04-29 PROCEDURE — 250N000013 HC RX MED GY IP 250 OP 250 PS 637: Performed by: NURSE PRACTITIONER

## 2023-04-29 RX ORDER — MAGNESIUM SULFATE HEPTAHYDRATE 40 MG/ML
2 INJECTION, SOLUTION INTRAVENOUS ONCE
Status: COMPLETED | OUTPATIENT
Start: 2023-04-29 | End: 2023-04-29

## 2023-04-29 RX ORDER — POTASSIUM CHLORIDE 1.5 G/1.58G
20 POWDER, FOR SOLUTION ORAL ONCE
Status: COMPLETED | OUTPATIENT
Start: 2023-04-29 | End: 2023-04-29

## 2023-04-29 RX ORDER — POTASSIUM CHLORIDE 750 MG/1
20 TABLET, EXTENDED RELEASE ORAL ONCE
Status: COMPLETED | OUTPATIENT
Start: 2023-04-29 | End: 2023-04-29

## 2023-04-29 RX ADMIN — THIAMINE HCL TAB 100 MG 100 MG: 100 TAB at 09:12

## 2023-04-29 RX ADMIN — OLANZAPINE 5 MG: 5 TABLET, FILM COATED ORAL at 21:52

## 2023-04-29 RX ADMIN — CLONIDINE HYDROCHLORIDE 0.1 MG: 0.1 TABLET ORAL at 09:13

## 2023-04-29 RX ADMIN — LEVALBUTEROL HYDROCHLORIDE 1.25 MG: 1.25 SOLUTION RESPIRATORY (INHALATION) at 00:55

## 2023-04-29 RX ADMIN — LEVALBUTEROL HYDROCHLORIDE 1.25 MG: 1.25 SOLUTION RESPIRATORY (INHALATION) at 16:17

## 2023-04-29 RX ADMIN — LAMOTRIGINE 50 MG: 25 TABLET ORAL at 09:12

## 2023-04-29 RX ADMIN — CEFTRIAXONE SODIUM 2 G: 2 INJECTION, POWDER, FOR SOLUTION INTRAMUSCULAR; INTRAVENOUS at 14:04

## 2023-04-29 RX ADMIN — CLONIDINE HYDROCHLORIDE 0.1 MG: 0.1 TABLET ORAL at 21:03

## 2023-04-29 RX ADMIN — LEVALBUTEROL HYDROCHLORIDE 1.25 MG: 1.25 SOLUTION RESPIRATORY (INHALATION) at 22:49

## 2023-04-29 RX ADMIN — METRONIDAZOLE 500 MG: 500 TABLET ORAL at 21:03

## 2023-04-29 RX ADMIN — POTASSIUM CHLORIDE 20 MEQ: 750 TABLET, EXTENDED RELEASE ORAL at 14:03

## 2023-04-29 RX ADMIN — POTASSIUM CHLORIDE 20 MEQ: 1.5 FOR SOLUTION ORAL at 04:56

## 2023-04-29 RX ADMIN — Medication 40 MG: at 06:33

## 2023-04-29 RX ADMIN — METRONIDAZOLE 500 MG: 500 TABLET ORAL at 09:13

## 2023-04-29 RX ADMIN — MONTELUKAST 10 MG: 10 TABLET, FILM COATED ORAL at 21:53

## 2023-04-29 RX ADMIN — MAGNESIUM SULFATE HEPTAHYDRATE 2 G: 40 INJECTION, SOLUTION INTRAVENOUS at 15:09

## 2023-04-29 RX ADMIN — ENOXAPARIN SODIUM 40 MG: 40 INJECTION SUBCUTANEOUS at 09:12

## 2023-04-29 RX ADMIN — ATORVASTATIN CALCIUM 40 MG: 40 TABLET, FILM COATED ORAL at 21:03

## 2023-04-29 RX ADMIN — METRONIDAZOLE 500 MG: 500 TABLET ORAL at 14:04

## 2023-04-29 RX ADMIN — FOLIC ACID 1 MG: 1 TABLET ORAL at 09:13

## 2023-04-29 ASSESSMENT — ACTIVITIES OF DAILY LIVING (ADL)
ADLS_ACUITY_SCORE: 42
ADLS_ACUITY_SCORE: 43
ADLS_ACUITY_SCORE: 43
ADLS_ACUITY_SCORE: 44
ADLS_ACUITY_SCORE: 43
ADLS_ACUITY_SCORE: 44
ADLS_ACUITY_SCORE: 42
ADLS_ACUITY_SCORE: 43
ADLS_ACUITY_SCORE: 43
ADLS_ACUITY_SCORE: 44

## 2023-04-29 NOTE — PLAN OF CARE
Goal Outcome Evaluation:    IMC status DISCONTINUED at 2000 --     Pt. A&Ox 4. VSS. Afebrile. Lung sounds clear but diminished on RA. PRN neb given for some mild wheezing and coughing overnight. Denies nausea, SOB, chest pain and N/T. Voiding w/o issue. Patient denied oral dilaudid and tylenol for pain. Up with assist of 1, walker and GB. Midline PIV is patent on right arm. Call light within reach, patient able to make needs known. Potassium replaced -- AM electrolyte redraw. Will continue with plan of care.
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
## 2023-04-29 NOTE — PROGRESS NOTES
Received request from charge RN to review if patient still requires IMC status.       Chart reviewed, including today's labs, current medications, and progress note.   Appears appropriate to move to med/surge status.      Glenys Forrester PA-C  Hospitalist Service  [facility], [place]  Pager: [phone]

## 2023-04-30 ENCOUNTER — APPOINTMENT (OUTPATIENT)
Dept: SPEECH THERAPY | Facility: CLINIC | Age: 43
DRG: 917 | End: 2023-04-30
Payer: COMMERCIAL

## 2023-04-30 ENCOUNTER — APPOINTMENT (OUTPATIENT)
Dept: PHYSICAL THERAPY | Facility: CLINIC | Age: 43
DRG: 917 | End: 2023-04-30
Payer: COMMERCIAL

## 2023-04-30 LAB
ANION GAP SERPL CALCULATED.3IONS-SCNC: 12 MMOL/L (ref 7–15)
BACTERIA BRONCH: ABNORMAL
BACTERIA BRONCH: ABNORMAL
BASOPHILS # BLD MANUAL: 0.2 10E3/UL (ref 0–0.2)
BASOPHILS NFR BLD MANUAL: 1 %
BUN SERPL-MCNC: 13.8 MG/DL (ref 6–20)
CALCIUM SERPL-MCNC: 9.3 MG/DL (ref 8.6–10)
CHLORIDE SERPL-SCNC: 104 MMOL/L (ref 98–107)
CREAT SERPL-MCNC: 0.56 MG/DL (ref 0.51–0.95)
DEPRECATED HCO3 PLAS-SCNC: 22 MMOL/L (ref 22–29)
EOSINOPHIL # BLD MANUAL: 1.4 10E3/UL (ref 0–0.7)
EOSINOPHIL NFR BLD MANUAL: 7 %
ERYTHROCYTE [DISTWIDTH] IN BLOOD BY AUTOMATED COUNT: 14.2 % (ref 10–15)
GFR SERPL CREATININE-BSD FRML MDRD: >90 ML/MIN/1.73M2
GLUCOSE SERPL-MCNC: 93 MG/DL (ref 70–99)
HCT VFR BLD AUTO: 36 % (ref 35–47)
HGB BLD-MCNC: 11.7 G/DL (ref 11.7–15.7)
LYMPHOCYTES # BLD MANUAL: 4.3 10E3/UL (ref 0.8–5.3)
LYMPHOCYTES NFR BLD MANUAL: 21 %
MAGNESIUM SERPL-MCNC: 2.3 MG/DL (ref 1.7–2.3)
MCH RBC QN AUTO: 29.6 PG (ref 26.5–33)
MCHC RBC AUTO-ENTMCNC: 32.5 G/DL (ref 31.5–36.5)
MCV RBC AUTO: 91 FL (ref 78–100)
METAMYELOCYTES # BLD MANUAL: 0.6 10E3/UL
METAMYELOCYTES NFR BLD MANUAL: 3 %
MONOCYTES # BLD MANUAL: 0.6 10E3/UL (ref 0–1.3)
MONOCYTES NFR BLD MANUAL: 3 %
NEUTROPHILS # BLD MANUAL: 13.2 10E3/UL (ref 1.6–8.3)
NEUTROPHILS NFR BLD MANUAL: 65 %
PHOSPHATE SERPL-MCNC: 3.6 MG/DL (ref 2.5–4.5)
PLAT MORPH BLD: ABNORMAL
PLATELET # BLD AUTO: 563 10E3/UL (ref 150–450)
POLYCHROMASIA BLD QL SMEAR: SLIGHT
POTASSIUM SERPL-SCNC: 3.7 MMOL/L (ref 3.4–5.3)
RBC # BLD AUTO: 3.95 10E6/UL (ref 3.8–5.2)
RBC MORPH BLD: ABNORMAL
SODIUM SERPL-SCNC: 138 MMOL/L (ref 136–145)
WBC # BLD AUTO: 20.3 10E3/UL (ref 4–11)

## 2023-04-30 PROCEDURE — 250N000013 HC RX MED GY IP 250 OP 250 PS 637: Performed by: NURSE PRACTITIONER

## 2023-04-30 PROCEDURE — 250N000013 HC RX MED GY IP 250 OP 250 PS 637: Performed by: PHYSICIAN ASSISTANT

## 2023-04-30 PROCEDURE — 250N000013 HC RX MED GY IP 250 OP 250 PS 637: Performed by: INTERNAL MEDICINE

## 2023-04-30 PROCEDURE — 84100 ASSAY OF PHOSPHORUS: CPT | Performed by: INTERNAL MEDICINE

## 2023-04-30 PROCEDURE — 99223 1ST HOSP IP/OBS HIGH 75: CPT | Performed by: INTERNAL MEDICINE

## 2023-04-30 PROCEDURE — 92526 ORAL FUNCTION THERAPY: CPT | Mod: GN

## 2023-04-30 PROCEDURE — 999N000157 HC STATISTIC RCP TIME EA 10 MIN

## 2023-04-30 PROCEDURE — 250N000011 HC RX IP 250 OP 636: Performed by: NURSE PRACTITIONER

## 2023-04-30 PROCEDURE — 94640 AIRWAY INHALATION TREATMENT: CPT

## 2023-04-30 PROCEDURE — 36415 COLL VENOUS BLD VENIPUNCTURE: CPT | Performed by: INTERNAL MEDICINE

## 2023-04-30 PROCEDURE — 120N000002 HC R&B MED SURG/OB UMMC

## 2023-04-30 PROCEDURE — 97116 GAIT TRAINING THERAPY: CPT | Mod: GP

## 2023-04-30 PROCEDURE — 97110 THERAPEUTIC EXERCISES: CPT | Mod: GP

## 2023-04-30 PROCEDURE — 83735 ASSAY OF MAGNESIUM: CPT | Performed by: INTERNAL MEDICINE

## 2023-04-30 PROCEDURE — 250N000013 HC RX MED GY IP 250 OP 250 PS 637: Performed by: STUDENT IN AN ORGANIZED HEALTH CARE EDUCATION/TRAINING PROGRAM

## 2023-04-30 PROCEDURE — 85027 COMPLETE CBC AUTOMATED: CPT | Performed by: INTERNAL MEDICINE

## 2023-04-30 PROCEDURE — 99232 SBSQ HOSP IP/OBS MODERATE 35: CPT | Performed by: INTERNAL MEDICINE

## 2023-04-30 PROCEDURE — 82310 ASSAY OF CALCIUM: CPT | Performed by: INTERNAL MEDICINE

## 2023-04-30 PROCEDURE — 250N000009 HC RX 250: Performed by: INTERNAL MEDICINE

## 2023-04-30 PROCEDURE — 250N000011 HC RX IP 250 OP 636: Performed by: STUDENT IN AN ORGANIZED HEALTH CARE EDUCATION/TRAINING PROGRAM

## 2023-04-30 PROCEDURE — 85007 BL SMEAR W/DIFF WBC COUNT: CPT | Performed by: INTERNAL MEDICINE

## 2023-04-30 RX ORDER — BENZONATATE 100 MG/1
100 CAPSULE ORAL 3 TIMES DAILY PRN
Status: DISCONTINUED | OUTPATIENT
Start: 2023-04-30 | End: 2023-05-01 | Stop reason: HOSPADM

## 2023-04-30 RX ORDER — OLANZAPINE 5 MG/1
5 TABLET, ORALLY DISINTEGRATING ORAL 2 TIMES DAILY PRN
Status: DISCONTINUED | OUTPATIENT
Start: 2023-04-30 | End: 2023-05-01 | Stop reason: HOSPADM

## 2023-04-30 RX ORDER — HYDROXYZINE HYDROCHLORIDE 50 MG/1
50 TABLET, FILM COATED ORAL EVERY 6 HOURS PRN
Status: DISCONTINUED | OUTPATIENT
Start: 2023-04-30 | End: 2023-05-01 | Stop reason: HOSPADM

## 2023-04-30 RX ORDER — GUAIFENESIN/DEXTROMETHORPHAN 100-10MG/5
10 SYRUP ORAL EVERY 4 HOURS PRN
Status: DISCONTINUED | OUTPATIENT
Start: 2023-04-30 | End: 2023-05-01 | Stop reason: HOSPADM

## 2023-04-30 RX ORDER — HYDROXYZINE HYDROCHLORIDE 25 MG/1
25 TABLET, FILM COATED ORAL EVERY 6 HOURS PRN
Status: DISCONTINUED | OUTPATIENT
Start: 2023-04-30 | End: 2023-05-01 | Stop reason: HOSPADM

## 2023-04-30 RX ORDER — SULFAMETHOXAZOLE/TRIMETHOPRIM 800-160 MG
2 TABLET ORAL EVERY 8 HOURS
Status: DISCONTINUED | OUTPATIENT
Start: 2023-04-30 | End: 2023-05-01 | Stop reason: HOSPADM

## 2023-04-30 RX ORDER — POTASSIUM CHLORIDE 750 MG/1
20 TABLET, EXTENDED RELEASE ORAL ONCE
Status: COMPLETED | OUTPATIENT
Start: 2023-04-30 | End: 2023-04-30

## 2023-04-30 RX ADMIN — HYDROXYZINE HYDROCHLORIDE 50 MG: 50 TABLET, FILM COATED ORAL at 05:39

## 2023-04-30 RX ADMIN — METRONIDAZOLE 500 MG: 500 TABLET ORAL at 08:15

## 2023-04-30 RX ADMIN — SULFAMETHOXAZOLE AND TRIMETHOPRIM 2 TABLET: 800; 160 TABLET ORAL at 22:53

## 2023-04-30 RX ADMIN — CLONIDINE HYDROCHLORIDE 0.1 MG: 0.1 TABLET ORAL at 20:17

## 2023-04-30 RX ADMIN — GUAIFENESIN AND DEXTROMETHORPHAN 10 ML: 100; 10 SYRUP ORAL at 13:39

## 2023-04-30 RX ADMIN — CLONIDINE HYDROCHLORIDE 0.1 MG: 0.1 TABLET ORAL at 08:15

## 2023-04-30 RX ADMIN — HYDROXYZINE HYDROCHLORIDE 50 MG: 50 TABLET, FILM COATED ORAL at 21:01

## 2023-04-30 RX ADMIN — FOLIC ACID 1 MG: 1 TABLET ORAL at 08:15

## 2023-04-30 RX ADMIN — METRONIDAZOLE 500 MG: 500 TABLET ORAL at 20:17

## 2023-04-30 RX ADMIN — Medication 40 MG: at 08:15

## 2023-04-30 RX ADMIN — CEFTRIAXONE SODIUM 2 G: 2 INJECTION, POWDER, FOR SOLUTION INTRAMUSCULAR; INTRAVENOUS at 13:39

## 2023-04-30 RX ADMIN — POTASSIUM CHLORIDE 20 MEQ: 750 TABLET, EXTENDED RELEASE ORAL at 13:39

## 2023-04-30 RX ADMIN — OLANZAPINE 7.5 MG: 5 TABLET, FILM COATED ORAL at 21:01

## 2023-04-30 RX ADMIN — GUAIFENESIN AND DEXTROMETHORPHAN 10 ML: 100; 10 SYRUP ORAL at 20:17

## 2023-04-30 RX ADMIN — OLANZAPINE 5 MG: 5 TABLET, ORALLY DISINTEGRATING ORAL at 22:21

## 2023-04-30 RX ADMIN — BENZONATATE 100 MG: 100 CAPSULE ORAL at 10:35

## 2023-04-30 RX ADMIN — ONDANSETRON 4 MG: 4 TABLET, ORALLY DISINTEGRATING ORAL at 05:39

## 2023-04-30 RX ADMIN — MONTELUKAST 10 MG: 10 TABLET, FILM COATED ORAL at 21:05

## 2023-04-30 RX ADMIN — ENOXAPARIN SODIUM 40 MG: 40 INJECTION SUBCUTANEOUS at 08:16

## 2023-04-30 RX ADMIN — OLANZAPINE 5 MG: 10 INJECTION, POWDER, LYOPHILIZED, FOR SOLUTION INTRAMUSCULAR at 02:51

## 2023-04-30 RX ADMIN — ACETAMINOPHEN 650 MG: 325 TABLET ORAL at 13:39

## 2023-04-30 RX ADMIN — ATORVASTATIN CALCIUM 40 MG: 40 TABLET, FILM COATED ORAL at 20:17

## 2023-04-30 RX ADMIN — BENZONATATE 100 MG: 100 CAPSULE ORAL at 20:17

## 2023-04-30 RX ADMIN — THIAMINE HCL TAB 100 MG 100 MG: 100 TAB at 08:15

## 2023-04-30 RX ADMIN — ESTRADIOL 1 PATCH: 0.1 PATCH TRANSDERMAL at 08:15

## 2023-04-30 RX ADMIN — SULFAMETHOXAZOLE AND TRIMETHOPRIM 2 TABLET: 800; 160 TABLET ORAL at 16:07

## 2023-04-30 RX ADMIN — LAMOTRIGINE 50 MG: 25 TABLET ORAL at 08:15

## 2023-04-30 RX ADMIN — LEVALBUTEROL HYDROCHLORIDE 1.25 MG: 1.25 SOLUTION RESPIRATORY (INHALATION) at 03:27

## 2023-04-30 ASSESSMENT — ACTIVITIES OF DAILY LIVING (ADL)
ADLS_ACUITY_SCORE: 43

## 2023-04-30 NOTE — PLAN OF CARE
Goal Outcome Evaluation:               VS: Temp: 99.7  F (37.6  C) Temp src: Oral BP: (!) 152/90 Pulse: 92   Resp: 17 SpO2: 97 % O2 Device: None (Room air)        O2: Sating >90% on RA. Lung sounds clear. Denies chest pain and SOB.   Output: Voids spontaneously and adequately.    Last BM: Bowel sounds active x4. Passing flatus.    Activity: Independent and activity as tolerated    Skin: WDL   Pain: No complaint of pain noted, complaint of anxiety and sleeplessness was noted several times,  provider paged and pt was approved for atarax. Atarax and olanzapine administered x1    CMS: A&Ox4. Denies N/T.    Dressing: N/A   Diet: Regular. Appetite was good. Denies N/V.    LDA: PIV to right arm. Patent and saline locked    Equipment: IV pole, FWW, gait belt, and personal belongings. Call light within reach and uses appropriately.   Plan:  Psych unit as soon as is available , continue plan of care   Additional Info:

## 2023-04-30 NOTE — CONSULTS
[facility] SERVICE: NEW CONSULTATION  Patient:  Shanda Hinds, Date of birth 1980, Medical record number [id number]  Date of Admission: 4/20/2023  Date of Visit:  4/30/2023  Requesting Provider: Eliezer Reid         Assessment and Recommendations:   Problem List:  1. Staph aureus and stenotrophomonas pneumonia in the setting of recent intubation  2. Leukocytosis of unclear etiology  3. History of C. difficile  4. 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 Admission for delirium in the setting of cocaine intoxication requiring intubation  6. Recent ingestion of magnets requiring colonoscopy for removal   7. Borderline personality disorder and history of suicide attempts    Discussion:  Shanda Hinds is a 41 yo woman who was admitted with magnet ingestion and cocaine-related agitation and required intubation which was complicated with ventilator associated pneumonia.  She has grown MSSA, and now more recently Staph aureus assumed to be MSSA and stenotrophomonas.  There does not cause problems before, her cystic fibrosis variant may be making her more prone to severe infection with these organisms and may make them harder to clear.  Given her ongoing cough and worsening leukocytosis, plan to target both staph and stenotrophomonas with weight-based dosing of Bactrim.  This can replace the ceftriaxone.  We will plan to continue metronidazole to complete planned course for aspiration pneumonia.  This will also prevent C. Difficile for the time being.  If the cough worsens despite antibiotic change, a chest CT would be warranted.    Recommendations:  1. Stop ceftriaxone   2. Start Bactrim PO at 8 to 12 mg/kg/day trimethoprim component  3. Continue metronidazole unchanged  4. Assess for improvement in cough and white count  5. Low threshold for chest reimaging if no improvement with antibiotic change  6. If other antibiotics are  continued after metronidazole stopped, consider twice daily oral vancomycin in setting of C. difficile history    Recommendations discussed with primary team.    Thank you for this consult. ID will continue to follow this patient. Please feel free to call with any questions.     Please see [facility] for current ID coverage of [facility] [place] [place] ID Service.    Tomeka Gatica MD  Infectious Diseases  Pager [phone]        History of Present Illness:   Shanda Hinds is a 43 yo woman with a mild variant of cystic fibrosis and no previous pulmonary infections , history of asthma, polysubstance use disorder, borderline personality disorder, seizures versus pseudoseizures, and suicide attempts who was admitted on 4/20/2023 with agitation and after ingesting magnets.  Urine drug screen on admission was positive for benzodiazepines and cocaine.  On admission to the [facility] [place] [place] ICU she required intubation for airway protection.  The magnets required colonoscopy for removal.  She developed ventilator associated pneumonia on April 23.  There is also concern for aspiration pneumonia given episode of self extubation.  Sputum grew MSSA.  She was initially on vancomycin for pip/tazo and then changed to ceftriaxone and metronidazole.    She was successfully extubated and moved to the floor.  Procalcitonin was 0.03 when last checked on 4/28.  She is not currently requiring supplemental oxygen but does report increasing cough over the last few days.  Her CRP was improving at last check on 4/27.  However, her white count has been increasing daily from 4/26 when it was 13 until today when it is 20.  On discussion with the patient she reports that she is actually feeling pretty good other than her cough which only improves when she is lying on her right side.    No headache.  No mouth or throat pain.  No abdominal pain.  No urinary symptoms.  No new rashes or sores.  She reports a previous rash that she attributes to a fungal skin infection which she  had a hard time getting rid of but resolved within the last few weeks.    Regarding diagnosis of cystic fibrosis, she reports that her child was diagnosed with a mild variant of cystic fibrosis that has been newly recognized.  As a result of this she underwent evaluation and was also found to have a mild variant.  She has had no previous problems related to this and was told that it would mostly affect fertility in males in her family.         Review of Systems:   Complete 12 point review of systems negative except as noted in HPI.        Past Medical History:     Past Medical History:   Diagnosis Date     Cystic fibrosis (H)      Depressive disorder      Insomnia      PTSD (post-traumatic stress disorder)          Allergies:      Allergies   Allergen Reactions     Haldol [Haloperidol] Anaphylaxis     Codeine      Hives, vomiting      Compazine [Prochlorperazine]      Dexmedetomidine Hcl In Nacl Other (See Comments)     Tachy arrhythmias on 2 separate infusion trials      Fentanyl      Morphine      Hives, vomiting      Neurontin [Gabapentin] Other (See Comments)     Facial twitching     Reglan [Metoclopramide]      Trazodone Nausea and Hives           Recent Antimicrobials::   Zosyn 4/23- 4/27  Vancomycin 4/23- 4/27  Ceftriaxone 4/27- 4/30  Metronidazole 4/27-present  Bactrim, weight-based dosing 4/30-present       Family History:   Reviewed and noncontributory.   Family History   Problem Relation Age of Onset     No Known Problems Mother      Kidney failure Father      Hypertension Father      Pancreatic Cancer Sister         Social History:     Social History     Socioeconomic History     Marital status:      Spouse name: Not on file     Number of children: Not on file     Years of education: Not on file     Highest education level: Not on file   Occupational History     Not on file   Tobacco Use     Smoking status: Every Day     Packs/day: 0.25     Years: 15.00     Pack years: 3.75     Types: Cigarettes  "    Smokeless tobacco: Never     Tobacco comments:     3-4 per day   Vaping Use     Vaping status: Not on file   Substance and Sexual Activity     Alcohol use: Yes     Comment: Alcoholic Drinks/day: Holidays     Drug use: Yes     Types: Cocaine, Marijuana     Comment: Drug use: occassionally     Sexual activity: Yes     Partners: Male   Other Topics Concern     Not on file   Social History Narrative    [facility] from [facility]     Social Determinants of Health     Financial Resource Strain: Not on file   Food Insecurity: Not on file   Transportation Needs: Not on file   Physical Activity: Not on file   Stress: Not on file   Social Connections: Not on file   Intimate Partner Violence: Not on file   Housing Stability: Not on file          Physical Exam:   BP (!) 152/90 (BP Location: Left arm)   Pulse 92   Temp 99.7  F (37.6  C) (Oral)   Resp 17   Ht 1.676 m (5' 6\")   Wt 99.7 kg (219 lb 12.8 oz)   SpO2 97%   BMI 35.48 kg/m     Exam:  GENERAL:  Well-developed, well-nourished, sitting in bed in no acute distress.   ENT:  Head is normocephalic, atraumatic. Oropharynx is moist without exudates or ulcers.  EYES:  Eyes have anicteric sclerae.    NECK:  Supple.  LUNGS:  Clear to auscultation.  CARDIOVASCULAR:  Regular rate and rhythm with no murmurs, gallops or rubs.  ABDOMEN:  Normal bowel sounds, soft, nontender.  EXT: Extremities warm and without edema.  SKIN:  No acute rashes.  PIV in place in right arm. Patient had very tight cuff of sleeve over insertion site but no induration, redness, discharge, swelling of arm.  NEUROLOGIC:  Grossly nonfocal.         Laboratory Data:     Creatinine   Date Value Ref Range Status   04/30/2023 0.56 0.51 - 0.95 mg/dL Final   04/29/2023 0.51 0.51 - 0.95 mg/dL Final   04/28/2023 0.54 0.51 - 0.95 mg/dL Final   04/27/2023 0.66 0.51 - 0.95 mg/dL Final   04/26/2023 0.56 0.51 - 0.95 mg/dL Final   04/17/2018 0.53 0.52 - 1.04 mg/dL Final   12/25/2017 0.56 0.52 - 1.04 mg/dL Final   12/24/2017 " 0.47 (L) 0.52 - 1.04 mg/dL Final   12/29/2016 0.57 0.52 - 1.04 mg/dL Final   02/11/2015 0.52 0.52 - 1.04 mg/dL Final     WBC   Date Value Ref Range Status   04/17/2018 9.2 4.0 - 11.0 10e9/L Final   12/25/2017 9.7 4.0 - 11.0 10e9/L Final   12/24/2017 10.4 4.0 - 11.0 10e9/L Final   12/24/2017 12.8 (H) 4.0 - 11.0 10e9/L Final   12/29/2016 13.4 (H) 4.0 - 11.0 10e9/L Final     WBC Count   Date Value Ref Range Status   04/30/2023 20.3 (H) 4.0 - 11.0 10e3/uL Final   04/29/2023 18.8 (H) 4.0 - 11.0 10e3/uL Final   04/28/2023 16.4 (H) 4.0 - 11.0 10e3/uL Final   04/27/2023 15.0 (H) 4.0 - 11.0 10e3/uL Final   04/26/2023 13.9 (H) 4.0 - 11.0 10e3/uL Final     Hemoglobin   Date Value Ref Range Status   04/30/2023 11.7 11.7 - 15.7 g/dL Final   04/17/2018 14.1 11.7 - 15.7 g/dL Final     Platelet Count   Date Value Ref Range Status   04/30/2023 563 (H) 150 - 450 10e3/uL Final   04/17/2018 339 150 - 450 10e9/L Final     Lab Results   Component Value Date     04/30/2023    BUN 13.8 04/30/2023    CO2 22 04/30/2023     CRP Inflammation   Date Value Ref Range Status   12/25/2017 3.7 0.0 - 8.0 mg/L Final   12/24/2017 3.0 0.0 - 8.0 mg/L Final     CRP   Date Value Ref Range Status   08/27/2018 0.4 0.0 - 0.8 mg/dL Final           Pertinent Recent Microbiology Data:     Culture   Date Value Ref Range Status   04/26/2023 1+ Staphylococcus aureus (A)  Final     Comment:     Susceptibilities done on previous cultures   04/26/2023 1+ Stenotrophomonas maltophilia (A)  Final   04/26/2023 Candida glabrata complex (A)  Preliminary   04/26/2023 Candida albicans (A)  Preliminary   04/24/2023 No Growth  Final   04/24/2023 No Growth  Final   04/23/2023   Final    Staphylococcus aureus MRSA not isolated upon subculture   04/23/2023 No Growth  Final   04/23/2023 No Growth  Final   04/22/2023 3+ Staphylococcus aureus (A)  Final   04/22/2023 1+ Normal yudy  Final   04/20/2023 No Growth  Final   04/20/2023 No Growth  Final   09/23/2019   Final    No  Salmonella, Shigella, Yersinia, or Campylobacter.   07/15/2019 No Growth  Final   05/07/2019 No Growth  Final   04/09/2019 No Growth  Final   11/10/2018   Final    No Salmonella, Shigella, Yersinia, or Campylobacter.   08/27/2018 Usual Babita  Final   06/21/2018   Final    No Salmonella, Shigella, Yersinia, or Campylobacter.          Imaging:   No imaging since 4/27

## 2023-04-30 NOTE — PROGRESS NOTES
Pt is a SBA with a walker    K replaced this shift-- electrolyte recheck in the AM.     Frequent dry unproductive cough noted.     Not much sleep in the last 2 days- pt does seem a little more hyperactive.

## 2023-04-30 NOTE — PROGRESS NOTES
Pt is a SBA with a walker.     Mg and K replaced this shift-- recheck in the AM    Chronic ABD pain in the RLQ.     RUE extended dwell PIV     Loose stools this shift-- C. Diff sample sent to lab this shift.

## 2023-04-30 NOTE — PROGRESS NOTES
"[facility]    Medicine Progress Note - Hospitalist Service, GOLD TEAM 16    Date of Admission:  4/20/2023    Assessment & Plan      Shanda Hinds is a 43yo F with PMH of mild cystic fibrosis, asthma, BRIAN (no CPAP), constipation, HTN, HLD, prior CVA (no residual), hepatic steatosis, questionable seizure disorder, polysubstance use disorder (cocaine, opioids, BZD, and THC), and multiple psychiatric conditions (bipolar disorder, borderline personality disorder, RANDI, PTSD, multiple suicide attempts, and possible pseudoseizures), who presented with agitated delirium likely 2/2 cocaine & possible BZD intoxication, AMS and AHHRF 2/2 PNA requiring intubation on 4/22, self-extubated on 4/27. Also found to have 2x magnets at ileocecal valve she swallowed on 4/16/23, removed by colonoscopy on 4/24/2023. Now with ongoing pneumonia, likely secondary to aspiration.  Patient has now been transferred to the medical floor for ongoing management and care on 4/27/2023.     # Agitated delirium, likely 2/2 cocaine intoxication  # Encephalopathy, metabolic   # Cocaine intoxication  # Possible benzodiazepine intoxication  # Poly substance abuse (cocaine, benzodiazepine, opioid, THC)  Patient with a history of polysubstance use presented with agitated delirium. Urine drug screen was positive for benzodiazepines and cocaine. No reports of convulsive activity since presentation. Work-up for additional metabolic or infectious etiologies contributing to AMS has been thus far unrevealing. Per S.O. patient taking 1.5-2gm of cocaine daily and last used \"about a gram\" on 19th.   > Neurocrit consult for encephalopathy. EEG findings consistent with diffuse encephalopathy, no seizures seen.  > Off precedex. PRN sedatives available  > Evaluated by psychiatry on 4/27/2023.  Advised to increase lamotrigine to 50 mg/day with a plan to go to 100 mg in 2 weeks.  Schedule olanzapine at bedtime and as needed.  " Haloperidol 2 to 5 mg IV every 4 hours as needed for agitation.  Advised to discontinue suicide precaution.  Advised to continue one-to-one sitter.  >On 4/29/2023, patient is alert, oriented, nonfocal exam.    Plan:  - Continue lamotrigine to 50 mg daily, plan to go to 100 mg in 2 weeks.  - Increase olanzapine to 7.5 mg at bedtime as patient not sleeping well..  Olanzapine 5 mg IV 2 times daily as needed for agitation.  - Continue one-to-one bedside attendant.  - Monitor for withdrawal symptoms.  - Delirium prevention/treatment: Control pain, maintain sleep hygiene, advance activity, avoid psychoactive medications, orient daily          # Epilepsy vs pseudoseizures   # Migraine headaches   # Hx of TBI  New onset tonic clonic seizures early January 2023 (also possible report of sz in 2021). Neuroimaging negative for acute intracranial pathology, EEGs without epileptiforms while hospitalized. Generally preceded by an aura including migraines and anxiety. Was started on Depakote initially, but reportedly transitioned to lamotrigine. Subsequently [place]like activity. 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   >Patient was evaluated by neuro critical service.  EEG was done.  No seizure activity was noted.  - Seizure precautions  - Continue PTA lamotrigine.  - PRN lorazepam available for seizure activity     # Bipolar disorder  # Borderline personality disorder  # RANDI  # PTDS  # Multiple past suicide attempts   Unclear current psychiatric medication list. Recently discharged from [facility] with [facility] med list including amitriptyline 100 mg at bedtime PRN OR zolpidem 10 mg at bedtime PRN, diazepam 5 mg BID PRN, and is currently up-titrating lamotrigine. Saw her psychiatrist on 3/30/23 who also noted she may be on effexor, but this is not in her  discharge med list. Of note, lamotrigine level on 4/21 was <0.9.   > Supplemental Thiamine and folate (unclear if ETOH abuse is an issue for her)  > Unclear what active PTA psych medication regimen was, multiple prescriptions found from numerous different ED visit/providers:  Amitriptyline 100 mg @ HS PRN - Hold; Zolpidem 10 mg @ HS PRN - Hold; Diazepam 5 mg BID - Hold; Lamotrigine 25 mg qd - start 50mg daily as above; Rimegepant 75 mg every day PRN - Hold; Effexor 150 mg every day - Hold   >Patient has been evaluated by psych as above.  Appreciate psych recommendations.    Plan:  - Continue lamotrigine on 50 mg daily.  -Follow-up with psychiatry for further management.         Pulmonary:  # Pneumonia, hospital acquired vs aspiration  # H/o asthma  # ?Cystic Fibrosis, mild  # BRIAN, no CPAP  # Concern for airway edema, resolved. No cuff leak noted 4/25 with cuff deflation. C/f airway edema. CT neck soft tissue negative for peritracheal edema. CT Chest showing scattered peripheral groundglass opacities in the right upper and middle lobes suggestive for infection versus aspiration. SpCx pending. +ve MRSA on swab. Patient FiO2 had been weaned to 35% before patient self-extubated on AM of 4/27.  > CT C/A/Pshowing c/f pneumonia. BC and sputum cx obtained, Initial sputum with +4 gram positive cocci, MRSA swab positive. Started on vanc/zosyn/flagyl. SpCx +ve Pan susceptible staph aureus. Concern for new vs. Worsening infection given worsening fever. Patient bronched on 4/26  - Bronch with unremarkable findings  > PTA fluticasone nasal spray BID,  montelukast 10 mg every day   > No significant findings on gram stain from BAL or respiratory panel  > CXR 4/27 with improving pulmonary capacities  > Antibiotics switched to ceftriaxone + flagyl to cover both MSSA and anaerobics, plan for 8d abx course  >Patient is currently on 6 L of oxygen.  She has no laborious breathing.  > On 4/29/2023, patient is on room air.  Patient reports  some cough.  Felt short of breath last night, chest pain earlier this morning.  Reports both resolved.  > Patient reports some loose stool.  Reports right lower abdominal pain.  Reports chronic in nature.  Reports it has been there since she was  hit during her  service.  > White count elevated to 18.8 on 4/29/2023.  Patient clinically doing well.  Unsure if it is within her range, or represent new infection versus worsening infection.  Clinically patient is remarkably improved from even yesterday.  Hemodynamics, respiratory status, mental status are all stable.  >Stool for C. difficile negative on 4/29/2023.  Procalcitonin from 4/28/2023 is normal  >On 4/30/2023: Patient reports persistent cough.  No obvious chest pain or shortness of breath.  She does not bring much sputum.  Sputum culture from 4/26/2023 is now growing Stenotrophomonas maltophilia along with Staph aureus.  White count is also trending up.  White count is up to 20.3 on 4/30/2023.    Plan:  - Continue ceftriaxone and metronidazole.  - We will consult ID for further evaluation  - Continue fluticasone nasal spray twice daily.  - Continue montelukast at bedtime.  - Continue to wean down oxygen.  - Continue to monitor CBC, CRP, procalcitonin.     # Lactic acidosis:  likely 2/2 agitation, resolved     # HTN:  Prior to admission on metoprolol succinate, and losartan.  Currently on clonidine 0.1 mg twice daily,  Hydralazine IV as needed, labetalol IV as needed  Plan:  - Continue current regime.  - Continue to monitor and titrate.      # HLD:  # Hypertriglyceridemia on admission:  Prior to admission on atorvastatin.  Stable.  Plan:  - Continue atorvastatin.       # Magnet ingestion 4/16, resolved.   >GI consulted, appreciate recommendations  >magnets removed via colonscopy on 4/24  >GI has now signed off.      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    # Nutritional status:  Patient was started on formula feeding by NG tube while she was in the ICU.  She did not meet to criteria for malnutrition.  > On 4/28/2023, case reviewed with dietitian.  NG tube will be removed.  >She is being followed by registered dietitian.    Plan:  - Monitor p.o. intake  - Senna BID  - Bisacodyl suppository daily PRN        # Hyponatremia, resolved     # Volume status, remains hypervolemic, net positive 6L since admission   - Scruggs for strict I&O, unable to use external device given high liquid stool output with bowel prep  - Daily weights   - Patient was being treated with furosemide IV 40 mg doses.      Plan:  -Monitor    # Acute normocytic anemia  PTA Hb  11.8-12.3. Hb 9.9 today. No signs of active bleeding, likely 2/2 to drug use/illness. No evidence of bleeding.   -Monitor CBC      # Deconditioning:  Ambulatory PTA.  - PT/OT consult          Diet: Soft & Bite Sized Diet (level 6) Thin Liquids (level 0) (1:1 supervision)    DVT Prophylaxis: Enoxaparin (Lovenox) SQ  Scruggs Catheter: Not present  Lines: None     Cardiac Monitoring: None  Code Status: Full Code      Clinically Significant Risk Factors                        # Obesity: Estimated body mass index is 35.48 kg/m  as calculated from the following:    Height as of this encounter: 1.676 m (5' 6\").    Weight as of this encounter: 99.7 kg (219 lb 12.8 oz).          Disposition Plan      Expected Discharge Date: 05/02/2023      Destination: [facility]            Zach Hurt MD  Hospitalist Service, GOLD TEAM 16  [facility]  Securely message with [employer] (more info)  Text page via [facility] Paging/Directory   See signed in provider for up to date coverage information  ______________________________________________________________________    Interval History     Overnight events reviewed.  Patient reports she did not sleep well last night.  She reports having " persistent cough.  Cough is dry in nature.  She does not bring much sputum.  She denies any chest pain or shortness of breath.  Patient reports her coughing is worse with talking.  Patient denies any fever, chills.  Patient reports some chronic right-sided abdominal pain.  Patient denies any burning urination.  Patient denies any loose stools.  Physical Exam   Vital Signs: Temp: 99.7  F (37.6  C) Temp src: Oral BP: (!) 152/90 Pulse: 92   Resp: 17 SpO2: 97 % O2 Device: None (Room air)    Weight: 219 lbs 12.78 oz    General Appearance: Laying in bed in no acute distress.  HEENT: NG tube in place.  Respiratory: Bilateral coarse breath sounds.  Decreased breath sound at bases.  Cardiovascular: S1, S2 normal.  GI: Soft, nontender, bowel sounds positive  Skin: No obvious rashes  Neuro: Alert, oriented, nonfocal exam.      Medical Decision Making       **CLEAR ALL SELECTIONS**      Data   ------------------------- PAST 24 HR DATA REVIEWED -----------------------------------------------    I have personally reviewed the following data over the past 24 hrs:    20.3 (H)  \   11.7   / 563 (H)     138 104 13.8 /  93   3.7 22 0.56 \       Imaging results reviewed over the past 24 hrs:   No results found for this or any previous visit (from the past 24 hour(s)).

## 2023-04-30 NOTE — PLAN OF CARE
Physical Therapy Discharge Summary    Reason for therapy discharge:    All goals and outcomes met, no further needs identified.    Progress towards therapy goal(s). See goals on Care Plan in Epic electronic health record for goal details.  Goals met    Therapy recommendation(s):    No further therapy is recommended.    Goal Outcome Evaluation:

## 2023-04-30 NOTE — PROGRESS NOTES
Pt A&OX4 during this shift. Pt has loose stool during this shift. Walking around on the hallway. RT was called for respiratory treatment due to pt has non productive frequent cough

## 2023-05-01 VITALS
TEMPERATURE: 99.4 F | HEART RATE: 102 BPM | DIASTOLIC BLOOD PRESSURE: 102 MMHG | HEIGHT: 66 IN | SYSTOLIC BLOOD PRESSURE: 171 MMHG | RESPIRATION RATE: 16 BRPM | BODY MASS INDEX: 35.32 KG/M2 | OXYGEN SATURATION: 95 % | WEIGHT: 219.8 LBS

## 2023-05-01 LAB
ANION GAP SERPL CALCULATED.3IONS-SCNC: 13 MMOL/L (ref 7–15)
BASOPHILS # BLD AUTO: 0.1 10E3/UL (ref 0–0.2)
BASOPHILS NFR BLD AUTO: 1 %
BUN SERPL-MCNC: 11.5 MG/DL (ref 6–20)
CALCIUM SERPL-MCNC: 9.4 MG/DL (ref 8.6–10)
CHLORIDE SERPL-SCNC: 102 MMOL/L (ref 98–107)
CREAT SERPL-MCNC: 0.74 MG/DL (ref 0.51–0.95)
DEPRECATED HCO3 PLAS-SCNC: 20 MMOL/L (ref 22–29)
EOSINOPHIL # BLD AUTO: 0.9 10E3/UL (ref 0–0.7)
EOSINOPHIL NFR BLD AUTO: 6 %
ERYTHROCYTE [DISTWIDTH] IN BLOOD BY AUTOMATED COUNT: 14.4 % (ref 10–15)
GFR SERPL CREATININE-BSD FRML MDRD: >90 ML/MIN/1.73M2
GLUCOSE SERPL-MCNC: 131 MG/DL (ref 70–99)
HCT VFR BLD AUTO: 33.7 % (ref 35–47)
HGB BLD-MCNC: 11 G/DL (ref 11.7–15.7)
IMM GRANULOCYTES # BLD: 0.6 10E3/UL
IMM GRANULOCYTES NFR BLD: 4 %
LYMPHOCYTES # BLD AUTO: 3.3 10E3/UL (ref 0.8–5.3)
LYMPHOCYTES NFR BLD AUTO: 22 %
MAGNESIUM SERPL-MCNC: 2.1 MG/DL (ref 1.7–2.3)
MCH RBC QN AUTO: 29.3 PG (ref 26.5–33)
MCHC RBC AUTO-ENTMCNC: 32.6 G/DL (ref 31.5–36.5)
MCV RBC AUTO: 90 FL (ref 78–100)
MONOCYTES # BLD AUTO: 1.1 10E3/UL (ref 0–1.3)
MONOCYTES NFR BLD AUTO: 7 %
NEUTROPHILS # BLD AUTO: 9.2 10E3/UL (ref 1.6–8.3)
NEUTROPHILS NFR BLD AUTO: 60 %
NRBC # BLD AUTO: 0 10E3/UL
NRBC BLD AUTO-RTO: 0 /100
PHOSPHATE SERPL-MCNC: 3.4 MG/DL (ref 2.5–4.5)
PLATELET # BLD AUTO: 565 10E3/UL (ref 150–450)
POTASSIUM SERPL-SCNC: 3.7 MMOL/L (ref 3.4–5.3)
RBC # BLD AUTO: 3.75 10E6/UL (ref 3.8–5.2)
SODIUM SERPL-SCNC: 135 MMOL/L (ref 136–145)
WBC # BLD AUTO: 15.2 10E3/UL (ref 4–11)

## 2023-05-01 PROCEDURE — 85025 COMPLETE CBC W/AUTO DIFF WBC: CPT | Performed by: INTERNAL MEDICINE

## 2023-05-01 PROCEDURE — 250N000013 HC RX MED GY IP 250 OP 250 PS 637: Performed by: NURSE PRACTITIONER

## 2023-05-01 PROCEDURE — 250N000013 HC RX MED GY IP 250 OP 250 PS 637: Performed by: INTERNAL MEDICINE

## 2023-05-01 PROCEDURE — 250N000013 HC RX MED GY IP 250 OP 250 PS 637: Performed by: PHYSICIAN ASSISTANT

## 2023-05-01 PROCEDURE — 250N000013 HC RX MED GY IP 250 OP 250 PS 637: Performed by: STUDENT IN AN ORGANIZED HEALTH CARE EDUCATION/TRAINING PROGRAM

## 2023-05-01 PROCEDURE — 80048 BASIC METABOLIC PNL TOTAL CA: CPT | Performed by: INTERNAL MEDICINE

## 2023-05-01 PROCEDURE — 36415 COLL VENOUS BLD VENIPUNCTURE: CPT | Performed by: INTERNAL MEDICINE

## 2023-05-01 PROCEDURE — 84100 ASSAY OF PHOSPHORUS: CPT | Performed by: INTERNAL MEDICINE

## 2023-05-01 PROCEDURE — 250N000011 HC RX IP 250 OP 636: Performed by: NURSE PRACTITIONER

## 2023-05-01 PROCEDURE — 99232 SBSQ HOSP IP/OBS MODERATE 35: CPT | Performed by: INTERNAL MEDICINE

## 2023-05-01 PROCEDURE — 83735 ASSAY OF MAGNESIUM: CPT | Performed by: INTERNAL MEDICINE

## 2023-05-01 RX ORDER — GUAIFENESIN/DEXTROMETHORPHAN 100-10MG/5
10 SYRUP ORAL EVERY 4 HOURS PRN
Qty: 100 ML | Refills: 0 | Status: SHIPPED | OUTPATIENT
Start: 2023-05-01

## 2023-05-01 RX ORDER — LAMOTRIGINE 25 MG/1
50 TABLET ORAL DAILY
Qty: 30 TABLET | Refills: 0 | Status: SHIPPED | OUTPATIENT
Start: 2023-05-02

## 2023-05-01 RX ORDER — SULFAMETHOXAZOLE/TRIMETHOPRIM 800-160 MG
1 TABLET ORAL 2 TIMES DAILY
Qty: 10 TABLET | Refills: 0 | Status: SHIPPED | OUTPATIENT
Start: 2023-05-01

## 2023-05-01 RX ORDER — BENZONATATE 100 MG/1
100 CAPSULE ORAL 3 TIMES DAILY PRN
Qty: 30 CAPSULE | Refills: 0 | Status: SHIPPED | OUTPATIENT
Start: 2023-05-01

## 2023-05-01 RX ORDER — CLONIDINE HYDROCHLORIDE 0.2 MG/1
0.2 TABLET ORAL 2 TIMES DAILY
Qty: 60 TABLET | Refills: 0 | Status: SHIPPED | OUTPATIENT
Start: 2023-05-01

## 2023-05-01 RX ORDER — OLANZAPINE 7.5 MG/1
7.5 TABLET, FILM COATED ORAL AT BEDTIME
Qty: 30 TABLET | Refills: 0 | Status: SHIPPED | OUTPATIENT
Start: 2023-05-01

## 2023-05-01 RX ORDER — PANTOPRAZOLE SODIUM 40 MG/1
40 TABLET, DELAYED RELEASE ORAL
Status: DISCONTINUED | OUTPATIENT
Start: 2023-05-01 | End: 2023-05-01 | Stop reason: HOSPADM

## 2023-05-01 RX ORDER — LANOLIN ALCOHOL/MO/W.PET/CERES
100 CREAM (GRAM) TOPICAL DAILY
Qty: 30 TABLET | Refills: 0 | Status: SHIPPED | OUTPATIENT
Start: 2023-05-02

## 2023-05-01 RX ORDER — CLONIDINE HYDROCHLORIDE 0.1 MG/1
0.2 TABLET ORAL 2 TIMES DAILY
Status: DISCONTINUED | OUTPATIENT
Start: 2023-05-01 | End: 2023-05-01 | Stop reason: HOSPADM

## 2023-05-01 RX ORDER — VANCOMYCIN HYDROCHLORIDE 125 MG/1
125 CAPSULE ORAL 4 TIMES DAILY
Qty: 10 CAPSULE | Refills: 0 | Status: SHIPPED | OUTPATIENT
Start: 2023-05-01

## 2023-05-01 RX ADMIN — Medication 1 LOZENGE: at 05:39

## 2023-05-01 RX ADMIN — SULFAMETHOXAZOLE AND TRIMETHOPRIM 2 TABLET: 800; 160 TABLET ORAL at 06:54

## 2023-05-01 RX ADMIN — Medication 1 LOZENGE: at 03:56

## 2023-05-01 RX ADMIN — GUAIFENESIN AND DEXTROMETHORPHAN 10 ML: 100; 10 SYRUP ORAL at 02:30

## 2023-05-01 RX ADMIN — FOLIC ACID 1 MG: 1 TABLET ORAL at 08:51

## 2023-05-01 RX ADMIN — GUAIFENESIN AND DEXTROMETHORPHAN 10 ML: 100; 10 SYRUP ORAL at 06:09

## 2023-05-01 RX ADMIN — Medication 2 MG: at 11:17

## 2023-05-01 RX ADMIN — LAMOTRIGINE 50 MG: 25 TABLET ORAL at 08:51

## 2023-05-01 RX ADMIN — HYDROXYZINE HYDROCHLORIDE 50 MG: 50 TABLET, FILM COATED ORAL at 02:30

## 2023-05-01 RX ADMIN — BENZONATATE 100 MG: 100 CAPSULE ORAL at 03:50

## 2023-05-01 RX ADMIN — PANTOPRAZOLE SODIUM 40 MG: 40 TABLET, DELAYED RELEASE ORAL at 06:09

## 2023-05-01 RX ADMIN — OLANZAPINE 5 MG: 5 TABLET, ORALLY DISINTEGRATING ORAL at 11:21

## 2023-05-01 RX ADMIN — ENOXAPARIN SODIUM 40 MG: 40 INJECTION SUBCUTANEOUS at 08:51

## 2023-05-01 RX ADMIN — CLONIDINE HYDROCHLORIDE 0.1 MG: 0.1 TABLET ORAL at 08:51

## 2023-05-01 RX ADMIN — THIAMINE HCL TAB 100 MG 100 MG: 100 TAB at 08:51

## 2023-05-01 RX ADMIN — ONDANSETRON 4 MG: 4 TABLET, ORALLY DISINTEGRATING ORAL at 11:21

## 2023-05-01 RX ADMIN — METRONIDAZOLE 500 MG: 500 TABLET ORAL at 08:51

## 2023-05-01 ASSESSMENT — ACTIVITIES OF DAILY LIVING (ADL)
ADLS_ACUITY_SCORE: 43

## 2023-05-01 NOTE — PROGRESS NOTES
[facility] PROGRESS NOTE     Patient:  Shanda Hinds   YOB: 1980, MRN: [id number]  Date of Visit: 05/01/2023  Date of Admission: 4/20/2023  Consult Requester: Eliezer Reid MD          ASSESSMENT AND PLAN     Shanda Hinds is a 43 yo woman who was admitted with magnet ingestion and cocaine-related agitation and required intubation which was complicated with ventilator associated pneumonia.  She has grown MSSA, and now more recently Staph aureus assumed to be MSSA and stenotrophomonas.      There does not cause problems before, her cystic fibrosis variant may be making her more prone to severe infection with these organisms and may make them harder to clear.  Given her ongoing cough and worsening leukocytosis, plan to target both staph and stenotrophomonas with weight-based dosing of Bactrim.  This can replace the ceftriaxone.  We will plan to finish metronidazole today since this is day 5 for treatment of aspiration pneumonia. She is feeling well overall today without any new or worsening symptoms. She is hemodynamically stable and remains afebrile. Exam is benign. Patient leaving AMA, so will plan to have her discharged with 5-day course of PO DS Bactrim finishing on 5/5 to finish treatment course for VAP.     IMPRESSION  1. Staph aureus and stenotrophomonas pneumonia in the setting of recent intubation  2. History of C. difficile  3. 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 Admission for delirium in the setting of cocaine intoxication requiring intubation  5. Recent ingestion of magnets requiring colonoscopy for removal   6. Borderline personality disorder and history of suicide attempts    RECOMMENDATIONS:  1) Bactrim DS 1 tablet twice daily for 5 days until to 5/5/23 to finish treatment for VAP       ID will sign off at this time    This patient and plan were discussed with Dr. Chandler ARROYO spent 30 minutes as part of a visit on the date of the  encounter doing chart review, history and exam, documentation, and care coordination.      ETTA Orozco, CNP  Infectious Diseases  Pager# [phone] and Vocera Ibrahima           SUBJECTIVE      Interval History and Events:    Feeling well overall. Occasional dry cough and some abdominal pain. Denies any headache, fever, chills, night sweats, fatigue, sore throat, dyspnea, nausea, vomiting, diarrhea, dysuria, myalgias, arthralgias, lymphadenopathy, acute rash, or new wounds.      Antimicrobial Treatment:  Unasyn IV (4/30 - today)  Metronidazole PO (4/27 - today)  Ceftriaxone IV (4/27 - 4/30)           OBJECTIVE       Physical Examination:    Temp:  [97.4  F (36.3  C)-98.8  F (37.1  C)] 97.4  F (36.3  C)  Pulse:  [60-81] 60  Resp:  [16-20] 16  BP: (138-168)/(84-96) 153/84  SpO2:  [97 %-98 %] 97 %    No intake/output data recorded.    Vitals:    04/24/23 0900 04/25/23 0000 04/26/23 0400 04/27/23 0400   Weight: 107.2 kg (236 lb 5.3 oz) 101 kg (222 lb 10.6 oz) 99.3 kg (218 lb 14.7 oz) 99 kg (218 lb 4.1 oz)    04/27/23 1635   Weight: 99.7 kg (219 lb 12.8 oz)       Constitutional: Pleasant and cooperative female in NAD. Awake, alert, interactive.  HEENT: NC/AT, EOMI, sclera clear, conjunctiva normal, OP with MMM  Respiratory: No increased work of breathing, CTAB, no crackles or wheezing. Breathing comfortably on room air  Cardiovascular: RRR, no murmur noted. No peripheral edema.   GI: Normal bowel sounds, soft, non-distended and non-tender.  Skin: Warm, dry, well-perfused. No bruising, bleeding, rashes, or lesions on limited exam.  Musculoskeletal: Extremities grossly normal, non-tender, no edema. Good strength and ROM in bed. Ambulating about room   Neurologic: A&O. Answers questions appropriately, speech normal. Moves all extremities spontaneously.  Neuropsychiatric: Calm. Affect appropriate to situation.  Vascular access: CDI, non-tender, no surrounding erythema.        Laboratory Data:  Lab Results   Component Value  Date    WBC 15.2 05/01/2023    WBC 9.2 04/17/2018     Lab Results   Component Value Date    RBC 3.75 05/01/2023    RBC 4.59 04/17/2018     Lab Results   Component Value Date    HGB 11.0 05/01/2023    HGB 14.1 04/17/2018     Lab Results   Component Value Date    HCT 33.7 05/01/2023    HCT 41.8 04/17/2018     No components found for: MCT  Lab Results   Component Value Date    MCV 90 05/01/2023    MCV 91 04/17/2018     Lab Results   Component Value Date    MCH 29.3 05/01/2023    MCH 30.7 04/17/2018     Lab Results   Component Value Date    MCHC 32.6 05/01/2023    MCHC 33.7 04/17/2018     Lab Results   Component Value Date    RDW 14.4 05/01/2023    RDW 13.3 04/17/2018     Lab Results   Component Value Date     05/01/2023     04/17/2018     Last Comprehensive Metabolic Panel:  Lab Results   Component Value Date     (L) 05/01/2023    POTASSIUM 3.7 05/01/2023    CHLORIDE 102 05/01/2023    CO2 20 (L) 05/01/2023    ANIONGAP 13 05/01/2023     (H) 05/01/2023    BUN 11.5 05/01/2023    CR 0.74 05/01/2023    GFRESTIMATED >90 05/01/2023    CASSIE 9.4 05/01/2023       Microbiology:  Respiratory aerobic BAL culture (4/26) MSSA and Stenotrophomonas maltophilia  Fungal BAL culture (4/26) Candida glabrata and Lotus albicans  MRSA nares (4/23/23) positive for MRSA    Imaging  None new

## 2023-05-01 NOTE — PLAN OF CARE
"Goal Outcome Evaluation:          The client is alert and oriented with increased agitation and paranoia. Chiquis observed. KARINA expressed numerous concerns about the well being of her spouse and believed that he was in bad health and needed her support right away. For example she stated that her spouse was at home alone and needed emergency assitance  but denied the nursing staffs help when offered. Another incident was witnesses by staff where KARINA reported \" fentanyl and cocaine\" found in container, she handed it to staff to hold for her. Security was called to examine the container and it was determined that lip gloss was in the container and not drugs. Bedside attendant discontinued per MD orders. Visually she appeared slightly disheveled.   No body odor was observed. Her vital sighs are stable. Skin free from any ulcers or abrasions and easily returns to original state. PRN dilaudid administered , PRN Zofran for nausea, and PRN Zyprexa for anxiety. KARINA had several loose stools. Senna held. She did not eat a full meal just soda cracker and soda to help alleviate nausea  Client went AMA at 12:30 pm. Midline IV removed before client left.         "

## 2023-05-01 NOTE — PROGRESS NOTES
"[facility]    Medicine Progress Note - Hospitalist Service, GOLD TEAM 16    Date of Admission:  4/20/2023    Assessment & Plan      Shanda Hinds is a 41yo F with PMH of mild cystic fibrosis, asthma, BRIAN (no CPAP), constipation, HTN, HLD, prior CVA (no residual), hepatic steatosis, questionable seizure disorder, polysubstance use disorder (cocaine, opioids, BZD, and THC), and multiple psychiatric conditions (bipolar disorder, borderline personality disorder, RANDI, PTSD, multiple suicide attempts, and possible pseudoseizures), who presented with agitated delirium likely 2/2 cocaine & possible BZD intoxication, AMS and AHHRF 2/2 PNA requiring intubation on 4/22, self-extubated on 4/27. Also found to have 2x magnets at ileocecal valve she swallowed on 4/16/23, removed by colonoscopy on 4/24/2023. Now with ongoing pneumonia, likely secondary to aspiration.  Patient has now been transferred to the medical floor for ongoing management and care on 4/27/2023.     # Agitated delirium, likely 2/2 cocaine intoxication  # Encephalopathy, metabolic   # Cocaine intoxication  # Possible benzodiazepine intoxication  # Poly substance abuse (cocaine, benzodiazepine, opioid, THC)  Patient with a history of polysubstance use presented with agitated delirium. Urine drug screen was positive for benzodiazepines and cocaine. No reports of convulsive activity since presentation. Work-up for additional metabolic or infectious etiologies contributing to AMS has been thus far unrevealing. Per S.O. patient taking 1.5-2gm of cocaine daily and last used \"about a gram\" on 19th.   > Neurocrit consult for encephalopathy. EEG findings consistent with diffuse encephalopathy, no seizures seen.  > Off precedex. PRN sedatives available  > Evaluated by psychiatry on 4/27/2023.  Advised to increase lamotrigine to 50 mg/day with a plan to go to 100 mg in 2 weeks.  Schedule olanzapine at bedtime and as needed.  " Haloperidol 2 to 5 mg IV every 4 hours as needed for agitation.  Advised to discontinue suicide precaution.  Advised to continue one-to-one sitter.  >On 4/29/2023, patient is alert, oriented, nonfocal exam.  >Patient having issues with sleeping on 4/30/2023 night.  Patient reports that this is her usual cycle.  She is not bothered about not sleeping at night.  >Patient looked anxious this morning.    Plan:  - Continue lamotrigine to 50 mg daily, plan to go to 100 mg in 2 weeks.  - Increase olanzapine to 7.5 mg at bedtime as patient not sleeping well..  Olanzapine 5 mg IV 2 times daily as needed for agitation.  - Continue one-to-one bedside attendant.  - Monitor for withdrawal symptoms.  - Delirium prevention/treatment: Control pain, maintain sleep hygiene, advance activity, avoid psychoactive medications, orient daily   -Psych consult.    Addendum: Patient reports her boyfriend is not doing well, he is bleeding.  She would have to go to get him to the hospital.  Patient denies any suicidal, homicidal ideation.  Patient reports she is doing fine.  Patient would like to be discharged AMA.  Patient earlier in the morning did not want me to call family.  As patient is leaving AMA I notified her mother.  I did not tell her any medical  medical information.  I just told her that patient has left hospital.      # Epilepsy vs pseudoseizures   # Migraine headaches   # Hx of TBI  New onset tonic clonic seizures early January 2023 (also possible report of sz in 2021). Neuroimaging negative for acute intracranial pathology, EEGs without epileptiforms while hospitalized. Generally preceded by an aura including migraines and anxiety. Was started on Depakote initially, but reportedly transitioned to lamotrigine. Subsequently [place]-like activity. 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   >Patient was evaluated by neuro critical service.  EEG was done.  No seizure activity was noted.  - Seizure precautions  - Continue PTA lamotrigine.  - PRN lorazepam available for seizure activity     # Bipolar disorder  # Borderline personality disorder  # RANDI  # PTDS  # Multiple past suicide attempts   Unclear current psychiatric medication list. Recently discharged from [facility] with [facility] med list including amitriptyline 100 mg at bedtime PRN OR zolpidem 10 mg at bedtime PRN, diazepam 5 mg BID PRN, and is currently up-titrating lamotrigine. Saw her psychiatrist on 3/30/23 who also noted she may be on effexor, but this is not in her discharge med list. Of note, lamotrigine level on 4/21 was <0.9.   > Supplemental Thiamine and folate (unclear if ETOH abuse is an issue for her)  > Unclear what active PTA psych medication regimen was, multiple prescriptions found from numerous different ED visit/providers:  Amitriptyline 100 mg @ HS PRN - Hold; Zolpidem 10 mg @ HS PRN - Hold; Diazepam 5 mg BID - Hold; Lamotrigine 25 mg qd - start 50mg daily as above; Rimegepant 75 mg every day PRN - Hold; Effexor 150 mg every day - Hold   >Patient has been evaluated by psych as above.  Appreciate psych recommendations.    Plan:  - Continue lamotrigine on 50 mg daily.  -Follow-up with psychiatry for further management.  - Psych consult.         Pulmonary:  # Pneumonia, hospital acquired vs aspiration  # H/o asthma  # ?Cystic Fibrosis, mild  # BRIAN, no CPAP  # Concern for airway edema, resolved. No cuff leak noted 4/25 with cuff deflation. C/f airway edema. CT neck soft tissue negative for peritracheal edema. CT Chest showing scattered peripheral groundglass opacities in the right upper and middle lobes suggestive for infection versus aspiration. SpCx pending. +ve MRSA on swab. Patient FiO2 had been weaned to 35% before patient self-extubated on AM of 4/27.  > CT C/A/Pshowing c/f pneumonia. BC and  sputum cx obtained, Initial sputum with +4 gram positive cocci, MRSA swab positive. Started on vanc/zosyn/flagyl. SpCx +ve Pan susceptible staph aureus. Concern for new vs. Worsening infection given worsening fever. Patient bronched on 4/26  - Bronch with unremarkable findings  > PTA fluticasone nasal spray BID,  montelukast 10 mg every day   > No significant findings on gram stain from BAL or respiratory panel  > CXR 4/27 with improving pulmonary capacities  > Antibiotics switched to ceftriaxone + flagyl to cover both MSSA and anaerobics, plan for 8d abx course  >Patient is currently on 6 L of oxygen.  She has no laborious breathing.  > On 4/29/2023, patient is on room air.  Patient reports some cough.  Felt short of breath last night, chest pain earlier this morning.  Reports both resolved.  > Patient reports some loose stool.  Reports right lower abdominal pain.  Reports chronic in nature.  Reports it has been there since she was  hit during her  service.  > White count elevated to 18.8 on 4/29/2023.  Patient clinically doing well.  Unsure if it is within her range, or represent new infection versus worsening infection.  Clinically patient is remarkably improved from even yesterday.  Hemodynamics, respiratory status, mental status are all stable.  >Stool for C. difficile negative on 4/29/2023.  Procalcitonin from 4/28/2023 is normal  >On 4/30/2023: Patient reports persistent cough.  No obvious chest pain or shortness of breath.  She does not bring much sputum.  Sputum culture from 4/26/2023 is now growing Stenotrophomonas maltophilia along with Staph aureus.  White count is also trending up.  White count is up to 20.3 on 4/30/2023.  >Patient was evaluated by ID on 5/1/2023.  Ceftriaxone was discontinued.  Bactrim was added.  >White count is down to 15.2 on 5/12/2023.    Plan:  -Continue Bactrim double strength, and metronidazole.  - Continue fluticasone nasal spray twice daily.  - Continue montelukast at  bedtime.  - Continue to monitor CBC     # Lactic acidosis:  likely 2/2 agitation, resolved     # HTN:  Prior to admission on metoprolol succinate, and losartan.  Currently on clonidine 0.1 mg twice daily,  Hydralazine IV as needed, labetalol IV as needed  >Blood pressure running on the higher side.  Plan:  - Increase clonidine to 0.2 mg twice daily  -Consider restarting losartan as well.  - Continue current regime.  - Continue to monitor and titrate.      # HLD:  # Hypertriglyceridemia on admission:  Prior to admission on atorvastatin.  Stable.  Plan:  - Continue atorvastatin.       # Magnet ingestion 4/16, resolved.   >GI consulted, appreciate recommendations  >magnets removed via colonscopy on 4/24  >GI has now signed off.      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    # Nutritional status:  Patient was started on formula feeding by NG tube while she was in the ICU.  She did not meet to criteria for malnutrition.  > On 4/28/2023, case reviewed with dietitian.  NG tube will be removed.  >She is being followed by registered dietitian.    Plan:  - Monitor p.o. intake  - Senna BID  - Bisacodyl suppository daily PRN        # Hyponatremia, resolved     # Volume status, remains hypervolemic, net positive 6L since admission   - Scruggs for strict I&O, unable to use external device given high liquid stool output with bowel prep  - Daily weights   - Patient was being treated with furosemide IV 40 mg doses.      Plan:  -Monitor    # Acute normocytic anemia  PTA Hb  11.8-12.3. Hb 9.9 today. No signs of active bleeding, likely 2/2 to drug use/illness. No evidence of bleeding.   -Monitor CBC      # Deconditioning:  Ambulatory PTA.  - PT/OT consult          Diet:      DVT Prophylaxis: Enoxaparin (Lovenox) SQ  Scruggs Catheter: Not present  Lines: None     Cardiac Monitoring: None  Code Status: Full Code   "    Clinically Significant Risk Factors                        # Obesity: Estimated body mass index is 35.48 kg/m  as calculated from the following:    Height as of this encounter: 1.676 m (5' 6\").    Weight as of this encounter: 99.7 kg (219 lb 12.8 oz).          Disposition Plan     Expected Discharge Date: 05/02/2023      Destination: [facility]            Zach Hurt MD  Hospitalist Service, GOLD TEAM 16  [facility]  Securely message with [email or web address] (more info)  Text page via [email or web address] Paging/Directory   See signed in provider for up to date coverage information  ______________________________________________________________________    Interval History   Overnight events reviewed.  Patient reports doing well this morning.  She reports persistent cough.  She denies any chest pain or shortness of breath.    Later on, patient reported that she would like to be going home as his boyfriend is sick.  I advised to patient that boyfriend should call 911 and come to the hospital  Patient agreeable to seeing psychiatry.    Physical Exam   Vital Signs: Temp: 99.4  F (37.4  C) Temp src: Oral BP: (!) 171/102 Pulse: 102   Resp: 16 SpO2: 95 % O2 Device: None (Room air)    Weight: 219 lbs 12.78 oz    General Appearance: Laying in bed in no acute distress.  HEENT: NG tube in place.  Respiratory: Bilateral coarse breath sounds.  Decreased breath sound at bases.  Cardiovascular: S1, S2 normal.  GI: Soft, nontender, bowel sounds positive  Skin: No obvious rashes  Neuro: Alert, oriented, nonfocal exam.      Medical Decision Making       **CLEAR ALL SELECTIONS**      Data   ------------------------- PAST 24 HR DATA REVIEWED -----------------------------------------------    I have personally reviewed the following data over the past 24 hrs:    15.2 (H)  \   11.0 (L)   / 565 (H)     135 (L) 102 11.5 /  131 (H)   3.7 20 (L) 0.74 \       Imaging results reviewed over the past 24 hrs:   No " results found for this or any previous visit (from the past 24 hour(s)).
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
## 2023-05-01 NOTE — PLAN OF CARE
Speech Language Therapy Discharge Summary    Reason for therapy discharge:    Pt left AMA    Progress towards therapy goal(s). See goals on Care Plan in [facility] electronic health record for goal details.  Pt left AMA    Therapy recommendation(s):    Pt left AMA, prior pt leaving she was on easy to chew textures with thin liquids and working on advancing to regular textures    Goal Outcome Evaluation:

## 2023-05-01 NOTE — PLAN OF CARE
Goal Outcome Evaluation:    A&O x4. VSS stable, no fever overnight.     Lungs clear (diminished) but dry productive cough. Tessalon pearls, robitussin and cough drops provided.     Patient was very restless throughout the whole night, sleeping approx 3 hours in the past two days combined.    Speech is pressured and rapid in nature. Patient will tangent off of questions without always providing direct answers.     PRN zyprexa given and atarax x2.     Multiple walks in the hallway with 1:1.     Sitter in room for 1:1 safety     Will continue with plan of care.

## 2023-05-02 ENCOUNTER — PATIENT OUTREACH (OUTPATIENT)
Dept: CARE COORDINATION | Facility: CLINIC | Age: 43
End: 2023-05-02
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
## 2023-05-03 LAB — ELASTASE PANC STL-MCNT: 278 UG/G

## 2023-05-24 LAB
BACTERIA BRONCH: ABNORMAL
BACTERIA BRONCH: ABNORMAL

## 2023-06-08 ENCOUNTER — HOSPITAL ENCOUNTER (EMERGENCY)
Facility: CLINIC | Age: 43
Discharge: LEFT BEFORE DISCHARGE | End: 2023-06-08
Attending: EMERGENCY MEDICINE | Admitting: EMERGENCY MEDICINE
Payer: COMMERCIAL

## 2023-06-08 VITALS
BODY MASS INDEX: 32.14 KG/M2 | TEMPERATURE: 99.6 F | OXYGEN SATURATION: 98 % | HEIGHT: 66 IN | DIASTOLIC BLOOD PRESSURE: 90 MMHG | WEIGHT: 200 LBS | HEART RATE: 87 BPM | RESPIRATION RATE: 16 BRPM | SYSTOLIC BLOOD PRESSURE: 129 MMHG

## 2023-06-08 DIAGNOSIS — R10.9 FLANK PAIN: ICD-10-CM

## 2023-06-08 LAB
ALBUMIN SERPL BCG-MCNC: 4.7 G/DL (ref 3.5–5.2)
ALBUMIN UR-MCNC: 50 MG/DL
ALP SERPL-CCNC: 96 U/L (ref 35–104)
ALT SERPL W P-5'-P-CCNC: 60 U/L (ref 10–35)
ANION GAP SERPL CALCULATED.3IONS-SCNC: 15 MMOL/L (ref 7–15)
APPEARANCE UR: ABNORMAL
AST SERPL W P-5'-P-CCNC: 33 U/L (ref 10–35)
BASOPHILS # BLD AUTO: 0.1 10E3/UL (ref 0–0.2)
BASOPHILS NFR BLD AUTO: 1 %
BILIRUB SERPL-MCNC: 0.2 MG/DL
BILIRUB UR QL STRIP: NEGATIVE
BUN SERPL-MCNC: 9.5 MG/DL (ref 6–20)
CALCIUM SERPL-MCNC: 9.9 MG/DL (ref 8.6–10)
CHLORIDE SERPL-SCNC: 99 MMOL/L (ref 98–107)
COLOR UR AUTO: ABNORMAL
CREAT SERPL-MCNC: 0.7 MG/DL (ref 0.51–0.95)
DEPRECATED HCO3 PLAS-SCNC: 24 MMOL/L (ref 22–29)
EOSINOPHIL # BLD AUTO: 0.5 10E3/UL (ref 0–0.7)
EOSINOPHIL NFR BLD AUTO: 5 %
ERYTHROCYTE [DISTWIDTH] IN BLOOD BY AUTOMATED COUNT: 14.2 % (ref 10–15)
GFR SERPL CREATININE-BSD FRML MDRD: >90 ML/MIN/1.73M2
GLUCOSE SERPL-MCNC: 107 MG/DL (ref 70–99)
GLUCOSE UR STRIP-MCNC: NEGATIVE MG/DL
HCG SERPL QL: NEGATIVE
HCT VFR BLD AUTO: 37.4 % (ref 35–47)
HGB BLD-MCNC: 12.5 G/DL (ref 11.7–15.7)
HGB UR QL STRIP: ABNORMAL
IMM GRANULOCYTES # BLD: 0 10E3/UL
IMM GRANULOCYTES NFR BLD: 0 %
KETONES UR STRIP-MCNC: ABNORMAL MG/DL
LEUKOCYTE ESTERASE UR QL STRIP: ABNORMAL
LIPASE SERPL-CCNC: 26 U/L (ref 13–60)
LYMPHOCYTES # BLD AUTO: 3.1 10E3/UL (ref 0.8–5.3)
LYMPHOCYTES NFR BLD AUTO: 27 %
MCH RBC QN AUTO: 30.5 PG (ref 26.5–33)
MCHC RBC AUTO-ENTMCNC: 33.4 G/DL (ref 31.5–36.5)
MCV RBC AUTO: 91 FL (ref 78–100)
MONOCYTES # BLD AUTO: 1.2 10E3/UL (ref 0–1.3)
MONOCYTES NFR BLD AUTO: 10 %
NEUTROPHILS # BLD AUTO: 6.5 10E3/UL (ref 1.6–8.3)
NEUTROPHILS NFR BLD AUTO: 57 %
NITRATE UR QL: NEGATIVE
NRBC # BLD AUTO: 0 10E3/UL
NRBC BLD AUTO-RTO: 0 /100
PH UR STRIP: 5.5 [PH] (ref 5–7)
PLATELET # BLD AUTO: 359 10E3/UL (ref 150–450)
POTASSIUM SERPL-SCNC: 3.5 MMOL/L (ref 3.4–5.3)
PROT SERPL-MCNC: 7.5 G/DL (ref 6.4–8.3)
RBC # BLD AUTO: 4.1 10E6/UL (ref 3.8–5.2)
RBC URINE: >182 /HPF
SODIUM SERPL-SCNC: 138 MMOL/L (ref 136–145)
SP GR UR STRIP: 1.01 (ref 1–1.03)
SQUAMOUS EPITHELIAL: 3 /HPF
UROBILINOGEN UR STRIP-MCNC: NORMAL MG/DL
WBC # BLD AUTO: 11.4 10E3/UL (ref 4–11)
WBC URINE: 22 /HPF

## 2023-06-08 PROCEDURE — 83690 ASSAY OF LIPASE: CPT | Performed by: EMERGENCY MEDICINE

## 2023-06-08 PROCEDURE — 87086 URINE CULTURE/COLONY COUNT: CPT | Performed by: EMERGENCY MEDICINE

## 2023-06-08 PROCEDURE — 36415 COLL VENOUS BLD VENIPUNCTURE: CPT | Performed by: EMERGENCY MEDICINE

## 2023-06-08 PROCEDURE — 81001 URINALYSIS AUTO W/SCOPE: CPT | Performed by: EMERGENCY MEDICINE

## 2023-06-08 PROCEDURE — 99284 EMERGENCY DEPT VISIT MOD MDM: CPT | Performed by: EMERGENCY MEDICINE

## 2023-06-08 PROCEDURE — 85025 COMPLETE CBC W/AUTO DIFF WBC: CPT | Performed by: EMERGENCY MEDICINE

## 2023-06-08 PROCEDURE — 84703 CHORIONIC GONADOTROPIN ASSAY: CPT | Performed by: EMERGENCY MEDICINE

## 2023-06-08 PROCEDURE — 99283 EMERGENCY DEPT VISIT LOW MDM: CPT | Performed by: EMERGENCY MEDICINE

## 2023-06-08 PROCEDURE — 80053 COMPREHEN METABOLIC PANEL: CPT | Performed by: EMERGENCY MEDICINE

## 2023-06-08 RX ORDER — METHOCARBAMOL 750 MG/1
750 TABLET, FILM COATED ORAL 4 TIMES DAILY PRN
COMMUNITY

## 2023-06-08 RX ORDER — SIMVASTATIN 20 MG
20 TABLET ORAL AT BEDTIME
COMMUNITY
Start: 2023-06-05

## 2023-06-08 RX ORDER — KETOROLAC TROMETHAMINE 15 MG/ML
15 INJECTION, SOLUTION INTRAMUSCULAR; INTRAVENOUS ONCE
Status: DISCONTINUED | OUTPATIENT
Start: 2023-06-08 | End: 2023-06-08 | Stop reason: HOSPADM

## 2023-06-08 RX ORDER — ONDANSETRON 2 MG/ML
4 INJECTION INTRAMUSCULAR; INTRAVENOUS ONCE
Status: DISCONTINUED | OUTPATIENT
Start: 2023-06-08 | End: 2023-06-08 | Stop reason: HOSPADM

## 2023-06-08 RX ORDER — HYDROXYZINE HYDROCHLORIDE 50 MG/1
50 TABLET, FILM COATED ORAL EVERY 6 HOURS PRN
COMMUNITY

## 2023-06-08 RX ORDER — HYDROMORPHONE HYDROCHLORIDE 2 MG/1
2 TABLET ORAL EVERY 8 HOURS PRN
COMMUNITY
Start: 2023-06-05

## 2023-06-08 RX ORDER — LEVETIRACETAM 500 MG/1
1000 TABLET ORAL 2 TIMES DAILY
COMMUNITY

## 2023-06-08 RX ORDER — ASPIRIN 81 MG/1
81 TABLET, CHEWABLE ORAL DAILY
COMMUNITY

## 2023-06-08 RX ORDER — LOSARTAN POTASSIUM AND HYDROCHLOROTHIAZIDE 25; 100 MG/1; MG/1
1 TABLET ORAL DAILY
COMMUNITY

## 2023-06-08 ASSESSMENT — ACTIVITIES OF DAILY LIVING (ADL): ADLS_ACUITY_SCORE: 33

## 2023-06-08 NOTE — DISCHARGE INSTRUCTIONS
Please make an appointment to follow up with Urology Clinic (phone: [phone]).    Have all of your imaging and previous lab work sent to the urology clinic.

## 2023-06-08 NOTE — ED NOTES
Patient requested to receive stronger pain medications than the ordered Toradol or tylenol. Patient stated that she did not want to take NSAIDS because she was worried about internal bleeding. Stated that if tylenol and ibuprofen worked for her she would not have been in the ED. Patient told RN that she left treatment in [place] in order to get her pain controlled and planned to return as soon as her pain was controlled. RN and provider explained to patient the need for imaging to properly  treat kidney stone pain. Provider also explained to patient his hesitation to use opiate pain medications due to her currently being in treatment and her history of opiate addiction. Patient requested her IV out, stated she did not want to wait for imaging, and requested to leave [place].

## 2023-06-08 NOTE — ED PROVIDER NOTES
ED Provider Note  [facility]      History     Chief Complaint   Patient presents with     Flank Pain     Know kidney stone left kidney     HPI  Shanda Hinds is a 42 year old female who presents to the ED with complaint of left flank pain.  Patient is currently at [facility] in [place] for dual diagnosis chemical dependency and mental health treatment.  She states she is making very good progress in the rehab facility and plans to go back there.  1 week ago, she developed severe left-sided flank pain.  She had a CT scan done in [place] that showed a 6 mm stone.  She has not had any urologic intervention on this stone.  Since that time, she has had increased bleeding and increased pain.  The pain specifically worsened over the past 24 hours.  They did not feel that they could handle her medical situation so she decided to come back to [place] to hopefully get the stone taken care of with plans to fly back to [facility] to continue her rehab afterwards.  She endorses intermittent fevers/chills with a Tmax of 101.5 at home.  Pain/fevers uncontrolled with ibuprofen/Tylenol.  Endorses increased hematuria as well.  No dysuria or frequency.  Does endorse left-sided flank pain which is otherwise nonradiating.      Past Medical History  Past Medical History:   Diagnosis Date     Cystic fibrosis (H)      Depressive disorder      Insomnia      Kidney stone      PTSD (post-traumatic stress disorder)      TIA (transient ischemic attack)      Past Surgical History:   Procedure Laterality Date     ABDOMEN SURGERY       ABDOMEN SURGERY      removal of shrapnel     ABDOMEN SURGERY      removal of adhesions     BLADDER SURGERY       CHOLECYSTECTOMY       CHOLECYSTECTOMY       CHOLECYSTECTOMY Bilateral 2011     COLONOSCOPY N/A 4/22/2023    Procedure: Colonoscopy;  Surgeon: Omid Pierce MD;  Location: [facility] OR     GYN SURGERY      fibroids, endometriosis, torsion, adhesion  removal     HYSTERECTOMY       HYSTERECTOMY       LEFT OOPHORECTOMY       SLING BLADDER SUSPENSION WITH FASCIA EILEEN       amitriptyline (ELAVIL) 50 MG tablet  aspirin (ASA) 81 MG chewable tablet  cloNIDine (CATAPRES) 0.2 MG tablet  estradiol (VIVELLE-DOT) 0.1 MG/24HR BIW patch  HYDROmorphone (DILAUDID) 2 MG tablet  hydrOXYzine (ATARAX) 50 MG tablet  lamoTRIgine (LAMICTAL) 25 MG tablet  levETIRAcetam (KEPPRA) 500 MG tablet  losartan-hydrochlorothiazide (HYZAAR) 100-25 MG tablet  methocarbamol (ROBAXIN) 750 MG tablet  montelukast (SINGULAIR) 10 MG tablet  multivitamin CF FORMULA (CHOICEFUL) capsule  OLANZapine (ZYPREXA) 7.5 MG tablet  simvastatin (ZOCOR) 20 MG tablet  atorvastatin (LIPITOR) 40 MG tablet  benzonatate (TESSALON) 100 MG capsule  fluticasone (FLONASE) 50 MCG/ACT nasal spray  guaiFENesin-dextromethorphan (ROBITUSSIN DM) 100-10 MG/5ML syrup  omeprazole (PRILOSEC) 40 MG DR capsule  rimegepant (NURTEC) 75 MG ODT tablet  sulfamethoxazole-trimethoprim (BACTRIM DS) 800-160 MG tablet  thiamine (B-1) 100 MG tablet  vancomycin (VANCOCIN) 125 MG capsule  zolpidem (AMBIEN) 10 MG tablet      Allergies   Allergen Reactions     Haldol [Haloperidol] Anaphylaxis     Codeine      Hives, vomiting      Compazine [Prochlorperazine]      Dexmedetomidine Hcl In Nacl Other (See Comments)     Tachy arrhythmias on 2 separate infusion trials      Fentanyl      Morphine      Hives, vomiting      Neurontin [Gabapentin] Other (See Comments)     Facial twitching     Reglan [Metoclopramide]      Trazodone Nausea and Hives     Family History  Family History   Problem Relation Age of Onset     No Known Problems Mother      Kidney failure Father      Hypertension Father      Pancreatic Cancer Sister      Social History   Social History     Tobacco Use     Smoking status: Every Day     Types: Vaping Device     Smokeless tobacco: Never     Tobacco comments:     3-4 per day   Substance Use Topics     Alcohol use: Not Currently     Drug use:  "Not Currently     Types: Cocaine, Marijuana     Comment: In treatment         A medically appropriate review of systems was performed with pertinent positives and negatives noted in the HPI, and all other systems negative.    Physical Exam   BP: (!) 129/90  Pulse: 87  Temp: 99.6  F (37.6  C)  Resp: 16  Height: 167.6 cm (5' 6\")  Weight: 90.7 kg (200 lb)  SpO2: 98 %  Physical Exam  Vitals and nursing note reviewed.   Constitutional:       General: She is in acute distress.      Appearance: Normal appearance. She is ill-appearing.   HENT:      Head: Normocephalic.      Nose: Nose normal.   Eyes:      Pupils: Pupils are equal, round, and reactive to light.   Cardiovascular:      Rate and Rhythm: Normal rate and regular rhythm.   Pulmonary:      Effort: Pulmonary effort is normal.   Abdominal:      General: There is no distension.      Tenderness: There is left CVA tenderness.   Musculoskeletal:         General: No deformity. Normal range of motion.      Cervical back: Normal range of motion.   Skin:     General: Skin is warm.   Neurological:      Mental Status: She is alert and oriented to person, place, and time.   Psychiatric:         Mood and Affect: Mood normal.           ED Course, Procedures, & Data           No results found for any visits on 06/08/23.  Medications - No data to display  Labs Ordered and Resulted from Time of ED Arrival to Time of ED Departure - No data to display  No orders to display          Critical care was not performed.     Medical Decision Making  The patient's presentation was of moderate complexity (an undiagnosed new problem with uncertain diagnosis).    The patient's evaluation involved:  history and exam without other MDM data elements    The patient's management necessitated further care after sign-out to Dr Arzate (see their note for further management).      Assessment & Plan    Patient presents the ED for evaluation of worsening flank pain, hematuria in setting of known 6 mm " left-sided stone.  Differential diagnosis includes hydronephrosis, pyelonephritis, sepsis due to infected stone, persistent ureterolithiasis.    On arrival, patient has a low-grade temp of 99.6.  Otherwise normal vital signs.  She appears distressed and does have left-sided CVA tenderness on exam.  Abdomen is otherwise soft without signs of peritonitis    Plan for CBC, CMP, lipase, urinalysis, CT abdomen pelvis without contrast.    Meds:  Toradol, Zofran, 1L IVNS    I have reviewed the nursing notes. I have reviewed the findings, diagnosis, plan and need for follow up with the patient.    New Prescriptions    No medications on file       Final diagnoses:   None       Haris Santo DO  [facility] EMERGENCY DEPARTMENT  6/8/2023     Haris Santo DO  06/08/23 0658

## 2023-06-08 NOTE — ED PROVIDER NOTES
Patient received as sign-out at change of shift.  Please see initial note for complete details.  42 year old female who presented to the ED with left flank pain and hematuria.  She apparently had ureteral stone on CT scan in [place] while attending a treatment program for chemical dependency and mental health.  Awaiting lab and CT results.  Toradol, Zofran, and IV fluids had been ordered at time of signout.  The patient's chart was reviewed.  She has a history of chemical dependency including opiates and benzodiazepines.  Acute events during this shift: Patient is requesting opiate pain medications.  She refused Toradol and acetaminophen.  I met with the patient and discussed with her that she would not be given opiate pain medications, especially before any diagnostic imaging was performed.  I reviewed care everywhere and cannot confirm that she has a history of kidney stones or ureterolithiasis.  She had a CT performed in the [facility] system in March of this year that did not reveal any kidney stones.  The patient subsequently refused evaluation and signed out AGAINST MEDICAL ADVICE.  I provided her the urology clinic phone number and advised her to have all of her diagnostic evaluation that was performed in [place] forwarded to the urology clinic.  The patient does not appear to be in any discomfort as she is standing in the room having this conversation.     Rodrigo Arzate MD  06/08/23 [id number]

## 2023-06-08 NOTE — ED TRIAGE NOTES
Pt. states 24 hours of left flank pain, nausea, fever and passing blood in urine.  Now this a.m. having more pain with urination.   Triage Assessment     Row Name 06/08/23 0614       Triage Assessment (Adult)    Airway WDL WDL       Respiratory WDL    Respiratory WDL WDL       Skin Circulation/Temperature WDL    Skin Circulation/Temperature WDL WDL       Cardiac WDL    Cardiac WDL WDL       Peripheral/Neurovascular WDL    Peripheral Neurovascular WDL WDL       Cognitive/Neuro/Behavioral WDL    Cognitive/Neuro/Behavioral WDL WDL       Manish Coma Scale    Best Eye Response 4-->(E4) spontaneous    Best Motor Response 6-->(M6) obeys commands    Best Verbal Response 5-->(V5) oriented    [place] Coma Scale Score 15

## 2023-06-09 LAB — BACTERIA UR CULT: NO GROWTH

## 2023-06-09 NOTE — RESULT ENCOUNTER NOTE
"Final urine culture report shows \"NO GROWTH\" and is NEGATIVE.  [facility] Emergency Dept discharge antibiotic: None  Recommendations in treatment per [facility] ED Lab result Urine culture protocol.  "

## 2023-06-21 LAB
ACID FAST STAIN (ARUP): NORMAL
ACID FAST STAIN (ARUP): NORMAL

## 2023-10-01 ENCOUNTER — HEALTH MAINTENANCE LETTER (OUTPATIENT)
Age: 43
End: 2023-10-01

## 2023-10-08 ENCOUNTER — APPOINTMENT (OUTPATIENT)
Dept: CT IMAGING | Facility: CLINIC | Age: 43
DRG: 917 | End: 2023-10-08
Attending: STUDENT IN AN ORGANIZED HEALTH CARE EDUCATION/TRAINING PROGRAM
Payer: COMMERCIAL

## 2023-10-08 ENCOUNTER — HOSPITAL ENCOUNTER (INPATIENT)
Facility: CLINIC | Age: 43
LOS: 2 days | Discharge: HOME | DRG: 917 | End: 2023-10-10
Attending: STUDENT IN AN ORGANIZED HEALTH CARE EDUCATION/TRAINING PROGRAM | Admitting: STUDENT IN AN ORGANIZED HEALTH CARE EDUCATION/TRAINING PROGRAM
Payer: COMMERCIAL

## 2023-10-08 ENCOUNTER — APPOINTMENT (OUTPATIENT)
Dept: GENERAL RADIOLOGY | Facility: CLINIC | Age: 43
DRG: 917 | End: 2023-10-08
Attending: STUDENT IN AN ORGANIZED HEALTH CARE EDUCATION/TRAINING PROGRAM
Payer: COMMERCIAL

## 2023-10-08 DIAGNOSIS — R40.0 SOMNOLENCE: ICD-10-CM

## 2023-10-08 DIAGNOSIS — G93.40 ENCEPHALOPATHY: ICD-10-CM

## 2023-10-08 DIAGNOSIS — F60.3 BORDERLINE PERSONALITY DISORDER (H): Primary | ICD-10-CM

## 2023-10-08 DIAGNOSIS — I15.9 SECONDARY HYPERTENSION: ICD-10-CM

## 2023-10-08 LAB
ALBUMIN SERPL BCG-MCNC: 4.7 G/DL (ref 3.5–5.2)
ALBUMIN UR-MCNC: NEGATIVE MG/DL
ALP SERPL-CCNC: 69 U/L (ref 35–104)
ALT SERPL W P-5'-P-CCNC: 26 U/L (ref 0–50)
ANION GAP SERPL CALCULATED.3IONS-SCNC: 14 MMOL/L (ref 7–15)
APPEARANCE UR: CLEAR
AST SERPL W P-5'-P-CCNC: 26 U/L (ref 0–45)
BACTERIA #/AREA URNS HPF: ABNORMAL /HPF
BASO+EOS+MONOS # BLD AUTO: NORMAL 10*3/UL
BASO+EOS+MONOS NFR BLD AUTO: NORMAL %
BASOPHILS # BLD AUTO: 0.1 10E3/UL (ref 0–0.2)
BASOPHILS NFR BLD AUTO: 1 %
BILIRUB SERPL-MCNC: 0.2 MG/DL
BILIRUB UR QL STRIP: NEGATIVE
BUN SERPL-MCNC: 6.6 MG/DL (ref 6–20)
CA-I BLD-MCNC: 4.7 MG/DL (ref 4.4–5.2)
CA-I BLD-MCNC: 5 MG/DL (ref 4.4–5.2)
CALCIUM SERPL-MCNC: 9.6 MG/DL (ref 8.6–10)
CANNABINOIDS UR QL SCN: NORMAL
CHLORIDE SERPL-SCNC: 104 MMOL/L (ref 98–107)
CK SERPL-CCNC: 128 U/L (ref 26–192)
COLOR UR AUTO: ABNORMAL
CPB POCT: NO
CPB POCT: NO
CREAT SERPL-MCNC: 0.68 MG/DL (ref 0.51–0.95)
CREAT UR-MCNC: 32 MG/DL
DEPRECATED HCO3 PLAS-SCNC: 23 MMOL/L (ref 22–29)
EGFRCR SERPLBLD CKD-EPI 2021: >90 ML/MIN/1.73M2
EOSINOPHIL # BLD AUTO: 0.4 10E3/UL (ref 0–0.7)
EOSINOPHIL NFR BLD AUTO: 4 %
ERYTHROCYTE [DISTWIDTH] IN BLOOD BY AUTOMATED COUNT: 13.4 % (ref 10–15)
ETHANOL SERPL-MCNC: <0.01 G/DL
GLUCOSE BLD-MCNC: 116 MG/DL (ref 70–99)
GLUCOSE BLD-MCNC: 127 MG/DL (ref 70–99)
GLUCOSE BLDC GLUCOMTR-MCNC: 125 MG/DL (ref 70–99)
GLUCOSE BLDC GLUCOMTR-MCNC: 126 MG/DL (ref 70–99)
GLUCOSE SERPL-MCNC: 125 MG/DL (ref 70–99)
GLUCOSE UR STRIP-MCNC: NEGATIVE MG/DL
HCG SERPL QL: NEGATIVE
HCO3 BLDV-SCNC: 25 MMOL/L (ref 21–28)
HCO3 BLDV-SCNC: 27 MMOL/L (ref 21–28)
HCT VFR BLD AUTO: 38.8 % (ref 35–47)
HCT VFR BLD CALC: 39 % (ref 35–47)
HCT VFR BLD CALC: 39 % (ref 35–47)
HGB BLD-MCNC: 13.2 G/DL (ref 11.7–15.7)
HGB BLD-MCNC: 13.3 G/DL (ref 11.7–15.7)
HGB BLD-MCNC: 13.3 G/DL (ref 11.7–15.7)
HGB UR QL STRIP: NEGATIVE
HOLD SPECIMEN: NORMAL
IMM GRANULOCYTES # BLD: 0 10E3/UL
IMM GRANULOCYTES NFR BLD: 0 %
KETONES UR STRIP-MCNC: NEGATIVE MG/DL
LACTATE SERPL-SCNC: 1.9 MMOL/L (ref 0.7–2)
LACTATE SERPL-SCNC: 2.3 MMOL/L (ref 0.7–2)
LEUKOCYTE ESTERASE UR QL STRIP: NEGATIVE
LYMPHOCYTES # BLD AUTO: 2.8 10E3/UL (ref 0.8–5.3)
LYMPHOCYTES NFR BLD AUTO: 27 %
MCH RBC QN AUTO: 29.5 PG (ref 26.5–33)
MCHC RBC AUTO-ENTMCNC: 34 G/DL (ref 31.5–36.5)
MCV RBC AUTO: 87 FL (ref 78–100)
MONOCYTES # BLD AUTO: 0.8 10E3/UL (ref 0–1.3)
MONOCYTES NFR BLD AUTO: 8 %
NEUTROPHILS # BLD AUTO: 6.3 10E3/UL (ref 1.6–8.3)
NEUTROPHILS NFR BLD AUTO: 60 %
NITRATE UR QL: NEGATIVE
NRBC # BLD AUTO: 0 10E3/UL
NRBC BLD AUTO-RTO: 0 /100
NT-PROBNP SERPL-MCNC: 137 PG/ML (ref 0–450)
PCO2 BLDV: 41 MM HG (ref 40–50)
PCO2 BLDV: 42 MM HG (ref 40–50)
PH BLDV: 7.38 [PH] (ref 7.32–7.43)
PH BLDV: 7.42 [PH] (ref 7.32–7.43)
PH UR STRIP: 6.5 [PH] (ref 5–7)
PLATELET # BLD AUTO: 323 10E3/UL (ref 150–450)
PO2 BLDV: 34 MM HG (ref 25–47)
PO2 BLDV: 35 MM HG (ref 25–47)
POTASSIUM BLD-SCNC: 3.4 MMOL/L (ref 3.4–5.3)
POTASSIUM BLD-SCNC: 5.9 MMOL/L (ref 3.4–5.3)
POTASSIUM SERPL-SCNC: 3.5 MMOL/L (ref 3.4–5.3)
PROT SERPL-MCNC: 7.5 G/DL (ref 6.4–8.3)
RBC # BLD AUTO: 4.48 10E6/UL (ref 3.8–5.2)
RBC URINE: 1 /HPF
SAO2 % BLDV: 64 % (ref 94–100)
SAO2 % BLDV: 69 % (ref 94–100)
SODIUM BLD-SCNC: 137 MMOL/L (ref 133–144)
SODIUM BLD-SCNC: 139 MMOL/L (ref 133–144)
SODIUM SERPL-SCNC: 141 MMOL/L (ref 135–145)
SP GR UR STRIP: 1.01 (ref 1–1.03)
SQUAMOUS EPITHELIAL: <1 /HPF
TROPONIN T SERPL HS-MCNC: 6 NG/L
TROPONIN T SERPL HS-MCNC: <6 NG/L
UROBILINOGEN UR STRIP-MCNC: NORMAL MG/DL
WBC # BLD AUTO: 10.4 10E3/UL (ref 4–11)
WBC URINE: 1 /HPF

## 2023-10-08 PROCEDURE — 80355 GABAPENTIN NON-BLOOD: CPT | Performed by: STUDENT IN AN ORGANIZED HEALTH CARE EDUCATION/TRAINING PROGRAM

## 2023-10-08 PROCEDURE — 70450 CT HEAD/BRAIN W/O DYE: CPT

## 2023-10-08 PROCEDURE — 94002 VENT MGMT INPAT INIT DAY: CPT

## 2023-10-08 PROCEDURE — 250N000013 HC RX MED GY IP 250 OP 250 PS 637

## 2023-10-08 PROCEDURE — 36415 COLL VENOUS BLD VENIPUNCTURE: CPT

## 2023-10-08 PROCEDURE — 74177 CT ABD & PELVIS W/CONTRAST: CPT

## 2023-10-08 PROCEDURE — 82550 ASSAY OF CK (CPK): CPT

## 2023-10-08 PROCEDURE — 82310 ASSAY OF CALCIUM: CPT

## 2023-10-08 PROCEDURE — 87086 URINE CULTURE/COLONY COUNT: CPT | Performed by: STUDENT IN AN ORGANIZED HEALTH CARE EDUCATION/TRAINING PROGRAM

## 2023-10-08 PROCEDURE — 82077 ASSAY SPEC XCP UR&BREATH IA: CPT | Performed by: STUDENT IN AN ORGANIZED HEALTH CARE EDUCATION/TRAINING PROGRAM

## 2023-10-08 PROCEDURE — 87635 SARS-COV-2 COVID-19 AMP PRB: CPT

## 2023-10-08 PROCEDURE — 99291 CRITICAL CARE FIRST HOUR: CPT | Mod: 25 | Performed by: STUDENT IN AN ORGANIZED HEALTH CARE EDUCATION/TRAINING PROGRAM

## 2023-10-08 PROCEDURE — 82962 GLUCOSE BLOOD TEST: CPT

## 2023-10-08 PROCEDURE — 81001 URINALYSIS AUTO W/SCOPE: CPT | Performed by: STUDENT IN AN ORGANIZED HEALTH CARE EDUCATION/TRAINING PROGRAM

## 2023-10-08 PROCEDURE — 80307 DRUG TEST PRSMV CHEM ANLYZR: CPT | Performed by: STUDENT IN AN ORGANIZED HEALTH CARE EDUCATION/TRAINING PROGRAM

## 2023-10-08 PROCEDURE — 82805 BLOOD GASES W/O2 SATURATION: CPT

## 2023-10-08 PROCEDURE — 80307 DRUG TEST PRSMV CHEM ANLYZR: CPT

## 2023-10-08 PROCEDURE — 83605 ASSAY OF LACTIC ACID: CPT | Performed by: STUDENT IN AN ORGANIZED HEALTH CARE EDUCATION/TRAINING PROGRAM

## 2023-10-08 PROCEDURE — 250N000011 HC RX IP 250 OP 636: Performed by: STUDENT IN AN ORGANIZED HEALTH CARE EDUCATION/TRAINING PROGRAM

## 2023-10-08 PROCEDURE — 96374 THER/PROPH/DIAG INJ IV PUSH: CPT | Performed by: STUDENT IN AN ORGANIZED HEALTH CARE EDUCATION/TRAINING PROGRAM

## 2023-10-08 PROCEDURE — 93005 ELECTROCARDIOGRAM TRACING: CPT

## 2023-10-08 PROCEDURE — 84703 CHORIONIC GONADOTROPIN ASSAY: CPT | Performed by: STUDENT IN AN ORGANIZED HEALTH CARE EDUCATION/TRAINING PROGRAM

## 2023-10-08 PROCEDURE — 5A1935Z RESPIRATORY VENTILATION, LESS THAN 24 CONSECUTIVE HOURS: ICD-10-PCS | Performed by: INTERNAL MEDICINE

## 2023-10-08 PROCEDURE — 71045 X-RAY EXAM CHEST 1 VIEW: CPT | Mod: 26 | Performed by: RADIOLOGY

## 2023-10-08 PROCEDURE — 74177 CT ABD & PELVIS W/CONTRAST: CPT | Mod: 26 | Performed by: RADIOLOGY

## 2023-10-08 PROCEDURE — 250N000011 HC RX IP 250 OP 636

## 2023-10-08 PROCEDURE — 83880 ASSAY OF NATRIURETIC PEPTIDE: CPT

## 2023-10-08 PROCEDURE — 82947 ASSAY GLUCOSE BLOOD QUANT: CPT

## 2023-10-08 PROCEDURE — 258N000003 HC RX IP 258 OP 636: Performed by: STUDENT IN AN ORGANIZED HEALTH CARE EDUCATION/TRAINING PROGRAM

## 2023-10-08 PROCEDURE — 36415 COLL VENOUS BLD VENIPUNCTURE: CPT | Performed by: STUDENT IN AN ORGANIZED HEALTH CARE EDUCATION/TRAINING PROGRAM

## 2023-10-08 PROCEDURE — 82803 BLOOD GASES ANY COMBINATION: CPT

## 2023-10-08 PROCEDURE — 93010 ELECTROCARDIOGRAM REPORT: CPT | Performed by: STUDENT IN AN ORGANIZED HEALTH CARE EDUCATION/TRAINING PROGRAM

## 2023-10-08 PROCEDURE — 80348 DRUG SCREENING BUPRENORPHINE: CPT | Performed by: STUDENT IN AN ORGANIZED HEALTH CARE EDUCATION/TRAINING PROGRAM

## 2023-10-08 PROCEDURE — 84484 ASSAY OF TROPONIN QUANT: CPT

## 2023-10-08 PROCEDURE — 70450 CT HEAD/BRAIN W/O DYE: CPT | Mod: 26 | Performed by: RADIOLOGY

## 2023-10-08 PROCEDURE — 85025 COMPLETE CBC W/AUTO DIFF WBC: CPT | Performed by: STUDENT IN AN ORGANIZED HEALTH CARE EDUCATION/TRAINING PROGRAM

## 2023-10-08 PROCEDURE — 93005 ELECTROCARDIOGRAM TRACING: CPT | Performed by: STUDENT IN AN ORGANIZED HEALTH CARE EDUCATION/TRAINING PROGRAM

## 2023-10-08 PROCEDURE — 82947 ASSAY GLUCOSE BLOOD QUANT: CPT | Performed by: STUDENT IN AN ORGANIZED HEALTH CARE EDUCATION/TRAINING PROGRAM

## 2023-10-08 PROCEDURE — 99291 CRITICAL CARE FIRST HOUR: CPT | Mod: GC | Performed by: STUDENT IN AN ORGANIZED HEALTH CARE EDUCATION/TRAINING PROGRAM

## 2023-10-08 PROCEDURE — 200N000002 HC R&B ICU UMMC

## 2023-10-08 PROCEDURE — 84484 ASSAY OF TROPONIN QUANT: CPT | Performed by: STUDENT IN AN ORGANIZED HEALTH CARE EDUCATION/TRAINING PROGRAM

## 2023-10-08 PROCEDURE — 82330 ASSAY OF CALCIUM: CPT

## 2023-10-08 PROCEDURE — 96376 TX/PRO/DX INJ SAME DRUG ADON: CPT | Performed by: STUDENT IN AN ORGANIZED HEALTH CARE EDUCATION/TRAINING PROGRAM

## 2023-10-08 PROCEDURE — 71260 CT THORAX DX C+: CPT | Mod: 26 | Performed by: RADIOLOGY

## 2023-10-08 PROCEDURE — 250N000009 HC RX 250: Performed by: STUDENT IN AN ORGANIZED HEALTH CARE EDUCATION/TRAINING PROGRAM

## 2023-10-08 PROCEDURE — 999N000157 HC STATISTIC RCP TIME EA 10 MIN

## 2023-10-08 PROCEDURE — 999N000065 XR CHEST PORT 1 VIEW

## 2023-10-08 PROCEDURE — 84443 ASSAY THYROID STIM HORMONE: CPT

## 2023-10-08 PROCEDURE — 999N000185 HC STATISTIC TRANSPORT TIME EA 15 MIN

## 2023-10-08 PROCEDURE — 96361 HYDRATE IV INFUSION ADD-ON: CPT | Performed by: STUDENT IN AN ORGANIZED HEALTH CARE EDUCATION/TRAINING PROGRAM

## 2023-10-08 RX ORDER — DEXTROSE MONOHYDRATE 25 G/50ML
25-50 INJECTION, SOLUTION INTRAVENOUS
Status: DISCONTINUED | OUTPATIENT
Start: 2023-10-08 | End: 2023-10-10 | Stop reason: HOSPADM

## 2023-10-08 RX ORDER — PROPOFOL 10 MG/ML
5-75 INJECTION, EMULSION INTRAVENOUS CONTINUOUS
Status: DISCONTINUED | OUTPATIENT
Start: 2023-10-08 | End: 2023-10-09

## 2023-10-08 RX ORDER — PROPOFOL 10 MG/ML
150 INJECTION, EMULSION INTRAVENOUS ONCE
Status: COMPLETED | OUTPATIENT
Start: 2023-10-08 | End: 2023-10-08

## 2023-10-08 RX ORDER — ALBUTEROL SULFATE 90 UG/1
6 AEROSOL, METERED RESPIRATORY (INHALATION) EVERY 4 HOURS PRN
Status: DISCONTINUED | OUTPATIENT
Start: 2023-10-08 | End: 2023-10-10 | Stop reason: HOSPADM

## 2023-10-08 RX ORDER — MIDAZOLAM HCL IN 0.9 % NACL/PF 1 MG/ML
1-8 PLASTIC BAG, INJECTION (ML) INTRAVENOUS CONTINUOUS
Status: DISCONTINUED | OUTPATIENT
Start: 2023-10-08 | End: 2023-10-08

## 2023-10-08 RX ORDER — PHENTOLAMINE MESYLATE 5 MG/1
5 INJECTION INTRAMUSCULAR; INTRAVENOUS ONCE
Status: COMPLETED | OUTPATIENT
Start: 2023-10-08 | End: 2023-10-08

## 2023-10-08 RX ORDER — PHENTOLAMINE MESYLATE 5 MG/1
2.5 INJECTION INTRAMUSCULAR; INTRAVENOUS ONCE
Status: COMPLETED | OUTPATIENT
Start: 2023-10-08 | End: 2023-10-08

## 2023-10-08 RX ORDER — ENOXAPARIN SODIUM 100 MG/ML
40 INJECTION SUBCUTANEOUS EVERY 24 HOURS
Status: DISCONTINUED | OUTPATIENT
Start: 2023-10-08 | End: 2023-10-10 | Stop reason: HOSPADM

## 2023-10-08 RX ORDER — IOPAMIDOL 755 MG/ML
122 INJECTION, SOLUTION INTRAVASCULAR ONCE
Status: COMPLETED | OUTPATIENT
Start: 2023-10-08 | End: 2023-10-08

## 2023-10-08 RX ORDER — CHLORHEXIDINE GLUCONATE ORAL RINSE 1.2 MG/ML
15 SOLUTION DENTAL EVERY 12 HOURS
Status: DISCONTINUED | OUTPATIENT
Start: 2023-10-08 | End: 2023-10-09

## 2023-10-08 RX ORDER — MIDAZOLAM HCL IN 0.9 % NACL/PF 1 MG/ML
1-8 PLASTIC BAG, INJECTION (ML) INTRAVENOUS CONTINUOUS
Status: DISCONTINUED | OUTPATIENT
Start: 2023-10-08 | End: 2023-10-09

## 2023-10-08 RX ORDER — NICOTINE POLACRILEX 4 MG
15-30 LOZENGE BUCCAL
Status: DISCONTINUED | OUTPATIENT
Start: 2023-10-08 | End: 2023-10-10 | Stop reason: HOSPADM

## 2023-10-08 RX ADMIN — SODIUM CHLORIDE, POTASSIUM CHLORIDE, SODIUM LACTATE AND CALCIUM CHLORIDE 1000 ML: 600; 310; 30; 20 INJECTION, SOLUTION INTRAVENOUS at 18:15

## 2023-10-08 RX ADMIN — PROPOFOL 150 MG: 10 INJECTION, EMULSION INTRAVENOUS at 20:40

## 2023-10-08 RX ADMIN — ROCURONIUM BROMIDE 90 MG: 10 INJECTION, SOLUTION INTRAVENOUS at 20:41

## 2023-10-08 RX ADMIN — CHLORHEXIDINE GLUCONATE 0.12% ORAL RINSE 15 ML: 1.2 LIQUID ORAL at 23:38

## 2023-10-08 RX ADMIN — MIDAZOLAM HYDROCHLORIDE 6 MG/HR: 1 INJECTION, SOLUTION INTRAVENOUS at 22:12

## 2023-10-08 RX ADMIN — MIDAZOLAM HYDROCHLORIDE 5 MG/HR: 1 INJECTION, SOLUTION INTRAVENOUS at 20:43

## 2023-10-08 RX ADMIN — ENOXAPARIN SODIUM 40 MG: 40 INJECTION SUBCUTANEOUS at 23:38

## 2023-10-08 RX ADMIN — PHENTOLAMINE MESYLATE 5 MG: 5 INJECTION, POWDER, FOR SOLUTION INTRAMUSCULAR; INTRAVENOUS at 19:59

## 2023-10-08 RX ADMIN — IOPAMIDOL 122 ML: 755 INJECTION, SOLUTION INTRAVENOUS at 21:20

## 2023-10-08 RX ADMIN — PHENTOLAMINE MESYLATE 2.5 MG: 5 INJECTION, POWDER, FOR SOLUTION INTRAMUSCULAR; INTRAVENOUS at 18:41

## 2023-10-08 RX ADMIN — PROPOFOL 20 MCG/KG/MIN: 10 INJECTION, EMULSION INTRAVENOUS at 22:06

## 2023-10-08 RX ADMIN — Medication 0.2 MG/HR: at 20:57

## 2023-10-08 ASSESSMENT — ACTIVITIES OF DAILY LIVING (ADL)
DIFFICULTY_COMMUNICATING: NO
WALKING_OR_CLIMBING_STAIRS_DIFFICULTY: NO
CHANGE_IN_FUNCTIONAL_STATUS_SINCE_ONSET_OF_CURRENT_ILLNESS/INJURY: NO
WEAR_GLASSES_OR_BLIND: YES
ADLS_ACUITY_SCORE: 35
DOING_ERRANDS_INDEPENDENTLY_DIFFICULTY: NO
ADLS_ACUITY_SCORE: 35
HEARING_DIFFICULTY_OR_DEAF: NO
DRESSING/BATHING_DIFFICULTY: NO
VISION_MANAGEMENT: GLASSES
ADLS_ACUITY_SCORE: 47
TOILETING_ISSUES: NO
DIFFICULTY_EATING/SWALLOWING: NO
CONCENTRATING,_REMEMBERING_OR_MAKING_DECISIONS_DIFFICULTY: NO
FALL_HISTORY_WITHIN_LAST_SIX_MONTHS: NO

## 2023-10-08 NOTE — LETTER
[facility] [address]  [address] [address]  Phone: [phone]    October 10, 2023        Shanda Power  [address]  [place] [address]          To whom it may concern:    RE: Shanda Power was admitted to the [facility] and under my care from 10/8/23-10/10/2023. Please excuse her from school related activities during this time.     Please contact me for questions or concerns.      Sincerely,        Chantelle Gibbs MD

## 2023-10-08 NOTE — ED TRIAGE NOTES
BIBA from home with reports of 5 seizures today per patients boyfriend. Upon arrival patient awake, alert and talking but had seizure like movement and has since been drowsy and confused. Per EMS patient not currently on anti-seizure medication. On arrival patient alternates between drowsiness and attempting to get out bed.      Triage Assessment       Row Name 10/08/23 4027       Triage Assessment (Adult)    Airway WDL WDL       Respiratory WDL    Respiratory WDL WDL       Skin Circulation/Temperature WDL    Skin Circulation/Temperature WDL WDL       Cardiac WDL    Cardiac WDL X;rhythm    Cardiac Rhythm ST       Peripheral/Neurovascular WDL    Peripheral Neurovascular WDL WDL       Cognitive/Neuro/Behavioral WDL    Cognitive/Neuro/Behavioral WDL X    Level of Consciousness confused    Arousal Level arouses to voice    Orientation disoriented to;place;time;situation    Speech spontaneous;slurred    Mood/Behavior restless       Custer Coma Scale    Best Eye Response 3-->(E3) to speech    Best Motor Response 4-->(M4) withdraws from pain    Best Verbal Response 3-->(V3) inappropriate words    Custer Coma Scale Score 10

## 2023-10-08 NOTE — ED PROVIDER NOTES
ED Provider Note  [facility]      History     Chief Complaint   Patient presents with    Seizures     HPI  Shanda Power is a 43-year-old female with a history of mild cystic fibrosis, asthma, BRIAN, questionable seizure disorder who is often awake during her convulsive event, and had a evaluation by neuro critical care without any evidence of seizure-like activity on EEG, thought to be psychogenic seizures, polysubstance use disorder (cocaine, opiates, benzos, THC), multiple psychiatric conditions including borderline personality disorder, multiple suicide attempts, and bipolar disorder with recent admission from 4/22 - 5/1 of this year in the setting of agitated delirium requiring intubation, subsequently self extubated likely thought to be due to cocaine and benzodiazepine, and ingestion of magnets presents emergency department due to increasing seizure activity today.    History is significantly limited due to patient's altered mental status on arrival.  Per EMS, patient endorses that she has been having multiple seizures today.  Initially was talking and relatively alert on arrival for EMS, and then had a generalized shaking episode in front of EMS while conversant, without definitive tonic-clonic activity, during the course of that.  Afterwards however, she was sleepy and difficult to arouse.    On arrival, patient is answering questions  intermittently, but not consistently.    6:11 PM further history obtained from her alternative MRN demonstrating a past medical history consistent with polysubstance use,    Past Medical History  No past medical history on file.  No past surgical history on file.  No current outpatient medications on file.    Not on File  Family History  No family history on file.  Social History          A medically appropriate review of systems was performed with pertinent positives and negatives noted in the HPI, and all other systems negative.    Physical  Exam   BP: (!) 189/139  Pulse: (!) 143  Temp: 98.1  F (36.7  C)  Resp: 14  SpO2: 93 %  Physical Exam  GEN: Sleepy, but arousable, intermittently twitching arms and legs, jumping and sitting up in bed and then back in bed  HEENT: normocephalic and atraumatic, PERRLA, EOMI, bilateral pupils reactive to light, midrange  CV: well-perfused, normal skin color for ethnicity, sinus tachycardia to the 130s   PULM: breathing comfortably, in no respiratory distress, clear to auscultation upper and lower lung fields  ABD: nondistended, soft, nontender  EXT: Full range of motion.  No edema.  NEURO: Arousable to voice, intermittently appropriately answering questions, grossly normal bilateral upper and lower extremity strength & ROM   SKIN: No rashes, ecchymosis, or lacerations    ED Course, Procedures, & Data      Procedures            EKG Interpretation:      Interpreted by Harper Daniels MD  Time reviewed: 1815  Symptoms at time of EKG: ams   Rhythm: Sinus tachycardia  Rate: normal  Axis: normal  Ectopy: none  Conduction: Left ventricular hypertrophy   ST Segments/ T Waves: lateral ST depressions  Q Waves: none  Comparison to prior: No old EKG available    Clinical Impression: Sinus tachycardia with lateral ST depressions      Results for orders placed or performed during the hospital encounter of 10/08/23   XR Chest Port 1 View     Status: None (Preliminary result)    Narrative    EXAM: XR CHEST PORT 1 VIEW  LOCATION: [facility]  DATE: 10/8/2023    INDICATION: Post intubation and OG XR.  COMPARISON: 09/14/2023.      Impression    IMPRESSION: Endotracheal tube is in place with tip 3.4 cm above the pastora. NG tube tip and side-port is present within the stomach. Heart and mediastinal size are normal. There is moderate pulmonary vascular congestion. No pleural effusion or   pneumothorax is seen on the supine radiograph. Cholecystectomy clips are noted.   Comprehensive metabolic panel      Status: Abnormal   Result Value Ref Range    Sodium 141 135 - 145 mmol/L    Potassium 3.5 3.4 - 5.3 mmol/L    Carbon Dioxide (CO2) 23 22 - 29 mmol/L    Anion Gap 14 7 - 15 mmol/L    Urea Nitrogen 6.6 6.0 - 20.0 mg/dL    Creatinine 0.68 0.51 - 0.95 mg/dL    GFR Estimate >90 >60 mL/min/1.73m2    Calcium 9.6 8.6 - 10.0 mg/dL    Chloride 104 98 - 107 mmol/L    Glucose 125 (H) 70 - 99 mg/dL    Alkaline Phosphatase 69 35 - 104 U/L    AST 26 0 - 45 U/L    ALT 26 0 - 50 U/L    Protein Total 7.5 6.4 - 8.3 g/dL    Albumin 4.7 3.5 - 5.2 g/dL    Bilirubin Total 0.2 <=1.2 mg/dL   Lactic acid whole blood     Status: Abnormal   Result Value Ref Range    Lactic Acid 2.3 (H) 0.7 - 2.0 mmol/L   Ethyl Alcohol Level     Status: Normal   Result Value Ref Range    Alcohol ethyl <0.01 <=0.01 g/dL   HCG qualitative Blood     Status: Normal   Result Value Ref Range    hCG Serum Qualitative Negative Negative   UA with Microscopic reflex to Culture     Status: Abnormal    Specimen: Urine, Catheter   Result Value Ref Range    Color Urine Straw Colorless, Straw, Light Yellow, Yellow    Appearance Urine Clear Clear    Glucose Urine Negative Negative mg/dL    Bilirubin Urine Negative Negative    Ketones Urine Negative Negative mg/dL    Specific Gravity Urine 1.009 1.003 - 1.035    Blood Urine Negative Negative    pH Urine 6.5 5.0 - 7.0    Protein Albumin Urine Negative Negative mg/dL    Urobilinogen Urine Normal Normal, 2.0 mg/dL    Nitrite Urine Negative Negative    Leukocyte Esterase Urine Negative Negative    Bacteria Urine Few (A) None Seen /HPF    RBC Urine 1 <=2 /HPF    WBC Urine 1 <=5 /HPF    Squamous Epithelials Urine <1 <=1 /HPF    Narrative    Urine Culture not indicated   CBC with platelets and differential     Status: None   Result Value Ref Range    WBC Count 10.4 4.0 - 11.0 10e3/uL    RBC Count 4.48 3.80 - 5.20 10e6/uL    Hemoglobin 13.2 11.7 - 15.7 g/dL    Hematocrit 38.8 35.0 - 47.0 %    MCV 87 78 - 100 fL    MCH 29.5 26.5 -  33.0 pg    MCHC 34.0 31.5 - 36.5 g/dL    RDW 13.4 10.0 - 15.0 %    Platelet Count 323 150 - 450 10e3/uL    % Neutrophils 60 %    % Lymphocytes 27 %    % Monocytes 8 %    Mids % (Monos, Eos, Basos)      % Eosinophils 4 %    % Basophils 1 %    % Immature Granulocytes 0 %    NRBCs per 100 WBC 0 <1 /100    Absolute Neutrophils 6.3 1.6 - 8.3 10e3/uL    Absolute Lymphocytes 2.8 0.8 - 5.3 10e3/uL    Absolute Monocytes 0.8 0.0 - 1.3 10e3/uL    Mids Abs (Monos, Eos, Basos)      Absolute Eosinophils 0.4 0.0 - 0.7 10e3/uL    Absolute Basophils 0.1 0.0 - 0.2 10e3/uL    Absolute Immature Granulocytes 0.0 <=0.4 10e3/uL    Absolute NRBCs 0.0 10e3/uL   [place] Draw     Status: None    Narrative    The following orders were created for panel order [place] Draw.  Procedure                               Abnormality         Status                     ---------                               -----------         ------                     Extra Blue Top Tube[[id number]]                              Final result                 Please view results for these tests on the individual orders.   Extra Blue Top Tube     Status: None   Result Value Ref Range    Hold Specimen JIC    iStat Gases Electrolytes ICA Glucose Venous, POCT     Status: Abnormal   Result Value Ref Range    CPB Applied No     Hematocrit POCT 39 35 - 47 %    Calcium, Ionized Whole Blood POCT 5.0 4.4 - 5.2 mg/dL    Glucose Whole Blood POCT 127 (H) 70 - 99 mg/dL    Bicarbonate Venous POCT 25 21 - 28 mmol/L    Hemoglobin POCT 13.3 11.7 - 15.7 g/dL    Potassium POCT 3.4 3.4 - 5.3 mmol/L    Sodium POCT 139 133 - 144 mmol/L    pCO2 Venous POCT 41 40 - 50 mm Hg    pO2 Venous POCT 34 25 - 47 mm Hg    pH Venous POCT 7.38 7.32 - 7.43    O2 Sat, Venous POCT 64 (L) 94 - 100 %   Glucose by meter     Status: Abnormal   Result Value Ref Range    GLUCOSE BY METER POCT 126 (H) 70 - 99 mg/dL   Troponin T, High Sensitivity     Status: Normal   Result Value Ref Range    Troponin T, High  Sensitivity <6 <=14 ng/L   Lactic acid whole blood     Status: Normal   Result Value Ref Range    Lactic Acid 1.9 0.7 - 2.0 mmol/L   iStat Gases Electrolytes ICA Glucose Venous, POCT     Status: Abnormal   Result Value Ref Range    CPB Applied No     Hematocrit POCT 39 35 - 47 %    Calcium, Ionized Whole Blood POCT 4.7 4.4 - 5.2 mg/dL    Glucose Whole Blood POCT 116 (H) 70 - 99 mg/dL    Bicarbonate Venous POCT 27 21 - 28 mmol/L    Hemoglobin POCT 13.3 11.7 - 15.7 g/dL    Potassium POCT 5.9 (H) 3.4 - 5.3 mmol/L    Sodium POCT 137 133 - 144 mmol/L    pCO2 Venous POCT 42 40 - 50 mm Hg    pO2 Venous POCT 35 25 - 47 mm Hg    pH Venous POCT 7.42 7.32 - 7.43    O2 Sat, Venous POCT 69 (L) 94 - 100 %   EKG 12-lead, tracing only     Status: None (Preliminary result)   Result Value Ref Range    Systolic Blood Pressure  mmHg    Diastolic Blood Pressure  mmHg    Ventricular Rate 138 BPM    Atrial Rate 138 BPM    ID Interval 124 ms    QRS Duration 106 ms     ms    QTc 475 ms    P Axis 39 degrees    R AXIS 11 degrees    T Axis 70 degrees    Interpretation ECG       Sinus tachycardia  Possible Left atrial enlargement  Left ventricular hypertrophy with repolarization abnormality  Abnormal ECG     CBC with platelets differential     Status: None    Narrative    The following orders were created for panel order CBC with platelets differential.  Procedure                               Abnormality         Status                     ---------                               -----------         ------                     CBC with platelets and d...[[id number]]                      Final result                 Please view results for these tests on the individual orders.     Medications   midazolam (VERSED) drip - ADULT 100 mg/100 mL in NS (pre-mix) (5 mg/hr Intravenous $New Bag 10/8/23 2043)   HYDROmorphone (DILAUDID) 0.2 mg/mL infusion ADULT/PEDS GREATER than or EQUAL to 20 kg (0.2 mg/hr Intravenous $New Bag 10/8/23 2057)   midazolam  (VERSED) drip - ADULT 100 mg/100 mL in NS (pre-mix) ( Intravenous Not Given 10/8/23 2053)   lactated ringers BOLUS 1,000 mL (0 mLs Intravenous Stopped 10/8/23 2027)   phentolamine (REGITINE) injection 2.5 mg (2.5 mg Intravenous $Given 10/8/23 1841)   phentolamine (REGITINE) injection 5 mg (5 mg Intravenous $Given 10/8/23 1959)   propofol (DIPRIVAN) injection 10 mg/mL vial (150 mg Intravenous $Given 10/8/23 2040)   rocuronium injection 90 mg (90 mg Intravenous $Given 10/8/23 2041)     Labs Ordered and Resulted from Time of ED Arrival to Time of ED Departure   COMPREHENSIVE METABOLIC PANEL - Abnormal       Result Value    Sodium 141      Potassium 3.5      Carbon Dioxide (CO2) 23      Anion Gap 14      Urea Nitrogen 6.6      Creatinine 0.68      GFR Estimate >90      Calcium 9.6      Chloride 104      Glucose 125 (*)     Alkaline Phosphatase 69      AST 26      ALT 26      Protein Total 7.5      Albumin 4.7      Bilirubin Total 0.2     LACTIC ACID WHOLE BLOOD - Abnormal    Lactic Acid 2.3 (*)    ROUTINE UA WITH MICROSCOPIC REFLEX TO CULTURE - Abnormal    Color Urine Straw      Appearance Urine Clear      Glucose Urine Negative      Bilirubin Urine Negative      Ketones Urine Negative      Specific Gravity Urine 1.009      Blood Urine Negative      pH Urine 6.5      Protein Albumin Urine Negative      Urobilinogen Urine Normal      Nitrite Urine Negative      Leukocyte Esterase Urine Negative      Bacteria Urine Few (*)     RBC Urine 1      WBC Urine 1      Squamous Epithelials Urine <1     ISTAT GASES ELECTROLYTES ICA GLUCOSE VENOUS POCT - Abnormal    CPB Applied No      Hematocrit POCT 39      Calcium, Ionized Whole Blood POCT 5.0      Glucose Whole Blood POCT 127 (*)     Bicarbonate Venous POCT 25      Hemoglobin POCT 13.3      Potassium POCT 3.4      Sodium POCT 139      pCO2 Venous POCT 41      pO2 Venous POCT 34      pH Venous POCT 7.38      O2 Sat, Venous POCT 64 (*)    GLUCOSE BY METER - Abnormal    GLUCOSE BY  METER POCT 126 (*)    ISTAT GASES ELECTROLYTES ICA GLUCOSE VENOUS POCT - Abnormal    CPB Applied No      Hematocrit POCT 39      Calcium, Ionized Whole Blood POCT 4.7      Glucose Whole Blood POCT 116 (*)     Bicarbonate Venous POCT 27      Hemoglobin POCT 13.3      Potassium POCT 5.9 (*)     Sodium POCT 137      pCO2 Venous POCT 42      pO2 Venous POCT 35      pH Venous POCT 7.42      O2 Sat, Venous POCT 69 (*)    ETHYL ALCOHOL LEVEL - Normal    Alcohol ethyl <0.01     HCG QUALITATIVE PREGNANCY - Normal    hCG Serum Qualitative Negative     TROPONIN T, HIGH SENSITIVITY - Normal    Troponin T, High Sensitivity <6     LACTIC ACID WHOLE BLOOD - Normal    Lactic Acid 1.9     GLUCOSE MONITOR NURSING POCT   CBC WITH PLATELETS AND DIFFERENTIAL    WBC Count 10.4      RBC Count 4.48      Hemoglobin 13.2      Hematocrit 38.8      MCV 87      MCH 29.5      MCHC 34.0      RDW 13.4      Platelet Count 323      % Neutrophils 60      % Lymphocytes 27      % Monocytes 8      Mids % (Monos, Eos, Basos)        % Eosinophils 4      % Basophils 1      % Immature Granulocytes 0      NRBCs per 100 WBC 0      Absolute Neutrophils 6.3      Absolute Lymphocytes 2.8      Absolute Monocytes 0.8      Mids Abs (Monos, Eos, Basos)        Absolute Eosinophils 0.4      Absolute Basophils 0.1      Absolute Immature Granulocytes 0.0      Absolute NRBCs 0.0     ISTAT GASES LACTATE VENOUS POCT   URINE CULTURE     CT Chest/Abdomen/Pelvis w Contrast   Preliminary Result   RESIDENT PRELIMINARY INTERPRETATION   IMPRESSION:    1.  CT is negative for acute pathology of the abdomen and pelvis.   2. No radiographic appearance of foreign object within the   gastrointestinal system.    3. Bilateral small pleural effusions with associated atelectasis.             CT Head w/o Contrast   Final Result   IMPRESSION:   1.  No acute intracranial process.      XR Chest Port 1 View   Final Result   IMPRESSION: Endotracheal tube is in place with tip 3.4 cm above the  pastora. NG tube tip and side-port is present within the stomach. Heart and mediastinal size are normal. There is moderate pulmonary vascular congestion. No pleural effusion or    pneumothorax is seen on the supine radiograph. Cholecystectomy clips are noted.      Echo Limited    (Results Pending)          Critical care was performed.   Critical Care Addendum  My initial assessment, based on my review of prehospital provider report, review of nursing observations, review of vital signs, focused history, physical exam, review of cardiac rhythm monitor, and 12 lead ECG analysis, established a high suspicion that Shanda Power has altered mental status, which requires immediate intervention, and therefore she is critically ill.     After the initial assessment, the care team initiated multiple lab tests and initiated medication therapy with , MD  to provide stabilization care. Due to the critical nature of this patient, I reassessed nursing observations, vital signs, physical exam, review of cardiac rhythm monitor, mental status, and neurologic status multiple times prior to her disposition.     Time also spent performing documentation, discussion with family to obtain medical information for decision making, reviewing test results, discussion with consultants, and coordination of care.     Critical care time (excluding teaching time and procedures): 60 minutes.     Assessment & Plan    43-year-old female initially unknown to our medical system but with a subjective history of seizures presents emergency department due to shaking episode today in the setting of which she endorsed was multiple seizures earlier today noted on arrival to be confused, intermittently answering questions, and having some voluntary twitching and movements of her arms and legs, with sinus tachycardia to the 130s.    Differential includes generalized tonic-clonic seizures, nonepileptiform seizure disorder, dysrhythmia, electrolyte  derangements, intracranial abnormalities including mass, intracranial hemorrhage, hypertensive emergency, agitated delirium, methamphetamine use, cocaine use    Neurologically intact, although alternating between drowsiness and trying to get out of bed on arrival.  Could be consistent with postictal state, she is tachycardic with sinus tachycardia into the 140s and blood pressure is 189/139, however patient is so agitated that is difficult to determine whether or not this is accurate.  Due to the significant hypertension and tachycardia, I do have possible concerns for PRES as an etiology of her symptoms.     6:10 PM I just found her wallet in her bag and her SSN and card are in the wallet. This is associated with a different chart with an alternative last name (MRN [id number]). Further history obtained at this time.  Based on this, story is consistent with acute encephalopathy in the setting of polysubstance use.  At the time, she also had a magnets, and due to her altered mental status and inability to cooperate with history at this time, will plan for more broad work-up including CT of her chest, abdomen and pelvis.  We will plan for phentolamine for blood pressure control due to her concern for tachycardia, hypertension in the setting of possible cocaine intoxication.    9:03 PM after reassessment multiple times, patient's mental status worsened to the point where she was unable to even arouse to deep painful stimulation, and had some persistent hypertension and tachycardia.  She was given 2 doses of phentolamine with some improvement of her hypertension and tachycardia, however was still persistently hypertensive and tachycardic.  Due to her progression and worsening mental status, decision was made to intubate for airway protection as she was somnolent, and had sonorous respirations.    9:05 PM Patient was intubated without incident.  Decision was made to start her on a Versed drip due to concern for possible  cocaine intoxication, and a Dilaudid drip as her previous chart notes an allergy to fentanyl, but she tolerated Dilaudid previously without incident.  Postintubation x-ray reassuring.  CT scan of her chest, abdomen and pelvis and CT of her head pending.        I have reviewed the nursing notes. I have reviewed the findings, diagnosis, plan and need for follow up with the patient.    New Prescriptions    No medications on file       Final diagnoses:   Somnolence   Secondary hypertension   Encephalopathy       Harper Daniels MD  [facility] EMERGENCY DEPARTMENT  10/8/2023     Harper Daniels MD  10/08/23 2242       Harper Daniels MD  10/08/23 2243

## 2023-10-08 NOTE — ED NOTES
Bed: ED14  Expected date:   Expected time:   Means of arrival:   Comments:  Yarely 437  43F seizure activity  ST 130s  5 min out

## 2023-10-09 ENCOUNTER — APPOINTMENT (OUTPATIENT)
Dept: CARDIOLOGY | Facility: CLINIC | Age: 43
DRG: 917 | End: 2023-10-09
Payer: COMMERCIAL

## 2023-10-09 LAB
ALBUMIN SERPL BCG-MCNC: 4.2 G/DL (ref 3.5–5.2)
ALP SERPL-CCNC: 64 U/L (ref 35–104)
ALT SERPL W P-5'-P-CCNC: 16 U/L (ref 0–50)
AMPHETAMINES UR QL SCN: ABNORMAL
ANION GAP SERPL CALCULATED.3IONS-SCNC: 13 MMOL/L (ref 7–15)
ANION GAP SERPL CALCULATED.3IONS-SCNC: 14 MMOL/L (ref 7–15)
ANION GAP SERPL CALCULATED.3IONS-SCNC: 18 MMOL/L (ref 7–15)
APAP SERPL-MCNC: <5 UG/ML (ref 10–30)
AST SERPL W P-5'-P-CCNC: 24 U/L (ref 0–45)
ATRIAL RATE - MUSE: 138 BPM
ATRIAL RATE - MUSE: 91 BPM
B-OH-BUTYR SERPL-SCNC: 0.2 MMOL/L
BARBITURATES UR QL SCN: ABNORMAL
BASE EXCESS BLDV CALC-SCNC: 1 MMOL/L (ref -7.7–1.9)
BASE EXCESS BLDV CALC-SCNC: 1 MMOL/L (ref -7.7–1.9)
BASE EXCESS BLDV CALC-SCNC: 5.5 MMOL/L (ref -7.7–1.9)
BENZODIAZ UR QL SCN: ABNORMAL
BILIRUB SERPL-MCNC: 0.2 MG/DL
BUN SERPL-MCNC: 6.5 MG/DL (ref 6–20)
BUN SERPL-MCNC: 8.4 MG/DL (ref 6–20)
BUN SERPL-MCNC: 9.3 MG/DL (ref 6–20)
BZE UR QL SCN: ABNORMAL
CALCIUM SERPL-MCNC: 8.8 MG/DL (ref 8.6–10)
CALCIUM SERPL-MCNC: 9 MG/DL (ref 8.6–10)
CALCIUM SERPL-MCNC: 9.2 MG/DL (ref 8.6–10)
CANNABINOIDS UR QL SCN: ABNORMAL
CHLORIDE SERPL-SCNC: 100 MMOL/L (ref 98–107)
CHLORIDE SERPL-SCNC: 102 MMOL/L (ref 98–107)
CHLORIDE SERPL-SCNC: 99 MMOL/L (ref 98–107)
CREAT SERPL-MCNC: 0.64 MG/DL (ref 0.51–0.95)
CREAT SERPL-MCNC: 0.67 MG/DL (ref 0.51–0.95)
CREAT SERPL-MCNC: 0.72 MG/DL (ref 0.51–0.95)
DEPRECATED HCO3 PLAS-SCNC: 20 MMOL/L (ref 22–29)
DEPRECATED HCO3 PLAS-SCNC: 22 MMOL/L (ref 22–29)
DEPRECATED HCO3 PLAS-SCNC: 26 MMOL/L (ref 22–29)
DIASTOLIC BLOOD PRESSURE - MUSE: NORMAL MMHG
DIASTOLIC BLOOD PRESSURE - MUSE: NORMAL MMHG
EGFRCR SERPLBLD CKD-EPI 2021: >90 ML/MIN/1.73M2
ERYTHROCYTE [DISTWIDTH] IN BLOOD BY AUTOMATED COUNT: 13.7 % (ref 10–15)
ETHANOL SERPL-MCNC: <0.01 G/DL
FENTANYL UR QL: ABNORMAL
GLUCOSE BLDC GLUCOMTR-MCNC: 115 MG/DL (ref 70–99)
GLUCOSE BLDC GLUCOMTR-MCNC: 122 MG/DL (ref 70–99)
GLUCOSE SERPL-MCNC: 111 MG/DL (ref 70–99)
GLUCOSE SERPL-MCNC: 116 MG/DL (ref 70–99)
GLUCOSE SERPL-MCNC: 137 MG/DL (ref 70–99)
HCO3 BLDV-SCNC: 26 MMOL/L (ref 21–28)
HCO3 BLDV-SCNC: 26 MMOL/L (ref 21–28)
HCO3 BLDV-SCNC: 31 MMOL/L (ref 21–28)
HCT VFR BLD AUTO: 39.9 % (ref 35–47)
HGB BLD-MCNC: 12.8 G/DL (ref 11.7–15.7)
HGB BLD-MCNC: 13.5 G/DL (ref 11.7–15.7)
INTERPRETATION ECG - MUSE: NORMAL
INTERPRETATION ECG - MUSE: NORMAL
LVEF ECHO: NORMAL
MAGNESIUM SERPL-MCNC: 2.1 MG/DL (ref 1.7–2.3)
MCH RBC QN AUTO: 29.9 PG (ref 26.5–33)
MCHC RBC AUTO-ENTMCNC: 33.8 G/DL (ref 31.5–36.5)
MCV RBC AUTO: 89 FL (ref 78–100)
O2/TOTAL GAS SETTING VFR VENT: 35 %
O2/TOTAL GAS SETTING VFR VENT: 40 %
O2/TOTAL GAS SETTING VFR VENT: 40 %
OPIATES UR QL SCN: ABNORMAL
OSMOLALITY SERPL: 291 MMOL/KG (ref 275–295)
OSMOLALITY UR: 390 MMOL/KG (ref 100–1200)
OXYHGB MFR BLDV: 93 % (ref 70–75)
P AXIS - MUSE: 39 DEGREES
P AXIS - MUSE: 54 DEGREES
PCO2 BLDV: 42 MM HG (ref 40–50)
PCO2 BLDV: 42 MM HG (ref 40–50)
PCO2 BLDV: 47 MM HG (ref 40–50)
PCP QUAL URINE (ROCHE): ABNORMAL
PH BLDV: 7.4 [PH] (ref 7.32–7.43)
PH BLDV: 7.4 [PH] (ref 7.32–7.43)
PH BLDV: 7.43 [PH] (ref 7.32–7.43)
PHOSPHATE SERPL-MCNC: 3.5 MG/DL (ref 2.5–4.5)
PLATELET # BLD AUTO: 331 10E3/UL (ref 150–450)
PO2 BLDV: 34 MM HG (ref 25–47)
PO2 BLDV: 72 MM HG (ref 25–47)
PO2 BLDV: 72 MM HG (ref 25–47)
POTASSIUM SERPL-SCNC: 3.4 MMOL/L (ref 3.4–5.3)
POTASSIUM SERPL-SCNC: 3.9 MMOL/L (ref 3.4–5.3)
POTASSIUM SERPL-SCNC: 4.3 MMOL/L (ref 3.4–5.3)
PR INTERVAL - MUSE: 124 MS
PR INTERVAL - MUSE: 154 MS
PROT SERPL-MCNC: 6.8 G/DL (ref 6.4–8.3)
QRS DURATION - MUSE: 104 MS
QRS DURATION - MUSE: 106 MS
QT - MUSE: 314 MS
QT - MUSE: 398 MS
QTC - MUSE: 475 MS
QTC - MUSE: 489 MS
R AXIS - MUSE: 11 DEGREES
R AXIS - MUSE: 18 DEGREES
RBC # BLD AUTO: 4.51 10E6/UL (ref 3.8–5.2)
SALICYLATES SERPL-MCNC: <0.5 MG/DL
SARS-COV-2 RNA RESP QL NAA+PROBE: NEGATIVE
SODIUM SERPL-SCNC: 137 MMOL/L (ref 135–145)
SODIUM SERPL-SCNC: 137 MMOL/L (ref 135–145)
SODIUM SERPL-SCNC: 140 MMOL/L (ref 135–145)
SYSTOLIC BLOOD PRESSURE - MUSE: NORMAL MMHG
SYSTOLIC BLOOD PRESSURE - MUSE: NORMAL MMHG
T AXIS - MUSE: 65 DEGREES
T AXIS - MUSE: 70 DEGREES
TSH SERPL DL<=0.005 MIU/L-ACNC: 0.67 UIU/ML (ref 0.3–4.2)
VENTRICULAR RATE- MUSE: 138 BPM
VENTRICULAR RATE- MUSE: 91 BPM
WBC # BLD AUTO: 16.1 10E3/UL (ref 4–11)

## 2023-10-09 PROCEDURE — 250N000011 HC RX IP 250 OP 636

## 2023-10-09 PROCEDURE — 36415 COLL VENOUS BLD VENIPUNCTURE: CPT

## 2023-10-09 PROCEDURE — 82077 ASSAY SPEC XCP UR&BREATH IA: CPT

## 2023-10-09 PROCEDURE — 80143 DRUG ASSAY ACETAMINOPHEN: CPT

## 2023-10-09 PROCEDURE — 250N000013 HC RX MED GY IP 250 OP 250 PS 637: Performed by: STUDENT IN AN ORGANIZED HEALTH CARE EDUCATION/TRAINING PROGRAM

## 2023-10-09 PROCEDURE — 250N000013 HC RX MED GY IP 250 OP 250 PS 637

## 2023-10-09 PROCEDURE — 93306 TTE W/DOPPLER COMPLETE: CPT | Mod: 26 | Performed by: INTERNAL MEDICINE

## 2023-10-09 PROCEDURE — 999N000157 HC STATISTIC RCP TIME EA 10 MIN

## 2023-10-09 PROCEDURE — 200N000002 HC R&B ICU UMMC

## 2023-10-09 PROCEDURE — 80048 BASIC METABOLIC PNL TOTAL CA: CPT

## 2023-10-09 PROCEDURE — 87040 BLOOD CULTURE FOR BACTERIA: CPT | Performed by: STUDENT IN AN ORGANIZED HEALTH CARE EDUCATION/TRAINING PROGRAM

## 2023-10-09 PROCEDURE — 85018 HEMOGLOBIN: CPT

## 2023-10-09 PROCEDURE — 36415 COLL VENOUS BLD VENIPUNCTURE: CPT | Performed by: STUDENT IN AN ORGANIZED HEALTH CARE EDUCATION/TRAINING PROGRAM

## 2023-10-09 PROCEDURE — 83935 ASSAY OF URINE OSMOLALITY: CPT

## 2023-10-09 PROCEDURE — 82803 BLOOD GASES ANY COMBINATION: CPT

## 2023-10-09 PROCEDURE — 84100 ASSAY OF PHOSPHORUS: CPT

## 2023-10-09 PROCEDURE — 83735 ASSAY OF MAGNESIUM: CPT

## 2023-10-09 PROCEDURE — 250N000009 HC RX 250

## 2023-10-09 PROCEDURE — 94003 VENT MGMT INPAT SUBQ DAY: CPT

## 2023-10-09 PROCEDURE — 83930 ASSAY OF BLOOD OSMOLALITY: CPT

## 2023-10-09 PROCEDURE — 99291 CRITICAL CARE FIRST HOUR: CPT | Mod: GC | Performed by: INTERNAL MEDICINE

## 2023-10-09 PROCEDURE — 85027 COMPLETE CBC AUTOMATED: CPT

## 2023-10-09 PROCEDURE — 82010 KETONE BODYS QUAN: CPT

## 2023-10-09 PROCEDURE — 80053 COMPREHEN METABOLIC PANEL: CPT

## 2023-10-09 PROCEDURE — C9113 INJ PANTOPRAZOLE SODIUM, VIA: HCPCS | Mod: JZ

## 2023-10-09 PROCEDURE — 80179 DRUG ASSAY SALICYLATE: CPT

## 2023-10-09 PROCEDURE — 93306 TTE W/DOPPLER COMPLETE: CPT

## 2023-10-09 PROCEDURE — 99222 1ST HOSP IP/OBS MODERATE 55: CPT | Performed by: PSYCHIATRY & NEUROLOGY

## 2023-10-09 RX ORDER — DIAZEPAM 10 MG/2ML
5-10 INJECTION, SOLUTION INTRAMUSCULAR; INTRAVENOUS EVERY 30 MIN PRN
Status: DISCONTINUED | OUTPATIENT
Start: 2023-10-09 | End: 2023-10-10 | Stop reason: HOSPADM

## 2023-10-09 RX ORDER — DEXMEDETOMIDINE HYDROCHLORIDE 4 UG/ML
.1-1.2 INJECTION, SOLUTION INTRAVENOUS CONTINUOUS
Status: DISCONTINUED | OUTPATIENT
Start: 2023-10-09 | End: 2023-10-10 | Stop reason: HOSPADM

## 2023-10-09 RX ORDER — KETOROLAC TROMETHAMINE 30 MG/ML
30 INJECTION, SOLUTION INTRAMUSCULAR; INTRAVENOUS ONCE
Status: COMPLETED | OUTPATIENT
Start: 2023-10-09 | End: 2023-10-09

## 2023-10-09 RX ORDER — OLANZAPINE 5 MG/1
5 TABLET, ORALLY DISINTEGRATING ORAL
Status: DISCONTINUED | OUTPATIENT
Start: 2023-10-09 | End: 2023-10-10

## 2023-10-09 RX ORDER — FLUMAZENIL 0.1 MG/ML
0.2 INJECTION, SOLUTION INTRAVENOUS
Status: DISCONTINUED | OUTPATIENT
Start: 2023-10-09 | End: 2023-10-10 | Stop reason: HOSPADM

## 2023-10-09 RX ORDER — ACETAMINOPHEN 325 MG/1
650 TABLET ORAL EVERY 6 HOURS PRN
Status: DISCONTINUED | OUTPATIENT
Start: 2023-10-09 | End: 2023-10-10 | Stop reason: HOSPADM

## 2023-10-09 RX ORDER — HYDRALAZINE HYDROCHLORIDE 20 MG/ML
10 INJECTION INTRAMUSCULAR; INTRAVENOUS EVERY 6 HOURS PRN
Status: DISCONTINUED | OUTPATIENT
Start: 2023-10-09 | End: 2023-10-10 | Stop reason: HOSPADM

## 2023-10-09 RX ORDER — DIAZEPAM 5 MG
10 TABLET ORAL EVERY 30 MIN PRN
Status: DISCONTINUED | OUTPATIENT
Start: 2023-10-09 | End: 2023-10-10 | Stop reason: HOSPADM

## 2023-10-09 RX ADMIN — PANTOPRAZOLE SODIUM 40 MG: 40 INJECTION, POWDER, FOR SOLUTION INTRAVENOUS at 07:42

## 2023-10-09 RX ADMIN — PROPOFOL 35 MCG/KG/MIN: 10 INJECTION, EMULSION INTRAVENOUS at 13:59

## 2023-10-09 RX ADMIN — PROPOFOL 50 MCG/KG/MIN: 10 INJECTION, EMULSION INTRAVENOUS at 00:53

## 2023-10-09 RX ADMIN — ACETAMINOPHEN 650 MG: 325 TABLET, FILM COATED ORAL at 20:17

## 2023-10-09 RX ADMIN — DEXMEDETOMIDINE HYDROCHLORIDE 0.2 MCG/KG/HR: 400 INJECTION INTRAVENOUS at 11:01

## 2023-10-09 RX ADMIN — HYDRALAZINE HYDROCHLORIDE 10 MG: 20 INJECTION INTRAMUSCULAR; INTRAVENOUS at 06:23

## 2023-10-09 RX ADMIN — Medication 10 MG: at 09:05

## 2023-10-09 RX ADMIN — MIDAZOLAM HYDROCHLORIDE 6 MG/HR: 1 INJECTION, SOLUTION INTRAVENOUS at 09:09

## 2023-10-09 RX ADMIN — KETOROLAC TROMETHAMINE 30 MG: 30 INJECTION, SOLUTION INTRAMUSCULAR; INTRAVENOUS at 21:17

## 2023-10-09 RX ADMIN — PROPOFOL 75 MCG/KG/MIN: 10 INJECTION, EMULSION INTRAVENOUS at 10:02

## 2023-10-09 RX ADMIN — CHLORHEXIDINE GLUCONATE 0.12% ORAL RINSE 15 ML: 1.2 LIQUID ORAL at 07:42

## 2023-10-09 RX ADMIN — PROPOFOL 60 MCG/KG/MIN: 10 INJECTION, EMULSION INTRAVENOUS at 07:42

## 2023-10-09 RX ADMIN — HYDRALAZINE HYDROCHLORIDE 10 MG: 20 INJECTION INTRAMUSCULAR; INTRAVENOUS at 14:23

## 2023-10-09 RX ADMIN — ENOXAPARIN SODIUM 40 MG: 40 INJECTION SUBCUTANEOUS at 21:18

## 2023-10-09 RX ADMIN — PROPOFOL 50 MCG/KG/MIN: 10 INJECTION, EMULSION INTRAVENOUS at 04:19

## 2023-10-09 ASSESSMENT — ACTIVITIES OF DAILY LIVING (ADL)
ADLS_ACUITY_SCORE: 31
ADLS_ACUITY_SCORE: 29
ADLS_ACUITY_SCORE: 29
ADLS_ACUITY_SCORE: 31
ADLS_ACUITY_SCORE: 31
ADLS_ACUITY_SCORE: 29
ADLS_ACUITY_SCORE: 31
ADLS_ACUITY_SCORE: 46
ADLS_ACUITY_SCORE: 27
ADLS_ACUITY_SCORE: 31

## 2023-10-09 NOTE — PROGRESS NOTES
Admitted/transferred from: ED  Reason for admission/transfer: intubated, needing higher level of care  Patient status upon admission/transfer: pt intubated and on full vent settings, dilaudid and versed for sedation. Hypertensive, 170's/110's.   Interventions: Discontinued dilaudid and switched to prop for sedation. Wrist restraints and mitts applied for agitation/restlessness.   Plan: Continue monitoring BP's and see if sedation helps improve hypertension. RASS goal -1/-2.   2 RN skin assessment: completed by Isela GRAHAM RN and Yanni KHOURY RN  Sexual Orientation and Gender Identity (SOGI) smartfom completed: Done/Not Done  Result of skin assessment and interventions/actions: Skin intact, scattered old scars, two medication patches on abdomen. No adjustments needed.   Height, weight, drug calc weight: Done  Patient belongings (see Flowsheet - Adult Profile for details):   MDRO education (if applicable):

## 2023-10-09 NOTE — PROGRESS NOTES
[facility], Procedure Note          Extubation:       Shanda Power  MRN# [id number]   October 9, 2023, 3:10 PM         Patient extubated at: October 9, 2023, 3:10 PM   Supplemental Oxygen: Via nasal cannula at   2 liters per minute   Cough: The cough is strong   Secretion Mode: PRN suction by self   Secretion Amount: Small amount, thin and clear in color   Respiratory Exam:: Breath sounds: equal and clear     Location: bilaterally   Skin Exam:: Patient color: natural   Patient Status: Currently anxious             Pt was agitated and almost self extubated, verbal order was given by MD to extubate. Pt on  2L NC.  Recorded by Oleg Liriano, RT

## 2023-10-09 NOTE — PROGRESS NOTES
[facility]    CRITICAL CARE ATTENDING PHYSICIAN NOTE     I am managing these acute and ongoing critical issues resulting in critical condition that impairs one or more vital organ systems, incur a high probability of imminent or life-threatening deterioration in the patient's condition and providing frequent personal assessment and manipulation of medications and life support equipment.      Shanda Power is a 43 year old female with asthma, BRIAN (not on CPAP), constipation, HTN, HLD, prior CVA (no residual effects), hepatic steatosis, seizure disorder, polysubstance use disorder (cocaine, opioids, BZD, and THC), and multiple psychiatric conditions (bipolar disorder, borderline personality disorder, RANDI, PTSD, multiple suicide attempts)  admitted on 10/8/2023 for acute encephalopathy in the setting of acute intoxication.     On mechanical ventilation for airway protection: tolerated a short SBT and extubation today.   Anion Gap closed. Evaluated other potential ingestions: salicylates, EtOH, were neg.   Psych consult appreciated.   Back on medications for chronic conditions   Hypertensive emergency on prn hydralazine.      ICU Interventions: ventilator management, parenteral medications necessitating continuous monitoring including vasoactive medications, medication for heart rhythm control, insulin infusion, and/or sedative/opiates  and interpretation of lab values, cardiac output, cxr, pulse oximetry, blood gases, and/or information/data stored in electronic medical records     See MS4/RUSTAM/resident's associated note for further details.   The patient was seen and examined with the resident/RUSTAM/MS4.  We have discussed the patient in detail and I agree with the findings, assessment, and plan as documented when this note was cosigned on this day. The plan was formulated in conjunction with pharmacy, ICU nurses, and respiratory therapist. I have evaluated all laboratory values and imaging studies  for the past 24 hours. I have reviewed all the consults that have been ordered and are active for this patient.       Critical Care Time: 35 min.  I spent this time (excluding procedures) personally providing and directing critical care services at the bedside and on the critical care unit.       Sharda Treviño MD, MPH  Pulmonary, Allergy and Critical Care Medicine

## 2023-10-09 NOTE — PLAN OF CARE
"Goal Outcome Evaluation:    ICU End of Shift Summary. See flowsheets for vital signs and detailed assessment.    Changes this shift: Pt very labile throughout shift while on ventilator. Sedation weened as tolerated. At approximately 3 pm patient sitting straight up in the bed and was thrashing around all four extremeties. Pt repeatedly kicking her feet and attempting to pull at ETT, was able to disconnect ETT from ventilator. Multiple RNs and MICU 1 team at bedside. Attempted to redirect and reorient patient. Propofol stopped. Pt placed on PS. Tolerated well with good volumes and RR. Pt continued to remain extemely agitated. Decision made by MICU 1 team to extubate pt. Fellow at bedside during extubation. Tolerated well. Currently on 4 L NC and has been calm since extubation on Precedex at 0.2 mcg/kg/min. Tmax 100.5 - MICU notified. BC drawn. OGT output brown/dark red - MICU aware. Hgb trended. No s/s of bleeding at this time. Passed bedside swallow eval. Diet ordered. No BM. Pt currently sleeping, RN will endorse bob removal to NOC RN. CIWA started.     Plan: Assess CIWA. Ween o2 as tolerated     Problem: Plan of Care - These are the overarching goals to be used throughout the patient stay.    Goal: Plan of Care Review  Description: The Plan of Care Review/Shift note should be completed every shift.  The Outcome Evaluation is a brief statement about your assessment that the patient is improving, declining, or no change.  This information will be displayed automatically on your shift note.  Outcome: Progressing  Goal: Patient-Specific Goal (Individualized)  Description: You can add care plan individualizations to a care plan. Examples of Individualization might be:  \"Parent requests to be called daily at 9am for status\", \"I have a hard time hearing out of my right ear\", or \"Do not touch me to wake me up as it startles me\".  Outcome: Progressing  Goal: Absence of Hospital-Acquired Illness or Injury  Outcome: " Progressing  Intervention: Identify and Manage Fall Risk  Recent Flowsheet Documentation  Taken 10/9/2023 1600 by Favreau-Garsia, Gabriella, RN  Safety Promotion/Fall Prevention:   clutter free environment maintained   increased rounding and observation   increase visualization of patient   activity supervised   patient and family education  Taken 10/9/2023 1200 by Favreau-Garsia, Gabriella, RN  Safety Promotion/Fall Prevention:   clutter free environment maintained   increased rounding and observation   increase visualization of patient   activity supervised   patient and family education  Taken 10/9/2023 0800 by Favreau-Garsia, Gabriella, RN  Safety Promotion/Fall Prevention:   clutter free environment maintained   increased rounding and observation   increase visualization of patient   activity supervised   patient and family education  Intervention: Prevent Skin Injury  Recent Flowsheet Documentation  Taken 10/9/2023 1600 by Favreau-Garsia, Gabriella, RN  Body Position: position changed independently  Taken 10/9/2023 1500 by Favreau-Garsia, Gabriella, RN  Body Position: position changed independently  Taken 10/9/2023 1400 by Favreau-Garsia, Gabriella, RN  Body Position:   turned   left  Taken 10/9/2023 1200 by Favreau-Garsia, Gabriella, RN  Body Position:   turned   right  Taken 10/9/2023 1000 by Favreau-Garsia, Gabriella, RN  Body Position:   turned   left  Taken 10/9/2023 0800 by Favreau-Garsia, Gabriella, RN  Body Position:   turned   right  Goal: Optimal Comfort and Wellbeing  Outcome: Progressing  Intervention: Monitor Pain and Promote Comfort  Recent Flowsheet Documentation  Taken 10/9/2023 1200 by Favreau-Garsia, Gabriella, RN  Pain Management Interventions: around-the-clock dosing utilized  Taken 10/9/2023 0800 by Favreau-Garsia, Gabriella, RN  Pain Management Interventions: around-the-clock dosing utilized  Intervention: Provide Person-Centered Care  Recent Flowsheet Documentation  Taken 10/9/2023 1600  by Favreau-Garsia, Gabriella, RN  Trust Relationship/Rapport:   care explained   choices provided   reassurance provided  Taken 10/9/2023 1200 by Favreau-Garsia, Gabriella, ALEX  Trust Relationship/Rapport:   care explained   choices provided   reassurance provided  Taken 10/9/2023 0800 by Favreau-Garsia, Gabriella, RN  Trust Relationship/Rapport:   care explained   choices provided   reassurance provided  Goal: Readiness for Transition of Care  Outcome: Progressing

## 2023-10-09 NOTE — H&P
"  MEDICAL ICU H&P  10/08/2023    Date of Hospital Admission: 10/08/23   Date of ICU Admission: 10/08/23   Reason for Critical Care Admission: Acute Encephalopathy, Intubated for Airway Protection   Date of Service (when I saw the patient): 10/08/2023    ASSESSMENT: Shanda Power is a 43 year old female with PMH asthma, BRIAN (not on CPAP), constipation, HTN, HLD, prior CVA (no residual effects), hepatic steatosis, questionable seizure disorder, polysubstance use disorder (cocaine, opioids, BZD, and THC), and multiple psychiatric conditions (bipolar disorder, borderline personality disorder, RANDI, PTSD, multiple suicide attempts) who was admitted on 10/8/2023 for acute encephalopathy.     PLAN:    Neuro:  # Pain and sedation  - Propofol drip with PRN boluses    - Versed drip with PRN boluses   - RASS goal -1 - -2    # Acute Encephalopathy, Suspect Drug Induce    # Epilepsy v Pseudoseizures   She presented with altered mental status after her significant other called EMS due to her having \"5 seizures\" at home. In the ED was noted to have waxing and waning alertness followed by progression to unable to arouse with painful stimuli. She did not receive Narcan in the ED. She has a history of similar presentation in 4/2023 thought to be secondary to substance use. She has a history of ?epileptic seizures vs psychogenic seizures. Differential for her encephalopathy includes: drug-induced (h/o polysubstance use), metabolic (normal CO2 and CMP), PRES given HTN, however no other signs of end organ damage from HTN, possible seizure with post-ictal state, although less likely given she was talking during tonic/clonic event in ambulance, no evidence of tongue biting or loss of bladder/bowel continence, and minimally elevated lactic (2.3) and normal CK.  - Workup:    - CT Head without acute intracranial pathology    - TSH pending    - UDS pending   - Consider Neurocritical Care Consult pending clinical course   - Seizure " Precautions   - Neuro Checks Q4H     # Polysubstance Use   Per review of records, previous UDS positive for cocaine, opioids, and benzodiazepines. Unclear if/when her last substance use was. Per her mother, she is concerned that the patient is still using cocaine.   - UDS pending   - Monitor for withdrawal toxidromes   - Consider Addiction Medicine Consult     # Bipolar Disorder   # Borderline Personality Disorder   # H/O Multiple Suicide Attempts   # PTSD   She has an extensive psychiatry history and [facility] Per chart review has been on multiple medications in the past: Amitriptyline; Zolpidem; Diazepam; Lamotrigine; Rimegepant; Effexor (per review of chart in April). Unclear medication reconciliation on admission. During her previous hospitalization in 4/2023, she was found to have swallowed magnets. Magnets were found in her pocket on this admission with CT C/A/P without evidence of ingestion.   - Pharmacy Consult for medication reconciliation   - Consider Psychiatry Consult     Pulmonary:  # Intubated for Airway Protection  In the ED was noted to have waxing and waning alertness followed by progression to unable to arouse with painful stimuli. Given GCS < 8, she was intubated for airway protection.   - HOB > 30 degrees. Oral cares.   - SBT if passes safety checklist in AM.  - Wean FiO2 as tolerate to maintain SpO2 > 90%    Vent Mode: CMV/AC  (Continuous Mandatory Ventilation/ Assist Control)  FiO2 (%): 35 %  Resp Rate (Set): 16 breaths/min  Tidal Volume (Set, mL): 450 mL  PEEP (cm H2O): 5 cmH2O  Resp: 16    # Asthma   # BRIAN  No prior PFTs found. Per chart review, does not use CPAP.   - PRN Albuterol     Cardiovascular:  # Hypertensive Urgency   # Sinus Tachycardia   # Concern for Cocaine Toxicity  In the ED, noted to have 's and sinus tachycardia with -130 consistent with HTN urgency. No signs of end organ damage. EKG with LVH. Unsure if on any antihypertensives PTA.  HTN  urgency likely secondary to substance use.   - Consider Nitroglycerin drip if SBP > 180 despite sedation/treatment of cocaine toxicity  - TSH pending   - See sedation above   - Appreciate pharmacy medication reconciliation     GI/Nutrition:  # Nutrition   # Obesity   - NPO. Consider Nutrition if not able to be extubated 10/9.     # GI Prophylaxis   - PPI Daily     Renal/Fluids/Electrolytes:  # Hyperkalemia, Likely Hemolysis    K+ on POC 5.9 - suspect likely hemolysis.   - Repeat BMP      Endocrine:  No active issues.   - Monitor Fingerstick Glucose      ID:  No active issues.     Hematology:    No active issues.     Musculoskeletal:  # ICU Early Mobility   - PT/OT Consults     Skin:  # Pressure Injury Prevention    - Reposition Q2H    General Cares/Prophylaxis:    DVT Prophylaxis: Enoxaparin (Lovenox) SQ  GI Prophylaxis: PPI  Restraints: Ordered, Pulling lines  Family Communication: Mother, Marlin, updated via telephone   Code Status: Full Code per discussion with mother     Lines/tubes/drains:  - PIV   - ETT   - OG Tube   - Scruggs     Disposition:  - Medical ICU     Patient seen and findings/plan discussed with medical ICU staff, Dr. Kwok.    Charlene España MD      Clinically Significant Risk Factors Present on Admission        # Hyperkalemia: Highest K = 5.9 mmol/L in last 2 days, will monitor as appropriate             # Hypertension: Noted on problem list                       -----------------------------------------------------------------------    HISTORY PRESENTING ILLNESS:   Shanda Power is a 43 year old female with PMH mild cystic fibrosis, asthma, BRIAN (not on CPAP), constipation, HTN, HLD, prior CVA (no residual effects), hepatic steatosis, questionable seizure disorder, polysubstance use disorder (cocaine, opioids, BZD, and THC), and multiple psychiatric conditions (bipolar disorder, borderline personality disorder, RANDI, PTSD, multiple suicide attempts - including battery ingestion) who  "presented to the ED with altered mental status after her significant other called EMS due to her having \"5 seizures\" at home. In the ED was noted to have waxing and waning alertness followed by progression to unable to arouse with painful stimuli. She did not receive Narcan in the ED. Given GCS < 8, she was intubated for airway protection and admitted to the ICU. Unclear if any substance or medication ingestion prior to arrival.     I spoke on the phone with her mother, Marlin, who reported that she had talked with [place] (whom she referred to as Saira) on the phone earlier this afternoon and she had seemed in her usual state of health. She was unaware of any substance use recently, but asked \"Did you test her for cocaine? She probably did cocaine.\"     REVIEW OF SYSTEMS: Unable to be obtained as patient is sedated and mechanically ventilated.     PAST MEDICAL HISTORY:   No past medical history on file.  SURGICAL HISTORY:  No past surgical history on file.  SOCIAL HISTORY:     FAMILY HISTORY:   No family history on file.  ALLERGIES:   Allergies   Allergen Reactions    Haldol [Haloperidol] Anaphylaxis    Codeine Other (See Comments) and Hives     vomiting    Compazine [Prochlorperazine] Unknown    Fentanyl Unknown    Gabapentin Other (See Comments)     Face twitching    Morphine Other (See Comments) and Hives     Vomiting      Precedex [Dexmedetomidine Hcl In Nacl] Other (See Comments)     Tachy arrhythmias on 2 separate infusion trials    Reglan [Metoclopramide] Unknown    Trazodone Other (See Comments) and Hives     nausea     MEDICATIONS:  No current facility-administered medications on file prior to encounter.  No current outpatient medications on file prior to encounter.      PHYSICAL EXAMINATION:  Temp:  [98.1  F (36.7  C)-98.4  F (36.9  C)] 98.4  F (36.9  C)  Pulse:  [] 92  Resp:  [10-25] 16  BP: ()/() 124/96  FiO2 (%):  [35 %-60 %] 35 %  SpO2:  [93 %-100 %] 97 %  General: sedated, " mechanically ventilated, uncomfortable at times   HEENT: normocephalic, atraumatic, ETT in place   Neuro: sedated, moves all four extremities spontaneously, occasional cough   Pulm/Resp: Clear breath sounds bilaterally without rhonchi, diminished in bases   CV: RRR, no murmur   Abdomen: Soft, non-distended, non-tender, obese, BS+   : bob catheter in place, urine yellow and clear  Incisions/Skin: no rashes or lesions identified     LABS: Reviewed.   Arterial Blood Gases   No lab results found in last 7 days.  Complete Blood Count   Recent Labs   Lab 10/08/23  2027 10/08/23  1810 10/08/23  1759   WBC  --   --  10.4   HGB 13.3 13.3 13.2   PLT  --   --  323     Basic Metabolic Panel  Recent Labs   Lab 10/08/23  2152 10/08/23  2027 10/08/23  1810 10/08/23  1805 10/08/23  1759   NA  --  137 139  --  141   POTASSIUM  --  5.9* 3.4  --  3.5   CHLORIDE  --   --   --   --  104   CO2  --   --   --   --  23   BUN  --   --   --   --  6.6   CR  --   --   --   --  0.68   * 116* 127* 126* 125*     Liver Function Tests  Recent Labs   Lab 10/08/23  1759   AST 26   ALT 26   ALKPHOS 69   BILITOTAL 0.2   ALBUMIN 4.7     Coagulation Profile  No lab results found in last 7 days.    IMAGING:  Recent Results (from the past 24 hour(s))   XR Chest Port 1 View    Narrative    EXAM: XR CHEST PORT 1 VIEW  LOCATION: [facility]  DATE: 10/8/2023    INDICATION: Post intubation and OG XR.  COMPARISON: 09/14/2023.      Impression    IMPRESSION: Endotracheal tube is in place with tip 3.4 cm above the pastora. NG tube tip and side-port is present within the stomach. Heart and mediastinal size are normal. There is moderate pulmonary vascular congestion. No pleural effusion or   pneumothorax is seen on the supine radiograph. Cholecystectomy clips are noted.   CT Head w/o Contrast    Narrative    EXAM: CT HEAD W/O CONTRAST  LOCATION: [facility]  DATE:  10/8/2023    INDICATION: seizure, AMS  COMPARISON: 08/11/2023.  TECHNIQUE: Routine CT Head without IV contrast. Multiplanar reformats. Dose reduction techniques were used.    FINDINGS:  INTRACRANIAL CONTENTS: No intracranial hemorrhage, extraaxial collection, or mass effect.  No CT evidence of acute infarct. Minimal asymmetric mineralization of the left basal ganglia. Normal parenchymal attenuation. Normal ventricles and sulci.   Developmentally asymmetry of the lateral ventricles.    VISUALIZED ORBITS/SINUSES/MASTOIDS: No intraorbital abnormality. No paranasal sinus mucosal disease. No middle ear or mastoid effusion.    BONES/SOFT TISSUES: No acute abnormality.      Impression    IMPRESSION:  1.  No acute intracranial process.   CT Chest/Abdomen/Pelvis w Contrast    Impression    RESIDENT PRELIMINARY INTERPRETATION  IMPRESSION:   1.  CT is negative for acute pathology of the abdomen and pelvis.  2. No radiographic appearance of foreign object within the  gastrointestinal system.   3. Bilateral small pleural effusions with associated atelectasis.
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
## 2023-10-09 NOTE — PROGRESS NOTES
"  MEDICAL ICU PROGRESS NOTE  10/09/2023      Date of Service (when I saw the patient): 10/09/2023    ASSESSMENT: Shanda Power is a 43 year old female with PMH asthma, BRIAN (not on CPAP), constipation, HTN, HLD, prior CVA (no residual effects), hepatic steatosis, questionable seizure disorder, polysubstance use disorder (cocaine, opioids, BZD, and THC), and multiple psychiatric conditions (bipolar disorder, borderline personality disorder, RANDI, PTSD, multiple suicide attempts) who was admitted on 10/8/2023 for acute encephalopathy.      CHANGES and MAJOR THINGS TODAY:   - Adding Precedex  - Anion gap, improving   - Alcohol, beta hydroxybutyrate pending   - Osm labs  - Waiting on poison control recs  - Elevated white count and febrile, BC pending  - Weaning vent today      PLAN:    Neuro:  # Pain and sedation  - Propofol drip with PRN boluses    - Versed drip with PRN boluses   - Precedex drip  - RASS goal -1 to -2     # Acute Encephalopathy, Suspect Drug Induce    # Epilepsy v Pseudoseizures   She presented with altered mental status after her significant other called EMS due to her having \"5 seizures\" at home. In the ED was noted to have waxing and waning alertness followed by progression to unable to arouse with painful stimuli. She did not receive Narcan in the ED. She has a history of similar presentation in 4/2023 thought to be secondary to substance use. She has a history of ?epileptic seizures vs psychogenic seizures. Encephalopathy likely substance induced. Possible seizure with post-ictal state, although less likely given she was talking during tonic/clonic event in ambulance, no evidence of tongue biting or loss of bladder/bowel continence, and minimally elevated lactic (2.3) and normal CK. Urine with a green tint and ~1L of brown fluids from his OG. New elevated anion gap on labs.  - Poison control consult   - alcohol & beta hydroxybutyrate pending   - ASA, tylenol levels pending   - Trend BMP " "q6h  - Workup:                - CT Head without acute intracranial pathology                - TSH normal               - UDS positive for cocaine   - osm labs pending  - Seizure Precautions   - Neuro Checks Q4H      # Polysubstance Use   Per review of records, previous UDS positive for cocaine, opioids, and benzodiazepines. Unclear if/when her last substance use was. UDS on admission positive for cocaine.   - Alcohol level pending  - Monitor for withdrawal toxidromes   - Consider Addiction Medicine Consult      # Bipolar Disorder   # Borderline Personality Disorder   # H/O Multiple Suicide Attempts   # PTSD   She has an extensive psychiatry history and [facility] Per chart review has been on multiple medications in the past: Amitriptyline; Zolpidem; Diazepam; Lamotrigine; Rimegepant; Effexor (per review of chart in April). Unclear medication reconciliation on admission. Patient has another chart under the name of \"Shanda Taveras". During her previous hospitalization in 4/2023, she was found to have swallowed magnets. Magnets were found in her pocket on this admission. CT C/A/P without evidence of ingestion.   - Pharmacy Consult for medication reconciliation   - Consider Psychiatry Consult      Pulmonary:  # Intubated for Airway Protection  In the ED was noted to have waxing and waning alertness followed by progression to unable to arouse with painful stimuli. Given GCS < 8, she was intubated for airway protection.   - HOB > 30 degrees. Oral cares.   - SBT this AM  - Wean FiO2 as tolerate to maintain SpO2 > 90%     Vent Mode: CMV/AC  (Continuous Mandatory Ventilation/ Assist Control)  FiO2 (%): 35 %  Resp Rate (Set): 16 breaths/min  Tidal Volume (Set, mL): 450 mL  PEEP (cm H2O): 5 cmH2O  Resp: 18     # Asthma   # BRIAN  No prior PFTs found. Per chart review, does not use CPAP.   - PRN Albuterol      Cardiovascular:  # Hypertensive Urgency   # Sinus Tachycardia   # Concern for Cocaine " Toxicity  In the ED, noted to have 's and sinus tachycardia with -130 consistent with HTN urgency. No signs of end organ damage. TSH normal. EKG with LVH. Unsure if on any antihypertensives PTA.  HTN urgency likely secondary to substance use.   - PRN hydralazine for SBP >180 or DBP>110  - See sedation above   - Appreciate pharmacy medication reconciliation      GI/Nutrition:  # Nutrition   # Obesity   - NPO. Consider Nutrition if not able to be extubated 10/9.     # GI Prophylaxis   - PPI Daily      Renal/Fluids/Electrolytes:  # Elevated anion gap  Anion gap of 18, recheck normalized. Depending on osm labs could consider further workup.  - Blood & urine osms pending    # Hyperkalemia, Likely Hemolysis. Resolved  K+ on POC 5.9 - suspect likely hemolysis. Normalized on admission.  - Monitor with BMP       Endocrine:  No active issues.   - Monitor Fingerstick Glucose       ID:  # Leukocytosis  # Fever  Temp >100. HR approaching 100. White count 16.1.   - Follow blood cultures     Hematology:    No active issues.      Musculoskeletal:  # ICU Early Mobility   - PT/OT Consults      Skin:  # Pressure Injury Prevention      - Reposition Q2H     General Cares/Prophylaxis:    DVT Prophylaxis: Enoxaparin (Lovenox) SQ  GI Prophylaxis: PPI  Restraints: Ordered, Pulling lines  Family Communication: MotherMarlin, lives in [place]  Code Status: Full Code per discussion with mother      Lines/tubes/drains:  - PIV x2  - ETT   - OG Tube   - Scruggs      Disposition:  - Medical ICU     Patient seen and findings/plan discussed with medical ICU staff, Dr. Treviño.    Heydi Sanchez  MS4  [facility]         Resident/Fellow Attestation   I, Chantelle Gibbs MD, was present with the medical/RUSTAM student who participated in the service and in the documentation of the note.  I have verified the history and personally performed the physical exam and medical decision making.  I agree with the assessment and plan of care as  documented in the note.      Chantelle Gibbs MD  PGY2  Date of Service (when I saw the patient): 10/09/23       ====================================  INTERVAL HISTORY:   - wrist restraints for agitation/restlessness  - OG draining brown substance  - Green tinted urine    OBJECTIVE:   1. VITAL SIGNS:   Temp:  [98.1  F (36.7  C)-99.4  F (37.4  C)] 99.4  F (37.4  C)  Pulse:  [] 94  Resp:  [10-25] 18  BP: ()/() 172/114  FiO2 (%):  [35 %-60 %] 35 %  SpO2:  [93 %-100 %] 99 %  Vent Mode: CMV/AC  (Continuous Mandatory Ventilation/ Assist Control)  FiO2 (%): 35 %  Resp Rate (Set): 16 breaths/min  Tidal Volume (Set, mL): 450 mL  PEEP (cm H2O): 5 cmH2O  Resp: 18    2. INTAKE/ OUTPUT:   I/O last 3 completed shifts:  In: -   Out: 1000 [Urine:1000]    3. PHYSICAL EXAMINATION:  General: sedated, mechanically ventilated, NAD  HEENT: normocephalic, atraumatic, ETT in place   Neuro: sedated, moves all four extremities spontaneously  Pulm/Resp: Clear breath sounds bilaterally without rhonchi  CV: tachycardia, regular rhythm, no murmur   Abdomen: Soft, non-distended, non-tender, obese, BS+   : bob catheter in place, urine clear with green tint  Incisions/Skin: no rashes or lesions identified     4. LABS:   Arterial Blood Gases   No lab results found in last 7 days.  Complete Blood Count   Recent Labs   Lab 10/08/23  2027 10/08/23  1810 10/08/23  1759   WBC  --   --  10.4   HGB 13.3 13.3 13.2   PLT  --   --  323     Basic Metabolic Panel  Recent Labs   Lab 10/09/23  0354 10/08/23  2317 10/08/23  2152 10/08/23  2027 10/08/23  1810 10/08/23  1805 10/08/23  1759   NA  --  137  --  137 139  --  141   POTASSIUM  --  3.9  --  5.9* 3.4  --  3.5   CHLORIDE  --  102  --   --   --   --  104   CO2  --  22  --   --   --   --  23   BUN  --  6.5  --   --   --   --  6.6   CR  --  0.64  --   --   --   --  0.68   * 137* 125* 116* 127*   < > 125*    < > = values in this interval not displayed.     Liver Function Tests  Recent  Labs   Lab 10/08/23  1759   AST 26   ALT 26   ALKPHOS 69   BILITOTAL 0.2   ALBUMIN 4.7     Coagulation Profile  No lab results found in last 7 days.    5. RADIOLOGY:   Recent Results (from the past 24 hour(s))   XR Chest Port 1 View    Narrative    EXAM: XR CHEST PORT 1 VIEW  LOCATION: [facility]  DATE: 10/8/2023    INDICATION: Post intubation and OG XR.  COMPARISON: 09/14/2023.      Impression    IMPRESSION: Endotracheal tube is in place with tip 3.4 cm above the pastora. NG tube tip and side-port is present within the stomach. Heart and mediastinal size are normal. There is moderate pulmonary vascular congestion. No pleural effusion or   pneumothorax is seen on the supine radiograph. Cholecystectomy clips are noted.   CT Head w/o Contrast    Narrative    EXAM: CT HEAD W/O CONTRAST  LOCATION: [facility]  DATE: 10/8/2023    INDICATION: seizure, AMS  COMPARISON: 08/11/2023.  TECHNIQUE: Routine CT Head without IV contrast. Multiplanar reformats. Dose reduction techniques were used.    FINDINGS:  INTRACRANIAL CONTENTS: No intracranial hemorrhage, extraaxial collection, or mass effect.  No CT evidence of acute infarct. Minimal asymmetric mineralization of the left basal ganglia. Normal parenchymal attenuation. Normal ventricles and sulci.   Developmentally asymmetry of the lateral ventricles.    VISUALIZED ORBITS/SINUSES/MASTOIDS: No intraorbital abnormality. No paranasal sinus mucosal disease. No middle ear or mastoid effusion.    BONES/SOFT TISSUES: No acute abnormality.      Impression    IMPRESSION:  1.  No acute intracranial process.   CT Chest/Abdomen/Pelvis w Contrast    Narrative    EXAMINATION: CT CHEST/ABDOMEN/PELVIS W CONTRAST, /8/2023 9:48 PM    COMPARISON STUDY: None.    INDICATION: AMS, hx of ingestion of batteries, SI    TECHNIQUE: CT scan of the chest, abdomen and pelvis was performed on  multidetector CT scanner  using volumetric acquisition technique and  images were reconstructed in multiple planes with variable thickness  and reviewed on dedicated workstations.     CONTRAST: 122ml Isovue 370. Without oral contrast.    CT scan radiation dose is optimized to minimum requisite dose using  automated dose modulation techniques.    Findings:     Chest:  Lungs: Bilateral small pleural effusions with associated atelectasis.  Endotracheal tube tip within mid thoracic trachea.    Mediastinum: Cardiac size within normal limits. No pericardial  effusion. Aorta and main pulmonary tablet are within normal limits. No  mediastinal lymphadenopathy. Esophagus with enteric tube, otherwise  unremarkable.        Abdomen/Pelvis:    Liver: No mass or intrahepatic biliary dilatation.    Gallbladder/CBD: Gallbladder surgically absent. Common bile duct is  within normal limits.    Pancreas: Hypoattenuating pancreatic lesion at the body of the  pancreas measuring up to 5 mm (series 4, image 127).    Spleen: Incidental splenules, otherwise unremarkable.    Adrenal glands: Normal.    Kidneys/ureters/bladder: Symmetric nephrogram without obstructing  renal stones, hydronephrosis or renal masses. Scattered subcortical  hypodensities, too small to characterize.    Genitourinary/reproductive tract: Decompressed bladder with Scruggs  catheter. Postsurgical changes of hysterectomy.    Gastrointestinal: Normal caliber small and large bowel. Appendix is  unremarkable. Stomach is within normal limits.    Vasculature: Patent abdominal vasculature.    Retroperitoneum/peritoneum/mesentery: No free air or fluid or  intra-abdominal mass. No lymphadenopathy.    Bones: No acute or aggressive appearing osseous abnormality.  Multilevel degenerative changes of the spine.    Soft tissues: Small umbilical hernia, otherwise unremarkable.      Impression    IMPRESSION:   1.  CT is negative for acute pathology of the abdomen and pelvis.  2. No radiographic appearance of  foreign object within the  gastrointestinal system.   3. Bilateral small pleural effusions with associated atelectasis.

## 2023-10-09 NOTE — PROGRESS NOTES
Assisted with endotracheal intubation for airway protection. A #7.5 ETT was placed and secured at 21 cm @ gums. Positive color change noted with CO2 detector, breath sounds were equal bilaterally. Patient placed on full vent support, labs and CXR pending. CMV R16/450/+5/60%. Pt transported to CT and then to  from ED with no complications.    Patients ETT was secured with anchorfast.      Mavis Naidu, RT, RT  10/8/2023 9:52 PM

## 2023-10-09 NOTE — CONSULTS
I have some suggestions on my pended note from today (I need to speak to her some more tomorrow, try to obtain collateral information).

## 2023-10-10 ENCOUNTER — APPOINTMENT (OUTPATIENT)
Dept: OCCUPATIONAL THERAPY | Facility: CLINIC | Age: 43
DRG: 917 | End: 2023-10-10
Payer: COMMERCIAL

## 2023-10-10 VITALS
TEMPERATURE: 98.1 F | DIASTOLIC BLOOD PRESSURE: 89 MMHG | RESPIRATION RATE: 15 BRPM | WEIGHT: 212.08 LBS | HEART RATE: 94 BPM | OXYGEN SATURATION: 94 % | SYSTOLIC BLOOD PRESSURE: 156 MMHG

## 2023-10-10 LAB
ALBUMIN SERPL BCG-MCNC: 4 G/DL (ref 3.5–5.2)
ALP SERPL-CCNC: 61 U/L (ref 35–104)
ALT SERPL W P-5'-P-CCNC: 16 U/L (ref 0–50)
ANION GAP SERPL CALCULATED.3IONS-SCNC: 11 MMOL/L (ref 7–15)
AST SERPL W P-5'-P-CCNC: 15 U/L (ref 0–45)
BACTERIA UR CULT: NO GROWTH
BASE EXCESS BLDV CALC-SCNC: 5 MMOL/L (ref -7.7–1.9)
BILIRUB SERPL-MCNC: 0.5 MG/DL
BUN SERPL-MCNC: 17 MG/DL (ref 6–20)
CALCIUM SERPL-MCNC: 9.1 MG/DL (ref 8.6–10)
CHLORIDE SERPL-SCNC: 99 MMOL/L (ref 98–107)
CREAT SERPL-MCNC: 0.93 MG/DL (ref 0.51–0.95)
DEPRECATED HCO3 PLAS-SCNC: 27 MMOL/L (ref 22–29)
EGFRCR SERPLBLD CKD-EPI 2021: 77 ML/MIN/1.73M2
ERYTHROCYTE [DISTWIDTH] IN BLOOD BY AUTOMATED COUNT: 14.1 % (ref 10–15)
GLUCOSE BLDC GLUCOMTR-MCNC: 111 MG/DL (ref 70–99)
GLUCOSE BLDC GLUCOMTR-MCNC: 97 MG/DL (ref 70–99)
GLUCOSE BLDC GLUCOMTR-MCNC: 97 MG/DL (ref 70–99)
GLUCOSE SERPL-MCNC: 89 MG/DL (ref 70–99)
HCO3 BLDV-SCNC: 30 MMOL/L (ref 21–28)
HCT VFR BLD AUTO: 37.5 % (ref 35–47)
HCV AB SERPL QL IA: NONREACTIVE
HGB BLD-MCNC: 12.4 G/DL (ref 11.7–15.7)
HIV 1+2 AB+HIV1 P24 AG SERPL QL IA: NONREACTIVE
MCH RBC QN AUTO: 29.3 PG (ref 26.5–33)
MCHC RBC AUTO-ENTMCNC: 33.1 G/DL (ref 31.5–36.5)
MCV RBC AUTO: 89 FL (ref 78–100)
O2/TOTAL GAS SETTING VFR VENT: 0 %
PCO2 BLDV: 42 MM HG (ref 40–50)
PH BLDV: 7.45 [PH] (ref 7.32–7.43)
PLATELET # BLD AUTO: 307 10E3/UL (ref 150–450)
PO2 BLDV: 47 MM HG (ref 25–47)
POTASSIUM SERPL-SCNC: 3.6 MMOL/L (ref 3.4–5.3)
PROT SERPL-MCNC: 6.6 G/DL (ref 6.4–8.3)
RBC # BLD AUTO: 4.23 10E6/UL (ref 3.8–5.2)
SODIUM SERPL-SCNC: 137 MMOL/L (ref 135–145)
WBC # BLD AUTO: 12.7 10E3/UL (ref 4–11)

## 2023-10-10 PROCEDURE — 85027 COMPLETE CBC AUTOMATED: CPT

## 2023-10-10 PROCEDURE — 999N000147 HC STATISTIC PT IP EVAL DEFER

## 2023-10-10 PROCEDURE — 80053 COMPREHEN METABOLIC PANEL: CPT

## 2023-10-10 PROCEDURE — 250N000013 HC RX MED GY IP 250 OP 250 PS 637: Performed by: STUDENT IN AN ORGANIZED HEALTH CARE EDUCATION/TRAINING PROGRAM

## 2023-10-10 PROCEDURE — 99239 HOSP IP/OBS DSCHRG MGMT >30: CPT | Mod: GC | Performed by: INTERNAL MEDICINE

## 2023-10-10 PROCEDURE — 97535 SELF CARE MNGMENT TRAINING: CPT | Mod: GO | Performed by: OCCUPATIONAL THERAPIST

## 2023-10-10 PROCEDURE — 82803 BLOOD GASES ANY COMBINATION: CPT

## 2023-10-10 PROCEDURE — 36415 COLL VENOUS BLD VENIPUNCTURE: CPT

## 2023-10-10 PROCEDURE — 250N000009 HC RX 250

## 2023-10-10 PROCEDURE — 97165 OT EVAL LOW COMPLEX 30 MIN: CPT | Mod: GO | Performed by: OCCUPATIONAL THERAPIST

## 2023-10-10 PROCEDURE — 87389 HIV-1 AG W/HIV-1&-2 AB AG IA: CPT

## 2023-10-10 PROCEDURE — 93010 ELECTROCARDIOGRAM REPORT: CPT | Performed by: INTERNAL MEDICINE

## 2023-10-10 PROCEDURE — 93005 ELECTROCARDIOGRAM TRACING: CPT

## 2023-10-10 PROCEDURE — 86803 HEPATITIS C AB TEST: CPT

## 2023-10-10 RX ORDER — OLANZAPINE 5 MG/1
5 TABLET, ORALLY DISINTEGRATING ORAL 3 TIMES DAILY PRN
Status: DISCONTINUED | OUTPATIENT
Start: 2023-10-10 | End: 2023-10-10 | Stop reason: HOSPADM

## 2023-10-10 RX ORDER — OLANZAPINE 5 MG/1
5 TABLET, ORALLY DISINTEGRATING ORAL
Status: DISCONTINUED | OUTPATIENT
Start: 2023-10-10 | End: 2023-10-10 | Stop reason: HOSPADM

## 2023-10-10 RX ADMIN — ACETAMINOPHEN 650 MG: 325 TABLET, FILM COATED ORAL at 11:28

## 2023-10-10 RX ADMIN — DEXMEDETOMIDINE HYDROCHLORIDE 0.1 MCG/KG/HR: 400 INJECTION INTRAVENOUS at 03:20

## 2023-10-10 ASSESSMENT — ACTIVITIES OF DAILY LIVING (ADL)
ADLS_ACUITY_SCORE: 29
ADLS_ACUITY_SCORE: 27
ADLS_ACUITY_SCORE: 29
ADLS_ACUITY_SCORE: 29
PREVIOUS_RESPONSIBILITIES: MEAL PREP;HOUSEKEEPING;LAUNDRY;SHOPPING;MEDICATION MANAGEMENT;FINANCES;DRIVING;SCHOOL

## 2023-10-10 NOTE — PLAN OF CARE
Occupational Therapy Discharge Summary    Reason for therapy discharge:    All goals and outcomes met, no further needs identified.    Progress towards therapy goal(s). See goals on Care Plan in [facility] electronic health record for goal details.  Pt. S/I with BADls and functional mobility, amb. Without AD. No OT equipment needs at this time.     Therapy recommendation(s):    No further therapy is recommended.

## 2023-10-10 NOTE — PROGRESS NOTES
[facility]     CRITICAL CARE ATTENDING PHYSICIAN NOTE     I am managing these acute and ongoing critical issues resulting in critical condition that impairs one or more vital organ systems, incur a high probability of imminent or life-threatening deterioration in the patient's condition and providing frequent personal assessment and manipulation of medications and life support equipment.      Shanda Power is a 43 year old female with asthma, BRIAN (not on CPAP), constipation, HTN, HLD, prior CVA (no residual effects), hepatic steatosis, seizure disorder, polysubstance use disorder (cocaine, opioids, BZD, and THC), and multiple psychiatric conditions (bipolar disorder, borderline personality disorder, RANDI, PTSD, multiple suicide attempts)  admitted on 10/8/2023 for acute encephalopathy in the setting of acute intoxication.      On mechanical ventilation for airway protection: resolved.   Anion Gap closed. Evaluated other potential ingestions: salicylates, EtOH, were neg.   Psych consult appreciated.   Back on medications for chronic conditions   Hypertensive emergency on prn hydralazine.   No insight on current addiction problems. Not interested in meeting with addiction medicine today.        See MS4/RUSTAM/resident's associated note for further details.   The patient was seen and examined with the resident/RUSTAM/MS4.  We have discussed the patient in detail and I agree with the findings, assessment, and plan as documented when this note was cosigned on this day. The plan was formulated in conjunction with pharmacy, ICU nurses, and respiratory therapist. I have evaluated all laboratory values and imaging studies for the past 24 hours. I have reviewed all the consults that have been ordered and are active for this patient.       Critical Care Time: 35 min.  I spent this time (excluding procedures) personally providing and directing critical care services at the bedside and on the critical care  unit.       Sharda Treviño MD, MPH  Pulmonary, Allergy and Critical Care Medicine      Clinically Significant Risk Factors        # Hyperkalemia: Highest K = 5.9 mmol/L in last 2 days, will monitor as appropriate           # Hypertension: Noted on problem list

## 2023-10-10 NOTE — DISCHARGE SUMMARY
[facility]  Discharge Summary - Medicine & Pediatrics       Date of Admission:  10/8/2023  Date of Discharge:  10/10/2023  Discharging Provider: Sharda Treviño MD  Discharge Service: Hospitalist Service    Discharge Diagnoses     Acute Encephalopathy, Suspect Drug Induce    Epilepsy v Pseudoseizures   Polysubstance Use   Bipolar Disorder   Borderline Personality Disorder   H/O Multiple Suicide Attempts   PTSD   Asthma   BRIAN  Hypertensive Urgency   Sinus Tachycardia   Concern for Cocaine Toxicity  Obesity   Elevated anion gap  Hyperkalemia  Leukocytosis  Fever    Clinically Significant Risk Factors          Follow-ups Needed After Discharge   Follow-up Appointments     Adult [facility] Follow-up and recommended labs and tests      Follow up with primary care provider, Physician No Ref-Primary, within 7   days for hospital follow- up.  No follow up labs or test are needed.      Appointments on [place] and/or [facility] (with [facility] or [facility]   provider or service). Call [phone] if you haven't heard regarding   these appointments within 7 days of discharge.        - Follow-up HIV, hepatitis panel  - Consider Addiction Medicine consult (patient not interested during admission)  - Follow-up with Psychiatry    Unresulted Labs Ordered in the Past 30 Days of this Admission       Date and Time Order Name Status Description    10/10/2023  9:13 AM HIV Antigen Antibody Combo Cascade In process     10/10/2023  8:54 AM Hepatitis C Screen Reflex to HCV RNA Quant and Genotype In process     10/9/2023 12:50 PM Blood Culture Foot, Right Preliminary     10/9/2023 12:50 PM Blood Culture Foot, Left Preliminary     10/8/2023  9:31 PM Urine Drug Confirmation Panel In process         These results will be followed up by PCP    Discharge Disposition   Discharged to home  Condition at discharge: Stable    Hospital Course   ASSESSMENT:   Shanda Power is a 43 year old female with PMH  "asthma, BRIAN (not on CPAP), constipation, HTN, HLD, prior CVA (no residual effects), hepatic steatosis, questionable seizure disorder, polysubstance use disorder (cocaine, opioids, BZD, and THC), and multiple psychiatric conditions (bipolar disorder, borderline personality disorder, RANDI, PTSD, multiple suicide attempts) who was admitted on 10/8/2023 for acute encephalopathy in setting of cocaine ingestion extubated on 10/9/23.     Neuro:  # Pain and sedation  Initially sedated with propofol and switched to precedex while intubated. All sedation medications discontinue prior to discharge.   - Continue Tylenol PRN     # Acute Encephalopathy, Suspect Drug Induce- resolved   # Epilepsy v PNES  She presented with altered mental status after her significant other called EMS due to her having \"5 seizures\" at home. In the ED was noted to have waxing and waning alertness followed by progression to unable to arouse with painful stimuli. She did not receive Narcan in the ED. She has a history of similar presentation in 4/2023 thought to be secondary to substance use. She has a history of ?epileptic seizures vs psychogenic seizures. Encephalopathy likely substance induced. Possible seizure with post-ictal state, although less likely given she was talking during tonic/clonic event in ambulance, no evidence of tongue biting or loss of bladder/bowel continence, and minimally elevated lactic (2.3) and normal CK. 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 Urine with a green tint and brown fluids from her OG. Hgb has been stable upon admission. Discussed with poison control, after anion gap closed only recommended supportive cares. Urine/serum osmolality normal. CT head w/o pathology. Mental status improved following extubation.      # Polysubstance Use   Per review of records, previous UDS positive for cocaine, opioids, and benzodiazepines. Unclear if/when her last substance use was. UDS on admission " "positive for cocaine. Negative alcohol level on admission. Patient denies any additional recreational drug use prior to admission.   - Consider addiction medicine consult in outpatient setting   - Patient declined services during this admission     # Bipolar Disorder   # Borderline Personality Disorder   # H/O Multiple Suicide Attempts   # PTSD   She has an extensive psychiat[facility] history [facility] Per [facility] review has been on multiple medications in the past: Amitriptyline; Zolpidem; Diazepam; Lamotrigine; Rimegepant; Effexor (per review of chart in April). Unclear medication reconciliation on admission. Patient has another chart under the name of \"Shanda Taveras". During her previous hospitalization in 4/2023, she was found to have swallowed magnets. Magnets were found in her pocket on this admission. CT C/A/P without evidence of ingestion.   - Continue following with psychiatry as outpatient      Pulmonary:  # Intubated for Airway Protection  In the ED was noted to have waxing and waning alertness followed by progression to unable to arouse with painful stimuli. Given GCS < 8, she was intubated for airway protection. Extubated 10/9 and was able to breath comfortably without oxygen supplementation for 24 hours prior to discharge.      # Asthma   # BRIAN  No prior PFTs found. Per chart review, does not use CPAP.   - PRN Albuterol      Cardiovascular:  # Hypertensive Urgency   # Sinus Tachycardia   # Concern for Cocaine Toxicity  In the ED, noted to have 's and sinus tachycardia with -130 consistent with HTN urgency. No signs of end organ damage. TSH normal. EKG with LVH. Unsure if on any antihypertensives PTA.  HTN urgency likely secondary to substance use.  Managed with PRN hydralazine for SBP >180 or DBP>110.  - Close follow-up with PCP for ongoing blood pressure monitoring     GI/Nutrition:  # Nutrition   # Obesity   - Regular diet     # GI Prophylaxis   PPI Daily while " intubated. Discontinued upon extubation.      Renal/Fluids/Electrolytes:  # Elevated anion gap, resolved  Initially noted to have anion gap of 20, resolved on subsequent BMP.      # Hyperkalemia, Likely Hemolysis. Resolved  K+ on POC 5.9 - suspect likely hemolysis. Normalized during admission.       ID:  # Leukocytosis  # Fever  Temp >100. HR approaching 100. White count 16.1. Ngtd for blood and urine cultures. WBC improved without initiation of antibiotics.      Musculoskeletal:  # ICU Early Mobility   - PT/OT Consults      Skin:  # Pressure Injury Prevention      - Reposition Q2H    Consultations This Hospital Stay   PHYSICAL THERAPY ADULT IP CONSULT  OCCUPATIONAL THERAPY ADULT IP CONSULT  MEDICATION HISTORY IP PHARMACY CONSULT  PSYCHIATRY IP CONSULT    Code Status   Full Code       The patient was discussed with Dr. Hudson Gibbs MD  [facility] Service  [facility] [address] [place] [id number]  Phone: [phone]  ______________________________________________________________________    Physical Exam   Vital Signs: Temp: 98.1  F (36.7  C) Temp src: Axillary BP: (!) 156/89 Pulse: 94   Resp: 15 SpO2: 94 % O2 Device: None (Room air) Oxygen Delivery: 2 LPM  Weight: 212 lbs 1.32 oz  Physical Exam  Constitutional:       General: She is not in acute distress.     Appearance: She is not ill-appearing.   HENT:      Head: Normocephalic and atraumatic.   Eyes:      Extraocular Movements: Extraocular movements intact.      Conjunctiva/sclera: Conjunctivae normal.   Cardiovascular:      Rate and Rhythm: Normal rate and regular rhythm.   Pulmonary:      Effort: Pulmonary effort is normal.      Breath sounds: Normal breath sounds.   Abdominal:      General: There is no distension.      Palpations: Abdomen is soft.      Tenderness: There is no abdominal tenderness.   Musculoskeletal:      Right lower leg: No edema.      Left lower leg: No edema.   Skin:     General: Skin is  warm and dry.      Coloration: Skin is not jaundiced.   Neurological:      General: No focal deficit present.      Mental Status: She is alert. Mental status is at baseline.   Psychiatric:         Mood and Affect: Affect is tearful.       Primary Care Physician   Physician No Ref-Primary    Discharge Orders      Adult Mental Health  Referral      Reason for your hospital stay    You were admitted to the hospital after being found unresponsive. You were intubated in the ED to protect your airway and help you breath. We were able to remove the breathing tube while you were admitted and the rest of your vital signs remained stable during your hospital admission.     Activity    Your activity upon discharge: activity as tolerated     Adult [facility] Follow-up and recommended labs and tests    Follow up with primary care provider, Physician No Ref-Primary, within 7 days for hospital follow- up.  No follow up labs or test are needed.      Appointments on [place] and/or [facility] (with [facility] or [facility] provider or service). Call [phone] if you haven't heard regarding these appointments within 7 days of discharge.     Diet    Follow this diet upon discharge: Orders Placed This Encounter      Soft & Bite Sized Diet (level 6) Thin Liquids (level 0)       Significant Results and Procedures   Most Recent 3 CBC's:  Recent Labs   Lab Test 10/10/23  0756 10/09/23  1207 10/09/23  0822 10/08/23  1810 10/08/23  1759   WBC 12.7*  --  16.1*  --  10.4   HGB 12.4 12.8 13.5   < > 13.2   MCV 89  --  89  --  87     --  331  --  323    < > = values in this interval not displayed.     Most Recent 3 BMP's:  Recent Labs   Lab Test 10/10/23  0805 10/10/23  0756 10/10/23  0419 10/09/23  2025 10/09/23  1052 10/09/23  0712   NA  --  137  --   --  140 137   POTASSIUM  --  3.6  --   --  3.4 4.3   CHLORIDE  --  99  --   --  100 99   CO2  --  27  --   --  26 20*   BUN  --  17.0  --   --  9.3 8.4   CR  --  0.93  --   --  0.72  0.67   ANIONGAP  --  11  --   --  14 18*   CASSIE  --  9.1  --   --  9.0 9.2   GLC 97 89 97   < > 111* 116*    < > = values in this interval not displayed.     Most Recent 2 LFT's:  Recent Labs   Lab Test 10/10/23  0756 10/09/23  0712   AST 15 24   ALT 16 16   ALKPHOS 61 64   BILITOTAL 0.5 0.2   ,   Results for orders placed or performed during the hospital encounter of 10/08/23   CT Head w/o Contrast    Narrative    EXAM: CT HEAD W/O CONTRAST  LOCATION: [facility]  DATE: 10/8/2023    INDICATION: seizure, AMS  COMPARISON: 08/11/2023.  TECHNIQUE: Routine CT Head without IV contrast. Multiplanar reformats. Dose reduction techniques were used.    FINDINGS:  INTRACRANIAL CONTENTS: No intracranial hemorrhage, extraaxial collection, or mass effect.  No CT evidence of acute infarct. Minimal asymmetric mineralization of the left basal ganglia. Normal parenchymal attenuation. Normal ventricles and sulci.   Developmentally asymmetry of the lateral ventricles.    VISUALIZED ORBITS/SINUSES/MASTOIDS: No intraorbital abnormality. No paranasal sinus mucosal disease. No middle ear or mastoid effusion.    BONES/SOFT TISSUES: No acute abnormality.      Impression    IMPRESSION:  1.  No acute intracranial process.   CT Chest/Abdomen/Pelvis w Contrast    Narrative    EXAMINATION: CT CHEST/ABDOMEN/PELVIS W CONTRAST, /8/2023 9:48 PM    COMPARISON STUDY: None.    INDICATION: AMS, hx of ingestion of batteries, SI    TECHNIQUE: CT scan of the chest, abdomen and pelvis was performed on  multidetector CT scanner using volumetric acquisition technique and  images were reconstructed in multiple planes with variable thickness  and reviewed on dedicated workstations.     CONTRAST: 122ml Isovue 370. Without oral contrast.    CT scan radiation dose is optimized to minimum requisite dose using  automated dose modulation techniques.    Findings:     Chest:  Lungs: Bilateral small pleural effusions with  associated atelectasis.  Endotracheal tube tip within mid thoracic trachea.    Mediastinum: Cardiac size within normal limits. No pericardial  effusion. Aorta and main pulmonary tablet are within normal limits. No  mediastinal lymphadenopathy. Esophagus with enteric tube, otherwise  unremarkable.        Abdomen/Pelvis:    Liver: No mass or intrahepatic biliary dilatation.    Gallbladder/CBD: Gallbladder surgically absent. Common bile duct is  within normal limits.    Pancreas: Unremarkable.    Spleen: Incidental splenules, otherwise unremarkable.    Adrenal glands: Normal.    Kidneys/ureters/bladder: Symmetric nephrogram without obstructing  renal stones, hydronephrosis or renal masses. Scattered subcortical  hypodensities, too small to characterize.    Genitourinary/reproductive tract: Decompressed bladder with Scruggs  catheter. Postsurgical changes of hysterectomy.    Gastrointestinal: Normal caliber small and large bowel. Appendix is  unremarkable. Stomach is within normal limits.    Vasculature: Patent abdominal vasculature.    Retroperitoneum/peritoneum/mesentery: No free air or fluid or  intra-abdominal mass. No lymphadenopathy.    Bones: No acute or aggressive appearing osseous abnormality.  Multilevel degenerative changes of the spine.    Soft tissues: Small umbilical hernia, otherwise unremarkable.      Impression    IMPRESSION:   1.  CT is negative for acute pathology of the abdomen and pelvis.  2. No radiographic appearance of foreign object within the  gastrointestinal system.   3. Bilateral small pleural effusions with associated atelectasis.       I have personally reviewed the examination and initial interpretation  and I agree with the findings.    MUKUL REGALADO MD         SYSTEM ID:  [id number]   XR Chest Port 1 View    Narrative    EXAM: XR CHEST PORT 1 VIEW  LOCATION: [facility]  DATE: 10/8/2023    INDICATION: Post intubation and OG XR.  COMPARISON:  2023.      Impression    IMPRESSION: Endotracheal tube is in place with tip 3.4 cm above the pastora. NG tube tip and side-port is present within the stomach. Heart and mediastinal size are normal. There is moderate pulmonary vascular congestion. No pleural effusion or   pneumothorax is seen on the supine radiograph. Cholecystectomy clips are noted.   Echo Complete     Value    LVEF  60-65%    Narrative    [id number]  [id number]  [id number]  [id number]^JESSICA^PAVEL     [facility]  Echocardiography Laboratory  [address] [place] [address]     Name: BIANCA KUMAR  MRN: [id number]  : 1980  Study Date: 10/09/2023 09:04 AM  Age: 43 yrs  Gender: Female  Patient Location: [facility]  Reason For Study: Hypertension (HTN)  Ordering Physician: PAVEL LOPEZ  Performed By: Natanael Talavera RDCS     BSA: 2.1 m2  Height: 67 in  Weight: 219 lb  HR: 103  BP: 176/112 mmHg  ______________________________________________________________________________  Procedure  Complete Portable Echo Adult.  ______________________________________________________________________________  Interpretation Summary  Global and regional left ventricular function is normal with an EF of 60-65%.  Right ventricular function, chamber size, wall motion, and thickness are  normal.  The inferior vena cava is normal.  No pericardial effusion is present.  No significant valvular abnormalities present.  There is no prior study for direct comparison.  ______________________________________________________________________________  Left Ventricle  Global and regional left ventricular function is normal with an EF of 60-65%.  Left ventricular size is normal. Mild concentric wall thickening consistent  with left ventricular hypertrophy is present. Left ventricular diastolic  function is normal. No regional wall motion abnormalities are seen.     Right Ventricle  Right ventricular function, chamber size, wall  motion, and thickness are  normal.     Atria  Both atria appear normal.     Mitral Valve  The mitral valve is normal.     Aortic Valve  The valve leaflets are not well visualized. On Doppler interrogation, there is  no significant stenosis or regurgitation.     Tricuspid Valve  The tricuspid valve is normal. Pulmonary artery systolic pressure cannot be  assessed.     Pulmonic Valve  The valve leaflets are not well visualized. On Doppler interrogation, there is  no significant stenosis or regurgitation.     Vessels  The thoracic aorta is normal. The pulmonary artery and bifurcation cannot be  assessed. The inferior vena cava is normal. Sinuses of Valsalva 4 cm.     Pericardium  No pericardial effusion is present.     Miscellaneous  No significant valvular abnormalities present.     Compared to Previous Study  There is no prior study for direct comparison.  ______________________________________________________________________________  MMode/2D Measurements & Calculations  IVSd: 1.3 cm  LVIDd: 4.5 cm  LVIDs: 3.0 cm  LVPWd: 1.2 cm  FS: 34.6 %  LV mass(C)d: 217.0 grams  LV mass(C)dI: 103.3 grams/m2  asc Aorta Diam: 3.6 cm  Asc Ao diam index BSA (cm/m2): 1.7  Asc Ao diam index Ht(cm/m): 2.1     LA Volume Index (BP): 23.6 ml/m2  RWT: 0.55  TAPSE: 1.7 cm     Doppler Measurements & Calculations  MV E max francine: 70.2 cm/sec  MV A max francine: 78.1 cm/sec  MV E/A: 0.90  MV dec slope: 573.3 cm/sec2  MV dec time: 0.12 sec  PA acc time: 0.11 sec  E/E' av.8  Lateral E/e': 8.1  Medial E/e': 15.4     ______________________________________________________________________________  Report approved by: Luis Carlos Oneill 10/09/2023 10:23 AM             Discharge Medications   There are no discharge medications for this patient.    Allergies   Allergies   Allergen Reactions    Haldol [Haloperidol] Anaphylaxis    Codeine Other (See Comments) and Hives     vomiting    Compazine [Prochlorperazine] Unknown    Fentanyl Unknown    Gabapentin  Other (See Comments)     Face twitching    Morphine Other (See Comments) and Hives     Vomiting      Precedex [Dexmedetomidine Hcl In Nacl] Other (See Comments)     Tachy arrhythmias on 2 separate infusion trials    Reglan [Metoclopramide] Unknown    Trazodone Other (See Comments) and Hives     nausea

## 2023-10-10 NOTE — PLAN OF CARE
Discharged to: Home at 1300  Belongings: Sent with pt  AVS (After Visit Summary) discussed with: Patient

## 2023-10-10 NOTE — PROGRESS NOTES
ICU End of Shift Summary. See flowsheets for vital signs and detailed assessment.    Changes this shift: Drowsy throughout shift, intermittently disoriented to time. CIWA negative. Obtained order for Toradol x1 for generalized pain. Precedex off. SR 60s - . Normotensive without intervention. Tylenol PRN for Tmax 101.5. Weaned to RA. No BM. Scruggs removed. Due to void. 150 mL present on bladder scan @ 0600. 2x medication patches (placed prior to admission) present on abdomen at beginning of shift. 1 patch removed by writer. Pt refused to allow removal of remaining patch. Pt states that it is an estrogen patch for hormone replacement therapy. MICU aware. Awaiting AM lab draw.     Plan: Continue plan of care. Continue CIWA protocol.

## 2023-10-10 NOTE — PLAN OF CARE
4C Physical Therapy - Per chart review and discussion with OT, Pt ambulating with without AD, SBA-IND, able to step with high knees, no concern for balance or stairs. Plan for OT to continue to follow to address functional mobility as needed. Pt with no skilled inpatient PT needs, Will complete consult and defer evaluation, please reconsult as appropriate if patient has decline in functional mobility requiring further skilled inpatient PT needs. Defer Discharge recommendations to occupational therapy.

## 2023-10-10 NOTE — PROGRESS NOTES
10/10/23 1007   Appointment Info   Signing Clinician's Name / Credentials (OT) Rin Copeland OTR/L   Living Environment   People in Home significant other   Current Living Arrangements apartment   Home Accessibility   (Elevator)   Transportation Anticipated car, drives self   Living Environment Comments lives wtih her boyfriend in an apt. tub/shower combo.   Self-Care   Usual Activity Tolerance good   Current Activity Tolerance moderate   Equipment Currently Used at Home none   Fall history within last six months no   Activity/Exercise/Self-Care Comment pt. indep. with ADLs/IADLs at baseline including school   Instrumental Activities of Daily Living (IADL)   Previous Responsibilities meal prep;housekeeping;laundry;shopping;medication management;finances;driving;school   General Information   Onset of Illness/Injury or Date of Surgery 10/08/23   Referring Physician Charlene España MD   Patient/Family Therapy Goal Statement (OT) to get home today   Additional Occupational Profile Info/Pertinent History of Current Problem 43 year old female with PMH asthma, BRIAN (not on CPAP), constipation, HTN, HLD, prior CVA (no residual effects), hepatic steatosis, questionable seizure disorder, polysubstance use disorder (cocaine, opioids, BZD, and THC), and multiple psychiatric conditions (bipolar disorder, borderline personality disorder, RANDI, PTSD, multiple suicide attempts) who was admitted on 10/8/2023 for acute encephalopathy.   Existing Precautions/Restrictions seizures   Cognitive Status Examination   Orientation Status orientation to person, place and time   Visual Perception   Visual Impairment/Limitations WFL  (per pt. she wears glasses at baseline)   Pain Assessment   Patient Currently in Pain No   Range of Motion Comprehensive   General Range of Motion bilateral upper extremity ROM WFL   Strength Comprehensive (MMT)   Comment, General Manual Muscle Testing (MMT) Assessment BUE str. WFL   Bed Mobility   Comment (Bed  Mobility) Thelma with HOB elevated, bed rail   Transfers   Transfers No deficits identified   Lower Body Dressing Assessment/Training   [place] Level (Lower Body Dressing) set up   Grooming Assessment/Training   [place] Level (Grooming) set up   Toileting   [place] Level (Toileting) not tested   Comment, (Toileting) Thelma per clinical judgement   Clinical Impression   Criteria for Skilled Therapeutic Interventions Met (OT) Yes, treatment indicated   OT Diagnosis below baseline act. ness.,ADL/IADL   Influenced by the following impairments medical status, decreased act. ness.   OT Problem List-Impairments impacting ADL activity tolerance impaired   ADL comments/analysis below baseline with ADL/act. ness   Assessment of Occupational Performance 1-3 Performance Deficits   Identified Performance Deficits bathing, toileting/dressing, home mgmt.   Planned Therapy Interventions (OT) ADL retraining;home program guidelines;progressive activity/exercise   Clinical Decision Making Complexity (OT) low complexity   Anticipated Equipment Needs Upon Discharge (OT)   (TBD)   Risk & Benefits of therapy have been explained evaluation/treatment results reviewed;care plan/treatment goals reviewed;risks/benefits reviewed;current/potential barriers reviewed;participants voiced agreement with care plan   OT Total Evaluation Time   OT Eval, Low Complexity Minutes (05978) 8   OT Goals   Therapy Frequency (OT) One time eval and treatment   OT Predicted Duration/Target Date for Goal Attainment 10/10/23   OT Discharge Planning   OT Plan OT PLAN: D/c   OT Discharge Recommendation (DC Rec) home with assist   OT Rationale for DC Rec pt. doing well this am. alert and oriented X 4, amb. without AD, no LOB.   OT Brief overview of current status S/I with BADLs and functional mobility, amb. without AD.   Total Session Time   Total Session Time (sum of timed and untimed services) 8

## 2023-10-12 LAB
ATRIAL RATE - MUSE: 95 BPM
BZE UR CFM-MCNC: >8000 NG/ML
BZE/CREAT UR: ABNORMAL
DIASTOLIC BLOOD PRESSURE - MUSE: NORMAL MMHG
INTERPRETATION ECG - MUSE: NORMAL
P AXIS - MUSE: 43 DEGREES
PR INTERVAL - MUSE: 148 MS
QRS DURATION - MUSE: 104 MS
QT - MUSE: 354 MS
QTC - MUSE: 444 MS
R AXIS - MUSE: 24 DEGREES
SYSTOLIC BLOOD PRESSURE - MUSE: NORMAL MMHG
T AXIS - MUSE: 65 DEGREES
VENTRICULAR RATE- MUSE: 95 BPM

## 2023-10-14 LAB
BACTERIA BLD CULT: NO GROWTH
BACTERIA BLD CULT: NO GROWTH

## 2023-11-14 NOTE — PROGRESS NOTES
Cocaine Use Specification:    # Polysubstance Use   # Chronic Cocaine Use Disorder, unknown severity   UDS on admission positive for cocaine use. Unable to fully characterize cocaine use disorder as patient denied any recreational drug use prior to admission or in recent history. Multiple prior urine drug screens positive for cocaine dating back to 2018 consistent with chronic cocaine use. Discussed addiction medicine consult while inpatient however patient declined. Patient referred to mental health for ongoing care of substance use and cocaine use disorder upon discharge.  -  Referral   - Follow-up with PCP  - Consider outpatient addiction medicine referral if patient agreeable    Chantelle Gibbs MD  Internal Medicine PGY-2

## 2024-09-19 ENCOUNTER — APPOINTMENT (OUTPATIENT)
Dept: CT IMAGING | Facility: CLINIC | Age: 44
End: 2024-09-19
Attending: EMERGENCY MEDICINE

## 2024-09-19 ENCOUNTER — HOSPITAL ENCOUNTER (EMERGENCY)
Facility: CLINIC | Age: 44
Discharge: HOME | End: 2024-09-19
Attending: EMERGENCY MEDICINE | Admitting: EMERGENCY MEDICINE

## 2024-09-19 VITALS
HEART RATE: 78 BPM | SYSTOLIC BLOOD PRESSURE: 165 MMHG | RESPIRATION RATE: 16 BRPM | WEIGHT: 207.8 LBS | DIASTOLIC BLOOD PRESSURE: 119 MMHG | TEMPERATURE: 98.2 F | OXYGEN SATURATION: 98 % | BODY MASS INDEX: 33.4 KG/M2 | HEIGHT: 66 IN

## 2024-09-19 DIAGNOSIS — R11.2 NAUSEA AND VOMITING, UNSPECIFIED VOMITING TYPE: ICD-10-CM

## 2024-09-19 DIAGNOSIS — R10.9 ABDOMINAL PAIN, UNSPECIFIED ABDOMINAL LOCATION: ICD-10-CM

## 2024-09-19 LAB
ALBUMIN SERPL BCG-MCNC: 4.7 G/DL (ref 3.5–5.2)
ALBUMIN UR-MCNC: NEGATIVE MG/DL
ALP SERPL-CCNC: 87 U/L (ref 40–150)
ALT SERPL W P-5'-P-CCNC: 29 U/L (ref 0–50)
ANION GAP SERPL CALCULATED.3IONS-SCNC: 19 MMOL/L (ref 7–15)
APPEARANCE UR: CLEAR
AST SERPL W P-5'-P-CCNC: 37 U/L (ref 0–45)
BASOPHILS # BLD AUTO: 0.1 10E3/UL (ref 0–0.2)
BASOPHILS NFR BLD AUTO: 1 %
BILIRUB SERPL-MCNC: 0.3 MG/DL
BILIRUB UR QL STRIP: NEGATIVE
BUN SERPL-MCNC: 10 MG/DL (ref 6–20)
CALCIUM SERPL-MCNC: 10 MG/DL (ref 8.8–10.4)
CHLORIDE SERPL-SCNC: 100 MMOL/L (ref 98–107)
COLOR UR AUTO: ABNORMAL
CREAT SERPL-MCNC: 1.11 MG/DL (ref 0.51–0.95)
EGFRCR SERPLBLD CKD-EPI 2021: 62 ML/MIN/1.73M2
EOSINOPHIL # BLD AUTO: 0.3 10E3/UL (ref 0–0.7)
EOSINOPHIL NFR BLD AUTO: 2 %
ERYTHROCYTE [DISTWIDTH] IN BLOOD BY AUTOMATED COUNT: 14.2 % (ref 10–15)
FLUAV RNA SPEC QL NAA+PROBE: NEGATIVE
FLUBV RNA RESP QL NAA+PROBE: NEGATIVE
GLUCOSE SERPL-MCNC: 112 MG/DL (ref 70–99)
GLUCOSE UR STRIP-MCNC: NEGATIVE MG/DL
HCO3 SERPL-SCNC: 20 MMOL/L (ref 22–29)
HCT VFR BLD AUTO: 38 % (ref 35–47)
HGB BLD-MCNC: 13 G/DL (ref 11.7–15.7)
HGB UR QL STRIP: NEGATIVE
HYALINE CASTS: 5 /LPF
IMM GRANULOCYTES # BLD: 0.1 10E3/UL
IMM GRANULOCYTES NFR BLD: 0 %
KETONES UR STRIP-MCNC: NEGATIVE MG/DL
LEUKOCYTE ESTERASE UR QL STRIP: NEGATIVE
LIPASE SERPL-CCNC: 36 U/L (ref 13–60)
LYMPHOCYTES # BLD AUTO: 2.9 10E3/UL (ref 0.8–5.3)
LYMPHOCYTES NFR BLD AUTO: 21 %
MCH RBC QN AUTO: 29.8 PG (ref 26.5–33)
MCHC RBC AUTO-ENTMCNC: 34.2 G/DL (ref 31.5–36.5)
MCV RBC AUTO: 87 FL (ref 78–100)
MONOCYTES # BLD AUTO: 1.2 10E3/UL (ref 0–1.3)
MONOCYTES NFR BLD AUTO: 8 %
MUCOUS THREADS #/AREA URNS LPF: PRESENT /LPF
NEUTROPHILS # BLD AUTO: 9.7 10E3/UL (ref 1.6–8.3)
NEUTROPHILS NFR BLD AUTO: 68 %
NITRATE UR QL: NEGATIVE
NRBC # BLD AUTO: 0 10E3/UL
NRBC BLD AUTO-RTO: 0 /100
PH UR STRIP: 6 [PH] (ref 5–7)
PLATELET # BLD AUTO: 459 10E3/UL (ref 150–450)
POTASSIUM SERPL-SCNC: 3.4 MMOL/L (ref 3.4–5.3)
PROT SERPL-MCNC: 8.1 G/DL (ref 6.4–8.3)
RBC # BLD AUTO: 4.36 10E6/UL (ref 3.8–5.2)
RBC URINE: <1 /HPF
RSV RNA SPEC NAA+PROBE: NEGATIVE
SARS-COV-2 RNA RESP QL NAA+PROBE: NEGATIVE
SODIUM SERPL-SCNC: 139 MMOL/L (ref 135–145)
SP GR UR STRIP: 1.01 (ref 1–1.03)
SQUAMOUS EPITHELIAL: <1 /HPF
UROBILINOGEN UR STRIP-MCNC: NORMAL MG/DL
WBC # BLD AUTO: 14.2 10E3/UL (ref 4–11)
WBC URINE: 5 /HPF

## 2024-09-19 PROCEDURE — 250N000011 HC RX IP 250 OP 636: Performed by: EMERGENCY MEDICINE

## 2024-09-19 PROCEDURE — 85025 COMPLETE CBC W/AUTO DIFF WBC: CPT | Performed by: EMERGENCY MEDICINE

## 2024-09-19 PROCEDURE — 96361 HYDRATE IV INFUSION ADD-ON: CPT | Performed by: EMERGENCY MEDICINE

## 2024-09-19 PROCEDURE — 96374 THER/PROPH/DIAG INJ IV PUSH: CPT | Mod: 59 | Performed by: EMERGENCY MEDICINE

## 2024-09-19 PROCEDURE — 83690 ASSAY OF LIPASE: CPT | Performed by: EMERGENCY MEDICINE

## 2024-09-19 PROCEDURE — 74177 CT ABD & PELVIS W/CONTRAST: CPT

## 2024-09-19 PROCEDURE — 74177 CT ABD & PELVIS W/CONTRAST: CPT | Mod: 26 | Performed by: RADIOLOGY

## 2024-09-19 PROCEDURE — 99284 EMERGENCY DEPT VISIT MOD MDM: CPT | Performed by: EMERGENCY MEDICINE

## 2024-09-19 PROCEDURE — 36415 COLL VENOUS BLD VENIPUNCTURE: CPT | Performed by: EMERGENCY MEDICINE

## 2024-09-19 PROCEDURE — 80053 COMPREHEN METABOLIC PANEL: CPT | Performed by: EMERGENCY MEDICINE

## 2024-09-19 PROCEDURE — 258N000003 HC RX IP 258 OP 636: Performed by: EMERGENCY MEDICINE

## 2024-09-19 PROCEDURE — 99285 EMERGENCY DEPT VISIT HI MDM: CPT | Mod: 25 | Performed by: EMERGENCY MEDICINE

## 2024-09-19 PROCEDURE — 96375 TX/PRO/DX INJ NEW DRUG ADDON: CPT | Performed by: EMERGENCY MEDICINE

## 2024-09-19 PROCEDURE — 81001 URINALYSIS AUTO W/SCOPE: CPT | Performed by: EMERGENCY MEDICINE

## 2024-09-19 PROCEDURE — 87637 SARSCOV2&INF A&B&RSV AMP PRB: CPT | Performed by: EMERGENCY MEDICINE

## 2024-09-19 PROCEDURE — 96376 TX/PRO/DX INJ SAME DRUG ADON: CPT | Performed by: EMERGENCY MEDICINE

## 2024-09-19 RX ORDER — OXYCODONE HYDROCHLORIDE 10 MG/1
10 TABLET ORAL ONCE
Status: DISCONTINUED | OUTPATIENT
Start: 2024-09-19 | End: 2024-09-19

## 2024-09-19 RX ORDER — IOPAMIDOL 755 MG/ML
127 INJECTION, SOLUTION INTRAVASCULAR ONCE
Status: COMPLETED | OUTPATIENT
Start: 2024-09-19 | End: 2024-09-19

## 2024-09-19 RX ORDER — ONDANSETRON 2 MG/ML
4 INJECTION INTRAMUSCULAR; INTRAVENOUS ONCE
Status: COMPLETED | OUTPATIENT
Start: 2024-09-19 | End: 2024-09-19

## 2024-09-19 RX ORDER — KETOROLAC TROMETHAMINE 15 MG/ML
15 INJECTION, SOLUTION INTRAMUSCULAR; INTRAVENOUS ONCE
Status: COMPLETED | OUTPATIENT
Start: 2024-09-19 | End: 2024-09-19

## 2024-09-19 RX ORDER — HYDROMORPHONE HYDROCHLORIDE 1 MG/ML
0.5 INJECTION, SOLUTION INTRAMUSCULAR; INTRAVENOUS; SUBCUTANEOUS ONCE
Status: COMPLETED | OUTPATIENT
Start: 2024-09-19 | End: 2024-09-19

## 2024-09-19 RX ORDER — ONDANSETRON 4 MG/1
4 TABLET, ORALLY DISINTEGRATING ORAL EVERY 8 HOURS PRN
Qty: 10 TABLET | Refills: 0 | Status: SHIPPED | OUTPATIENT
Start: 2024-09-19

## 2024-09-19 RX ADMIN — ONDANSETRON 4 MG: 2 INJECTION INTRAMUSCULAR; INTRAVENOUS at 11:27

## 2024-09-19 RX ADMIN — SODIUM CHLORIDE 1000 ML: 9 INJECTION, SOLUTION INTRAVENOUS at 10:46

## 2024-09-19 RX ADMIN — IOPAMIDOL 127 ML: 755 INJECTION, SOLUTION INTRAVENOUS at 12:23

## 2024-09-19 RX ADMIN — HYDROMORPHONE HYDROCHLORIDE 1 MG: 1 INJECTION, SOLUTION INTRAMUSCULAR; INTRAVENOUS; SUBCUTANEOUS at 13:43

## 2024-09-19 RX ADMIN — HYDROMORPHONE HYDROCHLORIDE 0.5 MG: 1 INJECTION, SOLUTION INTRAMUSCULAR; INTRAVENOUS; SUBCUTANEOUS at 10:52

## 2024-09-19 RX ADMIN — KETOROLAC TROMETHAMINE 15 MG: 15 INJECTION, SOLUTION INTRAMUSCULAR; INTRAVENOUS at 13:43

## 2024-09-19 RX ADMIN — HYDROMORPHONE HYDROCHLORIDE 1 MG: 1 INJECTION, SOLUTION INTRAMUSCULAR; INTRAVENOUS; SUBCUTANEOUS at 11:26

## 2024-09-19 RX ADMIN — ONDANSETRON 4 MG: 2 INJECTION INTRAMUSCULAR; INTRAVENOUS at 10:46

## 2024-09-19 ASSESSMENT — ACTIVITIES OF DAILY LIVING (ADL)
ADLS_ACUITY_SCORE: 38

## 2024-09-19 ASSESSMENT — COLUMBIA-SUICIDE SEVERITY RATING SCALE - C-SSRS
1. IN THE PAST MONTH, HAVE YOU WISHED YOU WERE DEAD OR WISHED YOU COULD GO TO SLEEP AND NOT WAKE UP?: NO
6. HAVE YOU EVER DONE ANYTHING, STARTED TO DO ANYTHING, OR PREPARED TO DO ANYTHING TO END YOUR LIFE?: NO
2. HAVE YOU ACTUALLY HAD ANY THOUGHTS OF KILLING YOURSELF IN THE PAST MONTH?: NO

## 2024-09-19 NOTE — ED TRIAGE NOTES
Pt ambulatory from home with abdominal pain, N/V/D since yesterday. Hx of multiple abdominal surgeries.     Triage Assessment (Adult)       Row Name 09/19/24 1014          Triage Assessment    Airway WDL WDL        Respiratory WDL    Respiratory WDL WDL        Cardiac WDL    Cardiac WDL WDL        Peripheral/Neurovascular WDL    Peripheral Neurovascular WDL WDL        Cognitive/Neuro/Behavioral WDL    Cognitive/Neuro/Behavioral WDL WDL        [place] Coma Scale    Best Eye Response 4-->(E4) spontaneous     Best Motor Response 6-->(M6) obeys commands     Best Verbal Response 5-->(V5) oriented     [place] Coma Scale Score 15

## 2024-09-19 NOTE — ED PROVIDER NOTES
ED Provider Note  [facility]      History     Chief Complaint   Patient presents with    Abdominal Pain    Nausea, Vomiting, & Diarrhea     HPI  Shanda Power is a 44 year old female with a past medical history significant for polysubstance abuse, bowel obstructions, prior abdominal surgeries including cholecystectomy and bilateral oophorectomy & hysterectomy, TBI, HTN, BPD and bipolar 1 disorder who presents to the Emergency Department for evaluation of abdominal pain, nausea, vomiting and diarrhea. She reports that a few days ago she developed right sided abdominal pain which has been persistent and throbbing. Then she later developed diarrhea and yesterday had nausea with vomiting. She tried zofran and tylenol without relief. No fevers. No changes in urination. No known sick contacts.       Past Medical History  Past Medical History:   Diagnosis Date    Cystic fibrosis (H)     Depressive disorder     Insomnia     Kidney stone     PTSD (post-traumatic stress disorder)     TIA (transient ischemic attack)      Past Surgical History:   Procedure Laterality Date    ABDOMEN SURGERY      ABDOMEN SURGERY      removal of shrapnel    ABDOMEN SURGERY      removal of adhesions    BLADDER SURGERY      CHOLECYSTECTOMY      CHOLECYSTECTOMY      CHOLECYSTECTOMY Bilateral 2011    COLONOSCOPY N/A 4/22/2023    Procedure: Colonoscopy;  Surgeon: Omid Pierce MD;  Location: [facility] OR    GYN SURGERY      fibroids, endometriosis, torsion, adhesion removal    HYSTERECTOMY      HYSTERECTOMY      IR LUMBAR PUNCTURE  7/6/2020    IR LUMBAR PUNCTURE  11/10/2020    IR LUMBAR PUNCTURE  11/29/2020    LEFT OOPHORECTOMY      SLING BLADDER SUSPENSION WITH FASCIA EILEEN       amitriptyline (ELAVIL) 50 MG tablet  aspirin (ASA) 81 MG chewable tablet  atorvastatin (LIPITOR) 40 MG tablet  benzonatate (TESSALON) 100 MG capsule  cloNIDine (CATAPRES) 0.2 MG tablet  estradiol (VIVELLE-DOT) 0.1 MG/24HR BIW  patch  fluticasone (FLONASE) 50 MCG/ACT nasal spray  guaiFENesin-dextromethorphan (ROBITUSSIN DM) 100-10 MG/5ML syrup  HYDROmorphone (DILAUDID) 2 MG tablet  hydrOXYzine (ATARAX) 50 MG tablet  lamoTRIgine (LAMICTAL) 25 MG tablet  levETIRAcetam (KEPPRA) 500 MG tablet  losartan-hydrochlorothiazide (HYZAAR) 100-25 MG tablet  methocarbamol (ROBAXIN) 750 MG tablet  montelukast (SINGULAIR) 10 MG tablet  multivitamin CF FORMULA (CHOICEFUL) capsule  OLANZapine (ZYPREXA) 7.5 MG tablet  omeprazole (PRILOSEC) 40 MG DR capsule  ondansetron (ZOFRAN ODT) 4 MG ODT tab  rimegepant (NURTEC) 75 MG ODT tablet  simvastatin (ZOCOR) 20 MG tablet  sulfamethoxazole-trimethoprim (BACTRIM DS) 800-160 MG tablet  thiamine (B-1) 100 MG tablet  vancomycin (VANCOCIN) 125 MG capsule  zolpidem (AMBIEN) 10 MG tablet      Allergies   Allergen Reactions    Haldol [Haloperidol] Anaphylaxis    Codeine      Hives, vomiting     Codeine Other (See Comments) and Hives     vomiting    Compazine [Prochlorperazine]     Compazine [Prochlorperazine] Unknown    Dexmedetomidine Hcl In Nacl Other (See Comments)     Tachy arrhythmias on 2 separate infusion trials     Fentanyl     Fentanyl Unknown    Gabapentin Other (See Comments)     Face twitching    Morphine      Hives, vomiting     Morphine Other (See Comments) and Hives     Vomiting      Neurontin [Gabapentin] Other (See Comments)     Facial twitching    Precedex [Dexmedetomidine Hcl In Nacl] Other (See Comments)     Tachy arrhythmias on 2 separate infusion trials    Reglan [Metoclopramide]     Reglan [Metoclopramide] Unknown    Trazodone Nausea and Hives    Trazodone Other (See Comments) and Hives     nausea     Family History  Family History   Problem Relation Age of Onset    No Known Problems Mother     Kidney failure Father     Hypertension Father     Pancreatic Cancer Sister      Social History   Social History     Tobacco Use    Smoking status: Every Day     Types: Vaping Device    Smokeless tobacco: Never  "   Tobacco comments:     3-4 per day   Substance Use Topics    Alcohol use: Not Currently    Drug use: Not Currently     Types: Cocaine, Marijuana     Comment: In treatment      A medically appropriate review of systems was performed with pertinent positives and negatives noted in the HPI, and all other systems negative.    Physical Exam   BP: (!) 165/119  Pulse: 78  Temp: 98.2  F (36.8  C)  Resp: 16  Height: 167.6 cm (5' 6\")  Weight: 94.3 kg (207 lb 12.8 oz)  SpO2: 98 %  Physical Exam  Constitutional:       General: She is not in acute distress.     Appearance: She is not toxic-appearing.   HENT:      Head: Normocephalic and atraumatic.   Eyes:      Conjunctiva/sclera: Conjunctivae normal.      Pupils: Pupils are equal, round, and reactive to light.   Cardiovascular:      Rate and Rhythm: Normal rate and regular rhythm.      Heart sounds: No murmur heard.     No gallop.   Pulmonary:      Effort: Pulmonary effort is normal. No respiratory distress.      Breath sounds: No wheezing or rales.   Abdominal:      General: There is no distension.      Palpations: Abdomen is soft.      Tenderness: There is abdominal tenderness. There is right CVA tenderness. There is no left CVA tenderness or guarding.   Musculoskeletal:         General: Normal range of motion.      Cervical back: Normal range of motion.   Skin:     General: Skin is warm and dry.   Neurological:      General: No focal deficit present.      Mental Status: She is alert and oriented to person, place, and time.           ED Course, Procedures, & Data      Procedures                 Results for orders placed or performed during the hospital encounter of 09/19/24   CT Abdomen Pelvis w Contrast     Status: None    Narrative    EXAM: CT ABDOMEN PELVIS W CONTRAST 9/19/2024 12:36 PM    HISTORY: 44 years Female right sided abdominal pain, vomiting, history  of SBO.    COMPARISON: CT chest/abdomen/pelvis 10/8/2023.    TECHNIQUE: Axial CT images from the lung bases " through the symphysis  pubis were obtained with IV contrast. Coronal and sagittal reformats  provided. Contrast dose: [id number]:127mL.    FINDINGS:    LINES/TUBES: None.    LUNG BASES: Bilateral dependent atelectasis..    HEPATIC: No suspicious focal hepatic masses or lesions.    BILIARY: Cholecystectomy. No intra or extrahepatic biliary dilatation.    SPLEEN: Normal size. No focal lesions. Small accessory splenules.    PANCREAS: No mass or peripancreatic fluid.    ADRENALS: Unremarkable.    RENAL: No hydronephrosis, calculi, or mass.    URINARY BLADDER: Unremarkable.    PELVIC ORGANS: Hysterectomy. No pelvic masses.    GASTROINTESTINAL: The distal esophagus, stomach, and duodenum appear  unremarkable. Small and large bowel are normal in caliber and without  abnormal wall thickening. No portal venous gas or pneumatosis. Colonic  diverticulosis without diverticulitis. Unremarkable appendix.    MESENTERY/PERITONEUM: No ascites or pneumoperitoneum.    LYMPH NODES: No lymphadenopathy.    VASCULAR: Major abdominal vessels appear patent. Mild atherosclerotic  vascular calcifications.    MUSCULOSKELETAL: No suspicious bony lesions. No acute osseous  abnormality. Unremarkable soft tissues.      Impression    IMPRESSION:   1.  No acute findings in the abdomen or pelvis to explain the  patient's symptoms.    I have personally reviewed the examination and initial interpretation  and I agree with the findings.    JAMILA SLATER MD         SYSTEM ID:  [id number]   Comprehensive metabolic panel     Status: Abnormal   Result Value Ref Range    Sodium 139 135 - 145 mmol/L    Potassium 3.4 3.4 - 5.3 mmol/L    Carbon Dioxide (CO2) 20 (L) 22 - 29 mmol/L    Anion Gap 19 (H) 7 - 15 mmol/L    Urea Nitrogen 10.0 6.0 - 20.0 mg/dL    Creatinine 1.11 (H) 0.51 - 0.95 mg/dL    GFR Estimate 62 >60 mL/min/1.73m2    Calcium 10.0 8.8 - 10.4 mg/dL    Chloride 100 98 - 107 mmol/L    Glucose 112 (H) 70 - 99 mg/dL    Alkaline Phosphatase 87 40 -  150 U/L    AST 37 0 - 45 U/L    ALT 29 0 - 50 U/L    Protein Total 8.1 6.4 - 8.3 g/dL    Albumin 4.7 3.5 - 5.2 g/dL    Bilirubin Total 0.3 <=1.2 mg/dL   Lipase     Status: Normal   Result Value Ref Range    Lipase 36 13 - 60 U/L   UA with Microscopic reflex to Culture     Status: Abnormal    Specimen: Urine, Midstream   Result Value Ref Range    Color Urine Straw Colorless, Straw, Light Yellow, Yellow    Appearance Urine Clear Clear    Glucose Urine Negative Negative mg/dL    Bilirubin Urine Negative Negative    Ketones Urine Negative Negative mg/dL    Specific Gravity Urine 1.009 1.003 - 1.035    Blood Urine Negative Negative    pH Urine 6.0 5.0 - 7.0    Protein Albumin Urine Negative Negative mg/dL    Urobilinogen Urine Normal Normal, 2.0 mg/dL    Nitrite Urine Negative Negative    Leukocyte Esterase Urine Negative Negative    Mucus Urine Present (A) None Seen /LPF    RBC Urine <1 <=2 /HPF    WBC Urine 5 <=5 /HPF    Squamous Epithelials Urine <1 <=1 /HPF    Hyaline Casts Urine 5 (H) <=2 /LPF    Narrative    Urine Culture not indicated   Asymptomatic Influenza A/B, RSV, & SARS-CoV2 PCR (COVID-19) Nose     Status: Normal    Specimen: Nose; Swab   Result Value Ref Range    Influenza A PCR Negative Negative    Influenza B PCR Negative Negative    RSV PCR Negative Negative    SARS CoV2 PCR Negative Negative    Narrative    Testing was performed using the Xpert Xpress CoV2/Flu/RSV Assay on the [employer] GeneXpert Instrument. This test should be ordered for the detection of SARS-CoV2, influenza, and RSV viruses in individuals with signs and symptoms of respiratory tract infection. This test is for in vitro diagnostic use under the [place] FDA for laboratories certified under CLIA to perform high or moderate complexity testing. This test has been [place] FDA cleared. A negative result does not rule out the presence of PCR inhibitors in the specimen or target RNA in concentration below the limit of detection for the assay. If only one  viral target is positive but coinfection with multiple targets is suspected, the sample should be re-tested with another FDA cleared, approved, or authorized test, if coninfection would change clinical management. This test was validated by the [employer] These laboratories are certified under the Clinical Laboratory Improvement Amendments of 1988 (CLIA-88) as qualified to perfom high complexity laboratory testing.   CBC with platelets and differential     Status: Abnormal   Result Value Ref Range    WBC Count 14.2 (H) 4.0 - 11.0 10e3/uL    RBC Count 4.36 3.80 - 5.20 10e6/uL    Hemoglobin 13.0 11.7 - 15.7 g/dL    Hematocrit 38.0 35.0 - 47.0 %    MCV 87 78 - 100 fL    MCH 29.8 26.5 - 33.0 pg    MCHC 34.2 31.5 - 36.5 g/dL    RDW 14.2 10.0 - 15.0 %    Platelet Count 459 (H) 150 - 450 10e3/uL    % Neutrophils 68 %    % Lymphocytes 21 %    % Monocytes 8 %    % Eosinophils 2 %    % Basophils 1 %    % Immature Granulocytes 0 %    NRBCs per 100 WBC 0 <1 /100    Absolute Neutrophils 9.7 (H) 1.6 - 8.3 10e3/uL    Absolute Lymphocytes 2.9 0.8 - 5.3 10e3/uL    Absolute Monocytes 1.2 0.0 - 1.3 10e3/uL    Absolute Eosinophils 0.3 0.0 - 0.7 10e3/uL    Absolute Basophils 0.1 0.0 - 0.2 10e3/uL    Absolute Immature Granulocytes 0.1 <=0.4 10e3/uL    Absolute NRBCs 0.0 10e3/uL   CBC with platelets differential     Status: Abnormal    Narrative    The following orders were created for panel order CBC with platelets differential.  Procedure                               Abnormality         Status                     ---------                               -----------         ------                     CBC with platelets and d...[[id number]]  Abnormal            Final result                 Please view results for these tests on the individual orders.     Medications   sodium chloride 0.9% BOLUS 1,000 mL (0 mLs Intravenous Stopped 9/19/24 1150)   ondansetron (ZOFRAN) injection 4 mg (4 mg Intravenous $Given 9/19/24 1046)    HYDROmorphone (PF) (DILAUDID) injection 0.5 mg (0.5 mg Intravenous $Given 9/19/24 1052)   ondansetron (ZOFRAN) injection 4 mg (4 mg Intravenous $Given 9/19/24 1127)   HYDROmorphone (DILAUDID) injection 1 mg (1 mg Intravenous $Given 9/19/24 1126)   iopamidol (ISOVUE-370) solution 127 mL (127 mLs Intravenous $Given 9/19/24 1223)   sodium chloride (PF) 0.9% PF flush 82 mL (82 mLs Intravenous $Given 9/19/24 1223)   HYDROmorphone (DILAUDID) injection 1 mg (1 mg Intravenous $Given 9/19/24 1343)   ketorolac (TORADOL) injection 15 mg (15 mg Intravenous $Given 9/19/24 1343)     Labs Ordered and Resulted from Time of ED Arrival to Time of ED Departure   COMPREHENSIVE METABOLIC PANEL - Abnormal       Result Value    Sodium 139      Potassium 3.4      Carbon Dioxide (CO2) 20 (*)     Anion Gap 19 (*)     Urea Nitrogen 10.0      Creatinine 1.11 (*)     GFR Estimate 62      Calcium 10.0      Chloride 100      Glucose 112 (*)     Alkaline Phosphatase 87      AST 37      ALT 29      Protein Total 8.1      Albumin 4.7      Bilirubin Total 0.3     ROUTINE UA WITH MICROSCOPIC REFLEX TO CULTURE - Abnormal    Color Urine Straw      Appearance Urine Clear      Glucose Urine Negative      Bilirubin Urine Negative      Ketones Urine Negative      Specific Gravity Urine 1.009      Blood Urine Negative      pH Urine 6.0      Protein Albumin Urine Negative      Urobilinogen Urine Normal      Nitrite Urine Negative      Leukocyte Esterase Urine Negative      Mucus Urine Present (*)     RBC Urine <1      WBC Urine 5      Squamous Epithelials Urine <1      Hyaline Casts Urine 5 (*)    CBC WITH PLATELETS AND DIFFERENTIAL - Abnormal    WBC Count 14.2 (*)     RBC Count 4.36      Hemoglobin 13.0      Hematocrit 38.0      MCV 87      MCH 29.8      MCHC 34.2      RDW 14.2      Platelet Count 459 (*)     % Neutrophils 68      % Lymphocytes 21      % Monocytes 8      % Eosinophils 2      % Basophils 1      % Immature Granulocytes 0      NRBCs per 100  WBC 0      Absolute Neutrophils 9.7 (*)     Absolute Lymphocytes 2.9      Absolute Monocytes 1.2      Absolute Eosinophils 0.3      Absolute Basophils 0.1      Absolute Immature Granulocytes 0.1      Absolute NRBCs 0.0     LIPASE - Normal    Lipase 36     INFLUENZA A/B, RSV, & SARS-COV2 PCR - Normal    Influenza A PCR Negative      Influenza B PCR Negative      RSV PCR Negative      SARS CoV2 PCR Negative       CT Abdomen Pelvis w Contrast   Final Result   IMPRESSION:    1.  No acute findings in the abdomen or pelvis to explain the   patient's symptoms.      I have personally reviewed the examination and initial interpretation   and I agree with the findings.      JAMILA SLATER MD            SYSTEM ID:  [id number]             Critical care was not performed.     Medical Decision Making  The patient's presentation was of high complexity (an acute health issue posing potential threat to life or bodily function).    The patient's evaluation involved:  review of external note(s) from 1 sources (ED note)  review of 3+ test result(s) ordered prior to this encounter (cbc, cmp, lipase)  ordering and/or review of 3+ test(s) in this encounter (see separate area of note for details)    The patient's management necessitated high risk (a parenteral controlled substance).    Assessment & Plan    This is a 44-year-old female presenting with abdominal pain with associated vomiting, diarrhea.  She was overall well-appearing, vitals were reassuring.  Exam did show some abdominal tenderness.  I was concern for possible intra-abdominal process so ordered blood work as well as a CT abdomen pelvis.  Viral illness was also considered and a viral swab was ordered.    Blood work did show slight leukocytosis of 14.2.  Creatinine was 1.11 up from 0.93.  UA negative for UTI, lipase normal.  CT abdomen and pelvis did not show acute findings to explain her symptoms.  Patient symptoms could be related to viral illness though she did test  negative for COVID, flu, RSV.  Elevated white blood cell count may be reactive from vomiting.      I have reviewed the nursing notes. I have reviewed the findings, diagnosis, plan and need for follow up with the patient.    New Prescriptions    ONDANSETRON (ZOFRAN ODT) 4 MG ODT TAB    Take 1 tablet (4 mg) by mouth every 8 hours as needed.       Final diagnoses:   Nausea and vomiting, unspecified vomiting type   Abdominal pain, unspecified abdominal location       Nick Clemons MD  [facility] EMERGENCY DEPARTMENT  9/19/2024        Nick Clemons MD  09/19/24 1657

## 2024-09-19 NOTE — DISCHARGE INSTRUCTIONS
Your blood work in the emergency department was reassuring.  Additionally the CT of your abdomen and pelvis did not show any signs of fluid collections, infections, or other acute issues with your organs like your pancreas or liver.  Your urine did not show signs of a UTI.  Your viral swab was negative for COVID, flu, and RSV; your symptoms may be related to another virus though unfortunately cannot test for all of them.    Please follow-up closely with your primary care doctor.  I have sent a prescription for Zofran to her pharmacy.  If you have new or worsening symptoms such as increased vomiting, increased pain, fevers, or have other new concerns please return to emergency department.

## 2024-11-24 ENCOUNTER — HEALTH MAINTENANCE LETTER (OUTPATIENT)
Age: 44
End: 2024-11-24

## 2025-06-30 NOTE — PHARMACY-ADMISSION MEDICATION HISTORY
"Admission medication history interview status for the 12/24/2017 admission is complete. See Epic admission navigator for allergy information, pharmacy, prior to admission medications and immunization status.     Medication history interview sources:  Patient, [place] chart review, Care Everywhere    Changes made to PTA medication list (reason)  Added:   -Valium (per pt)  -Lunesta (per pt)  *estradiol patch (Per pt, replaces patches Wednesday and Sunday. Pt reported she \"need[s] to replace\" her patch tomorrow, 12/25.)  -hair/nails gummies (per pt)  -ibuprofen (Per pt, takes twice daily- not as needed.)  Deleted:   -Ambien (Pt reported she \"sleepwalks like crazy\" when taking Ambien.)  -ciprofloxacin (Pt does not recall taking this medication recently. Chart review indicates a ciprofloxacin with same sig on original PTA med list was last ordered in 2015. No record of ciprofloxacin per all available Care Everywhere records. Pt denied taking any other antibiotics at home.)  Changed:   -sumatriptan: added \"Take 25 mg by mouth daily as needed.\" (per pt; Pt was unsure of specific mg but reported 1 tablet of \"the smallest dose\" was sufficient to address migraine pain.)    Additional medication history information (including reliability of information, actions taken by pharmacist):  -Pt was able to recall medication strengths, dosing regimens, last doses taken.   -Pt denied other medications, prescription and over-the-counter.  -Allergies confirmed, per pt.  -Home pharmacy updated, per pt.  -Per [facility], pt has documented influenza immunization this season.      Prior to Admission medications    Medication Sig Last Dose Taking? Auth Provider   DiazePAM (VALIUM PO) Take 5 mg by mouth every 8 hours as needed for anxiety 12/23/2017 at Unknown time Yes Unknown, Entered By History   Eszopiclone (LUNESTA PO) Take 2 mg by mouth At Bedtime 12/22/2017 Yes Unknown, Entered By History   estradiol (VIVELLE-DOT) 0.1 MG/24HR BIW patch Place " 1 patch onto the skin twice a week On Wednesdays and Sundays. 12/20/2017 at still placed on abdomen Yes Unknown, Entered By History   IBUPROFEN PO Take 600 mg by mouth 2 times daily 12/23/2017 at PM Yes Unknown, Entered By History   Biotin w/ Vitamins C & E (HAIR SKIN & NAILS GUMMIES PO) Take 2 gummies by mouth per day. 12/23/2017 at Unknown time Yes Unknown, Entered By History   DULoxetine HCl (CYMBALTA PO) Take 60 mg by mouth every evening  12/23/2017 at PM Yes Reported, Patient   oxyCODONE-acetaminophen (PERCOCET) 5-325 MG per tablet Take 1-2 tablets by mouth every 4 hours as needed for pain 12/23/2017 at Unknown time Yes Gerry Lopez MD   SUMAtriptan Succinate (IMITREX PO) Take 25 mg by mouth daily as needed  More than a month at Unknown time  Reported, Patient         Medication history completed by: Allegra Curtis     oral

## (undated) DEVICE — KIT CONNECTOR FOR OLYMPUS ENDOSCOPES DEFENDO 100310

## (undated) DEVICE — ENDO SYSTEM WATER BOTTLE & TUBING W/CO2 FILTER 00711549

## (undated) DEVICE — PAD CHUX UNDERPAD 23X24" 7136

## (undated) DEVICE — SOL WATER IRRIG 1000ML BOTTLE 2F7114

## (undated) DEVICE — WIPE PREMOIST CLEANSING WASHCLOTHS 7988

## (undated) DEVICE — PACK ENDOSCOPY GI CUSTOM UMMC

## (undated) DEVICE — ENDO CAP AND TUBING STERILE FOR ENDOGATOR  100130

## (undated) DEVICE — SUCTION MANIFOLD NEPTUNE 2 SYS 4 PORT 0702-020-000

## (undated) DEVICE — KIT ENDO FIRST STEP DISINFECTANT 200ML W/POUCH EP-4

## (undated) RX ORDER — POTASSIUM CHLORIDE 7.45 MG/ML
INJECTION INTRAVENOUS
Status: DISPENSED
Start: 2023-04-22

## (undated) RX ORDER — FENTANYL CITRATE 50 UG/ML
INJECTION, SOLUTION INTRAMUSCULAR; INTRAVENOUS
Status: DISPENSED
Start: 2023-04-22

## (undated) RX ORDER — PROPOFOL 10 MG/ML
INJECTION, EMULSION INTRAVENOUS
Status: DISPENSED
Start: 2023-04-22